# Patient Record
Sex: FEMALE | Race: WHITE | NOT HISPANIC OR LATINO | Employment: FULL TIME | URBAN - METROPOLITAN AREA
[De-identification: names, ages, dates, MRNs, and addresses within clinical notes are randomized per-mention and may not be internally consistent; named-entity substitution may affect disease eponyms.]

---

## 2018-06-14 ENCOUNTER — APPOINTMENT (OUTPATIENT)
Dept: URGENT CARE | Facility: CLINIC | Age: 33
End: 2018-06-14

## 2018-06-14 ENCOUNTER — APPOINTMENT (OUTPATIENT)
Dept: LAB | Facility: CLINIC | Age: 33
End: 2018-06-14

## 2018-06-14 ENCOUNTER — TRANSCRIBE ORDERS (OUTPATIENT)
Dept: URGENT CARE | Facility: CLINIC | Age: 33
End: 2018-06-14

## 2018-06-14 DIAGNOSIS — Z02.1 PRE-EMPLOYMENT HEALTH SCREENING EXAMINATION: Primary | ICD-10-CM

## 2018-06-14 DIAGNOSIS — Z02.1 PRE-EMPLOYMENT HEALTH SCREENING EXAMINATION: ICD-10-CM

## 2018-06-14 PROCEDURE — 86480 TB TEST CELL IMMUN MEASURE: CPT

## 2018-06-14 PROCEDURE — 36415 COLL VENOUS BLD VENIPUNCTURE: CPT

## 2018-06-16 LAB
ANNOTATION COMMENT IMP: NORMAL
GAMMA INTERFERON BACKGROUND BLD IA-ACNC: 0.05 IU/ML
M TB IFN-G BLD-IMP: NEGATIVE
M TB IFN-G CD4+ BCKGRND COR BLD-ACNC: <0.01 IU/ML
M TB IFN-G CD4+ T-CELLS BLD-ACNC: 0.04 IU/ML
MITOGEN IGNF BLD-ACNC: 7.52 IU/ML
QUANTIFERON-TB GOLD IN TUBE: NORMAL
SERVICE CMNT-IMP: NORMAL

## 2018-07-13 ENCOUNTER — HOSPITAL ENCOUNTER (EMERGENCY)
Facility: HOSPITAL | Age: 33
Discharge: HOME | End: 2018-07-13
Admitting: EMERGENCY MEDICINE
Payer: COMMERCIAL

## 2018-07-13 VITALS
HEART RATE: 51 BPM | TEMPERATURE: 97.2 F | SYSTOLIC BLOOD PRESSURE: 106 MMHG | DIASTOLIC BLOOD PRESSURE: 61 MMHG | RESPIRATION RATE: 18 BRPM | OXYGEN SATURATION: 100 %

## 2018-07-13 DIAGNOSIS — R60.0 EDEMA OF BOTH LEGS: Primary | ICD-10-CM

## 2018-07-13 LAB
ALBUMIN SERPL BCP-MCNC: 3.6 G/DL (ref 3.5–5)
ALP SERPL-CCNC: 57 U/L (ref 46–116)
ALT SERPL W P-5'-P-CCNC: 21 U/L (ref 12–78)
ANION GAP SERPL CALCULATED.3IONS-SCNC: 6 MMOL/L (ref 4–13)
AST SERPL W P-5'-P-CCNC: 13 U/L (ref 5–45)
BASOPHILS # BLD AUTO: 0.01 THOUSANDS/ΜL (ref 0–0.1)
BASOPHILS NFR BLD AUTO: 0 % (ref 0–1)
BILIRUB SERPL-MCNC: 0.3 MG/DL (ref 0.2–1)
BUN SERPL-MCNC: 11 MG/DL (ref 5–25)
CALCIUM SERPL-MCNC: 8.8 MG/DL (ref 8.3–10.1)
CHLORIDE SERPL-SCNC: 106 MMOL/L (ref 100–108)
CO2 SERPL-SCNC: 30 MMOL/L (ref 21–32)
CREAT SERPL-MCNC: 0.92 MG/DL (ref 0.6–1.3)
EOSINOPHIL # BLD AUTO: 0.25 THOUSAND/ΜL (ref 0–0.61)
EOSINOPHIL NFR BLD AUTO: 3 % (ref 0–6)
ERYTHROCYTE [DISTWIDTH] IN BLOOD BY AUTOMATED COUNT: 11.9 % (ref 11.6–15.1)
GFR SERPL CREATININE-BSD FRML MDRD: 83 ML/MIN/1.73SQ M
GLUCOSE SERPL-MCNC: 90 MG/DL (ref 65–140)
HCT VFR BLD AUTO: 37 % (ref 34.8–46.1)
HGB BLD-MCNC: 12.7 G/DL (ref 11.5–15.4)
IMM GRANULOCYTES # BLD AUTO: 0.02 THOUSAND/UL (ref 0–0.2)
IMM GRANULOCYTES NFR BLD AUTO: 0 % (ref 0–2)
LYMPHOCYTES # BLD AUTO: 3.04 THOUSANDS/ΜL (ref 0.6–4.47)
LYMPHOCYTES NFR BLD AUTO: 36 % (ref 14–44)
MCH RBC QN AUTO: 31.2 PG (ref 26.8–34.3)
MCHC RBC AUTO-ENTMCNC: 34.3 G/DL (ref 31.4–37.4)
MCV RBC AUTO: 91 FL (ref 82–98)
MONOCYTES # BLD AUTO: 0.56 THOUSAND/ΜL (ref 0.17–1.22)
MONOCYTES NFR BLD AUTO: 7 % (ref 4–12)
NEUTROPHILS # BLD AUTO: 4.69 THOUSANDS/ΜL (ref 1.85–7.62)
NEUTS SEG NFR BLD AUTO: 54 % (ref 43–75)
NRBC BLD AUTO-RTO: 0 /100 WBCS
PLATELET # BLD AUTO: 188 THOUSANDS/UL (ref 149–390)
PMV BLD AUTO: 10.7 FL (ref 8.9–12.7)
POTASSIUM SERPL-SCNC: 4 MMOL/L (ref 3.5–5.3)
PROT SERPL-MCNC: 6.8 G/DL (ref 6.4–8.2)
RBC # BLD AUTO: 4.07 MILLION/UL (ref 3.81–5.12)
SODIUM SERPL-SCNC: 142 MMOL/L (ref 136–145)
WBC # BLD AUTO: 8.57 THOUSAND/UL (ref 4.31–10.16)

## 2018-07-13 PROCEDURE — 36415 COLL VENOUS BLD VENIPUNCTURE: CPT | Performed by: PHYSICIAN ASSISTANT

## 2018-07-13 PROCEDURE — 99283 EMERGENCY DEPT VISIT LOW MDM: CPT

## 2018-07-13 PROCEDURE — 85025 COMPLETE CBC W/AUTO DIFF WBC: CPT | Performed by: PHYSICIAN ASSISTANT

## 2018-07-13 PROCEDURE — 80053 COMPREHEN METABOLIC PANEL: CPT | Performed by: PHYSICIAN ASSISTANT

## 2018-07-13 RX ORDER — VALACYCLOVIR HYDROCHLORIDE 500 MG/1
500 TABLET, FILM COATED ORAL DAILY
COMMUNITY
End: 2018-11-26 | Stop reason: SDUPTHER

## 2018-07-13 RX ORDER — OMEPRAZOLE 20 MG/1
20 CAPSULE, DELAYED RELEASE ORAL DAILY
COMMUNITY
End: 2018-11-05 | Stop reason: SDUPTHER

## 2018-07-13 RX ORDER — UREA 10 %
800 LOTION (ML) TOPICAL DAILY
COMMUNITY
End: 2021-05-21 | Stop reason: ALTCHOICE

## 2018-07-13 RX ORDER — ALBUTEROL SULFATE 90 UG/1
2 AEROSOL, METERED RESPIRATORY (INHALATION) EVERY 6 HOURS PRN
COMMUNITY
End: 2022-01-14

## 2018-07-13 RX ORDER — FLUTICASONE PROPIONATE 50 MCG
1 SPRAY, SUSPENSION (ML) NASAL AS NEEDED
COMMUNITY

## 2018-07-13 RX ORDER — LORATADINE 10 MG/1
10 CAPSULE, LIQUID FILLED ORAL DAILY
COMMUNITY

## 2018-07-13 NOTE — ED PROVIDER NOTES
History  Chief Complaint   Patient presents with    Leg Swelling     44-year-old female presents the ER with bilateral leg swelling that has worsened over this last week  Patient states that she just started a new job and has been in orientation where she spends more time sitting than usual   Patient states that she is usually very active and also walks in the evening but this week has not been able to  Patient denies any past history of blood clots and denies any redness, warmth, shortness of breath, difficulty breathing, wheezing, chest pain, chest tightness  Prior to Admission Medications   Prescriptions Last Dose Informant Patient Reported? Taking? Loratadine (CLARITIN) 10 MG CAPS   Yes Yes   Sig: Take 10 mg by mouth   albuterol (PROVENTIL HFA,VENTOLIN HFA) 90 mcg/act inhaler   Yes Yes   Sig: Inhale 2 puffs every 6 (six) hours as needed for wheezing   docusate sodium (COLACE) 50 mg capsule   Yes Yes   Sig: Take by mouth 2 (two) times a day   fluticasone (FLONASE) 50 mcg/act nasal spray   Yes Yes   Si spray into each nostril daily   folic acid (FOLVITE) 555 MCG tablet   Yes Yes   Sig: Take 400 mcg by mouth daily   omeprazole (PriLOSEC) 20 mg delayed release capsule   Yes Yes   Sig: Take 20 mg by mouth daily   valACYclovir (VALTREX) 500 mg tablet   Yes Yes   Sig: Take 500 mg by mouth daily      Facility-Administered Medications: None       No past medical history on file  No past surgical history on file  No family history on file  I have reviewed and agree with the history as documented  Social History   Substance Use Topics    Smoking status: Not on file    Smokeless tobacco: Not on file    Alcohol use Not on file        Review of Systems   HENT: Negative  Eyes: Negative  Respiratory: Negative  Cardiovascular: Positive for leg swelling  Negative for chest pain and palpitations  Gastrointestinal: Negative  Genitourinary: Negative  Musculoskeletal: Negative  Neurological: Negative  All other systems reviewed and are negative  Physical Exam  Physical Exam   Constitutional: She is oriented to person, place, and time  Vital signs are normal  She appears well-developed and well-nourished  No distress  HENT:   Head: Normocephalic and atraumatic  Eyes: Conjunctivae and EOM are normal  Pupils are equal, round, and reactive to light  Neck: Normal range of motion  Neck supple  Cardiovascular: Normal rate, regular rhythm, normal heart sounds and intact distal pulses  Pulmonary/Chest: Effort normal and breath sounds normal    Abdominal: Soft  Bowel sounds are normal    Musculoskeletal: Normal range of motion  She exhibits edema (Plus one edema bilaterally, no pitting)  Neurological: She is alert and oriented to person, place, and time  Skin: Skin is warm and dry  Capillary refill takes less than 2 seconds  She is not diaphoretic  No erythema  Psychiatric: She has a normal mood and affect  Her speech is normal and behavior is normal  Judgment and thought content normal    Nursing note and vitals reviewed        Vital Signs  ED Triage Vitals   Temperature Pulse Respirations Blood Pressure SpO2   07/13/18 1800 07/13/18 1759 07/13/18 1759 07/13/18 1759 07/13/18 1759   (!) 97 2 °F (36 2 °C) 64 18 118/64 99 %      Temp src Heart Rate Source Patient Position - Orthostatic VS BP Location FiO2 (%)   -- 07/13/18 1930 07/13/18 1759 07/13/18 1759 --    Monitor Lying Right arm       Pain Score       07/13/18 1935       3           Vitals:    07/13/18 1759 07/13/18 1800 07/13/18 1930   BP: 118/64  106/61   Pulse: 64 64 (!) 51   Patient Position - Orthostatic VS: Lying  Lying       Visual Acuity      ED Medications  Medications - No data to display    Diagnostic Studies  Results Reviewed     Procedure Component Value Units Date/Time    Comprehensive metabolic panel [[id number]] Collected:  07/13/18 1915    Lab Status:  Final result Specimen:  Blood from Arm, Right Updated:  07/13/18 1945     Sodium 142 mmol/L      Potassium 4 0 mmol/L      Chloride 106 mmol/L      CO2 30 mmol/L      Anion Gap 6 mmol/L      BUN 11 mg/dL      Creatinine 0 92 mg/dL      Glucose 90 mg/dL      Calcium 8 8 mg/dL      AST 13 U/L      ALT 21 U/L      Alkaline Phosphatase 57 U/L      Total Protein 6 8 g/dL      Albumin 3 6 g/dL      Total Bilirubin 0 30 mg/dL      eGFR 83 ml/min/1 73sq m     Narrative:         National Kidney Disease Education Program recommendations are as follows:  GFR calculation is accurate only with a steady state creatinine  Chronic Kidney disease less than 60 ml/min/1 73 sq  meters  Kidney failure less than 15 ml/min/1 73 sq  meters  CBC and differential [[id number]] Collected:  07/13/18 1915    Lab Status:  Final result Specimen:  Blood from Arm, Right Updated:  07/13/18 1923     WBC 8 57 Thousand/uL      RBC 4 07 Million/uL      Hemoglobin 12 7 g/dL      Hematocrit 37 0 %      MCV 91 fL      MCH 31 2 pg      MCHC 34 3 g/dL      RDW 11 9 %      MPV 10 7 fL      Platelets 283 Thousands/uL      nRBC 0 /100 WBCs      Neutrophils Relative 54 %      Immat GRANS % 0 %      Lymphocytes Relative 36 %      Monocytes Relative 7 %      Eosinophils Relative 3 %      Basophils Relative 0 %      Neutrophils Absolute 4 69 Thousands/µL      Immature Grans Absolute 0 02 Thousand/uL      Lymphocytes Absolute 3 04 Thousands/µL      Monocytes Absolute 0 56 Thousand/µL      Eosinophils Absolute 0 25 Thousand/µL      Basophils Absolute 0 01 Thousands/µL                  No orders to display              Procedures  Procedures       Phone Contacts  ED Phone Contact    ED Course                               MDM  Number of Diagnoses or Management Options  Edema of both legs: new and requires workup  Diagnosis management comments: Patient advised to try compression stocking and follow up with family doctor         Amount and/or Complexity of Data Reviewed  Clinical lab tests: ordered and reviewed    Patient Progress  Patient progress: stable    CritCare Time    Disposition  Final diagnoses:   Edema of both legs     Time reflects when diagnosis was documented in both MDM as applicable and the Disposition within this note     Time User Action Codes Description Comment    7/13/2018  8:49 PM Zahra Rutherford Add [R60 0] Edema of both legs       ED Disposition     ED Disposition Condition Comment    Discharge  [facility] discharge to home/self care  Condition at discharge: Stable        Follow-up Information     Follow up With Specialties Details Why Mario Cortes U  13 , DO Family Medicine Call For [address], If symptoms worsen [address] [facility]  [phone]            Discharge Medication List as of 7/13/2018  8:50 PM      CONTINUE these medications which have NOT CHANGED    Details   albuterol (PROVENTIL HFA,VENTOLIN HFA) 90 mcg/act inhaler Inhale 2 puffs every 6 (six) hours as needed for wheezing, Historical Med      docusate sodium (COLACE) 50 mg capsule Take by mouth 2 (two) times a day, Historical Med      fluticasone (FLONASE) 50 mcg/act nasal spray 1 spray into each nostril daily, Historical Med      folic acid (FOLVITE) 500 MCG tablet Take 400 mcg by mouth daily, Historical Med      Loratadine (CLARITIN) 10 MG CAPS Take 10 mg by mouth, Historical Med      omeprazole (PriLOSEC) 20 mg delayed release capsule Take 20 mg by mouth daily, Historical Med      valACYclovir (VALTREX) 500 mg tablet Take 500 mg by mouth daily, Historical Med           No discharge procedures on file      ED Provider  Electronically Signed by           Lexy Hwang PA-C  07/23/18 7323

## 2018-07-14 NOTE — DISCHARGE INSTRUCTIONS
Leg Edema   WHAT YOU NEED TO KNOW:   Leg edema is swelling caused by fluid buildup  Your legs may swell if you sit or stand for long periods of time, are pregnant, or are injured  Swelling may also occur if you have heart failure or circulation problems  This means that your heart does not pump blood through your body as it should  DISCHARGE INSTRUCTIONS:   Self-care:   · Elevate your legs:  Raise your legs above the level of your heart as often as you can  This will help decrease swelling and pain  Prop your legs on pillows or blankets to keep them elevated comfortably  · Wear pressure stockings: These tight stockings put pressure on your legs to promote blood flow and prevent blood clots  Wear the stockings during the day  Do not wear them while you sleep  · Apply heat:  Heat helps decrease pain and swelling  Apply heat on the area for 20 to 30 minutes every 2 hours for as many days as directed  · Stay active:  Do not stand or sit for long periods of time  Ask your healthcare provider about the best exercise plan for you  · Eat healthy foods:  Healthy foods include fruits, vegetables, whole-grain breads, low-fat dairy products, beans, lean meats, and fish  Ask if you need to be on a special diet  Limit salt  Salt will make your body hold even more fluid  Follow up with your healthcare provider as directed:  Write down your questions so you remember to ask them during your visits  Contact your healthcare provider if:   · You have a fever or feel more tired than usual     · The veins in your legs look larger than usual  They may look full or bulging  · Your legs itch or feel heavy  · You have red or white areas or sores on your legs  The skin may also appear dimpled or have indentations  · You are gaining weight  · You have trouble moving your ankles  · The swelling does not go away, or other parts of your body swell      · You have questions or concerns about your condition or care   Return to the emergency department if:   · You cannot walk  · You feel faint or confused  · Your skin turns blue or gray  · Your leg feels warm, tender, and painful  It may be swollen and red  · You have chest pain or trouble breathing that is worse when you lie down  · You suddenly feel lightheaded and have trouble breathing  · You have new and sudden chest pain  You may have more pain when you take deep breaths or cough  You may also cough up blood  © 2017 [address]  Information is for End User's use only and may not be sold, redistributed or otherwise used for commercial purposes  All illustrations and images included in CareNotes® are the copyrighted property of [employer] , Inc  or [employer]  The above information is an  only  It is not intended as medical advice for individual conditions or treatments  Talk to your doctor, nurse or pharmacist before following any medical regimen to see if it is safe and effective for you

## 2018-11-05 ENCOUNTER — OFFICE VISIT (OUTPATIENT)
Dept: FAMILY MEDICINE CLINIC | Facility: CLINIC | Age: 33
End: 2018-11-05
Payer: COMMERCIAL

## 2018-11-05 VITALS
WEIGHT: 240.8 LBS | BODY MASS INDEX: 33.71 KG/M2 | HEART RATE: 68 BPM | TEMPERATURE: 97.6 F | SYSTOLIC BLOOD PRESSURE: 110 MMHG | DIASTOLIC BLOOD PRESSURE: 78 MMHG | HEIGHT: 71 IN

## 2018-11-05 DIAGNOSIS — M54.42 CHRONIC LEFT-SIDED LOW BACK PAIN WITH LEFT-SIDED SCIATICA: ICD-10-CM

## 2018-11-05 DIAGNOSIS — M25.552 LEFT HIP PAIN: Primary | ICD-10-CM

## 2018-11-05 DIAGNOSIS — G89.29 CHRONIC LEFT-SIDED LOW BACK PAIN WITH LEFT-SIDED SCIATICA: ICD-10-CM

## 2018-11-05 DIAGNOSIS — K21.9 GASTROESOPHAGEAL REFLUX DISEASE WITHOUT ESOPHAGITIS: ICD-10-CM

## 2018-11-05 PROCEDURE — 99203 OFFICE O/P NEW LOW 30 MIN: CPT | Performed by: NURSE PRACTITIONER

## 2018-11-05 RX ORDER — OMEPRAZOLE 20 MG/1
20 CAPSULE, DELAYED RELEASE ORAL DAILY
Qty: 30 CAPSULE | Refills: 2 | Status: SHIPPED | OUTPATIENT
Start: 2018-11-05 | End: 2018-12-24 | Stop reason: SDUPTHER

## 2018-11-05 NOTE — PROGRESS NOTES
Assessment/Plan   Diagnoses and all orders for this visit:    Left hip pain  -     Ambulatory referral to Pain Management; Future  -     MRI lumbar spine wo contrast; Future    Chronic left-sided low back pain with left-sided sciatica  -     Ambulatory referral to Pain Management; Future  -     MRI lumbar spine wo contrast; Future    Gastroesophageal reflux disease without esophagitis  -     omeprazole (PriLOSEC) 20 mg delayed release capsule; Take 1 capsule (20 mg total) by mouth daily for 30 days        Chief Complaint   Patient presents with   [facility] patient check up, left hip pain and radiates down leg  last 3 digits and foot numb for 4 months  Had x-rays and PT no help  Subjective   Patient ID: Jayashree Kaiser is a 29 y o  female  Vitals:    11/05/18 0808   BP: 110/78   Pulse: 68   Temp: 97 6 °F (36 4 °C)     Krista Michael is here today to establish care with this practice, he states that since July she has been experiencing pain initially in her low back which radiated down her sciatic area of her left buttocks into her left hip pain radiates to knee, she has numbness of 3rd 4th and 5th toes  She has had films and feedings well [facility] for her hip and back which were without abnormality, has been on meloxicam Medrol Dosepaks and has had a course of physical therapy without any change in the pain  She has decreased range of motion of her left hip due to pain, she is limping  She is without decreased strength today, denies any weakness of her leg or giving out feeling  Hip Pain    The incident occurred more than 1 week ago  There was no injury mechanism  The pain is present in the left hip  The quality of the pain is described as stabbing and aching  The pain is at a severity of 7/10  The pain is moderate   The pain has been intermittent (pain increases in left hip with rotation, sciatic pain at times, none today, numbness of left 3rd, 4th, and 5th toes, pins and needleles feeling with long persiods of sitting) since onset  Associated symptoms include a loss of sensation, numbness and tingling  Pertinent negatives include no inability to bear weight, loss of motion or muscle weakness  She reports no foreign bodies present  The symptoms are aggravated by movement  She has tried NSAIDs and rest (physical therapy, has had 1 SI injectiion) for the symptoms  The treatment provided no relief  The following portions of the patient's history were reviewed and updated as appropriate: allergies, past family history, past medical history, past social history, past surgical history and problem list     Review of Systems   Constitutional: Negative  Negative for chills, fatigue and fever  HENT: Negative  Eyes: Negative  Respiratory: Negative  Negative for cough and shortness of breath  Cardiovascular: Negative  Gastrointestinal: Negative  Endocrine: Negative  Genitourinary: Negative  Musculoskeletal: Positive for arthralgias (left hip pain) and gait problem  Negative for joint swelling, myalgias and neck pain  Skin: Negative  Allergic/Immunologic: Negative  Neurological: Positive for tingling and numbness  Hematological: Negative  Psychiatric/Behavioral: Negative  Objective     Physical Exam   Constitutional: She is oriented to person, place, and time  She appears well-developed and well-nourished  No distress  HENT:   Head: Normocephalic and atraumatic  Eyes: Conjunctivae are normal  Right eye exhibits no discharge  Left eye exhibits no discharge  Neck: Normal range of motion  Neck supple  No thyromegaly present  Cardiovascular: Normal rate, regular rhythm, normal heart sounds and intact distal pulses  No murmur heard  Pulmonary/Chest: Effort normal and breath sounds normal  No respiratory distress  Musculoskeletal: She exhibits tenderness  She exhibits no edema  Left hip: She exhibits decreased range of motion and tenderness  She exhibits normal strength, no swelling and no crepitus  Legs:  Decreased ROM of left hip r/t pain   Lymphadenopathy:     She has no cervical adenopathy  Neurological: She is alert and oriented to person, place, and time  Skin: Skin is warm and dry  Capillary refill takes less than 2 seconds  She is not diaphoretic  Psychiatric: She has a normal mood and affect  Her behavior is normal  Judgment and thought content normal    Nursing note and vitals reviewed

## 2018-11-13 ENCOUNTER — OFFICE VISIT (OUTPATIENT)
Dept: PAIN MEDICINE | Facility: CLINIC | Age: 33
End: 2018-11-13
Payer: COMMERCIAL

## 2018-11-13 VITALS
BODY MASS INDEX: 33.6 KG/M2 | HEART RATE: 72 BPM | SYSTOLIC BLOOD PRESSURE: 110 MMHG | DIASTOLIC BLOOD PRESSURE: 78 MMHG | WEIGHT: 240 LBS | HEIGHT: 71 IN

## 2018-11-13 DIAGNOSIS — G89.29 CHRONIC LEFT-SIDED LOW BACK PAIN WITH LEFT-SIDED SCIATICA: ICD-10-CM

## 2018-11-13 DIAGNOSIS — M54.42 CHRONIC LEFT-SIDED LOW BACK PAIN WITH LEFT-SIDED SCIATICA: ICD-10-CM

## 2018-11-13 DIAGNOSIS — M54.16 LUMBAR RADICULOPATHY: Primary | ICD-10-CM

## 2018-11-13 DIAGNOSIS — M25.552 LEFT HIP PAIN: ICD-10-CM

## 2018-11-13 PROCEDURE — 99244 OFF/OP CNSLTJ NEW/EST MOD 40: CPT | Performed by: ANESTHESIOLOGY

## 2018-11-13 RX ORDER — PREDNISONE 10 MG/1
TABLET ORAL
Qty: 21 TABLET | Refills: 0 | Status: SHIPPED | OUTPATIENT
Start: 2018-11-13 | End: 2019-01-29

## 2018-11-13 RX ORDER — BIOTIN 1 MG
1000 TABLET ORAL 3 TIMES DAILY
COMMUNITY
End: 2021-05-21 | Stop reason: ALTCHOICE

## 2018-11-13 NOTE — PROGRESS NOTES
Assessment:  1  Lumbar radiculopathy - Left    2  Chronic left-sided low back pain with left-sided sciatica    3  Left hip pain        Plan: At this point the patient's pain persists despite time, relative rest, activity modification, and nonsteroidal anti-inflammatories  She has undergone a course of physical therapy  She has neurological deficits  Her pain is significantly interfering with her daily living activities  I believe is appropriate to order an MRI of the lumbar spine to rule out any significant etiology of her symptoms  I will start her on a titrating dose of oral prednisone to address any inflammatory component of the patient's pain  She understands she should not take nonsteroidal anti-inflammatories until she is finished with this steroid  If she has any problems or questions she will give us a call  Once we obtain MRI results we will proceed from there  My impressions and treatment recommendations were discussed in detail with the patient who verbalized understanding and had no further questions  Discharge instructions were provided  I personally saw and examined the patient and I agree with the above discussed plan of care  Orders Placed This Encounter   Procedures    MRI lumbar spine without contrast     Standing Status:   Future     Standing Expiration Date:   11/13/2022     Scheduling Instructions: There is no preparation for this test  Please leave your jewelry and valuables at home, wedding rings are the exception  Please bring your insurance cards, a form of photo ID and a list of your medications with you  Arrive 15 minutes prior to your appointment time in order to register  Please bring any prior CT or MRI studies of this area that were not performed at a [facility]  To schedule this appointment, please contact Central Scheduling at [phone]  Order Specific Question:   Is the patient pregnant?      Answer:   Unknown     Order Specific Question:   What is the patient's sedation requirement? Answer:   No Sedation     New Medications Ordered This Visit   Medications    Biotin 1000 MCG tablet     Sig: Take 1,000 mcg by mouth 3 (three) times a day    predniSONE 10 mg tablet     Sig: Take according to titration schedule     Dispense:  21 tablet     Refill:  0       Referred by Clara Mcpherson      History of Present Illness:    Jhon Hauser is a 29 y o  female with 4 month history of left-sided low back and left lower extremity pain and numbness  She is unaware of any clear precipitating event denies any trauma or injury  Her pain is moderate to severe she rates it between 6 to 8/10 on the visual analog scale significant interfering with daily living activities  Pain is nearly constant worse in the morning and night  She notes that physical therapy TENS heat nice provides no relief she did undergo 4 weeks of physical therapy  She describes the pain as sharp in her low back pins and needles down her leg with numbness into her toes she has subjective weakness of the left lower limb  Walking decreases symptoms while lying down, standing, bending, sitting, coughing and sneezing, all exacerbate her symptoms  I have personally reviewed and/or updated the patient's past medical history, past surgical history, family history, social history, current medications, allergies, and vital signs today  Review of Systems:    Review of Systems   Constitutional: Positive for unexpected weight change  Negative for fever  HENT: Negative for trouble swallowing  Eyes: Negative for visual disturbance  Respiratory: Negative for shortness of breath and wheezing  Cardiovascular: Negative for chest pain and palpitations  Gastrointestinal: Negative for constipation, diarrhea, nausea and vomiting  Endocrine: Negative for cold intolerance, heat intolerance and polydipsia  Genitourinary: Negative for difficulty urinating and frequency  Musculoskeletal: Positive for gait problem (Difficulty walking )  Negative for arthralgias, joint swelling and myalgias  Skin: Negative for rash  Neurological: Negative for dizziness, seizures, syncope, weakness and headaches  Hematological: Bruises/bleeds easily  Psychiatric/Behavioral: Negative for dysphoric mood  All other systems reviewed and are negative  Patient Active Problem List   Diagnosis    Lumbar radiculopathy - Left       Past Medical History:   Diagnosis Date    Asthma     GERD (gastroesophageal reflux disease)        Past Surgical History:   Procedure Laterality Date    CERVICAL BIOPSY  W/ LOOP ELECTRODE EXCISION      TONSILECTOMY AND ADNOIDECTOMY      WISDOM TOOTH EXTRACTION         No family history on file  Social History     Occupational History    Not on file  Social History Main Topics    Smoking status: Former Smoker    Smokeless tobacco: Never Used    Alcohol use Not on file    Drug use: Unknown    Sexual activity: Not on file       Current Outpatient Prescriptions on File Prior to Visit   Medication Sig    albuterol (PROVENTIL HFA,VENTOLIN HFA) 90 mcg/act inhaler Inhale 2 puffs every 6 (six) hours as needed for wheezing    docusate sodium (COLACE) 50 mg capsule Take by mouth 2 (two) times a day    fluticasone (FLONASE) 50 mcg/act nasal spray 1 spray into each nostril daily    folic acid (FOLVITE) 835 MCG tablet Take 400 mcg by mouth daily    Loratadine (CLARITIN) 10 MG CAPS Take 10 mg by mouth    omeprazole (PriLOSEC) 20 mg delayed release capsule Take 1 capsule (20 mg total) by mouth daily for 30 days    valACYclovir (VALTREX) 500 mg tablet Take 500 mg by mouth daily     No current facility-administered medications on file prior to visit          Allergies   Allergen Reactions    Doxycycline Edema    Sulfa Antibiotics      Family has allergy she has never taken       Physical Exam:    /78   Pulse 72   Ht 5' 11" (1 803 m)   Wt 109 kg (240 lb)   BMI 33 47 kg/m²     Constitutional: normal, well developed, well nourished, alert, in no distress and non-toxic and no overt pain behavior  and overweight  Eyes: anicteric  HEENT: grossly intact  Neck: supple, symmetric, trachea midline and no masses   Pulmonary:even and unlabored  Cardiovascular:No edema or pitting edema present  Skin:Normal without rashes or lesions and well hydrated  Psychiatric:Mood and affect appropriate  Neurologic:Cranial Nerves II-XII grossly intact  Musculoskeletal:normal and antalgic, difficulty going from sitting to standing to sitting position, no obvious skin lesions or erythema lumbar sacral spine, there is tenderness palpation on the left PSIS negative on the right no greater trochanteric tenderness bilateral no spinous process tenderness, deep tendon reflexes are absent left patella 2+ right patella diminished but symmetrical bilateral Achilles, decreased sensation left L5 distribution to pinwheel compared to the right, no focal motor deficit appreciated the lower limbs positive straight leg raising on the left negative the right negative bilateral Nikko's maneuver    Imaging  MRI LUMBAR SP W/O IV CONTRAST  07/02/15 @ [facility]      Indication: Lumbago  724 2  Backache  724 5  Disorders of sacrum  724 6  Chronic low back pain  Occasional right leg pain  Comparison:None     Findings: Multiple planar MR examination of the lumbar spine performed  The termination of the   conus medullaris at the level of T12  The conus is normal in appearance  L1-L2 interspace: The disc is well maintained  There is no disc bulging or herniation  The spinal   canal is of normal dimensions  The neural foramina are patent  L2-L3 interspace: The disc is well-maintained  There is no disc bulging or herniation  The spinal   canal and neural foramina are patent  L3-L4 interspace: The disc is well maintained  There is no disc bulging or herniation  The spinal   canal is of normal dimensions  The neural foramina are patent  L4-L5 interspace: The disc is well maintained  There is no disc bulging or herniation  The spinal   canal is of normal dimensions  The neural foramina are patent  L5-S1 interspace: There is degenerative disc disease  There is a small broad-based central disc   herniation causing minimal ventral impression on the thecal sac  There is Modic type I marrow   reactive signal at the L5 and S1 endplates  There is mild degenerative facet disease  Conclusion: Degenerative disc disease, small central disc herniation and Modic type I reactive   marrow signal at the L5-S1 level  I have personally reviewed pertinent films in PACS

## 2018-11-16 ENCOUNTER — HOSPITAL ENCOUNTER (OUTPATIENT)
Dept: MRI IMAGING | Facility: HOSPITAL | Age: 33
Discharge: HOME | End: 2018-11-16
Payer: COMMERCIAL

## 2018-11-16 ENCOUNTER — TELEPHONE (OUTPATIENT)
Dept: PAIN MEDICINE | Facility: CLINIC | Age: 33
End: 2018-11-16

## 2018-11-16 DIAGNOSIS — M25.552 LEFT HIP PAIN: ICD-10-CM

## 2018-11-16 DIAGNOSIS — G89.29 CHRONIC LEFT-SIDED LOW BACK PAIN WITH LEFT-SIDED SCIATICA: ICD-10-CM

## 2018-11-16 DIAGNOSIS — M54.42 CHRONIC LEFT-SIDED LOW BACK PAIN WITH LEFT-SIDED SCIATICA: ICD-10-CM

## 2018-11-16 DIAGNOSIS — M51.26 LUMBAR DISC HERNIATION: Primary | ICD-10-CM

## 2018-11-16 PROCEDURE — 72148 MRI LUMBAR SPINE W/O DYE: CPT

## 2018-11-16 NOTE — TELEPHONE ENCOUNTER
Order for Dr Alvarado Garcia in [facility]    Procedure for TFESI in [facility]

## 2018-11-16 NOTE — TELEPHONE ENCOUNTER
Left a detailed message on machine advising of referral to dr Misael Romero  Provided spelling and phone number  Advised pt of order for consuelo  Anticipate a cb from spa surg coordinator to schedule procedure  Please cb with questions or concerns  Provided cb number and office hours

## 2018-11-16 NOTE — TELEPHONE ENCOUNTER
----- Message from Pj Mejia DO sent at 11/16/2018 11:54 AM EST -----  Patient's MRI reveals large disc herniation at L5 S1    I recommend a surgical evaluation and would consider lumbar transforaminal epidural steroid injection, as her pain 1 side versus the other question

## 2018-11-16 NOTE — TELEPHONE ENCOUNTER
S/w pt, advised of above  Per pt,  Pain is worst in her L hip and down the back of her L leg with numbness into her L foot  Pt questioned tfesi, explained procedure as an injection of steroids into the affected area to treat pain along a specific pathway  Allow 3-5 days for the effects of the steroids, this office will fu in 1 week to determine the effectiveness  Advised pt, the procedure may provide significant relief, may need to be repeated to achieve acceptable relief or may fail to provide any relief  Pt verbalized understanding and agreement and requested a referral to a surgeon  Advised pt, will fu w/ SL re: procedure specifics and surgical referral and cb  Pt appreciative       Surg referral   Procedure order

## 2018-11-19 NOTE — TELEPHONE ENCOUNTER
S/w pt and she will be having her physical therapy from [facility] faxed to office  Pt is scheduled for 12/3 pending authorization, pt aware need for , npo 1 hr prior, wear pants without any metal, if become ill/anbtx call to r/s  Pt verbalized understanding

## 2018-11-21 ENCOUNTER — TELEPHONE (OUTPATIENT)
Dept: FAMILY MEDICINE CLINIC | Facility: CLINIC | Age: 33
End: 2018-11-21

## 2018-11-21 ENCOUNTER — OFFICE VISIT (OUTPATIENT)
Dept: OBGYN CLINIC | Facility: CLINIC | Age: 33
End: 2018-11-21
Payer: COMMERCIAL

## 2018-11-21 VITALS
HEART RATE: 70 BPM | BODY MASS INDEX: 33.6 KG/M2 | DIASTOLIC BLOOD PRESSURE: 75 MMHG | HEIGHT: 71 IN | WEIGHT: 240 LBS | SYSTOLIC BLOOD PRESSURE: 116 MMHG

## 2018-11-21 DIAGNOSIS — M51.26 LUMBAR DISC HERNIATION: Primary | ICD-10-CM

## 2018-11-21 DIAGNOSIS — M54.16 LUMBAR RADICULOPATHY: ICD-10-CM

## 2018-11-21 PROCEDURE — 99244 OFF/OP CNSLTJ NEW/EST MOD 40: CPT | Performed by: ORTHOPAEDIC SURGERY

## 2018-11-21 NOTE — PROGRESS NOTES
Patient Name:  Latia Timmons  MRN:  [id number]    Assessment     1  Lumbar disc herniation  Ambulatory referral to Orthopedic Surgery   2  Lumbar radiculopathy - Left         Plan     Large  Central disc herniation L5-S1 with  Associated lumbar radiculopathy  1  Patient does have an epidural steroid injection scheduled for 12/2/18  She would like to proceed with this  I feel that is reasonable  2  Recommend follow-up two weeks after the injection  If symptoms persist consider diskectomy at that time  3  Reviewed signs and symptoms of cauda equina syndrome with the patient and advise she reported immediately to the emergency department if she experiences any of these signs or symptoms  Chief Complaint     Left lower extremity pain      History of the Present Illness     Latia Timmons is a 29 y o  female who reports to the office today for evaluation of her left lower extremity  She has been experiencing low back pain and left lower extremity pain since July of 2018  She denies any injury or trauma  She states pain in the left-sided lumbar spine with radiation distally along the left lower extremity to the toes  She notes associated numbness and tingling in this distribution as well  She notes associated weakness  She does see Dr Troy Neal who ultimately obtained an MRI and scheduled the patient to undergo an epidural steroid injection on 12/3/18  She notes worsening pain with ambulation  She denies bowel or bladder dysfunction  No fevers or chills  Physical Exam     /75   Pulse 70   Ht 5' 11" (1 803 m)   Wt 109 kg (240 lb)   BMI 33 47 kg/m²     Lumbar spine:  No gross deformity  Skin intact  No erythema ecchymosis or swelling  No significant tenderness to palpation midline and paraspinal musculature  Motor intact L2-S1 bilaterally with 5/5 strength  Sensation intact in all distributions bilaterally with the exception of the left lateral foot    Positive straight leg raise on the left  Diminished DTRs on the left compared to the right  Skin warm and well perfused    Eyes:  Anicteric sclerae  Neck:  Supple  Lungs:  Unlabored breathing  Cardiovascular:  Capillary refill is less than 2 seconds  Skin:  Intact without erythema  Neurologic:  Sensation intact to light touch  Psychiatric:  Mood and affect are appropriate  Data Review     I have personally reviewed pertinent films in PACS, and my interpretation follows:    MRI of the lumbar spine reveals a large prominent central disc protrusion at L5-S1 which resulted in moderate to severe spinal stenosis most severe on the left        Past Medical History:   Diagnosis Date    Allergic     Asthma     GERD (gastroesophageal reflux disease)        Past Surgical History:   Procedure Laterality Date    CERVICAL BIOPSY  W/ LOOP ELECTRODE EXCISION      TONSILECTOMY AND ADNOIDECTOMY      WISDOM TOOTH EXTRACTION         Allergies   Allergen Reactions    Doxycycline Edema    Sulfa Antibiotics      Family has allergy she has never taken       Current Outpatient Prescriptions on File Prior to Visit   Medication Sig Dispense Refill    albuterol (PROVENTIL HFA,VENTOLIN HFA) 90 mcg/act inhaler Inhale 2 puffs every 6 (six) hours as needed for wheezing      Biotin 1000 MCG tablet Take 1,000 mcg by mouth 3 (three) times a day      docusate sodium (COLACE) 50 mg capsule Take by mouth 2 (two) times a day      fluticasone (FLONASE) 50 mcg/act nasal spray 1 spray into each nostril daily      folic acid (FOLVITE) 300 MCG tablet Take 400 mcg by mouth daily      Loratadine (CLARITIN) 10 MG CAPS Take 10 mg by mouth      omeprazole (PriLOSEC) 20 mg delayed release capsule Take 1 capsule (20 mg total) by mouth daily for 30 days 30 capsule 2    predniSONE 10 mg tablet Take according to titration schedule 21 tablet 0    valACYclovir (VALTREX) 500 mg tablet Take 500 mg by mouth daily       No current facility-administered medications on file prior to visit  Social History   Substance Use Topics    Smoking status: Former Smoker    Smokeless tobacco: Never Used    Alcohol use Not on file       Family History   Problem Relation Age of Onset    Diabetes Mother     Diabetes Father     Cancer Maternal Grandmother     Esophageal varices Maternal Grandmother     Abnormal EKG Maternal Grandmother     Alcohol abuse Maternal Grandmother     Cirrhosis Maternal Grandmother     Cancer Maternal Grandfather     Heart disease Maternal Grandfather     Stroke Maternal Grandfather     Hypertension Paternal Grandmother     Diabetes Paternal Grandmother     Diabetes Paternal Grandfather     Heart disease Maternal Uncle     Hypertension Paternal Aunt     Diabetes Paternal Aunt     Hypertension Paternal Uncle     Diabetes Paternal Uncle          Review of Systems     As stated in the HPI  All other systems were reviewed and are negative        Scribe Attestation    I,:   Ritesh Levy PA-C am acting as a scribe while in the presence of the attending physician :        I,:   Dinesh Hdez MD personally performed the services described in this documentation    as scribed in my presence :

## 2018-11-26 DIAGNOSIS — B00.9 HERPES: Primary | ICD-10-CM

## 2018-11-26 RX ORDER — VALACYCLOVIR HYDROCHLORIDE 500 MG/1
500 TABLET, FILM COATED ORAL DAILY
Qty: 30 TABLET | Refills: 2 | Status: SHIPPED | OUTPATIENT
Start: 2018-11-26 | End: 2018-12-24 | Stop reason: SDUPTHER

## 2018-12-03 ENCOUNTER — HOSPITAL ENCOUNTER (OUTPATIENT)
Dept: RADIOLOGY | Facility: CLINIC | Age: 33
Discharge: HOME | End: 2018-12-03
Admitting: ANESTHESIOLOGY
Payer: COMMERCIAL

## 2018-12-03 VITALS
DIASTOLIC BLOOD PRESSURE: 79 MMHG | HEART RATE: 76 BPM | RESPIRATION RATE: 20 BRPM | TEMPERATURE: 98.4 F | OXYGEN SATURATION: 98 % | SYSTOLIC BLOOD PRESSURE: 124 MMHG

## 2018-12-03 DIAGNOSIS — M51.26 LUMBAR DISC HERNIATION: ICD-10-CM

## 2018-12-03 PROCEDURE — 64483 NJX AA&/STRD TFRM EPI L/S 1: CPT | Performed by: ANESTHESIOLOGY

## 2018-12-03 PROCEDURE — 64484 NJX AA&/STRD TFRM EPI L/S EA: CPT | Performed by: ANESTHESIOLOGY

## 2018-12-03 RX ORDER — LIDOCAINE HYDROCHLORIDE 10 MG/ML
5 INJECTION, SOLUTION EPIDURAL; INFILTRATION; INTRACAUDAL; PERINEURAL ONCE
Status: COMPLETED | OUTPATIENT
Start: 2018-12-03 | End: 2018-12-03

## 2018-12-03 RX ORDER — METHYLPREDNISOLONE ACETATE 80 MG/ML
160 INJECTION, SUSPENSION INTRA-ARTICULAR; INTRALESIONAL; INTRAMUSCULAR; PARENTERAL; SOFT TISSUE ONCE
Status: COMPLETED | OUTPATIENT
Start: 2018-12-03 | End: 2018-12-03

## 2018-12-03 RX ADMIN — IOHEXOL 1 ML: 300 INJECTION, SOLUTION INTRAVENOUS at 14:04

## 2018-12-03 RX ADMIN — METHYLPREDNISOLONE ACETATE 160 MG: 80 INJECTION, SUSPENSION INTRA-ARTICULAR; INTRALESIONAL; INTRAMUSCULAR; SOFT TISSUE at 14:09

## 2018-12-03 RX ADMIN — LIDOCAINE HYDROCHLORIDE 3 ML: 10 INJECTION, SOLUTION EPIDURAL; INFILTRATION; INTRACAUDAL; PERINEURAL at 14:04

## 2018-12-03 RX ADMIN — LIDOCAINE HYDROCHLORIDE 2 ML: 20 INJECTION, SOLUTION EPIDURAL; INFILTRATION; INTRACAUDAL at 14:08

## 2018-12-03 NOTE — H&P
History of Present Illness: The patient is a 29 y o  female who presents with complaints of low back and leg pain  Patient Active Problem List   Diagnosis    Lumbar radiculopathy - Left    Lumbar disc herniation       Past Medical History:   Diagnosis Date    Allergic     Asthma     GERD (gastroesophageal reflux disease)        Past Surgical History:   Procedure Laterality Date    CERVICAL BIOPSY  W/ LOOP ELECTRODE EXCISION      TONSILECTOMY AND ADNOIDECTOMY      WISDOM TOOTH EXTRACTION           Current Outpatient Prescriptions:     albuterol (PROVENTIL HFA,VENTOLIN HFA) 90 mcg/act inhaler, Inhale 2 puffs every 6 (six) hours as needed for wheezing, Disp: , Rfl:     Biotin 1000 MCG tablet, Take 1,000 mcg by mouth 3 (three) times a day, Disp: , Rfl:     docusate sodium (COLACE) 50 mg capsule, Take by mouth 2 (two) times a day, Disp: , Rfl:     fluticasone (FLONASE) 50 mcg/act nasal spray, 1 spray into each nostril daily, Disp: , Rfl:     folic acid (FOLVITE) 855 MCG tablet, Take 400 mcg by mouth daily, Disp: , Rfl:     Loratadine (CLARITIN) 10 MG CAPS, Take 10 mg by mouth, Disp: , Rfl:     omeprazole (PriLOSEC) 20 mg delayed release capsule, Take 1 capsule (20 mg total) by mouth daily for 30 days, Disp: 30 capsule, Rfl: 2    predniSONE 10 mg tablet, Take according to titration schedule, Disp: 21 tablet, Rfl: 0    valACYclovir (VALTREX) 500 mg tablet, Take 1 tablet (500 mg total) by mouth daily for 90 days, Disp: 30 tablet, Rfl: 2    Allergies   Allergen Reactions    Doxycycline Edema    Sulfa Antibiotics      Family has allergy she has never taken       Physical Exam:   General: Awake, Alert, Oriented x 3, Mood and affect appropriate  Respiratory: Respirations even and unlabored  Cardiovascular: Peripheral pulses intact; no edema  Musculoskeletal Exam:  Decreased range of motion lumbar spine  ASA Score: II         Assessment:   1   Lumbar disc herniation        Plan: Left L5/S1 transforaminal epidural steroid injection number 1

## 2018-12-03 NOTE — DISCHARGE INSTRUCTIONS
Epidural Steroid Injection   WHAT YOU NEED TO KNOW:   An epidural steroid injection (TAMI) is a procedure to inject steroid medicine into the epidural space  The epidural space is between your spinal cord and vertebrae  Steroids reduce inflammation and fluid buildup in your spine that may be causing pain  You may be given pain medicine along with the steroids  ACTIVITY  · Do not drive or operate machinery today  · No strenuous activity today - bending, lifting, etc   · You may resume normal activites starting tomorrow - start slowly and as tolerated  · You may shower today, but no tub baths or hot tubs  · You may have numbness for several hours from the local anesthetic  Please use caution and common sense, especially with weight-bearing activities  CARE OF THE INJECTION SITE  · If you have soreness or pain, apply ice to the area today (20 minutes on/20 minutes off)  · Starting tomorrow, you may use warm, moist heat or ice if needed  · You may have an increase or change in your discomfort for 36-48 hours after your treatment  · Apply ice and continue with any pain medication you have been prescribed  · Notify the [facility] if you have any of the following: redness, drainage, swelling, headache, stiff neck or fever above 100°F     SPECIAL INSTRUCTIONS  · Our office will contact you in approximately 7 days for a progress report  MEDICATIONS  · Continue to take all routine medications  · Our office may have instructed you to hold some medications  If you have a problem specifically related to your procedure, please call our office at [phone]  Problems not related to your procedure should be directed to your primary care physician

## 2018-12-10 ENCOUNTER — TELEPHONE (OUTPATIENT)
Dept: PAIN MEDICINE | Facility: CLINIC | Age: 33
End: 2018-12-10

## 2018-12-19 ENCOUNTER — OFFICE VISIT (OUTPATIENT)
Dept: OBGYN CLINIC | Facility: CLINIC | Age: 33
End: 2018-12-19
Payer: COMMERCIAL

## 2018-12-19 VITALS
WEIGHT: 227 LBS | HEART RATE: 73 BPM | SYSTOLIC BLOOD PRESSURE: 122 MMHG | BODY MASS INDEX: 31.78 KG/M2 | DIASTOLIC BLOOD PRESSURE: 82 MMHG | HEIGHT: 71 IN

## 2018-12-19 DIAGNOSIS — M54.16 LUMBAR RADICULOPATHY: ICD-10-CM

## 2018-12-19 DIAGNOSIS — M51.26 LUMBAR DISC HERNIATION: Primary | ICD-10-CM

## 2018-12-19 PROCEDURE — 99213 OFFICE O/P EST LOW 20 MIN: CPT | Performed by: ORTHOPAEDIC SURGERY

## 2018-12-19 NOTE — PROGRESS NOTES
Assessment/Plan:    Lumbar disc herniation at L5-S1 with left sided radiculopathy and stenosis  · Patient will proceed with repeat epidural steroid injection with Dr Gema Coughlin  · Surgical intervention for lumbar microdiskectomy was again discussed  · Continue conservative management with injections  · Follow up in 4 weeks        Problem List Items Addressed This Visit     Lumbar radiculopathy - Left    Lumbar disc herniation - Primary     Patient reports 50% improvement following epidural steroid injection for large disc herniation L5-S1  She is very happy with the progress thus far and in fact is scheduled to have a repeat injection in the next 1 week  On physical exam she has positive straight leg raise left lower extremity  5/5 strength L2-S1 bilaterally  Sensation is grossly intact to light touch  Assessment and plan  Radicular symptoms to the left lower extremity 50% improved following 1st epidural steroid injection  No gross weakness  Follow-up in 3 weeks for re-evaluation after 2nd injection  Subjective:      Patient ID: Sherin Beauchamp is a 29 y o  female  HPI  Patient presents to the office for follow up of low back pain with left sided radicular symptoms ongoing since July 2018  She denies any injury or trauma  Since last visit she has received a lumbar epidural steroid injection with Dr Gema Coughlin  She reports 50% improvement of symptoms from the injection and has scheduled the next injection for January 2019  She still experiences some pain, numbness, tingling and weakness in her left leg  The following portions of the patient's history were reviewed and updated as appropriate: current medications, past family history, past medical history, past social history, past surgical history and problem list     Review of Systems   Constitutional: Negative for fever and unexpected weight change  HENT: Negative for hearing loss, nosebleeds and sore throat      Eyes: Negative for pain, redness and visual disturbance  Respiratory: Negative for cough, shortness of breath and wheezing  Cardiovascular: Negative for chest pain, palpitations and leg swelling  Gastrointestinal: Negative for abdominal pain, nausea and vomiting  Endocrine: Negative for polydipsia and polyuria  Genitourinary: Negative for dysuria and hematuria  Skin: Negative for rash and wound  Neurological: Negative for dizziness and headaches  Psychiatric/Behavioral: Negative for agitation and suicidal ideas  Objective:      /82   Pulse 73   Ht 5' 11" (1 803 m)   Wt 103 kg (227 lb)   BMI 31 66 kg/m²          Physical Exam   Constitutional: She is oriented to person, place, and time  She appears well-developed and well-nourished  HENT:   Head: Normocephalic and atraumatic  Eyes: Pupils are equal, round, and reactive to light  Neck: Neck supple  Cardiovascular: Intact distal pulses  Pulmonary/Chest: Effort normal and breath sounds normal    Neurological: She is alert and oriented to person, place, and time  Skin: Skin is warm and dry  Psychiatric: She has a normal mood and affect   Her behavior is normal        Patient ambulates without assistance  Modified straight leg raise positive on the left, negative on the right  Strength L2-S1 5/5 bilaterally  Sensation diminished left lateral foot

## 2018-12-19 NOTE — ASSESSMENT & PLAN NOTE
Patient reports 50% improvement following epidural steroid injection for large disc herniation L5-S1  She is very happy with the progress thus far and in fact is scheduled to have a repeat injection in the next 1 week  On physical exam she has positive straight leg raise left lower extremity  5/5 strength L2-S1 bilaterally  Sensation is grossly intact to light touch  Assessment and plan  Radicular symptoms to the left lower extremity 50% improved following 1st epidural steroid injection  No gross weakness  Follow-up in 3 weeks for re-evaluation after 2nd injection

## 2018-12-24 DIAGNOSIS — B00.9 HERPES: ICD-10-CM

## 2018-12-24 DIAGNOSIS — K21.9 GASTROESOPHAGEAL REFLUX DISEASE WITHOUT ESOPHAGITIS: ICD-10-CM

## 2018-12-24 RX ORDER — OMEPRAZOLE 20 MG/1
20 CAPSULE, DELAYED RELEASE ORAL DAILY
Qty: 30 CAPSULE | Refills: 2 | Status: SHIPPED | OUTPATIENT
Start: 2018-12-24 | End: 2019-04-04 | Stop reason: SDUPTHER

## 2018-12-24 RX ORDER — VALACYCLOVIR HYDROCHLORIDE 500 MG/1
500 TABLET, FILM COATED ORAL DAILY
Qty: 90 TABLET | Refills: 0 | Status: SHIPPED | OUTPATIENT
Start: 2018-12-24 | End: 2019-04-04 | Stop reason: SDUPTHER

## 2018-12-31 ENCOUNTER — HOSPITAL ENCOUNTER (OUTPATIENT)
Dept: RADIOLOGY | Facility: CLINIC | Age: 33
Discharge: HOME | End: 2018-12-31
Attending: ANESTHESIOLOGY | Admitting: ANESTHESIOLOGY
Payer: COMMERCIAL

## 2018-12-31 VITALS
SYSTOLIC BLOOD PRESSURE: 122 MMHG | RESPIRATION RATE: 20 BRPM | HEART RATE: 59 BPM | DIASTOLIC BLOOD PRESSURE: 81 MMHG | OXYGEN SATURATION: 99 % | TEMPERATURE: 98.4 F

## 2018-12-31 DIAGNOSIS — M51.26 DISPLACEMENT OF LUMBAR INTERVERTEBRAL DISC WITHOUT MYELOPATHY: ICD-10-CM

## 2018-12-31 PROCEDURE — 64484 NJX AA&/STRD TFRM EPI L/S EA: CPT | Performed by: ANESTHESIOLOGY

## 2018-12-31 PROCEDURE — 64483 NJX AA&/STRD TFRM EPI L/S 1: CPT | Performed by: ANESTHESIOLOGY

## 2018-12-31 RX ORDER — LIDOCAINE HYDROCHLORIDE 10 MG/ML
5 INJECTION, SOLUTION EPIDURAL; INFILTRATION; INTRACAUDAL; PERINEURAL ONCE
Status: COMPLETED | OUTPATIENT
Start: 2018-12-31 | End: 2018-12-31

## 2018-12-31 RX ORDER — METHYLPREDNISOLONE ACETATE 80 MG/ML
160 INJECTION, SUSPENSION INTRA-ARTICULAR; INTRALESIONAL; INTRAMUSCULAR; PARENTERAL; SOFT TISSUE ONCE
Status: COMPLETED | OUTPATIENT
Start: 2018-12-31 | End: 2018-12-31

## 2018-12-31 RX ADMIN — IOHEXOL 1 ML: 300 INJECTION, SOLUTION INTRAVENOUS at 10:36

## 2018-12-31 RX ADMIN — LIDOCAINE HYDROCHLORIDE 3 ML: 10 INJECTION, SOLUTION EPIDURAL; INFILTRATION; INTRACAUDAL; PERINEURAL at 10:36

## 2018-12-31 RX ADMIN — LIDOCAINE HYDROCHLORIDE 2 ML: 20 INJECTION, SOLUTION EPIDURAL; INFILTRATION; INTRACAUDAL at 10:36

## 2018-12-31 RX ADMIN — METHYLPREDNISOLONE ACETATE 160 MG: 80 INJECTION, SUSPENSION INTRA-ARTICULAR; INTRALESIONAL; INTRAMUSCULAR; SOFT TISSUE at 10:37

## 2018-12-31 NOTE — DISCHARGE INSTRUCTIONS
Epidural Steroid Injection   WHAT YOU NEED TO KNOW:   An epidural steroid injection (TAMI) is a procedure to inject steroid medicine into the epidural space  The epidural space is between your spinal cord and vertebrae  Steroids reduce inflammation and fluid buildup in your spine that may be causing pain  You may be given pain medicine along with the steroids  ACTIVITY  · Do not drive or operate machinery today  · No strenuous activity today - bending, lifting, etc   · You may resume normal activites starting tomorrow - start slowly and as tolerated  · You may shower today, but no tub baths or hot tubs  · You may have numbness for several hours from the local anesthetic  Please use caution and common sense, especially with weight-bearing activities  CARE OF THE INJECTION SITE  · If you have soreness or pain, apply ice to the area today (20 minutes on/20 minutes off)  · Starting tomorrow, you may use warm, moist heat or ice if needed  · You may have an increase or change in your discomfort for 36-48 hours after your treatment  · Apply ice and continue with any pain medication you have been prescribed  · Notify the [facility] if you have any of the following: redness, drainage, swelling, headache, stiff neck or fever above 100°F     SPECIAL INSTRUCTIONS  · Our office will contact you in approximately 7 days for a progress report  MEDICATIONS  · Continue to take all routine medications  · Our office may have instructed you to hold some medications  If you have a problem specifically related to your procedure, please call our office at [phone]  Problems not related to your procedure should be directed to your primary care physician

## 2018-12-31 NOTE — H&P
History of Present Illness: The patient is a 29 y o  female who presents with complaints of low back and leg pain      Patient Active Problem List   Diagnosis    Lumbar radiculopathy - Left    Lumbar disc herniation       Past Medical History:   Diagnosis Date    Allergic     Asthma     GERD (gastroesophageal reflux disease)        Past Surgical History:   Procedure Laterality Date    CERVICAL BIOPSY  W/ LOOP ELECTRODE EXCISION      TONSILECTOMY AND ADNOIDECTOMY      WISDOM TOOTH EXTRACTION           Current Outpatient Prescriptions:     albuterol (PROVENTIL HFA,VENTOLIN HFA) 90 mcg/act inhaler, Inhale 2 puffs every 6 (six) hours as needed for wheezing, Disp: , Rfl:     Biotin 1000 MCG tablet, Take 1,000 mcg by mouth 3 (three) times a day, Disp: , Rfl:     docusate sodium (COLACE) 50 mg capsule, Take by mouth 2 (two) times a day, Disp: , Rfl:     fluticasone (FLONASE) 50 mcg/act nasal spray, 1 spray into each nostril daily, Disp: , Rfl:     folic acid (FOLVITE) 652 MCG tablet, Take 400 mcg by mouth daily, Disp: , Rfl:     Loratadine (CLARITIN) 10 MG CAPS, Take 10 mg by mouth, Disp: , Rfl:     omeprazole (PriLOSEC) 20 mg delayed release capsule, Take 1 capsule (20 mg total) by mouth daily, Disp: 30 capsule, Rfl: 2    predniSONE 10 mg tablet, Take according to titration schedule, Disp: 21 tablet, Rfl: 0    valACYclovir (VALTREX) 500 mg tablet, Take 1 tablet (500 mg total) by mouth daily for 90 days, Disp: 90 tablet, Rfl: 0    Current Facility-Administered Medications:     iohexol (OMNIPAQUE) 300 mg/mL injection 50 mL, 50 mL, Epidural, Once, Gwyn Mckeon DO    lidocaine (PF) (XYLOCAINE-MPF) 1 % injection 5 mL, 5 mL, Other, Once, Gwyn Mckeon DO    lidocaine (PF) (XYLOCAINE-MPF) 2 % injection 5 mL, 5 mL, Epidural, Once, Gwyn Mckeon DO    methylPREDNISolone acetate (DEPO-MEDROL) injection 160 mg, 160 mg, Epidural, Once, Gwyn Mckeon DO    Allergies   Allergen Reactions    Doxycycline Edema    Sulfa Antibiotics      Family has allergy she has never taken       Physical Exam:   Vitals:    12/31/18 1015   BP: 108/71   Pulse: 64   Resp: 20   Temp: 98 4 °F (36 9 °C)   SpO2: 98%     General: Awake, Alert, Oriented x 3, Mood and affect appropriate  Respiratory: Respirations even and unlabored  Cardiovascular: Peripheral pulses intact; no edema  Musculoskeletal Exam:  Decreased range of motion lumbar spine    ASA Score: II    Patient/Chart Verification  Patient ID Verified: Verbal  ID Band Applied: No  Consents Confirmed: Procedural  H&P( within 30 days) Verified: To be obtained in the Pre-Procedure area  Interval H&P(within 24 hr) Complete (required for Outpatients and Surgery Admit only): To be obtained in the Pre-Procedure area  Allergies Reviewed: Yes  Anticoag/NSAID held?: No  Currently on antibiotics?: No  Pregnancy denied?: Yes  Pre-op Lab/Test Results Available: N/A  Pregnancy Lab Collected: N/A comment    Assessment:   1   Displacement of lumbar intervertebral disc without myelopathy        Plan: LT L5/S1 TFESI #2

## 2019-01-07 ENCOUNTER — TELEPHONE (OUTPATIENT)
Dept: PAIN MEDICINE | Facility: CLINIC | Age: 34
End: 2019-01-07

## 2019-01-08 NOTE — TELEPHONE ENCOUNTER
LMOM to cb on home / cell  Provided cb number and office hours       Note:  Large disc herniation at L5-SI  Surg eval and tfesi per sl  #1 - 50%  #2 - 0  Fu w/ DG 2/19  Microdiscectomy per Dr Venus Dietz  Conservative tx for now  Fu 1/16

## 2019-01-09 NOTE — TELEPHONE ENCOUNTER
S/w pt, stated that after her first injection, it took ~ 1 week - 10 days, but she did have + relief  Pt stated that the pain in her L leg began to come back ~ 5 days before her next injection  Pt stated that s/p injection #2, no relief, and the relief that she had after injection #1 is also gone  Pt denies any strenuous activity or cause for this pain  Pt stated that there is no new pain - only her original pain has returned  Per Pt, Dr Kim Gant had advised - if no additional relief after the 2nd injection - the microdescectomy would be advised  Pt stated that she is trying to avoid surgery s/t "down time"  Advised pt, will d/w Dr Cleve Monk and cb to advise  Pt verbalized understanding and appreciation

## 2019-01-16 ENCOUNTER — OFFICE VISIT (OUTPATIENT)
Dept: OBGYN CLINIC | Facility: CLINIC | Age: 34
End: 2019-01-16
Payer: COMMERCIAL

## 2019-01-16 VITALS
WEIGHT: 227 LBS | HEART RATE: 67 BPM | HEIGHT: 71 IN | BODY MASS INDEX: 31.78 KG/M2 | SYSTOLIC BLOOD PRESSURE: 113 MMHG | DIASTOLIC BLOOD PRESSURE: 75 MMHG

## 2019-01-16 DIAGNOSIS — M54.16 LUMBAR RADICULOPATHY: ICD-10-CM

## 2019-01-16 DIAGNOSIS — M51.26 LUMBAR DISC HERNIATION: Primary | ICD-10-CM

## 2019-01-16 PROCEDURE — 99214 OFFICE O/P EST MOD 30 MIN: CPT | Performed by: ORTHOPAEDIC SURGERY

## 2019-01-16 NOTE — PROGRESS NOTES
Assessment/Plan:      Patient seen examined with Dr Stevan Pickard  She presents for follow-up left lower extremity radicular symptoms secondary to large disc herniation at L5-S1  Recent injections with pain management have not provide significant relief  She is a candidate for left lumbar micro diskectomy at L5-S1  Risks and benefits of the procedure discussed with the patient  Patient would like to discuss surgery with family first prior to committing  She will follow-up on a PRN basis, she can call our office to schedule an appointment if she would like to proceed with surgical scheduling  Problem List Items Addressed This Visit     Lumbar radiculopathy - Left    Lumbar disc herniation - Primary            Subjective:      Patient ID: Mike Zelaya is a 29 y o  female  HPI     The patient is a 81-year-old female presents for follow-up appointment  She was last seen about four weeks ago for left lower extremity radicular symptoms, particularly left lateral thigh with radiation occasionally to the toes  This is associated with numbness and tingling in made worse when sitting for long periods of time  She underwent her second epidural steroid injection with pain management with minimal relief  Today, she states her symptoms are basically back to baseline  She denies any bowel or bladder incontinence, no saddle anesthesia  She denies any significant weakness of the left lower extremity  She would like to further discuss surgical intervention today  Overall, she has minimal low back pain and 95% of her symptoms are left lower extremity  The following portions of the patient's history were reviewed and updated as appropriate: allergies, current medications, past family history, past medical history, past social history, past surgical history and problem list     Review of Systems   Constitutional: Negative for chills, diaphoresis, fatigue and fever  Respiratory: Negative  Cardiovascular: Negative  Genitourinary: Negative for decreased urine volume and difficulty urinating  Musculoskeletal: Positive for back pain  Negative for gait problem  Skin: Negative  Neurological: Positive for numbness  Negative for weakness  All other systems reviewed and are negative  Objective:      /75   Pulse 67   Ht 5' 11" (1 803 m)   Wt 103 kg (227 lb)   BMI 31 66 kg/m²          Physical Exam   Constitutional: She is oriented to person, place, and time  She appears well-developed and well-nourished  No distress  HENT:   Head: Normocephalic and atraumatic  Eyes: Right eye exhibits no discharge  Cardiovascular: Intact distal pulses  Pulmonary/Chest: Effort normal  No stridor  No respiratory distress  Musculoskeletal: She exhibits tenderness  Neurological: She is alert and oriented to person, place, and time  Skin: Skin is warm and dry  She is not diaphoretic  Psychiatric: She has a normal mood and affect  Nursing note and vitals reviewed  No acute distress  Gait is normal   Inspection no open wounds or erythema  Lumbosacral region is minimally tender to palpation  Positive modified straight leg raise on the left, negative on the right  Strength is 5/5 L2-S1 bilaterally, sensation is equal and intact  Palpable posterior tibialis pulses bilaterally  There is no calf tenderness or pitting edema

## 2019-01-23 ENCOUNTER — OFFICE VISIT (OUTPATIENT)
Dept: OBGYN CLINIC | Facility: CLINIC | Age: 34
End: 2019-01-23
Payer: COMMERCIAL

## 2019-01-23 VITALS
WEIGHT: 227 LBS | SYSTOLIC BLOOD PRESSURE: 125 MMHG | HEART RATE: 64 BPM | HEIGHT: 71 IN | BODY MASS INDEX: 31.78 KG/M2 | DIASTOLIC BLOOD PRESSURE: 82 MMHG

## 2019-01-23 DIAGNOSIS — M51.26 LUMBAR DISC HERNIATION: Primary | ICD-10-CM

## 2019-01-23 DIAGNOSIS — M54.16 LUMBAR RADICULOPATHY: ICD-10-CM

## 2019-01-23 PROCEDURE — 99214 OFFICE O/P EST MOD 30 MIN: CPT | Performed by: ORTHOPAEDIC SURGERY

## 2019-01-23 RX ORDER — ACETAMINOPHEN 325 MG/1
975 TABLET ORAL ONCE
Status: CANCELLED | OUTPATIENT
Start: 2019-01-23 | End: 2019-01-23

## 2019-01-23 RX ORDER — GABAPENTIN 300 MG/1
300 CAPSULE ORAL ONCE
Status: CANCELLED | OUTPATIENT
Start: 2019-01-23 | End: 2019-01-23

## 2019-01-23 RX ORDER — CEFAZOLIN SODIUM 2 G/50ML
2000 SOLUTION INTRAVENOUS ONCE
Status: CANCELLED | OUTPATIENT
Start: 2019-01-23 | End: 2019-01-23

## 2019-01-23 NOTE — PROGRESS NOTES
Assessment/Plan:    Lumbar disc herniation  Patient is seen and examined  She presents today for follow-up and for surgical scheduling regarding left lumbar radiculopathy  She does have history of large extruded disc herniation on the left at L5-S1 causing lateral recess stenosis  She has tried and failed conservative management including epidural steroid injections which provided improvement in numbness but not significant improvement in pain  On physical exam she does have a positive straight leg raise left lower extremity  Good strength L2-S1 bilaterally  Sensation is grossly intact to light touch L2-S1 bilaterally  Reflexes are hypoactive at L4 and S1 symmetrically  Skin is intact lower extremities are warm well perfused  MRI demonstrates large extruded disc herniation on the left at L5-S1 causing lateral recess stenosis    Assessment and plan  Plan for hemilaminectomy lumbar decompression diskectomy left L5-S1  Risk and benefits including but not limited to bleeding, CSF leak, nerve damage, possible persistent symptoms, possible need for reoperation are discussed          · Left leg pain and numbness > low back pain  · S/p 2x Left L5-S1 TFESI with no long lasting benefit  · Microdiskectomy L5-S1 scheduled  · Risks, expectations, and precautions discussed with patient in detail   · Patient to stop NSAIDs one week prior to surgery    · Follow up after surgery     Problem List Items Addressed This Visit     Lumbar radiculopathy - Left    Relevant Orders    Case request operating room: Lumbar laminectomy decompression diskectomy left L5-S1  CPT code 31153  Case length:   1 hour (Completed)    UA w Reflex to Microscopic w Reflex to Culture    Comprehensive metabolic panel    CBC and differential    APTT    Protime-INR    HEMOGLOBIN A1C W/ EAG ESTIMATION    Type and screen    EKG 12 lead    XR chest pa & lateral    Lumbar disc herniation - Primary     Patient is seen and examined    She presents today for follow-up and for surgical scheduling regarding left lumbar radiculopathy  She does have history of large extruded disc herniation on the left at L5-S1 causing lateral recess stenosis  She has tried and failed conservative management including epidural steroid injections which provided improvement in numbness but not significant improvement in pain  On physical exam she does have a positive straight leg raise left lower extremity  Good strength L2-S1 bilaterally  Sensation is grossly intact to light touch L2-S1 bilaterally  Reflexes are hypoactive at L4 and S1 symmetrically  Skin is intact lower extremities are warm well perfused  MRI demonstrates large extruded disc herniation on the left at L5-S1 causing lateral recess stenosis    Assessment and plan  Plan for hemilaminectomy lumbar decompression diskectomy left L5-S1  Risk and benefits including but not limited to bleeding, CSF leak, nerve damage, possible persistent symptoms, possible need for reoperation are discussed           Relevant Orders    Case request operating room: Lumbar laminectomy decompression diskectomy left L5-S1  CPT code 51725  Case length:   1 hour (Completed)    UA w Reflex to Microscopic w Reflex to Culture    Comprehensive metabolic panel    CBC and differential    APTT    Protime-INR    HEMOGLOBIN A1C W/ EAG ESTIMATION    Type and screen    EKG 12 lead    XR chest pa & lateral            Subjective:      Patient ID: Jo-Ann Gomez is a 29 y o  female  HPI   The patient presents to discuss surgery for left leg pain  Today she complains of minimal low back pain with greater posterior left thigh and calf pain  The entire left leg is numb to the foot  Lying and turning in bed aggravates  She currently uses naproxen and ibuprofen with some benefit  She did have lumbar injections with no long lasting benefit  She denies past lumbar surgery        The following portions of the patient's history were reviewed and updated as appropriate: current medications, past family history, past medical history, past social history, past surgical history and problem list     Review of Systems   Constitutional: Negative for chills, fever and unexpected weight change  HENT: Negative for hearing loss, nosebleeds and sore throat  Eyes: Negative for pain, redness and visual disturbance  Respiratory: Negative for cough, shortness of breath and wheezing  Cardiovascular: Negative for chest pain, palpitations and leg swelling  Gastrointestinal: Negative for abdominal pain, nausea and vomiting  Genitourinary: Negative for dyspareunia, dysuria and frequency  Skin: Negative for rash and wound  Neurological: Negative for dizziness, numbness and headaches  Psychiatric/Behavioral: Negative for decreased concentration and suicidal ideas  The patient is not nervous/anxious  Objective:      /82   Pulse 64   Ht 5' 11" (1 803 m)   Wt 103 kg (227 lb)   BMI 31 66 kg/m²          Physical Exam   Constitutional: She is oriented to person, place, and time  She appears well-developed and well-nourished  HENT:   Head: Normocephalic  Eyes: Pupils are equal, round, and reactive to light  Conjunctivae and EOM are normal    Neck: Normal range of motion  Cardiovascular: Normal rate  Pulmonary/Chest: Effort normal    Neurological: She is alert and oriented to person, place, and time  Skin: Skin is warm and dry         Patient ambulates without assistance  Tender to palpation over lumbosacral region  Modified straight leg raise positive left, negative right  Strength L2-S1 5/5 bilaterally   Sensation L2-S1 intact bilaterally   Reflexes hypoactive at L4 and S1 symetrically      Imaging:  Lumbar MRI 11/16/18 Reviewed     Scribe Attestation    I,:   Ramón Terry am acting as a scribe while in the presence of the attending physician :        I,:   Kassandra Beyer MD personally performed the services described in this documentation as scribed in my presence :

## 2019-01-23 NOTE — ASSESSMENT & PLAN NOTE
Patient is seen and examined  She presents today for follow-up and for surgical scheduling regarding left lumbar radiculopathy  She does have history of large extruded disc herniation on the left at L5-S1 causing lateral recess stenosis  She has tried and failed conservative management including epidural steroid injections which provided improvement in numbness but not significant improvement in pain  On physical exam she does have a positive straight leg raise left lower extremity  Good strength L2-S1 bilaterally  Sensation is grossly intact to light touch L2-S1 bilaterally  Reflexes are hypoactive at L4 and S1 symmetrically  Skin is intact lower extremities are warm well perfused      MRI demonstrates large extruded disc herniation on the left at L5-S1 causing lateral recess stenosis    Assessment and plan  Plan for hemilaminectomy lumbar decompression diskectomy left L5-S1  Risk and benefits including but not limited to bleeding, CSF leak, nerve damage, possible persistent symptoms, possible need for reoperation are discussed

## 2019-01-25 ENCOUNTER — TRANSCRIBE ORDERS (OUTPATIENT)
Dept: ADMINISTRATIVE | Facility: HOSPITAL | Age: 34
End: 2019-01-25

## 2019-01-25 ENCOUNTER — LAB (OUTPATIENT)
Dept: LAB | Facility: HOSPITAL | Age: 34
End: 2019-01-25
Attending: ORTHOPAEDIC SURGERY
Payer: COMMERCIAL

## 2019-01-25 ENCOUNTER — HOSPITAL ENCOUNTER (OUTPATIENT)
Dept: RADIOLOGY | Facility: HOSPITAL | Age: 34
Discharge: HOME | End: 2019-01-25
Attending: ORTHOPAEDIC SURGERY

## 2019-01-25 ENCOUNTER — HOSPITAL ENCOUNTER (OUTPATIENT)
Dept: NON INVASIVE DIAGNOSTICS | Facility: HOSPITAL | Age: 34
Discharge: HOME | End: 2019-01-25
Attending: ORTHOPAEDIC SURGERY

## 2019-01-25 DIAGNOSIS — M51.26 LUMBAR DISC HERNIATION: ICD-10-CM

## 2019-01-25 DIAGNOSIS — M54.16 LUMBAR RADICULOPATHY: ICD-10-CM

## 2019-01-25 DIAGNOSIS — M51.26 DISPLACEMENT OF LUMBAR INTERVERTEBRAL DISC WITHOUT MYELOPATHY: ICD-10-CM

## 2019-01-25 DIAGNOSIS — M51.26 DISPLACEMENT OF LUMBAR INTERVERTEBRAL DISC WITHOUT MYELOPATHY: Primary | ICD-10-CM

## 2019-01-25 LAB
ALBUMIN SERPL BCP-MCNC: 3.6 G/DL (ref 3.5–5)
ALP SERPL-CCNC: 60 U/L (ref 46–116)
ALT SERPL W P-5'-P-CCNC: 15 U/L (ref 12–78)
ANION GAP SERPL CALCULATED.3IONS-SCNC: 6 MMOL/L (ref 4–13)
APTT PPP: 35 SECONDS (ref 26–38)
AST SERPL W P-5'-P-CCNC: 14 U/L (ref 5–45)
BASOPHILS # BLD AUTO: 0.02 THOUSANDS/ΜL (ref 0–0.1)
BASOPHILS NFR BLD AUTO: 0 % (ref 0–1)
BILIRUB SERPL-MCNC: 0.5 MG/DL (ref 0.2–1)
BILIRUB UR QL STRIP: NEGATIVE
BUN SERPL-MCNC: 12 MG/DL (ref 5–25)
CALCIUM SERPL-MCNC: 8.8 MG/DL (ref 8.3–10.1)
CHLORIDE SERPL-SCNC: 104 MMOL/L (ref 100–108)
CLARITY UR: CLEAR
CO2 SERPL-SCNC: 29 MMOL/L (ref 21–32)
COLOR UR: YELLOW
CREAT SERPL-MCNC: 0.7 MG/DL (ref 0.6–1.3)
EOSINOPHIL # BLD AUTO: 0.16 THOUSAND/ΜL (ref 0–0.61)
EOSINOPHIL NFR BLD AUTO: 2 % (ref 0–6)
ERYTHROCYTE [DISTWIDTH] IN BLOOD BY AUTOMATED COUNT: 12.7 % (ref 11.6–15.1)
EST. AVERAGE GLUCOSE BLD GHB EST-MCNC: 108 MG/DL
GFR SERPL CREATININE-BSD FRML MDRD: 115 ML/MIN/1.73SQ M
GLUCOSE SERPL-MCNC: 89 MG/DL (ref 65–140)
GLUCOSE UR STRIP-MCNC: NEGATIVE MG/DL
HBA1C MFR BLD: 5.4 % (ref 4.2–6.3)
HCT VFR BLD AUTO: 38.1 % (ref 34.8–46.1)
HGB BLD-MCNC: 12.5 G/DL (ref 11.5–15.4)
HGB UR QL STRIP.AUTO: NEGATIVE
IMM GRANULOCYTES # BLD AUTO: 0.03 THOUSAND/UL (ref 0–0.2)
IMM GRANULOCYTES NFR BLD AUTO: 0 % (ref 0–2)
INR PPP: 1.03 (ref 0.86–1.17)
KETONES UR STRIP-MCNC: NEGATIVE MG/DL
LEUKOCYTE ESTERASE UR QL STRIP: NEGATIVE
LYMPHOCYTES # BLD AUTO: 2.93 THOUSANDS/ΜL (ref 0.6–4.47)
LYMPHOCYTES NFR BLD AUTO: 37 % (ref 14–44)
MCH RBC QN AUTO: 30.6 PG (ref 26.8–34.3)
MCHC RBC AUTO-ENTMCNC: 32.8 G/DL (ref 31.4–37.4)
MCV RBC AUTO: 93 FL (ref 82–98)
MONOCYTES # BLD AUTO: 0.76 THOUSAND/ΜL (ref 0.17–1.22)
MONOCYTES NFR BLD AUTO: 10 % (ref 4–12)
NEUTROPHILS # BLD AUTO: 4.06 THOUSANDS/ΜL (ref 1.85–7.62)
NEUTS SEG NFR BLD AUTO: 51 % (ref 43–75)
NITRITE UR QL STRIP: NEGATIVE
NRBC BLD AUTO-RTO: 0 /100 WBCS
PH UR STRIP.AUTO: 6 [PH] (ref 4.5–8)
PLATELET # BLD AUTO: 196 THOUSANDS/UL (ref 149–390)
PMV BLD AUTO: 10.4 FL (ref 8.9–12.7)
POTASSIUM SERPL-SCNC: 3.4 MMOL/L (ref 3.5–5.3)
PROT SERPL-MCNC: 7.2 G/DL (ref 6.4–8.2)
PROT UR STRIP-MCNC: NEGATIVE MG/DL
PROTHROMBIN TIME: 12.9 SECONDS (ref 11.8–14.2)
RBC # BLD AUTO: 4.09 MILLION/UL (ref 3.81–5.12)
SODIUM SERPL-SCNC: 139 MMOL/L (ref 136–145)
SP GR UR STRIP.AUTO: 1.01 (ref 1–1.03)
UROBILINOGEN UR QL STRIP.AUTO: 0.2 E.U./DL
WBC # BLD AUTO: 7.96 THOUSAND/UL (ref 4.31–10.16)

## 2019-01-25 PROCEDURE — 71046 X-RAY EXAM CHEST 2 VIEWS: CPT

## 2019-01-25 PROCEDURE — 93005 ELECTROCARDIOGRAM TRACING: CPT

## 2019-01-25 PROCEDURE — 85730 THROMBOPLASTIN TIME PARTIAL: CPT

## 2019-01-25 PROCEDURE — 80053 COMPREHEN METABOLIC PANEL: CPT

## 2019-01-25 PROCEDURE — 85610 PROTHROMBIN TIME: CPT

## 2019-01-25 PROCEDURE — 81003 URINALYSIS AUTO W/O SCOPE: CPT | Performed by: ORTHOPAEDIC SURGERY

## 2019-01-25 PROCEDURE — 36415 COLL VENOUS BLD VENIPUNCTURE: CPT

## 2019-01-25 PROCEDURE — 83036 HEMOGLOBIN GLYCOSYLATED A1C: CPT

## 2019-01-25 PROCEDURE — 85025 COMPLETE CBC W/AUTO DIFF WBC: CPT

## 2019-01-28 ENCOUNTER — ANESTHESIA EVENT (OUTPATIENT)
Dept: PERIOP | Facility: HOSPITAL | Age: 34
End: 2019-01-28
Payer: COMMERCIAL

## 2019-01-28 NOTE — ANESTHESIA PREPROCEDURE EVALUATION
Review of Systems/Medical History  Patient summary reviewed  Chart reviewed  No history of anesthetic complications     Cardiovascular  Negative cardio ROS    Pulmonary  Asthma ,        GI/Hepatic    GERD ,        Negative  ROS        Endo/Other  Negative endo/other ROS      GYN  Negative gynecology ROS          Hematology  Negative hematology ROS      Musculoskeletal  Back pain , lumbar pain, Sciatica,        Neurology  Negative neurology ROS      Psychology   Negative psychology ROS              Physical Exam    Airway    Mallampati score: II  TM Distance: >3 FB  Neck ROM: full     Dental   No notable dental hx     Cardiovascular  Comment: Negative ROS, Rhythm: regular, Rate: normal, Cardiovascular exam normal    Pulmonary  Pulmonary exam normal Breath sounds clear to auscultation,     Other Findings        Anesthesia Plan  ASA Score- 3     Anesthesia Type- general with ASA Monitors  Additional Monitors:   Airway Plan: ETT  Comment:     Plan Factors-    Induction- intravenous  Postoperative Plan- Plan for postoperative opioid use  Informed Consent- Anesthetic plan and risks discussed with patient  I personally reviewed this patient with the CRNA  Discussed and agreed on the Anesthesia Plan with the CRNA  Primitivo Gross Recent labs personally reviewed:  Lab Results   Component Value Date    WBC 7 96 01/25/2019    HGB 12 5 01/25/2019     01/25/2019     Lab Results   Component Value Date    K 3 4 (L) 01/25/2019    BUN 12 01/25/2019    CREATININE 0 70 01/25/2019     Lab Results   Component Value Date    PTT 35 01/25/2019      Lab Results   Component Value Date    INR 1 03 01/25/2019       Blood type O    Lab Results   Component Value Date    HGBA1C 5 4 01/25/2019       IDerek MD, have personally seen and evaluated the patient prior to anesthetic care  I have reviewed the pre-anesthetic record, and other medical records if appropriate to the anesthetic care    If a CRNA is involved in the case, I have reviewed the CRNA assessment, if present, and agree  Risks/benefits and alternatives discussed with patient including possible PONV, sore throat, and possibility of rare anesthetic and surgical emergencies

## 2019-01-29 LAB
ATRIAL RATE: 68 BPM
P AXIS: 15 DEGREES
PR INTERVAL: 174 MS
QRS AXIS: 31 DEGREES
QRSD INTERVAL: 98 MS
QT INTERVAL: 416 MS
QTC INTERVAL: 442 MS
T WAVE AXIS: 22 DEGREES
VENTRICULAR RATE: 68 BPM

## 2019-01-29 PROCEDURE — 93010 ELECTROCARDIOGRAM REPORT: CPT | Performed by: INTERNAL MEDICINE

## 2019-01-29 NOTE — PRE-PROCEDURE INSTRUCTIONS
Pre-Surgery Instructions:   Medication Instructions    albuterol (PROVENTIL HFA,VENTOLIN HFA) 90 mcg/act inhaler Instructed patient per Anesthesia Guidelines   Biotin 1000 MCG tablet Instructed patient per Anesthesia Guidelines   docusate sodium (COLACE) 50 mg capsule Instructed patient per Anesthesia Guidelines   fluticasone (FLONASE) 50 mcg/act nasal spray Instructed patient per Anesthesia Guidelines   folic acid (FOLVITE) 868 MCG tablet Instructed patient per Anesthesia Guidelines   Loratadine (CLARITIN) 10 MG CAPS Instructed patient per Anesthesia Guidelines   omeprazole (PriLOSEC) 20 mg delayed release capsule Instructed patient per Anesthesia Guidelines   valACYclovir (VALTREX) 500 mg tablet Instructed patient per Anesthesia Guidelines  Spoke to pt  Medication list reviewed & instructed  As of 1 29 19 pt to stop biotin & folic  Instructed on tylenol only  Pt reports no use of PRN albuterol in past 2 weeks  Am DOS pt to take valtrex & omeprazole with 1-2 sips of water  Showering instructions given at time of call  All instructions verbally understood by patient  No further questions  Acyclovir Med Class     Continue to take this medication on your normal schedule  If this is an oral medication and you take it in the morning, then you may take this medicine with a sip of water  Inhalational Med Class     Continue to take these inhaler medications on your normal schedule up to and including the day of surgery  Stool Softener Med Class     Continue to take this medication on your normal schedule  If this is an oral medication and you take it in the morning, then you may take this medicine with a sip of water

## 2019-02-01 ENCOUNTER — APPOINTMENT (OUTPATIENT)
Dept: LAB | Facility: HOSPITAL | Age: 34
End: 2019-02-01
Attending: ORTHOPAEDIC SURGERY
Payer: COMMERCIAL

## 2019-02-01 DIAGNOSIS — M51.26 LUMBAR DISC HERNIATION: ICD-10-CM

## 2019-02-01 DIAGNOSIS — M54.16 LUMBAR RADICULOPATHY: ICD-10-CM

## 2019-02-01 DIAGNOSIS — M51.26 DISPLACEMENT OF LUMBAR INTERVERTEBRAL DISC WITHOUT MYELOPATHY: ICD-10-CM

## 2019-02-01 LAB
ABO GROUP BLD: NORMAL
BLD GP AB SCN SERPL QL: NEGATIVE
RH BLD: POSITIVE
SPECIMEN EXPIRATION DATE: NORMAL

## 2019-02-01 PROCEDURE — 36415 COLL VENOUS BLD VENIPUNCTURE: CPT

## 2019-02-01 PROCEDURE — 86850 RBC ANTIBODY SCREEN: CPT

## 2019-02-01 PROCEDURE — 86901 BLOOD TYPING SEROLOGIC RH(D): CPT

## 2019-02-01 PROCEDURE — 86900 BLOOD TYPING SEROLOGIC ABO: CPT

## 2019-02-05 ENCOUNTER — HOSPITAL ENCOUNTER (OUTPATIENT)
Facility: HOSPITAL | Age: 34
Setting detail: OUTPATIENT SURGERY
Discharge: HOME | End: 2019-02-05
Attending: ORTHOPAEDIC SURGERY | Admitting: ORTHOPAEDIC SURGERY
Payer: COMMERCIAL

## 2019-02-05 ENCOUNTER — HOSPITAL ENCOUNTER (OUTPATIENT)
Dept: RADIOLOGY | Facility: HOSPITAL | Age: 34
Discharge: HOME | End: 2019-02-05
Payer: COMMERCIAL

## 2019-02-05 ENCOUNTER — ANESTHESIA (OUTPATIENT)
Dept: PERIOP | Facility: HOSPITAL | Age: 34
End: 2019-02-05
Payer: COMMERCIAL

## 2019-02-05 VITALS
DIASTOLIC BLOOD PRESSURE: 76 MMHG | RESPIRATION RATE: 18 BRPM | HEART RATE: 74 BPM | WEIGHT: 230 LBS | OXYGEN SATURATION: 99 % | BODY MASS INDEX: 32.2 KG/M2 | TEMPERATURE: 99.3 F | SYSTOLIC BLOOD PRESSURE: 109 MMHG | HEIGHT: 71 IN

## 2019-02-05 DIAGNOSIS — M51.26 LUMBAR DISC HERNIATION: Primary | ICD-10-CM

## 2019-02-05 DIAGNOSIS — M51.26 LUMBAR DISC HERNIATION: ICD-10-CM

## 2019-02-05 DIAGNOSIS — M54.16 LUMBAR RADICULOPATHY: ICD-10-CM

## 2019-02-05 LAB
EXT PREGNANCY TEST URINE: NEGATIVE
GLUCOSE SERPL-MCNC: 93 MG/DL (ref 65–140)

## 2019-02-05 PROCEDURE — 82948 REAGENT STRIP/BLOOD GLUCOSE: CPT

## 2019-02-05 PROCEDURE — 63030 LAMOT DCMPRN NRV RT 1 LMBR: CPT | Performed by: ORTHOPAEDIC SURGERY

## 2019-02-05 PROCEDURE — 72020 X-RAY EXAM OF SPINE 1 VIEW: CPT

## 2019-02-05 PROCEDURE — 81025 URINE PREGNANCY TEST: CPT | Performed by: ORTHOPAEDIC SURGERY

## 2019-02-05 PROCEDURE — 63030 LAMOT DCMPRN NRV RT 1 LMBR: CPT | Performed by: PHYSICIAN ASSISTANT

## 2019-02-05 RX ORDER — FENTANYL CITRATE 50 UG/ML
INJECTION, SOLUTION INTRAMUSCULAR; INTRAVENOUS AS NEEDED
Status: DISCONTINUED | OUTPATIENT
Start: 2019-02-05 | End: 2019-02-05 | Stop reason: SURG

## 2019-02-05 RX ORDER — ACETAMINOPHEN 325 MG/1
975 TABLET ORAL ONCE
Status: COMPLETED | OUTPATIENT
Start: 2019-02-05 | End: 2019-02-05

## 2019-02-05 RX ORDER — PROPOFOL 10 MG/ML
INJECTION, EMULSION INTRAVENOUS AS NEEDED
Status: DISCONTINUED | OUTPATIENT
Start: 2019-02-05 | End: 2019-02-05 | Stop reason: SURG

## 2019-02-05 RX ORDER — SODIUM CHLORIDE, SODIUM LACTATE, POTASSIUM CHLORIDE, CALCIUM CHLORIDE 600; 310; 30; 20 MG/100ML; MG/100ML; MG/100ML; MG/100ML
20 INJECTION, SOLUTION INTRAVENOUS CONTINUOUS
Status: DISCONTINUED | OUTPATIENT
Start: 2019-02-05 | End: 2019-02-05 | Stop reason: HOSPADM

## 2019-02-05 RX ORDER — KETOROLAC TROMETHAMINE 30 MG/ML
INJECTION, SOLUTION INTRAMUSCULAR; INTRAVENOUS AS NEEDED
Status: DISCONTINUED | OUTPATIENT
Start: 2019-02-05 | End: 2019-02-05 | Stop reason: SURG

## 2019-02-05 RX ORDER — OXYCODONE HYDROCHLORIDE 5 MG/1
10 TABLET ORAL EVERY 4 HOURS PRN
Status: DISCONTINUED | OUTPATIENT
Start: 2019-02-05 | End: 2019-02-05 | Stop reason: HOSPADM

## 2019-02-05 RX ORDER — METHOCARBAMOL 500 MG/1
500 TABLET, FILM COATED ORAL 4 TIMES DAILY PRN
Qty: 30 TABLET | Refills: 0 | Status: SHIPPED | OUTPATIENT
Start: 2019-02-05 | End: 2019-05-07 | Stop reason: ALTCHOICE

## 2019-02-05 RX ORDER — GLYCOPYRROLATE 0.2 MG/ML
INJECTION INTRAMUSCULAR; INTRAVENOUS AS NEEDED
Status: DISCONTINUED | OUTPATIENT
Start: 2019-02-05 | End: 2019-02-05 | Stop reason: SURG

## 2019-02-05 RX ORDER — CEPHALEXIN 500 MG/1
500 CAPSULE ORAL EVERY 6 HOURS SCHEDULED
Qty: 20 CAPSULE | Refills: 0 | Status: SHIPPED | OUTPATIENT
Start: 2019-02-05 | End: 2019-02-10

## 2019-02-05 RX ORDER — VANCOMYCIN HYDROCHLORIDE 1 G/20ML
INJECTION, POWDER, LYOPHILIZED, FOR SOLUTION INTRAVENOUS AS NEEDED
Status: DISCONTINUED | OUTPATIENT
Start: 2019-02-05 | End: 2019-02-05 | Stop reason: HOSPADM

## 2019-02-05 RX ORDER — MIDAZOLAM HYDROCHLORIDE 1 MG/ML
INJECTION INTRAMUSCULAR; INTRAVENOUS AS NEEDED
Status: DISCONTINUED | OUTPATIENT
Start: 2019-02-05 | End: 2019-02-05 | Stop reason: SURG

## 2019-02-05 RX ORDER — LIDOCAINE HYDROCHLORIDE 10 MG/ML
INJECTION, SOLUTION INFILTRATION; PERINEURAL AS NEEDED
Status: DISCONTINUED | OUTPATIENT
Start: 2019-02-05 | End: 2019-02-05 | Stop reason: SURG

## 2019-02-05 RX ORDER — NEOSTIGMINE METHYLSULFATE 1 MG/ML
INJECTION INTRAVENOUS AS NEEDED
Status: DISCONTINUED | OUTPATIENT
Start: 2019-02-05 | End: 2019-02-05 | Stop reason: SURG

## 2019-02-05 RX ORDER — GABAPENTIN 300 MG/1
300 CAPSULE ORAL ONCE
Status: COMPLETED | OUTPATIENT
Start: 2019-02-05 | End: 2019-02-05

## 2019-02-05 RX ORDER — MAGNESIUM HYDROXIDE 1200 MG/15ML
LIQUID ORAL AS NEEDED
Status: DISCONTINUED | OUTPATIENT
Start: 2019-02-05 | End: 2019-02-05 | Stop reason: HOSPADM

## 2019-02-05 RX ORDER — OXYCODONE HYDROCHLORIDE 5 MG/1
5 TABLET ORAL EVERY 6 HOURS PRN
Qty: 30 TABLET | Refills: 0 | Status: SHIPPED | OUTPATIENT
Start: 2019-02-05 | End: 2019-02-15

## 2019-02-05 RX ORDER — NAPROXEN 250 MG/1
500 TABLET ORAL 2 TIMES DAILY WITH MEALS
COMMUNITY
End: 2019-02-05 | Stop reason: HOSPADM

## 2019-02-05 RX ORDER — METHOCARBAMOL 500 MG/1
500 TABLET, FILM COATED ORAL EVERY 6 HOURS PRN
Status: DISCONTINUED | OUTPATIENT
Start: 2019-02-05 | End: 2019-02-05 | Stop reason: HOSPADM

## 2019-02-05 RX ORDER — TRAMADOL HYDROCHLORIDE 50 MG/1
50 TABLET ORAL EVERY 6 HOURS PRN
Status: DISCONTINUED | OUTPATIENT
Start: 2019-02-05 | End: 2019-02-05 | Stop reason: HOSPADM

## 2019-02-05 RX ORDER — BUPIVACAINE HYDROCHLORIDE 2.5 MG/ML
INJECTION, SOLUTION INFILTRATION; PERINEURAL AS NEEDED
Status: DISCONTINUED | OUTPATIENT
Start: 2019-02-05 | End: 2019-02-05 | Stop reason: HOSPADM

## 2019-02-05 RX ORDER — ONDANSETRON 2 MG/ML
INJECTION INTRAMUSCULAR; INTRAVENOUS AS NEEDED
Status: DISCONTINUED | OUTPATIENT
Start: 2019-02-05 | End: 2019-02-05 | Stop reason: SURG

## 2019-02-05 RX ORDER — HYDROMORPHONE HCL/PF 1 MG/ML
0.5 SYRINGE (ML) INJECTION
Status: DISCONTINUED | OUTPATIENT
Start: 2019-02-05 | End: 2019-02-05 | Stop reason: HOSPADM

## 2019-02-05 RX ORDER — CHLORHEXIDINE GLUCONATE 0.12 MG/ML
15 RINSE ORAL ONCE
Status: COMPLETED | OUTPATIENT
Start: 2019-02-05 | End: 2019-02-05

## 2019-02-05 RX ORDER — ROCURONIUM BROMIDE 10 MG/ML
INJECTION, SOLUTION INTRAVENOUS AS NEEDED
Status: DISCONTINUED | OUTPATIENT
Start: 2019-02-05 | End: 2019-02-05 | Stop reason: SURG

## 2019-02-05 RX ORDER — ONDANSETRON 2 MG/ML
4 INJECTION INTRAMUSCULAR; INTRAVENOUS ONCE AS NEEDED
Status: DISCONTINUED | OUTPATIENT
Start: 2019-02-05 | End: 2019-02-05 | Stop reason: HOSPADM

## 2019-02-05 RX ORDER — ACETAMINOPHEN 325 MG/1
650 TABLET ORAL EVERY 6 HOURS PRN
Status: DISCONTINUED | OUTPATIENT
Start: 2019-02-05 | End: 2019-02-05 | Stop reason: HOSPADM

## 2019-02-05 RX ORDER — HYDROMORPHONE HYDROCHLORIDE 2 MG/ML
INJECTION, SOLUTION INTRAMUSCULAR; INTRAVENOUS; SUBCUTANEOUS AS NEEDED
Status: DISCONTINUED | OUTPATIENT
Start: 2019-02-05 | End: 2019-02-05 | Stop reason: SURG

## 2019-02-05 RX ORDER — SODIUM CHLORIDE, SODIUM LACTATE, POTASSIUM CHLORIDE, CALCIUM CHLORIDE 600; 310; 30; 20 MG/100ML; MG/100ML; MG/100ML; MG/100ML
100 INJECTION, SOLUTION INTRAVENOUS CONTINUOUS
Status: DISCONTINUED | OUTPATIENT
Start: 2019-02-05 | End: 2019-02-05 | Stop reason: HOSPADM

## 2019-02-05 RX ORDER — SODIUM CHLORIDE, SODIUM LACTATE, POTASSIUM CHLORIDE, CALCIUM CHLORIDE 600; 310; 30; 20 MG/100ML; MG/100ML; MG/100ML; MG/100ML
125 INJECTION, SOLUTION INTRAVENOUS CONTINUOUS
Status: DISCONTINUED | OUTPATIENT
Start: 2019-02-05 | End: 2019-02-05 | Stop reason: HOSPADM

## 2019-02-05 RX ORDER — FENTANYL CITRATE/PF 50 MCG/ML
25 SYRINGE (ML) INJECTION
Status: COMPLETED | OUTPATIENT
Start: 2019-02-05 | End: 2019-02-05

## 2019-02-05 RX ADMIN — CHLORHEXIDINE GLUCONATE 15 ML: 1.2 RINSE ORAL at 08:42

## 2019-02-05 RX ADMIN — HYDROMORPHONE HYDROCHLORIDE 0.5 MG: 2 INJECTION, SOLUTION INTRAMUSCULAR; INTRAVENOUS; SUBCUTANEOUS at 12:48

## 2019-02-05 RX ADMIN — FENTANYL CITRATE 25 MCG: 50 INJECTION, SOLUTION INTRAMUSCULAR; INTRAVENOUS at 13:33

## 2019-02-05 RX ADMIN — FENTANYL CITRATE 25 MCG: 50 INJECTION, SOLUTION INTRAMUSCULAR; INTRAVENOUS at 13:38

## 2019-02-05 RX ADMIN — FENTANYL CITRATE 50 MCG: 50 INJECTION, SOLUTION INTRAMUSCULAR; INTRAVENOUS at 11:06

## 2019-02-05 RX ADMIN — PROPOFOL 200 MG: 10 INJECTION, EMULSION INTRAVENOUS at 11:10

## 2019-02-05 RX ADMIN — ROCURONIUM BROMIDE 30 MG: 10 INJECTION INTRAVENOUS at 11:41

## 2019-02-05 RX ADMIN — SODIUM CHLORIDE, SODIUM LACTATE, POTASSIUM CHLORIDE, AND CALCIUM CHLORIDE 125 ML/HR: .6; .31; .03; .02 INJECTION, SOLUTION INTRAVENOUS at 08:54

## 2019-02-05 RX ADMIN — GABAPENTIN 300 MG: 300 CAPSULE ORAL at 08:39

## 2019-02-05 RX ADMIN — MIDAZOLAM 2 MG: 1 INJECTION INTRAMUSCULAR; INTRAVENOUS at 11:03

## 2019-02-05 RX ADMIN — DEXAMETHASONE SODIUM PHOSPHATE 10 MG: 10 INJECTION INTRAMUSCULAR; INTRAVENOUS at 11:25

## 2019-02-05 RX ADMIN — LIDOCAINE HYDROCHLORIDE 50 MG: 10 INJECTION, SOLUTION INFILTRATION; PERINEURAL at 11:10

## 2019-02-05 RX ADMIN — ROCURONIUM BROMIDE 50 MG: 10 INJECTION INTRAVENOUS at 11:10

## 2019-02-05 RX ADMIN — GLYCOPYRROLATE 0.4 MG: 0.2 INJECTION, SOLUTION INTRAMUSCULAR; INTRAVENOUS at 13:05

## 2019-02-05 RX ADMIN — FENTANYL CITRATE 25 MCG: 50 INJECTION, SOLUTION INTRAMUSCULAR; INTRAVENOUS at 14:09

## 2019-02-05 RX ADMIN — KETOROLAC TROMETHAMINE 30 MG: 30 INJECTION, SOLUTION INTRAMUSCULAR at 12:53

## 2019-02-05 RX ADMIN — ONDANSETRON 4 MG: 2 INJECTION INTRAMUSCULAR; INTRAVENOUS at 12:46

## 2019-02-05 RX ADMIN — Medication 2000 MG: at 11:16

## 2019-02-05 RX ADMIN — NEOSTIGMINE METHYLSULFATE 2 MG: 1 INJECTION INTRAVENOUS at 13:05

## 2019-02-05 RX ADMIN — ACETAMINOPHEN 975 MG: 325 TABLET, FILM COATED ORAL at 08:39

## 2019-02-05 RX ADMIN — OXYCODONE HYDROCHLORIDE 10 MG: 5 TABLET ORAL at 16:56

## 2019-02-05 RX ADMIN — FENTANYL CITRATE 50 MCG: 50 INJECTION, SOLUTION INTRAMUSCULAR; INTRAVENOUS at 11:04

## 2019-02-05 RX ADMIN — FENTANYL CITRATE 25 MCG: 50 INJECTION, SOLUTION INTRAMUSCULAR; INTRAVENOUS at 14:18

## 2019-02-05 NOTE — H&P (VIEW-ONLY)
Assessment/Plan:    Lumbar disc herniation  Patient is seen and examined  She presents today for follow-up and for surgical scheduling regarding left lumbar radiculopathy  She does have history of large extruded disc herniation on the left at L5-S1 causing lateral recess stenosis  She has tried and failed conservative management including epidural steroid injections which provided improvement in numbness but not significant improvement in pain  On physical exam she does have a positive straight leg raise left lower extremity  Good strength L2-S1 bilaterally  Sensation is grossly intact to light touch L2-S1 bilaterally  Reflexes are hypoactive at L4 and S1 symmetrically  Skin is intact lower extremities are warm well perfused  MRI demonstrates large extruded disc herniation on the left at L5-S1 causing lateral recess stenosis    Assessment and plan  Plan for hemilaminectomy lumbar decompression diskectomy left L5-S1  Risk and benefits including but not limited to bleeding, CSF leak, nerve damage, possible persistent symptoms, possible need for reoperation are discussed          · Left leg pain and numbness > low back pain  · S/p 2x Left L5-S1 TFESI with no long lasting benefit  · Microdiskectomy L5-S1 scheduled  · Risks, expectations, and precautions discussed with patient in detail   · Patient to stop NSAIDs one week prior to surgery    · Follow up after surgery     Problem List Items Addressed This Visit     Lumbar radiculopathy - Left    Relevant Orders    Case request operating room: Lumbar laminectomy decompression diskectomy left L5-S1  CPT code 56235  Case length:   1 hour (Completed)    UA w Reflex to Microscopic w Reflex to Culture    Comprehensive metabolic panel    CBC and differential    APTT    Protime-INR    HEMOGLOBIN A1C W/ EAG ESTIMATION    Type and screen    EKG 12 lead    XR chest pa & lateral    Lumbar disc herniation - Primary     Patient is seen and examined    She presents today for follow-up and for surgical scheduling regarding left lumbar radiculopathy  She does have history of large extruded disc herniation on the left at L5-S1 causing lateral recess stenosis  She has tried and failed conservative management including epidural steroid injections which provided improvement in numbness but not significant improvement in pain  On physical exam she does have a positive straight leg raise left lower extremity  Good strength L2-S1 bilaterally  Sensation is grossly intact to light touch L2-S1 bilaterally  Reflexes are hypoactive at L4 and S1 symmetrically  Skin is intact lower extremities are warm well perfused  MRI demonstrates large extruded disc herniation on the left at L5-S1 causing lateral recess stenosis    Assessment and plan  Plan for hemilaminectomy lumbar decompression diskectomy left L5-S1  Risk and benefits including but not limited to bleeding, CSF leak, nerve damage, possible persistent symptoms, possible need for reoperation are discussed           Relevant Orders    Case request operating room: Lumbar laminectomy decompression diskectomy left L5-S1  CPT code 00652  Case length:   1 hour (Completed)    UA w Reflex to Microscopic w Reflex to Culture    Comprehensive metabolic panel    CBC and differential    APTT    Protime-INR    HEMOGLOBIN A1C W/ EAG ESTIMATION    Type and screen    EKG 12 lead    XR chest pa & lateral            Subjective:      Patient ID: Miki Huang is a 29 y o  female  HPI   The patient presents to discuss surgery for left leg pain  Today she complains of minimal low back pain with greater posterior left thigh and calf pain  The entire left leg is numb to the foot  Lying and turning in bed aggravates  She currently uses naproxen and ibuprofen with some benefit  She did have lumbar injections with no long lasting benefit  She denies past lumbar surgery        The following portions of the patient's history were reviewed and updated as appropriate: current medications, past family history, past medical history, past social history, past surgical history and problem list     Review of Systems   Constitutional: Negative for chills, fever and unexpected weight change  HENT: Negative for hearing loss, nosebleeds and sore throat  Eyes: Negative for pain, redness and visual disturbance  Respiratory: Negative for cough, shortness of breath and wheezing  Cardiovascular: Negative for chest pain, palpitations and leg swelling  Gastrointestinal: Negative for abdominal pain, nausea and vomiting  Genitourinary: Negative for dyspareunia, dysuria and frequency  Skin: Negative for rash and wound  Neurological: Negative for dizziness, numbness and headaches  Psychiatric/Behavioral: Negative for decreased concentration and suicidal ideas  The patient is not nervous/anxious  Objective:      /82   Pulse 64   Ht 5' 11" (1 803 m)   Wt 103 kg (227 lb)   BMI 31 66 kg/m²          Physical Exam   Constitutional: She is oriented to person, place, and time  She appears well-developed and well-nourished  HENT:   Head: Normocephalic  Eyes: Pupils are equal, round, and reactive to light  Conjunctivae and EOM are normal    Neck: Normal range of motion  Cardiovascular: Normal rate  Pulmonary/Chest: Effort normal    Neurological: She is alert and oriented to person, place, and time  Skin: Skin is warm and dry         Patient ambulates without assistance  Tender to palpation over lumbosacral region  Modified straight leg raise positive left, negative right  Strength L2-S1 5/5 bilaterally   Sensation L2-S1 intact bilaterally   Reflexes hypoactive at L4 and S1 symetrically      Imaging:  Lumbar MRI 11/16/18 Reviewed     Scribe Attestation    I,:   Yesika Newton am acting as a scribe while in the presence of the attending physician :        I,:   Jaleel Vigil MD personally performed the services described in this documentation as scribed in my presence :

## 2019-02-05 NOTE — ANESTHESIA POSTPROCEDURE EVALUATION
Post-Op Assessment Note      CV Status:  Stable    Mental Status:  Alert and awake    Hydration Status:  Euvolemic    PONV Controlled:  Controlled    Airway Patency:  Patent    Post Op Vitals Reviewed: Yes          Staff: CRNA           /71 (02/05/19 1315)    Temp 97 9 °F (36 6 °C) (02/05/19 1315)    Pulse 68 (02/05/19 1315)   Resp 12 (02/05/19 1315)    SpO2 100 % (02/05/19 1315)

## 2019-02-05 NOTE — PERIOPERATIVE NURSING NOTE
VSS, pt denies nausea, reports improvement in pain, assessment unchanged, report called, no questions, pt transferred to [facility]

## 2019-02-05 NOTE — DISCHARGE INSTRUCTIONS
Discharge Instructions - Orthopedics  Tigist Delgado 29 y o  female MRN: [id number]  Unit/Bed#: [facility]U 16    Weight Bearing Status:                                           Weight bearing as tolerated to both legs  DVT prophylaxis:  None    Pain:  Continue analgesics as directed    Dressing Instructions:   Keep dressing clean, dry and intact until follow up appointment  May change dressing after 5 days  No scrubbing of wound  Do not shower until 2-3 days after the procedure  PT/OT:  No PT until further instructions from your surgeon  Appt Instructions: If you do not have your appointment, please call the clinic at [phone] to f/u with Dr Funmi Lerner in the office in 2 weeks  Contact the office sooner if you experience any increased numbness/tingling in the extremities  Miscellaneous:  No lifting greater than 5 lbs, no twisting or bending

## 2019-02-05 NOTE — OP NOTE
OPERATIVE REPORT  PATIENT NAME: Jhon Hauser    :  1985  MRN: [id number]  Pt Location: [facility] OR ROOM 18    SURGERY DATE: 2019    Surgeon(s) and Role:     * Nellie Langford MD - Primary     * Eloise Garcia PA-C (no qualified resident available, a PA was utilized for the purpose of patient positioning, retraction and blood suction)    Preop Diagnosis:  Lumbar disc herniation L5-S1 [M51 26]  Lumbar radiculopathy [M54 16]    Post-Op Diagnosis Codes:     * Lumbar disc herniation L5-S1 [M51 26]     * Lumbar radiculopathy [M54 16]    Procedures:  Lumbar hemilaminectomy decompression diskectomy left L5-S1    Specimen(s):  * No specimens in log *    Estimated Blood Loss:   Minimal    Drains:       Anesthesia Type:   General    Operative Indications:  Lumbar disc herniation [M51 26]  Lumbar radiculopathy [M54 16]      Operative Findings:  Large disc herniation L5-S1  Significant spinal stenosis L5-S1  Significant soft tissue leading to the surgical wound bed    Complications:   None    Procedure and Technique:  Left hemilaminectomy decompression, diskectomy left L5-S1  Patient was correctly identified by both the anesthesia department and myself  Her back was marked  She was taken to the operating room with general anesthesia was induced in the supine position  SCDs were attached to her legs  IV antibiotics were administered preoperatively  She was positioned prone onto radiolucent Lexis Meiers table ensuring that all bony prominences and neurovascular structures were adequately padded and protected  AP and lateral imaging demonstrated with the incision would be made overlying L5-S1  The back was then prepped and draped in the usual sterile fashion  A time-out was performed  A 2 5 cm longitudinal incision was made in midline overlying L5-S1  Dissection was carried to the level of the fascia    The adipose soft tissue depth measured over 8 cm  Subperiosteal dissection was performed exposing the spinous processes at L5-S1 out to the lamina and to the facet joint  Self-retaining retractors placed lateral marker confirmed the appropriate level of L5-S1 with Radiology  The ligamentum flavum was teased off with the use of pituitary rongeur was  Partial medial facetectomy was performed with the use of a high-speed bur  The lateral most aspect of the thecal sac was I had identified  The traversing L5 nerve was found to be severely tented over a large disc herniation  With very gentle mobilization annulotomy was created and several pieces of large disc material were removed from the lateral recess and also from the central region  Following diskectomy the lateral recess was found to be nicely decompressed  The traversing L5 nerve was found to passed freely without any significant tension  Alice Jane was passed to ensure no residual extruded fragments  Hemostasis was achieved with the use of FloSeal and bipolar  Copious irrigation was performed to the entire wound with normal saline solution followed by neomycin mixture  The exposed thecal sac on the left side was found to be intact without deficits  The fascial layer was then closed in a watertight fashion using 0 PDS suture  The deep subcutaneous layer was closed with 0 Vicryl suture followed by 2 Vicryl suture after vancomycin powder had been sprinkled is soft tissues  The skin layer was closed in interrupted fashion using 2 0 nylon suture  Marcaine was injected into the wound  Soft sterile dressing was then applied  Patient was flipped into the supine position extubated and taken to recovery room in stable condition       I was present for the entire procedure    Patient Disposition:  PACU     SIGNATURE: Colin Nam MD  DATE: February 5, 2019  TIME: 1:03 PM

## 2019-02-05 NOTE — PROGRESS NOTES
Pt's mother Jeffrey Camejo and pt's  Salo Hurtado at bedside  Pt's prescriptions x 3 tubed to [facility] tube # 11  Pt's family to  when ready

## 2019-02-07 ENCOUNTER — TELEPHONE (OUTPATIENT)
Dept: OBGYN CLINIC | Facility: HOSPITAL | Age: 34
End: 2019-02-07

## 2019-02-07 NOTE — TELEPHONE ENCOUNTER
Dr Kaylyn Roberts patient - Lumbar Lami/Disc    Patient calling this morning as she started with  temp 100 3-100 9  Denies drainage, swelling to incisional area  Advised to monitor symptoms above stay well hydrated, be up expanding lungs, cough-deep breathing, add Tylenol 1000mg TID  Call if fever goes above 101 0 with above remedies  Patient is currently on Keflex 500mg  QID for 5 days post surgery  Patient verbalized understanding

## 2019-02-12 NOTE — TELEPHONE ENCOUNTER
Spoke to pt  Who stated  she is having irritation from the (paper) tape over her dressing  Pt  Is changing dressing daily and stated because of her irritation she has applied hydrocortisone cream to areas of her back , pt  Stated she did not put cream on incision  I advised pt  To not apply any lotions or cream ner incision site and possibl change the type of tape she is using to cover incision  Pt  Will also take Benadryl for itching  Pt  Wants to know if incision must be covered   Please advise         Thanks

## 2019-02-12 NOTE — TELEPHONE ENCOUNTER
Patient called requesting a call back from a nurse patient got surgery 02/05 and have questions about her incision                   Call back # [phone]  Dr Samaniego

## 2019-02-12 NOTE — TELEPHONE ENCOUNTER
Spoke with patient  She is experiencing josh-incisional itching, and she was instructed to take oral Benadryl PRN  Advised to avoid cream/ointments near the incision  Otherwise, her incision is c/d/i, no drainage or erythema  She is aware of the above  Thank you

## 2019-02-20 ENCOUNTER — OFFICE VISIT (OUTPATIENT)
Dept: OBGYN CLINIC | Facility: CLINIC | Age: 34
End: 2019-02-20

## 2019-02-20 VITALS
BODY MASS INDEX: 33.32 KG/M2 | SYSTOLIC BLOOD PRESSURE: 131 MMHG | WEIGHT: 238 LBS | HEIGHT: 71 IN | HEART RATE: 78 BPM | DIASTOLIC BLOOD PRESSURE: 83 MMHG

## 2019-02-20 DIAGNOSIS — M51.26 LUMBAR DISC HERNIATION: ICD-10-CM

## 2019-02-20 DIAGNOSIS — M54.16 LUMBAR RADICULOPATHY: ICD-10-CM

## 2019-02-20 DIAGNOSIS — M51.36 DDD (DEGENERATIVE DISC DISEASE), LUMBAR: Primary | ICD-10-CM

## 2019-02-20 PROCEDURE — 99024 POSTOP FOLLOW-UP VISIT: CPT | Performed by: ORTHOPAEDIC SURGERY

## 2019-02-20 NOTE — PROGRESS NOTES
Subjective:  15-year-old female presenting for 2 week postoperative visit s/p Lumbar hemilaminectomy decompression diskectomy left L5-S1  Patient states had her left leg pain has completely resolved  She denies any numbness or tingling in her left leg  She describes intermittent lumbar back pain in the area of her surgical incision  This pain is controlled with the administration of Tylenol  Overall patient is very pleased with the results of her surgery  Physical exam:  Sutures intact, no active drainage from surgical incision lumbar spine, scabbing at midpoint of surgical incision  No josh-incisional erythema or ecchymosis  Appropriate tenderness to palpation josh incisionally, sensation intact to light touch equal bilateral lower extremities L2-S1, 5/5 motor L2-S1 equal bilateral lower extremities, distal extremity warm well perfused    Assessment plan:  15-year-old female 2 weeks status post L5-S1 lumbar hemilaminectomy decompression diskectomy  Doing well  Weightbearing as tolerated  Over-the-counter analgesics as needed for pain relief  Patient's directed to continue to monitor her surgical incision for signs of infection including erythema, pain, drainage  Follow up in office in 2 weeks on March 4th for wound check  Patient planning on returning to work on March 4th  She will be re-evaluated at morning  She was provided with a note to tentatively return to work on March 4  at today's visit

## 2019-02-20 NOTE — LETTER
February 20, 2019     Patient: Oswald Moore   YOB: 1985   Date of Visit: 2/20/2019       To Whom it May Concern:    Oswald Moore is under my professional care  She was seen in my office on 2/20/2019  She may return to work on 3/4 tentatively  If you have any questions or concerns, please don't hesitate to call           Sincerely,          Delilah Darling MD        CC: No Recipients

## 2019-03-04 ENCOUNTER — OFFICE VISIT (OUTPATIENT)
Dept: OBGYN CLINIC | Facility: HOSPITAL | Age: 34
End: 2019-03-04

## 2019-03-04 VITALS
HEART RATE: 61 BPM | BODY MASS INDEX: 33.18 KG/M2 | WEIGHT: 237 LBS | HEIGHT: 71 IN | SYSTOLIC BLOOD PRESSURE: 111 MMHG | DIASTOLIC BLOOD PRESSURE: 77 MMHG

## 2019-03-04 DIAGNOSIS — M54.16 LUMBAR RADICULOPATHY: ICD-10-CM

## 2019-03-04 DIAGNOSIS — M51.26 LUMBAR DISC HERNIATION: Primary | ICD-10-CM

## 2019-03-04 PROCEDURE — 99024 POSTOP FOLLOW-UP VISIT: CPT | Performed by: PHYSICIAN ASSISTANT

## 2019-03-04 NOTE — PROGRESS NOTES
Assessment/Plan:    Patient seen and examined  She is roughly one month status post lumbar hemilaminectomy decompression diskectomy on the left at L5-S1 done on 2/5/2019  Overall she is doing very well and reports her left lower extremity radicular symptoms are completely resolved  Her lumbar incision is well healed  From our standpoint, she can return to work without restrictions  She can follow up on a p r n  Basis  She was instructed to let us know she has any questions or concerns  Problem List Items Addressed This Visit        Nervous and Auditory    Lumbar radiculopathy - Left       Musculoskeletal and Integument    Lumbar disc herniation - Primary            Subjective:      Patient ID: Ata Evangelista is a 29 y o  female  HPI     Patient is a 44-year-old female presents for postop visit  She is roughly one month status post lumbar hemilaminectomy decompression diskectomy on the left at L5-S1 done on 2/5/2019  Today she states she is feeling very well and her left lower extremity radicular symptoms are completely resolved  She denies any new issues since her last visit  Her lumbar incision is healed she denies any fever chills or drainage  She is happy with the results of the surgery  The following portions of the patient's history were reviewed and updated as appropriate: allergies, current medications, past family history, past medical history, past social history, past surgical history and problem list     Review of Systems      Objective:      /77   Pulse 61   Ht 5' 11" (1 803 m)   Wt 108 kg (237 lb)   BMI 33 05 kg/m²          Physical Exam      No acute distress  Gait is normal without assistance  Inspection reveals lumbar incision is well healed, no signs of erythema drainage or dehiscence  Negative modified straight leg raise bilaterally  Strength is 5/5 L2-S1 bilaterally, sensation is equal and intact  There is no calf tenderness or pitting edema

## 2019-03-04 NOTE — LETTER
March 4, 2019     Patient: Sonya Nolasco   YOB: 1985   Date of Visit: 3/4/2019       To Whom it May Concern:    Sonya Nolasco is under my professional care  She was seen in my office on 3/4/2019  She may return to work on 3/4/2019, full duty with no restrictions  If you have any questions or concerns, please don't hesitate to call           Sincerely,          Ridge Angel PA-C        CC: No Recipients

## 2019-04-04 DIAGNOSIS — K21.9 GASTROESOPHAGEAL REFLUX DISEASE WITHOUT ESOPHAGITIS: ICD-10-CM

## 2019-04-04 DIAGNOSIS — B00.9 HERPES: ICD-10-CM

## 2019-04-04 RX ORDER — OMEPRAZOLE 20 MG/1
20 CAPSULE, DELAYED RELEASE ORAL DAILY
Qty: 90 CAPSULE | Refills: 1 | Status: SHIPPED | OUTPATIENT
Start: 2019-04-04 | End: 2020-02-23 | Stop reason: SDUPTHER

## 2019-04-04 RX ORDER — VALACYCLOVIR HYDROCHLORIDE 500 MG/1
500 TABLET, FILM COATED ORAL DAILY
Qty: 90 TABLET | Refills: 1 | Status: SHIPPED | OUTPATIENT
Start: 2019-04-04 | End: 2019-12-23 | Stop reason: SDUPTHER

## 2019-04-29 DIAGNOSIS — R94.31 ABNORMAL EKG: Primary | ICD-10-CM

## 2019-04-30 ENCOUNTER — HOSPITAL ENCOUNTER (OUTPATIENT)
Dept: NON INVASIVE DIAGNOSTICS | Facility: HOSPITAL | Age: 34
Discharge: HOME | End: 2019-04-30
Payer: COMMERCIAL

## 2019-04-30 DIAGNOSIS — R94.31 ABNORMAL EKG: ICD-10-CM

## 2019-04-30 LAB
ATRIAL RATE: 52 BPM
P AXIS: 24 DEGREES
PR INTERVAL: 156 MS
QRS AXIS: 30 DEGREES
QRSD INTERVAL: 98 MS
QT INTERVAL: 440 MS
QTC INTERVAL: 409 MS
T WAVE AXIS: 38 DEGREES
VENTRICULAR RATE: 52 BPM

## 2019-04-30 PROCEDURE — 93010 ELECTROCARDIOGRAM REPORT: CPT | Performed by: INTERNAL MEDICINE

## 2019-04-30 PROCEDURE — 93005 ELECTROCARDIOGRAM TRACING: CPT

## 2019-05-07 ENCOUNTER — APPOINTMENT (OUTPATIENT)
Dept: RADIOLOGY | Facility: CLINIC | Age: 34
End: 2019-05-07
Payer: COMMERCIAL

## 2019-05-07 ENCOUNTER — APPOINTMENT (OUTPATIENT)
Dept: LAB | Facility: CLINIC | Age: 34
End: 2019-05-07
Payer: COMMERCIAL

## 2019-05-07 ENCOUNTER — TELEPHONE (OUTPATIENT)
Dept: FAMILY MEDICINE CLINIC | Facility: CLINIC | Age: 34
End: 2019-05-07

## 2019-05-07 ENCOUNTER — OFFICE VISIT (OUTPATIENT)
Dept: FAMILY MEDICINE CLINIC | Facility: CLINIC | Age: 34
End: 2019-05-07
Payer: COMMERCIAL

## 2019-05-07 VITALS
RESPIRATION RATE: 16 BRPM | SYSTOLIC BLOOD PRESSURE: 110 MMHG | WEIGHT: 240.4 LBS | BODY MASS INDEX: 33.65 KG/M2 | TEMPERATURE: 96.8 F | HEIGHT: 71 IN | OXYGEN SATURATION: 98 % | DIASTOLIC BLOOD PRESSURE: 76 MMHG | HEART RATE: 62 BPM

## 2019-05-07 DIAGNOSIS — H00.012 HORDEOLUM EXTERNUM OF RIGHT LOWER EYELID: ICD-10-CM

## 2019-05-07 DIAGNOSIS — R06.02 SOB (SHORTNESS OF BREATH): ICD-10-CM

## 2019-05-07 DIAGNOSIS — R00.2 PALPITATIONS: ICD-10-CM

## 2019-05-07 LAB
ALBUMIN SERPL BCP-MCNC: 3.9 G/DL (ref 3.5–5)
ALP SERPL-CCNC: 66 U/L (ref 46–116)
ALT SERPL W P-5'-P-CCNC: 24 U/L (ref 12–78)
ANION GAP SERPL CALCULATED.3IONS-SCNC: 5 MMOL/L (ref 4–13)
AST SERPL W P-5'-P-CCNC: 17 U/L (ref 5–45)
BASOPHILS # BLD AUTO: 0.02 THOUSANDS/ΜL (ref 0–0.1)
BASOPHILS NFR BLD AUTO: 0 % (ref 0–1)
BILIRUB SERPL-MCNC: 0.26 MG/DL (ref 0.2–1)
BUN SERPL-MCNC: 13 MG/DL (ref 5–25)
CALCIUM SERPL-MCNC: 8.9 MG/DL (ref 8.3–10.1)
CHLORIDE SERPL-SCNC: 104 MMOL/L (ref 100–108)
CO2 SERPL-SCNC: 29 MMOL/L (ref 21–32)
CREAT SERPL-MCNC: 0.8 MG/DL (ref 0.6–1.3)
EOSINOPHIL # BLD AUTO: 0.19 THOUSAND/ΜL (ref 0–0.61)
EOSINOPHIL NFR BLD AUTO: 3 % (ref 0–6)
ERYTHROCYTE [DISTWIDTH] IN BLOOD BY AUTOMATED COUNT: 11.9 % (ref 11.6–15.1)
ERYTHROCYTE [SEDIMENTATION RATE] IN BLOOD: 11 MM/HOUR (ref 0–20)
GFR SERPL CREATININE-BSD FRML MDRD: 98 ML/MIN/1.73SQ M
GLUCOSE P FAST SERPL-MCNC: 85 MG/DL (ref 65–99)
HCT VFR BLD AUTO: 40.3 % (ref 34.8–46.1)
HGB BLD-MCNC: 13 G/DL (ref 11.5–15.4)
IMM GRANULOCYTES # BLD AUTO: 0.01 THOUSAND/UL (ref 0–0.2)
IMM GRANULOCYTES NFR BLD AUTO: 0 % (ref 0–2)
LYMPHOCYTES # BLD AUTO: 2.43 THOUSANDS/ΜL (ref 0.6–4.47)
LYMPHOCYTES NFR BLD AUTO: 33 % (ref 14–44)
MAGNESIUM SERPL-MCNC: 2 MG/DL (ref 1.6–2.6)
MCH RBC QN AUTO: 29.7 PG (ref 26.8–34.3)
MCHC RBC AUTO-ENTMCNC: 32.3 G/DL (ref 31.4–37.4)
MCV RBC AUTO: 92 FL (ref 82–98)
MONOCYTES # BLD AUTO: 0.49 THOUSAND/ΜL (ref 0.17–1.22)
MONOCYTES NFR BLD AUTO: 7 % (ref 4–12)
NEUTROPHILS # BLD AUTO: 4.17 THOUSANDS/ΜL (ref 1.85–7.62)
NEUTS SEG NFR BLD AUTO: 57 % (ref 43–75)
NRBC BLD AUTO-RTO: 0 /100 WBCS
PLATELET # BLD AUTO: 224 THOUSANDS/UL (ref 149–390)
PMV BLD AUTO: 11 FL (ref 8.9–12.7)
POTASSIUM SERPL-SCNC: 3.8 MMOL/L (ref 3.5–5.3)
PROT SERPL-MCNC: 7.2 G/DL (ref 6.4–8.2)
RBC # BLD AUTO: 4.37 MILLION/UL (ref 3.81–5.12)
SODIUM SERPL-SCNC: 138 MMOL/L (ref 136–145)
T3FREE SERPL-MCNC: 3 PG/ML (ref 2.3–4.2)
T4 FREE SERPL-MCNC: 0.91 NG/DL (ref 0.76–1.46)
TSH SERPL DL<=0.05 MIU/L-ACNC: 2.72 UIU/ML (ref 0.36–3.74)
WBC # BLD AUTO: 7.31 THOUSAND/UL (ref 4.31–10.16)

## 2019-05-07 PROCEDURE — 83735 ASSAY OF MAGNESIUM: CPT

## 2019-05-07 PROCEDURE — 84481 FREE ASSAY (FT-3): CPT

## 2019-05-07 PROCEDURE — 99214 OFFICE O/P EST MOD 30 MIN: CPT | Performed by: FAMILY MEDICINE

## 2019-05-07 PROCEDURE — 71046 X-RAY EXAM CHEST 2 VIEWS: CPT

## 2019-05-07 PROCEDURE — 84439 ASSAY OF FREE THYROXINE: CPT

## 2019-05-07 PROCEDURE — 85025 COMPLETE CBC W/AUTO DIFF WBC: CPT

## 2019-05-07 PROCEDURE — 80053 COMPREHEN METABOLIC PANEL: CPT

## 2019-05-07 PROCEDURE — 85652 RBC SED RATE AUTOMATED: CPT

## 2019-05-07 PROCEDURE — 86376 MICROSOMAL ANTIBODY EACH: CPT

## 2019-05-07 PROCEDURE — 86800 THYROGLOBULIN ANTIBODY: CPT

## 2019-05-07 PROCEDURE — 86618 LYME DISEASE ANTIBODY: CPT | Performed by: NURSE PRACTITIONER

## 2019-05-07 PROCEDURE — 3008F BODY MASS INDEX DOCD: CPT | Performed by: FAMILY MEDICINE

## 2019-05-07 PROCEDURE — 84443 ASSAY THYROID STIM HORMONE: CPT

## 2019-05-07 PROCEDURE — 36415 COLL VENOUS BLD VENIPUNCTURE: CPT | Performed by: NURSE PRACTITIONER

## 2019-05-07 RX ORDER — POLYMYXIN B SULFATE AND TRIMETHOPRIM 1; 10000 MG/ML; [USP'U]/ML
1 SOLUTION OPHTHALMIC EVERY 6 HOURS
Qty: 10 ML | Refills: 0 | Status: SHIPPED | OUTPATIENT
Start: 2019-05-07 | End: 2019-05-07

## 2019-05-07 RX ORDER — TOBRAMYCIN 3 MG/ML
1 SOLUTION/ DROPS OPHTHALMIC
Qty: 5 ML | Refills: 0 | Status: SHIPPED | OUTPATIENT
Start: 2019-05-07 | End: 2019-05-29 | Stop reason: ALTCHOICE

## 2019-05-08 LAB
B BURGDOR IGG SER IA-ACNC: 0.11
B BURGDOR IGM SER IA-ACNC: 0.3

## 2019-05-09 ENCOUNTER — TELEPHONE (OUTPATIENT)
Dept: FAMILY MEDICINE CLINIC | Facility: CLINIC | Age: 34
End: 2019-05-09

## 2019-05-09 LAB
THYROGLOB AB SERPL-ACNC: <1 IU/ML (ref 0–0.9)
THYROPEROXIDASE AB SERPL-ACNC: 12 IU/ML (ref 0–34)

## 2019-05-14 ENCOUNTER — HOSPITAL ENCOUNTER (OUTPATIENT)
Dept: NON INVASIVE DIAGNOSTICS | Facility: HOSPITAL | Age: 34
Discharge: HOME | End: 2019-05-14
Payer: COMMERCIAL

## 2019-05-14 DIAGNOSIS — R00.2 PALPITATIONS: ICD-10-CM

## 2019-05-14 PROCEDURE — 93225 XTRNL ECG REC<48 HRS REC: CPT

## 2019-05-14 PROCEDURE — 93226 XTRNL ECG REC<48 HR SCAN A/R: CPT

## 2019-05-23 PROCEDURE — 93227 XTRNL ECG REC<48 HR R&I: CPT | Performed by: INTERNAL MEDICINE

## 2019-05-29 ENCOUNTER — CONSULT (OUTPATIENT)
Dept: CARDIOLOGY CLINIC | Facility: CLINIC | Age: 34
End: 2019-05-29
Payer: COMMERCIAL

## 2019-05-29 VITALS
SYSTOLIC BLOOD PRESSURE: 104 MMHG | WEIGHT: 240 LBS | DIASTOLIC BLOOD PRESSURE: 70 MMHG | HEIGHT: 71 IN | BODY MASS INDEX: 33.6 KG/M2 | HEART RATE: 78 BPM

## 2019-05-29 DIAGNOSIS — R00.2 PALPITATIONS: Primary | ICD-10-CM

## 2019-05-29 PROCEDURE — 99243 OFF/OP CNSLTJ NEW/EST LOW 30: CPT | Performed by: INTERNAL MEDICINE

## 2019-06-26 ENCOUNTER — HOSPITAL ENCOUNTER (OUTPATIENT)
Dept: NON INVASIVE DIAGNOSTICS | Facility: CLINIC | Age: 34
Discharge: HOME | End: 2019-06-26
Payer: COMMERCIAL

## 2019-06-26 DIAGNOSIS — R00.2 PALPITATIONS: ICD-10-CM

## 2019-06-26 PROCEDURE — 93306 TTE W/DOPPLER COMPLETE: CPT | Performed by: INTERNAL MEDICINE

## 2019-06-26 PROCEDURE — 93306 TTE W/DOPPLER COMPLETE: CPT

## 2019-06-26 PROCEDURE — 93018 CV STRESS TEST I&R ONLY: CPT | Performed by: INTERNAL MEDICINE

## 2019-06-26 PROCEDURE — 93016 CV STRESS TEST SUPVJ ONLY: CPT | Performed by: INTERNAL MEDICINE

## 2019-06-26 PROCEDURE — 93017 CV STRESS TEST TRACING ONLY: CPT

## 2019-06-28 LAB
CHEST PAIN STATEMENT: NORMAL
MAX DIASTOLIC BP: 80 MMHG
MAX HEART RATE: 184 BPM
MAX PREDICTED HEART RATE: 187 BPM
MAX. SYSTOLIC BP: 135 MMHG
PROTOCOL NAME: NORMAL
REASON FOR TERMINATION: NORMAL
TARGET HR FORMULA: NORMAL
TEST INDICATION: NORMAL
TIME IN EXERCISE PHASE: NORMAL

## 2019-11-14 ENCOUNTER — OFFICE VISIT (OUTPATIENT)
Dept: FAMILY MEDICINE CLINIC | Facility: CLINIC | Age: 34
End: 2019-11-14
Payer: COMMERCIAL

## 2019-11-14 VITALS
WEIGHT: 236 LBS | HEIGHT: 71 IN | BODY MASS INDEX: 33.04 KG/M2 | HEART RATE: 60 BPM | DIASTOLIC BLOOD PRESSURE: 76 MMHG | SYSTOLIC BLOOD PRESSURE: 112 MMHG | RESPIRATION RATE: 18 BRPM | TEMPERATURE: 98.1 F

## 2019-11-14 DIAGNOSIS — Z11.4 SCREENING FOR HIV (HUMAN IMMUNODEFICIENCY VIRUS): Primary | ICD-10-CM

## 2019-11-14 DIAGNOSIS — Z00.00 ANNUAL PHYSICAL EXAM: ICD-10-CM

## 2019-11-14 PROCEDURE — 99395 PREV VISIT EST AGE 18-39: CPT | Performed by: NURSE PRACTITIONER

## 2019-11-14 NOTE — PATIENT INSTRUCTIONS

## 2019-11-14 NOTE — PROGRESS NOTES
[facility]"" [facility]    NAME: Belinda Rick  AGE: 36 y o  SEX: female  : 1985     DATE: 2019     Assessment and Plan:     Problem List Items Addressed This Visit     None      Visit Diagnoses     Screening for HIV (human immunodeficiency virus)    -  Primary    Annual physical exam        BMI 32 0-32 9,adult              Immunizations and preventive care screenings were discussed with patient today  Appropriate education was printed on patient's after visit summary  Counseling:  Alcohol/drug use: discussed moderation in alcohol intake, the recommendations for healthy alcohol use, and avoidance of illicit drug use  Dental Health: discussed importance of regular tooth brushing, flossing, and dental visits  Injury prevention: discussed safety/seat belts, safety helmets, smoke detectors, carbon dioxide detectors, and smoking near bedding or upholstery  Sexual health: discussed sexually transmitted diseases, partner selection, use of condoms, avoidance of unintended pregnancy, and contraceptive alternatives  · Exercise: the importance of regular exercise/physical activity was discussed  Recommend exercise 3-5 times per week for at least 30 minutes  BMI Counseling: Body mass index is 32 92 kg/m²  The BMI is above normal  Nutrition recommendations include decreasing portion sizes, encouraging healthy choices of fruits and vegetables, decreasing fast food intake, consuming healthier snacks, increasing intake of lean protein, reducing intake of saturated and trans fat and reducing intake of cholesterol  Exercise recommendations include moderate physical activity 150 minutes/week and strength training exercises  No pharmacotherapy was ordered  Depression Screening and Follow-up Plan: Patient's depression screening was positive with a PHQ-2 score of 0  Clincally patient does not have depression   No treatment is required  Return in 1 year (on 11/14/2020)  Chief Complaint:     Chief Complaint   Patient presents with    Annual Exam     needs GYN order       History of Present Illness:     Adult Annual Physical   Patient here for a comprehensive physical exam  The patient reports no problems  Diet and Physical Activity  · Diet/Nutrition: well balanced diet  · Exercise: walking  Depression Screening  PHQ-9 Depression Screening    PHQ-9:    Frequency of the following problems over the past two weeks:       Little interest or pleasure in doing things:  0 - not at all  Feeling down, depressed, or hopeless:  0 - not at all  PHQ-2 Score:  0       General Health  · Sleep: sleeps well  · Hearing: normal - bilateral   · Vision: goes for regular eye exams and wears glasses  · Dental: regular dental visits, brushes teeth twice daily and flosses teeth occasionally  /GYN Health  · Last menstrual period: 10/24/2018  · Contraceptive method: none   · History of STDs?: no      Review of Systems:     Review of Systems   Constitutional: Negative  Negative for activity change, appetite change, fatigue and fever  HENT: Negative  Negative for congestion, mouth sores and sinus pain  Eyes: Negative  Negative for redness and visual disturbance  Respiratory: Negative  Negative for choking and shortness of breath  Cardiovascular: Negative  Negative for chest pain, palpitations and leg swelling  Gastrointestinal: Negative  Negative for abdominal distention, abdominal pain, anal bleeding, diarrhea and nausea  Endocrine: Negative  Negative for cold intolerance, polyphagia and polyuria  Genitourinary: Negative  Negative for difficulty urinating, dysuria, vaginal discharge and vaginal pain  Musculoskeletal: Negative  Negative for arthralgias and myalgias  Skin: Negative  Negative for wound  Allergic/Immunologic: Negative  Neurological: Negative    Negative for dizziness, speech difficulty and light-headedness  Hematological: Negative  Psychiatric/Behavioral: Negative  Negative for decreased concentration, sleep disturbance and suicidal ideas  The patient is not nervous/anxious         Past Medical History:     Past Medical History:   Diagnosis Date    Allergic     Asthma     GERD (gastroesophageal reflux disease)       Past Surgical History:     Past Surgical History:   Procedure Laterality Date    CERVICAL BIOPSY  W/ LOOP ELECTRODE EXCISION  2012    CT EPIDURAL STEROID INJECTION (TAMI LUMBAR) N/A 2018    L5-S1, x2    POSTERIOR LAMINECTOMY THORACIC AND LUMBAR SPINE N/A 2/5/2019    Procedure: Lumbar laminectomy, decompression, discectomy left L5-S1 ;  Surgeon: Dennie Gunner, MD;  Location: [facility];  Service: Orthopedics    TONSILECTOMY AND ADNOIDECTOMY  1990    WISDOM TOOTH EXTRACTION  2007      Social History:     Social History     Socioeconomic History    Marital status: /Civil Union     Spouse name: None    Number of children: None    Years of education: None    Highest education level: None   Occupational History    None   Social Needs    Financial resource strain: Not hard at all   [employer] insecurity:     Worry: Sometimes true     Inability: Sometimes true   [employer] needs:     Medical: No     Non-medical: No   Tobacco Use    Smoking status: Former Smoker    Smokeless tobacco: Never Used    Tobacco comment: quit > 4 years ago    Substance and Sexual Activity    Alcohol use: No    Drug use: No    Sexual activity: Yes     Partners: Male     Birth control/protection: None   Lifestyle    Physical activity:     Days per week: 0 days     Minutes per session: 0 min    Stress: Not at all   Relationships    Social connections:     Talks on phone: More than three times a week     Gets together: More than three times a week     Attends Zoroastrian service: Never     Active member of club or organization: Yes     Attends meetings of clubs or organizations: Never Relationship status:     Intimate partner violence:     Fear of current or ex partner: No     Emotionally abused: No     Physically abused: No     Forced sexual activity: No   Other Topics Concern    None   Social History Narrative    None      Family History:     Family History   Problem Relation Age of Onset    Diabetes Mother     Arthritis Mother     Diabetes Father     Arthritis Father         psoriatic    Hyperlipidemia Father     Cancer Maternal Grandmother     Esophageal varices Maternal Grandmother     Abnormal EKG Maternal Grandmother     Alcohol abuse Maternal Grandmother     Cirrhosis Maternal Grandmother     Cancer Maternal Grandfather     Heart disease Maternal Grandfather     Stroke Maternal Grandfather     Hypertension Paternal Grandmother     Diabetes Paternal Grandmother     Diabetes Paternal Grandfather     Heart disease Maternal Uncle     Hypertension Paternal Aunt     Diabetes Paternal Aunt     Hypertension Paternal Uncle     Diabetes Paternal Uncle       Current Medications:     Current Outpatient Medications   Medication Sig Dispense Refill    albuterol (PROVENTIL HFA,VENTOLIN HFA) 90 mcg/act inhaler Inhale 2 puffs every 6 (six) hours as needed for wheezing      Biotin 1000 MCG tablet Take 1,000 mcg by mouth 3 (three) times a day      docusate sodium (COLACE) 50 mg capsule Take by mouth as needed       fluticasone (FLONASE) 50 mcg/act nasal spray 1 spray into each nostril as needed        folic acid (FOLVITE) 995 MCG tablet Take 800 mcg by mouth daily       Loratadine (CLARITIN) 10 MG CAPS Take 10 mg by mouth daily       omeprazole (PriLOSEC) 20 mg delayed release capsule Take 1 capsule (20 mg total) by mouth daily 90 capsule 1    valACYclovir (VALTREX) 500 mg tablet Take 1 tablet (500 mg total) by mouth daily for 90 days 90 tablet 1     No current facility-administered medications for this visit  Allergies:      Allergies   Allergen Reactions    Sulfa Antibiotics      Family has allergy she has never taken    Doxycycline Rash and Edema     Whole body rash,bruises behind both thighs      Physical Exam:     /76 (BP Location: Left arm, Patient Position: Sitting, Cuff Size: Standard)   Pulse 60   Temp 98 1 °F (36 7 °C) (Tympanic)   Resp 18   Ht 5' 11" (1 803 m)   Wt 107 kg (236 lb)   BMI 32 92 kg/m²     Physical Exam   Constitutional: She is oriented to person, place, and time  She appears well-developed and well-nourished  No distress  HENT:   Head: Normocephalic and atraumatic  Right Ear: External ear normal    Left Ear: External ear normal    Nose: Nose normal    Mouth/Throat: Oropharynx is clear and moist  No oropharyngeal exudate  Eyes: Pupils are equal, round, and reactive to light  Right eye exhibits no discharge  Left eye exhibits no discharge  Neck: Normal range of motion  Neck supple  No thyromegaly present  Cardiovascular: Normal rate, regular rhythm, normal heart sounds and intact distal pulses  Exam reveals no gallop  No murmur heard  Pulmonary/Chest: Effort normal and breath sounds normal  No respiratory distress  She has no wheezes  Abdominal: Soft  Bowel sounds are normal  She exhibits no distension and no mass  There is no tenderness  There is no rebound and no guarding  Musculoskeletal: Normal range of motion  She exhibits no edema or deformity  Lymphadenopathy:     She has no cervical adenopathy  Neurological: She is alert and oriented to person, place, and time  No cranial nerve deficit  Skin: Skin is warm and dry  Capillary refill takes less than 2 seconds  No rash noted  She is not diaphoretic  No erythema  No pallor  Psychiatric: She has a normal mood and affect  Her behavior is normal  Judgment and thought content normal    Nursing note and vitals reviewed        Vinay Gastelum, [address] [place] [address]

## 2019-12-23 DIAGNOSIS — B00.9 HERPES: ICD-10-CM

## 2019-12-23 RX ORDER — VALACYCLOVIR HYDROCHLORIDE 500 MG/1
500 TABLET, FILM COATED ORAL DAILY
Qty: 90 TABLET | Refills: 1 | Status: SHIPPED | OUTPATIENT
Start: 2019-12-23 | End: 2020-02-23 | Stop reason: SDUPTHER

## 2020-02-10 DIAGNOSIS — K21.9 GASTROESOPHAGEAL REFLUX DISEASE WITHOUT ESOPHAGITIS: ICD-10-CM

## 2020-02-10 RX ORDER — OMEPRAZOLE 20 MG/1
CAPSULE, DELAYED RELEASE ORAL
Qty: 90 CAPSULE | Refills: 0 | OUTPATIENT
Start: 2020-02-10

## 2020-02-23 DIAGNOSIS — B00.9 HERPES: ICD-10-CM

## 2020-02-23 DIAGNOSIS — K21.9 GASTROESOPHAGEAL REFLUX DISEASE WITHOUT ESOPHAGITIS: ICD-10-CM

## 2020-02-24 RX ORDER — VALACYCLOVIR HYDROCHLORIDE 500 MG/1
500 TABLET, FILM COATED ORAL DAILY
Qty: 90 TABLET | Refills: 0 | Status: SHIPPED | OUTPATIENT
Start: 2020-02-24 | End: 2020-11-03 | Stop reason: SDUPTHER

## 2020-02-24 RX ORDER — OMEPRAZOLE 20 MG/1
20 CAPSULE, DELAYED RELEASE ORAL DAILY
Qty: 90 CAPSULE | Refills: 0 | Status: SHIPPED | OUTPATIENT
Start: 2020-02-24 | End: 2021-03-04 | Stop reason: SDUPTHER

## 2020-09-16 ENCOUNTER — APPOINTMENT (OUTPATIENT)
Dept: LAB | Facility: HOSPITAL | Age: 35
End: 2020-09-16
Attending: OBSTETRICS & GYNECOLOGY
Payer: COMMERCIAL

## 2020-09-16 ENCOUNTER — OFFICE VISIT (OUTPATIENT)
Dept: OBGYN CLINIC | Facility: CLINIC | Age: 35
End: 2020-09-16
Payer: COMMERCIAL

## 2020-09-16 VITALS
BODY MASS INDEX: 34.02 KG/M2 | HEIGHT: 71 IN | TEMPERATURE: 97.4 F | SYSTOLIC BLOOD PRESSURE: 124 MMHG | WEIGHT: 243 LBS | DIASTOLIC BLOOD PRESSURE: 72 MMHG

## 2020-09-16 DIAGNOSIS — Z12.4 CERVICAL CANCER SCREENING: Primary | ICD-10-CM

## 2020-09-16 DIAGNOSIS — N64.3 GALACTORRHEA: ICD-10-CM

## 2020-09-16 DIAGNOSIS — Z01.419 WOMEN'S ANNUAL ROUTINE GYNECOLOGICAL EXAMINATION: ICD-10-CM

## 2020-09-16 DIAGNOSIS — Z11.51 SCREENING FOR HPV (HUMAN PAPILLOMAVIRUS): ICD-10-CM

## 2020-09-16 LAB
PROLACTIN SERPL-MCNC: 15.8 NG/ML
TSH SERPL DL<=0.05 MIU/L-ACNC: 2.03 UIU/ML (ref 0.36–3.74)

## 2020-09-16 PROCEDURE — 36415 COLL VENOUS BLD VENIPUNCTURE: CPT

## 2020-09-16 PROCEDURE — 84146 ASSAY OF PROLACTIN: CPT

## 2020-09-16 PROCEDURE — 84443 ASSAY THYROID STIM HORMONE: CPT

## 2020-09-16 PROCEDURE — G0145 SCR C/V CYTO,THINLAYER,RESCR: HCPCS | Performed by: OBSTETRICS & GYNECOLOGY

## 2020-09-16 PROCEDURE — 87624 HPV HI-RISK TYP POOLED RSLT: CPT | Performed by: OBSTETRICS & GYNECOLOGY

## 2020-09-16 PROCEDURE — 99385 PREV VISIT NEW AGE 18-39: CPT | Performed by: OBSTETRICS & GYNECOLOGY

## 2020-09-16 NOTE — PATIENT INSTRUCTIONS
Galactorrhea   WHAT YOU NEED TO KNOW:   What is galactorrhea? Galactorrhea is a milky discharge from your nipples  When you are not pregnant or breastfeeding, galactorrhea may be a sign of other conditions  The discharge may leak on its own, or it might happen when your breasts are touched  Galactorrhea is more common in women, but it can happen in men and infants also  What causes galactorrhea? The cause of your galactorrhea may not be known  Common causes include the following:  · Medicines     · Breast stimulation     · Pituitary conditions such as tumors or Cushing disease    · Increase of the hormone prolactin    · A drop in the mother's hormone levels in an infant after birth  What symptoms are common with galactorrhea? Symptoms will depend on the cause of your galactorrhea  You may have any of the following:  · Irregular menstrual cycles    · Headaches    · Problems with your vision, such as partial blindness    · Fatigue and weakness that gets worse    · Trouble sleeping  How is galactorrhea diagnosed? Your healthcare provider will ask what medicines you are taking  He will do a breast exam  In females, blood tests may be done to check for pregnancy  The blood tests may show changes in hormone levels or problems with your thyroid, liver, or kidney function  Your healthcare provider may give you a vision test  You may need an MRI of your brain to check for a tumor  How is galactorrhea treated? The treatment of galactorrhea depends on the cause  Your healthcare provider may have you stop taking the medicines that are causing your galactorrhea  You may need to see a specialist  Barbara Santoyo may not need any treatment  Galactorrhea can go away on its own  What can I do to manage galactorrhea? Avoid breast stimulation  Wear loose clothing, and do not squeeze or rub your breasts  Stimulation signals hormones to produce the milky discharge  When should I contact my healthcare provider?    · Your breasts become swollen and tender  · You become irritable and you do not feel well  · You have severe thirst and urinate large amounts often  · You develop dizziness, weakness, and fatigue  · You have questions or concerns about your condition or care  CARE AGREEMENT:   You have the right to help plan your care  Learn about your health condition and how it may be treated  Discuss treatment options with your caregivers to decide what care you want to receive  You always have the right to refuse treatment  The above information is an  only  It is not intended as medical advice for individual conditions or treatments  Talk to your doctor, nurse or pharmacist before following any medical regimen to see if it is safe and effective for you  © 2017 [employer] Information is for End User's use only and may not be sold, redistributed or otherwise used for commercial purposes  All illustrations and images included in CareNotes® are the copyrighted property of [employer]  or Jignesh Guerrero

## 2020-09-16 NOTE — PROGRESS NOTES
Assessment/Plan   Diagnoses and all orders for this visit:    Women's annual routine gynecological examination    Galactorrhea  -     TSH, 3rd generation; Future  -     Prolactin; Future    Patient was seen today for new patient evaluation  1  Yearly exam-Pap smear done with HPV testing, self-breast awareness reviewed  2  History of LEEP-done in 2012 for high-grade dysplasia  Patient had negative follow-up until around 2018  Pap with HPV was done today  with disposition as per findings  3  Bilateral galactorrhea-noted with clear discharge about weekly over the past 3-4 years time  Her last delivery was 8 years ago, stillbirth  She denies any darkened discharge or any focal findings  Breast exam is entirely benign  Would suggest laboratory evaluation  Not sexually active for 8 month so HCG was deferred  TSH and prolactin were ordered  Disposition as per findings  She does not have any direct breast stimulation  The discharge is a scant amount on her bra bilateral every week to few weeks, apparently associated with menses  4  History of stillbirth-2 vessel cord was noted during that pregnancy  Chorioamnionitis was apparently found at the time of delivery  No other evaluation was done, no autopsy  5  Contraception-not using any at this time  Had Mirena IUD in the past   First 1 fell out after 11 months  Second 1 was good for 5 years time, removed a couple years ago  She would welcome a pregnancy if it occurs  She does continue with folic acid  6  History of oral/vaginal herpes-continues on Valtrex suppression as per family doctor  Takes 500 mg daily, twice daily for 3 days with any outbreaks  Has had no outbreaks for about 8 months time or greater  Follow-up 1 year for yearly exam or as needed      Subjective   Patient ID: Tigist Delgado is a 30 y o  female      Vitals:    09/16/20 0851   BP: 124/72   Temp: (!) 97 4 °F (36 3 °C)     Patient seen today for yearly exam, new patient        The following portions of the patient's history were reviewed and updated as appropriate: allergies, current medications, past family history, past medical history, past social history, past surgical history and problem list   Past Medical History:   Diagnosis Date    Allergic     Asthma     GERD (gastroesophageal reflux disease)     HPV (human papilloma virus) infection     HSV-1 infection     HSV-2 infection      Past Surgical History:   Procedure Laterality Date    CERVICAL BIOPSY  W/ LOOP ELECTRODE EXCISION      CT EPIDURAL STEROID INJECTION (TAMI LUMBAR) N/A     L5-S1, x2    POSTERIOR LAMINECTOMY THORACIC AND LUMBAR SPINE N/A 2019    Procedure: Lumbar laminectomy, decompression, discectomy left L5-S1 ;  Surgeon: Yumiko Mak MD;  Location: [facility];  Service: Orthopedics    TONSILECTOMY AND ADNOIDECTOMY      WISDOM TOOTH EXTRACTION       OB History    Para Term  AB Living   3 2     1     SAB TAB Ectopic Multiple Live Births   1              # Outcome Date GA Lbr Omar/2nd Weight Sex Delivery Anes PTL Lv   3 SAB            2 Para            1 Para                Current Outpatient Medications:     albuterol (PROVENTIL HFA,VENTOLIN HFA) 90 mcg/act inhaler, Inhale 2 puffs every 6 (six) hours as needed for wheezing, Disp: , Rfl:     Biotin 1000 MCG tablet, Take 1,000 mcg by mouth 3 (three) times a day, Disp: , Rfl:     docusate sodium (COLACE) 50 mg capsule, Take by mouth as needed , Disp: , Rfl:     fluticasone (FLONASE) 50 mcg/act nasal spray, 1 spray into each nostril as needed  , Disp: , Rfl:     folic acid (FOLVITE) 541 MCG tablet, Take 800 mcg by mouth daily , Disp: , Rfl:     Loratadine (CLARITIN) 10 MG CAPS, Take 10 mg by mouth daily , Disp: , Rfl:     omeprazole (PriLOSEC) 20 mg delayed release capsule, Take 1 capsule (20 mg total) by mouth daily, Disp: 90 capsule, Rfl: 0    valACYclovir (VALTREX) 500 mg tablet, Take 1 tablet (500 mg total) by mouth daily, Disp: 90 tablet, Rfl: 0  Allergies   Allergen Reactions    Sulfa Antibiotics      Family has allergy she has never taken    Doxycycline Rash and Edema     Whole body rash,bruises behind both thighs     Social History     Socioeconomic History    Marital status: /Civil Union     Spouse name: None    Number of children: None    Years of education: None    Highest education level: None   Occupational History    None   Social Needs    Financial resource strain: Not hard at all   Happy [place] insecurity     Worry: Sometimes true     Inability: Sometimes true   [place] Transportation needs     Medical: No     Non-medical: No   Tobacco Use    Smoking status: Former Smoker    Smokeless tobacco: Never Used    Tobacco comment: quit > 4 years ago    Substance and Sexual Activity    Alcohol use: Never     Frequency: Never     Binge frequency: Never    Drug use: No    Sexual activity: Yes     Partners: Male     Birth control/protection: None   Lifestyle    Physical activity     Days per week: 0 days     Minutes per session: 0 min    Stress: Not at all   Relationships    Social connections     Talks on phone: More than three times a week     Gets together: More than three times a week     Attends Anabaptism service: Never     Active member of club or organization: Yes     Attends meetings of clubs or organizations: Never     Relationship status:     Intimate partner violence     Fear of current or ex partner: No     Emotionally abused: No     Physically abused: No     Forced sexual activity: No   Other Topics Concern    None   Social History Narrative    None     Family History   Problem Relation Age of Onset    Diabetes Mother     Arthritis Mother     Diabetes Father     Arthritis Father         psoriatic    Hyperlipidemia Father     Cancer Maternal Grandmother     Esophageal varices Maternal Grandmother     Abnormal EKG Maternal Grandmother     Alcohol abuse Maternal Grandmother     Cirrhosis Maternal Grandmother     Cancer Maternal Grandfather     Heart disease Maternal Grandfather     Stroke Maternal Grandfather     Hypertension Paternal Grandmother     Diabetes Paternal Grandmother     Diabetes Paternal Grandfather     Heart disease Maternal Uncle     Hypertension Paternal Aunt     Diabetes Paternal Aunt     Hypertension Paternal Uncle     Diabetes Paternal Uncle        Review of Systems   Constitutional: Negative for chills, diaphoresis, fatigue and fever  Respiratory: Negative for apnea, cough, chest tightness, shortness of breath and wheezing  Cardiovascular: Negative for chest pain, palpitations and leg swelling  Gastrointestinal: Negative for abdominal distention, abdominal pain, anal bleeding, constipation, diarrhea, nausea, rectal pain and vomiting  Genitourinary: Negative for difficulty urinating, dyspareunia, dysuria, frequency, hematuria, menstrual problem, pelvic pain, urgency, vaginal bleeding, vaginal discharge and vaginal pain  Musculoskeletal: Negative for arthralgias, back pain and myalgias  Skin: Negative for color change and rash  Neurological: Negative for dizziness, syncope, light-headedness, numbness and headaches  Hematological: Negative for adenopathy  Does not bruise/bleed easily  Psychiatric/Behavioral: Negative for dysphoric mood and sleep disturbance  The patient is not nervous/anxious  galactorrhea    Objective   Physical Exam    Objective      /72   Temp (!) 97 4 °F (36 3 °C)   Ht 5' 11" (1 803 m)   Wt 110 kg (243 lb)   LMP 08/20/2020 (Exact Date)   BMI 33 89 kg/m²     General:   alert and oriented, in no acute distress   Neck: normal to inspection and palpation   Breast: normal appearance, no masses or tenderness   Heart:    Lungs:    Abdomen: soft, non-tender, without masses or organomegaly   Vulva: normal   Vagina: Without erythema or lesions or discharge  Normal   Cervix:  Without lesions or discharge or cervicitis  No Cervical motion tenderness    Minimal bleeding with Pap test   Uterus: top normal size, anteverted, non-tender   Adnexa: no mass, fullness, tenderness   Rectum: negative    Psych:  Normal mood and affect   Skin:  Without obvious lesions   Eyes: symmetric, with normal movements and reactivity   Musculoskeletal:  Normal muscle tone and movements appreciated

## 2020-09-19 LAB
HPV HR 12 DNA CVX QL NAA+PROBE: NEGATIVE
HPV16 DNA CVX QL NAA+PROBE: NEGATIVE
HPV18 DNA CVX QL NAA+PROBE: NEGATIVE

## 2020-09-23 LAB
LAB AP GYN PRIMARY INTERPRETATION: NORMAL
LAB AP LMP: NORMAL
Lab: NORMAL

## 2020-10-06 ENCOUNTER — OFFICE VISIT (OUTPATIENT)
Dept: FAMILY MEDICINE CLINIC | Facility: CLINIC | Age: 35
End: 2020-10-06
Payer: COMMERCIAL

## 2020-10-06 ENCOUNTER — APPOINTMENT (OUTPATIENT)
Dept: LAB | Facility: CLINIC | Age: 35
End: 2020-10-06
Payer: COMMERCIAL

## 2020-10-06 VITALS
BODY MASS INDEX: 34.47 KG/M2 | OXYGEN SATURATION: 100 % | HEART RATE: 66 BPM | TEMPERATURE: 97.5 F | WEIGHT: 246.2 LBS | RESPIRATION RATE: 18 BRPM | HEIGHT: 71 IN | DIASTOLIC BLOOD PRESSURE: 84 MMHG | SYSTOLIC BLOOD PRESSURE: 126 MMHG

## 2020-10-06 DIAGNOSIS — R11.0 NAUSEA: ICD-10-CM

## 2020-10-06 DIAGNOSIS — R51.9 ACUTE NONINTRACTABLE HEADACHE, UNSPECIFIED HEADACHE TYPE: ICD-10-CM

## 2020-10-06 DIAGNOSIS — Z23 IMMUNIZATION DUE: ICD-10-CM

## 2020-10-06 DIAGNOSIS — G44.009 CLUSTER HEADACHE, NOT INTRACTABLE, UNSPECIFIED CHRONICITY PATTERN: ICD-10-CM

## 2020-10-06 DIAGNOSIS — R51.9 ACUTE NONINTRACTABLE HEADACHE, UNSPECIFIED HEADACHE TYPE: Primary | ICD-10-CM

## 2020-10-06 LAB
ALBUMIN SERPL BCP-MCNC: 3.8 G/DL (ref 3.5–5)
ALP SERPL-CCNC: 63 U/L (ref 46–116)
ALT SERPL W P-5'-P-CCNC: 18 U/L (ref 12–78)
ANION GAP SERPL CALCULATED.3IONS-SCNC: 2 MMOL/L (ref 4–13)
AST SERPL W P-5'-P-CCNC: 9 U/L (ref 5–45)
BASOPHILS # BLD AUTO: 0.03 THOUSANDS/ΜL (ref 0–0.1)
BASOPHILS NFR BLD AUTO: 1 % (ref 0–1)
BILIRUB SERPL-MCNC: 0.37 MG/DL (ref 0.2–1)
BILIRUB UR QL STRIP: NEGATIVE
BUN SERPL-MCNC: 11 MG/DL (ref 5–25)
CALCIUM SERPL-MCNC: 9.3 MG/DL (ref 8.3–10.1)
CHLORIDE SERPL-SCNC: 108 MMOL/L (ref 100–108)
CLARITY UR: CLEAR
CO2 SERPL-SCNC: 29 MMOL/L (ref 21–32)
COLOR UR: YELLOW
CREAT SERPL-MCNC: 0.88 MG/DL (ref 0.6–1.3)
EOSINOPHIL # BLD AUTO: 0.17 THOUSAND/ΜL (ref 0–0.61)
EOSINOPHIL NFR BLD AUTO: 3 % (ref 0–6)
ERYTHROCYTE [DISTWIDTH] IN BLOOD BY AUTOMATED COUNT: 12.5 % (ref 11.6–15.1)
FERRITIN SERPL-MCNC: 28 NG/ML (ref 8–388)
GFR SERPL CREATININE-BSD FRML MDRD: 86 ML/MIN/1.73SQ M
GLUCOSE P FAST SERPL-MCNC: 93 MG/DL (ref 65–99)
GLUCOSE UR STRIP-MCNC: NEGATIVE MG/DL
HCT VFR BLD AUTO: 40.4 % (ref 34.8–46.1)
HGB BLD-MCNC: 13.4 G/DL (ref 11.5–15.4)
HGB UR QL STRIP.AUTO: NEGATIVE
IMM GRANULOCYTES # BLD AUTO: 0.01 THOUSAND/UL (ref 0–0.2)
IMM GRANULOCYTES NFR BLD AUTO: 0 % (ref 0–2)
IRON SATN MFR SERPL: 22 %
IRON SERPL-MCNC: 66 UG/DL (ref 50–170)
KETONES UR STRIP-MCNC: NEGATIVE MG/DL
LEUKOCYTE ESTERASE UR QL STRIP: NEGATIVE
LYMPHOCYTES # BLD AUTO: 2.28 THOUSANDS/ΜL (ref 0.6–4.47)
LYMPHOCYTES NFR BLD AUTO: 36 % (ref 14–44)
MCH RBC QN AUTO: 30.1 PG (ref 26.8–34.3)
MCHC RBC AUTO-ENTMCNC: 33.2 G/DL (ref 31.4–37.4)
MCV RBC AUTO: 91 FL (ref 82–98)
MONOCYTES # BLD AUTO: 0.44 THOUSAND/ΜL (ref 0.17–1.22)
MONOCYTES NFR BLD AUTO: 7 % (ref 4–12)
NEUTROPHILS # BLD AUTO: 3.44 THOUSANDS/ΜL (ref 1.85–7.62)
NEUTS SEG NFR BLD AUTO: 53 % (ref 43–75)
NITRITE UR QL STRIP: NEGATIVE
NRBC BLD AUTO-RTO: 0 /100 WBCS
PH UR STRIP.AUTO: 6.5 [PH]
PLATELET # BLD AUTO: 226 THOUSANDS/UL (ref 149–390)
PMV BLD AUTO: 11 FL (ref 8.9–12.7)
POTASSIUM SERPL-SCNC: 4.1 MMOL/L (ref 3.5–5.3)
PROT SERPL-MCNC: 7.1 G/DL (ref 6.4–8.2)
PROT UR STRIP-MCNC: NEGATIVE MG/DL
RBC # BLD AUTO: 4.45 MILLION/UL (ref 3.81–5.12)
SODIUM SERPL-SCNC: 139 MMOL/L (ref 136–145)
SP GR UR STRIP.AUTO: 1.02 (ref 1–1.03)
TIBC SERPL-MCNC: 304 UG/DL (ref 250–450)
UROBILINOGEN UR QL STRIP.AUTO: 0.2 E.U./DL
WBC # BLD AUTO: 6.37 THOUSAND/UL (ref 4.31–10.16)

## 2020-10-06 PROCEDURE — 86618 LYME DISEASE ANTIBODY: CPT | Performed by: NURSE PRACTITIONER

## 2020-10-06 PROCEDURE — 80053 COMPREHEN METABOLIC PANEL: CPT

## 2020-10-06 PROCEDURE — 85025 COMPLETE CBC W/AUTO DIFF WBC: CPT

## 2020-10-06 PROCEDURE — 90471 IMMUNIZATION ADMIN: CPT | Performed by: FAMILY MEDICINE

## 2020-10-06 PROCEDURE — 36415 COLL VENOUS BLD VENIPUNCTURE: CPT | Performed by: NURSE PRACTITIONER

## 2020-10-06 PROCEDURE — 83540 ASSAY OF IRON: CPT

## 2020-10-06 PROCEDURE — 99214 OFFICE O/P EST MOD 30 MIN: CPT | Performed by: NURSE PRACTITIONER

## 2020-10-06 PROCEDURE — 82728 ASSAY OF FERRITIN: CPT

## 2020-10-06 PROCEDURE — 90682 RIV4 VACC RECOMBINANT DNA IM: CPT | Performed by: FAMILY MEDICINE

## 2020-10-06 PROCEDURE — 81003 URINALYSIS AUTO W/O SCOPE: CPT

## 2020-10-06 PROCEDURE — 83550 IRON BINDING TEST: CPT

## 2020-10-06 RX ORDER — NAPROXEN 500 MG/1
500 TABLET ORAL 2 TIMES DAILY WITH MEALS
Qty: 20 TABLET | Refills: 0 | Status: SHIPPED | OUTPATIENT
Start: 2020-10-06 | End: 2020-11-19 | Stop reason: ALTCHOICE

## 2020-10-06 RX ORDER — ONDANSETRON 4 MG/1
4 TABLET, FILM COATED ORAL EVERY 8 HOURS PRN
Qty: 12 TABLET | Refills: 0 | Status: SHIPPED | OUTPATIENT
Start: 2020-10-06 | End: 2020-11-19 | Stop reason: ALTCHOICE

## 2020-10-08 LAB — B BURGDOR IGG+IGM SER-ACNC: <0.91 ISR (ref 0–0.9)

## 2020-11-02 DIAGNOSIS — Z11.59 SPECIAL SCREENING EXAMINATION FOR UNSPECIFIED VIRAL DISEASE: Primary | ICD-10-CM

## 2020-11-02 PROCEDURE — U0003 INFECTIOUS AGENT DETECTION BY NUCLEIC ACID (DNA OR RNA); SEVERE ACUTE RESPIRATORY SYNDROME CORONAVIRUS 2 (SARS-COV-2) (CORONAVIRUS DISEASE [COVID-19]), AMPLIFIED PROBE TECHNIQUE, MAKING USE OF HIGH THROUGHPUT TECHNOLOGIES AS DESCRIBED BY CMS-2020-01-R: HCPCS | Performed by: PHYSICIAN ASSISTANT

## 2020-11-03 ENCOUNTER — TELEMEDICINE (OUTPATIENT)
Dept: FAMILY MEDICINE CLINIC | Facility: CLINIC | Age: 35
End: 2020-11-03
Payer: COMMERCIAL

## 2020-11-03 VITALS — HEART RATE: 66 BPM

## 2020-11-03 DIAGNOSIS — J06.9 UPPER RESPIRATORY TRACT INFECTION, UNSPECIFIED TYPE: Primary | ICD-10-CM

## 2020-11-03 DIAGNOSIS — J45.41 MODERATE PERSISTENT ASTHMATIC BRONCHITIS WITH ACUTE EXACERBATION: ICD-10-CM

## 2020-11-03 DIAGNOSIS — B00.9 HERPES: ICD-10-CM

## 2020-11-03 LAB — SARS-COV-2 RNA SPEC QL NAA+PROBE: NOT DETECTED

## 2020-11-03 PROCEDURE — 99213 OFFICE O/P EST LOW 20 MIN: CPT | Performed by: NURSE PRACTITIONER

## 2020-11-03 RX ORDER — VALACYCLOVIR HYDROCHLORIDE 500 MG/1
500 TABLET, FILM COATED ORAL DAILY
Qty: 90 TABLET | Refills: 0 | Status: SHIPPED | OUTPATIENT
Start: 2020-11-03 | End: 2021-03-04 | Stop reason: SDUPTHER

## 2020-11-03 RX ORDER — AZITHROMYCIN 250 MG/1
250 TABLET, FILM COATED ORAL DAILY
Qty: 6 TABLET | Refills: 0 | Status: SHIPPED | OUTPATIENT
Start: 2020-11-03 | End: 2020-11-08

## 2020-11-19 ENCOUNTER — TELEPHONE (OUTPATIENT)
Dept: OBGYN CLINIC | Facility: CLINIC | Age: 35
End: 2020-11-19

## 2020-11-19 ENCOUNTER — OFFICE VISIT (OUTPATIENT)
Dept: FAMILY MEDICINE CLINIC | Facility: CLINIC | Age: 35
End: 2020-11-19
Payer: COMMERCIAL

## 2020-11-19 VITALS
DIASTOLIC BLOOD PRESSURE: 76 MMHG | BODY MASS INDEX: 34.3 KG/M2 | HEART RATE: 78 BPM | WEIGHT: 245 LBS | SYSTOLIC BLOOD PRESSURE: 126 MMHG | TEMPERATURE: 98.4 F | HEIGHT: 71 IN

## 2020-11-19 DIAGNOSIS — Z00.00 ANNUAL PHYSICAL EXAM: Primary | ICD-10-CM

## 2020-11-19 PROCEDURE — 99395 PREV VISIT EST AGE 18-39: CPT | Performed by: NURSE PRACTITIONER

## 2020-12-15 ENCOUNTER — TELEMEDICINE (OUTPATIENT)
Dept: FAMILY MEDICINE CLINIC | Facility: CLINIC | Age: 35
End: 2020-12-15
Payer: COMMERCIAL

## 2020-12-15 DIAGNOSIS — G89.29 CHRONIC NONINTRACTABLE HEADACHE, UNSPECIFIED HEADACHE TYPE: Primary | ICD-10-CM

## 2020-12-15 DIAGNOSIS — R51.9 CHRONIC NONINTRACTABLE HEADACHE, UNSPECIFIED HEADACHE TYPE: Primary | ICD-10-CM

## 2020-12-15 PROCEDURE — 99213 OFFICE O/P EST LOW 20 MIN: CPT | Performed by: NURSE PRACTITIONER

## 2020-12-15 RX ORDER — SUMATRIPTAN 50 MG/1
50 TABLET, FILM COATED ORAL ONCE AS NEEDED
Qty: 9 TABLET | Refills: 0 | Status: SHIPPED | OUTPATIENT
Start: 2020-12-15 | End: 2021-05-21

## 2020-12-15 RX ORDER — METOCLOPRAMIDE 5 MG/1
10 TABLET ORAL DAILY PRN
Qty: 10 TABLET | Refills: 0 | Status: SHIPPED | OUTPATIENT
Start: 2020-12-15 | End: 2022-01-14

## 2020-12-20 ENCOUNTER — IMMUNIZATIONS (OUTPATIENT)
Dept: FAMILY MEDICINE CLINIC | Facility: HOSPITAL | Age: 35
End: 2020-12-20
Payer: COMMERCIAL

## 2020-12-20 DIAGNOSIS — Z23 ENCOUNTER FOR IMMUNIZATION: ICD-10-CM

## 2020-12-20 PROCEDURE — 91300 SARS-COV-2 / COVID-19 MRNA VACCINE (PFIZER-BIONTECH) 30 MCG: CPT

## 2020-12-20 PROCEDURE — 0001A SARS-COV-2 / COVID-19 MRNA VACCINE (PFIZER-BIONTECH) 30 MCG: CPT

## 2021-01-04 DIAGNOSIS — G89.29 CHRONIC NONINTRACTABLE HEADACHE, UNSPECIFIED HEADACHE TYPE: ICD-10-CM

## 2021-01-04 DIAGNOSIS — R51.9 CHRONIC NONINTRACTABLE HEADACHE, UNSPECIFIED HEADACHE TYPE: ICD-10-CM

## 2021-01-04 RX ORDER — SUMATRIPTAN 50 MG/1
TABLET, FILM COATED ORAL
Qty: 9 TABLET | Refills: 0 | OUTPATIENT
Start: 2021-01-04

## 2021-01-08 ENCOUNTER — IMMUNIZATIONS (OUTPATIENT)
Dept: FAMILY MEDICINE CLINIC | Facility: HOSPITAL | Age: 36
End: 2021-01-08

## 2021-01-08 DIAGNOSIS — Z23 ENCOUNTER FOR IMMUNIZATION: ICD-10-CM

## 2021-01-08 PROCEDURE — 0002A SARS-COV-2 / COVID-19 MRNA VACCINE (PFIZER-BIONTECH) 30 MCG: CPT

## 2021-01-08 PROCEDURE — 91300 SARS-COV-2 / COVID-19 MRNA VACCINE (PFIZER-BIONTECH) 30 MCG: CPT

## 2021-01-11 ENCOUNTER — AMB VIDEO VISIT (OUTPATIENT)
Dept: OTHER | Facility: HOSPITAL | Age: 36
End: 2021-01-11
Payer: COMMERCIAL

## 2021-01-11 PROCEDURE — ECARE PR SL URGENT CARE VIRTUAL VISIT: Performed by: FAMILY MEDICINE

## 2021-01-11 NOTE — CARE ANYWHERE EVISITS
Visit Summary for SHANTELLE CASTANEDA [facility]   Rupa Berger - Gender: Female - Date of Birth: [id number]  Date: [id number] - Duration: 3 minutes  Patient: FACUNDO Berger  Provider: Tevin Horne    Patient Contact Information  Address  [address]; [address]  [phone]    Visit Topics  body aches, low grade temp, recent COVID vaccine booster [Added By: Self - 2021-01-11]    Triage Questions   What is your current physical address in the event of a medical emergency? Answer []  Are you allergic to any medications? Answer []  Are you now or could you be pregnant? Answer []  Do you have any immune system compromise or chronic lung   disease? Answer []  Do you have any vulnerable family members in the home (infant, pregnant, cancer, elderly)? Answer []     Conversation Transcripts  [0A][0A] [Notification] You are connected with Tevin Horne, Family Physician [0A][Notification] Wally Molina is located in [place]  [0A][Notification] Wally Molina has shared health history  Bradley Perez  [0A][Notification] Tevin Horne has   added a diagnosis/procedure code  [0A]    Diagnosis  Oth complications following immunization, NEC    Procedures    Medications Prescribed    No prescriptions ordered    Provider Notes  [0A][0A] [0A]We strongly encourage you to share the following record of today's visit with your primary care physician  [0A][0A][0A][0A]Contact phone number: [0A][0A][0A][0A]Mode of Communication:  Video[0A][0A][0A][0A]HPI: 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 and had nausea and fever up to 100 9 and body aches  98 9 temperature this am  NO cough symptoms or other covid symptoms  [0A][0A][0A][0A][0A][0A]PMH: Asthma[0A][0A]PSH: None[0A][0A]Meds: Albuterol   inhaler[0A][0A]Allergies: Doxycycline[0A][0A][0A][0A]Exam: [0A][0A]Gen: Alert, normal mental status and interaction, no visible distress, non- toxic appearance   [0A][0A][0A][0A]Assessment: Likely side effects from booster resoleved may return to   work  [0A][0A][0A][0A]Plan:  [0A][0A]1  As symptoms and temerature are normal may return to work  [0A][0A]2  Discussed precautions  [0A][0A][0A][0A]Follow up:[0A][0A]1  If there are any questions or problems with the prescription, call [phone]   anytime for assistance  [0A][0A]2  Please re-connect for another online visit or see an in-person provider should your symptoms worsen or persist   [0A][0A]3  Taking a probiotic (either in pill form or by eating yogurt that contains probiotics) while   using antibiotics can help prevent some of the troublesome side effects that antibiotics can sometimes cause [0A][0A]4  Please print a copy of this note and send it to your regular doctor, or take it to your next visit so it may be included in your   medical record  [0A][0A][0A][0A]Patient voiced understanding and agrees to plan [0A][0A][0A][0A]Please see your PCP on an annual basis  [0A]    Electronically signed by: Valentino Sick Ronald(NPI [id number])

## 2021-02-08 ENCOUNTER — TELEPHONE (OUTPATIENT)
Dept: NEUROLOGY | Facility: CLINIC | Age: 36
End: 2021-02-08

## 2021-02-08 NOTE — TELEPHONE ENCOUNTER
Did not contact patient during 2 week reminder calls, as patient confirmed appt via [facility] last week  Will contact patient to complete COVID screening during 2 day confirmation calls

## 2021-02-18 ENCOUNTER — TELEPHONE (OUTPATIENT)
Dept: NEUROLOGY | Facility: CLINIC | Age: 36
End: 2021-02-18

## 2021-02-18 NOTE — TELEPHONE ENCOUNTER
Called and left voicemail for patient offering to change apt to virtual visit  Please call us back to confirm

## 2021-02-19 ENCOUNTER — TELEPHONE (OUTPATIENT)
Dept: NEUROLOGY | Facility: CLINIC | Age: 36
End: 2021-02-19

## 2021-02-19 ENCOUNTER — APPOINTMENT (OUTPATIENT)
Dept: LAB | Facility: CLINIC | Age: 36
End: 2021-02-19
Payer: COMMERCIAL

## 2021-02-19 ENCOUNTER — CONSULT (OUTPATIENT)
Dept: NEUROLOGY | Facility: CLINIC | Age: 36
End: 2021-02-19
Payer: COMMERCIAL

## 2021-02-19 VITALS
HEART RATE: 66 BPM | DIASTOLIC BLOOD PRESSURE: 60 MMHG | WEIGHT: 253.4 LBS | BODY MASS INDEX: 35.34 KG/M2 | SYSTOLIC BLOOD PRESSURE: 120 MMHG

## 2021-02-19 DIAGNOSIS — G43.001 MIGRAINE WITHOUT AURA AND WITH STATUS MIGRAINOSUS, NOT INTRACTABLE: ICD-10-CM

## 2021-02-19 DIAGNOSIS — E55.9 VITAMIN D DEFICIENCY: ICD-10-CM

## 2021-02-19 DIAGNOSIS — E55.9 VITAMIN D DEFICIENCY: Primary | ICD-10-CM

## 2021-02-19 LAB
25(OH)D3 SERPL-MCNC: 17.6 NG/ML (ref 30–100)
VIT B12 SERPL-MCNC: 376 PG/ML (ref 100–900)

## 2021-02-19 PROCEDURE — 82306 VITAMIN D 25 HYDROXY: CPT

## 2021-02-19 PROCEDURE — 36415 COLL VENOUS BLD VENIPUNCTURE: CPT

## 2021-02-19 PROCEDURE — 82607 VITAMIN B-12: CPT

## 2021-02-19 PROCEDURE — 99245 OFF/OP CONSLTJ NEW/EST HI 55: CPT | Performed by: PSYCHIATRY & NEUROLOGY

## 2021-02-19 RX ORDER — ERGOCALCIFEROL 1.25 MG/1
CAPSULE ORAL
Qty: 12 CAPSULE | Refills: 3 | Status: SHIPPED | OUTPATIENT
Start: 2021-02-19 | End: 2021-12-13

## 2021-02-19 RX ORDER — DEXAMETHASONE 2 MG/1
2 TABLET ORAL 2 TIMES DAILY WITH MEALS
Qty: 5 TABLET | Refills: 0 | Status: SHIPPED | OUTPATIENT
Start: 2021-02-19 | End: 2022-01-14

## 2021-02-19 RX ORDER — RIZATRIPTAN BENZOATE 10 MG/1
10 TABLET, ORALLY DISINTEGRATING ORAL ONCE AS NEEDED
Qty: 12 TABLET | Refills: 3 | Status: SHIPPED | OUTPATIENT
Start: 2021-02-19 | End: 2021-05-21 | Stop reason: SDUPTHER

## 2021-02-19 RX ORDER — KETOROLAC TROMETHAMINE 10 MG/1
TABLET, FILM COATED ORAL
Qty: 10 TABLET | Refills: 3 | Status: SHIPPED | OUTPATIENT
Start: 2021-02-19 | End: 2021-05-21 | Stop reason: SDUPTHER

## 2021-02-19 NOTE — PROGRESS NOTES
Review of Systems   Constitutional: Positive for fatigue  Negative for appetite change and fever  HENT: Negative  Negative for hearing loss, tinnitus, trouble swallowing and voice change  Eyes: Positive for pain  Negative for photophobia  Respiratory: Negative  Negative for shortness of breath  Cardiovascular: Negative  Negative for palpitations  Gastrointestinal: Positive for nausea  Negative for vomiting  Endocrine: Negative  Negative for cold intolerance  Genitourinary: Negative  Negative for dysuria, frequency and urgency  Musculoskeletal: Positive for neck pain  Negative for myalgias  Tension shoulder pain   Skin: Negative  Negative for rash  Neurological: Positive for dizziness, light-headedness and headaches  Negative for tremors, seizures, syncope, facial asymmetry, speech difficulty, weakness and numbness  Hematological: Negative  Does not bruise/bleed easily  Psychiatric/Behavioral: Negative  Negative for confusion, hallucinations and sleep disturbance

## 2021-02-19 NOTE — TELEPHONE ENCOUNTER
Patient called back  Reviewed labs with her  Advised to take 500mcg Vit B12 and 50,000iu Vit D Patient verbalized understanding  She will  her medications at the pharmacy

## 2021-02-19 NOTE — TELEPHONE ENCOUNTER
----- Message from Andressa Miller MD sent at 2/19/2021  2:41 PM EST -----  Please call the patient regarding her abnormal result     B12 -would like for patient to take B12 500 micro g daily   Vitamin-D is low thus take 50,000 international units weekly until told to stop

## 2021-02-19 NOTE — PROGRESS NOTES
[facility]  PATIENT:  Marlyn Bee  MRN:  [id number]  :  1985  DATE OF SERVICE:  2021      Assessment/Plan:        Problem List Items Addressed This Visit        Cardiovascular and Mediastinum    Migraine without aura and with status migrainosus, not intractable    Relevant Medications    rizatriptan (MAXALT-MLT) 10 MG disintegrating tablet    ketorolac (TORADOL) 10 mg tablet    dexamethasone (DECADRON) 2 mg tablet    magnesium oxide (MAG-OX) 400 mg    Riboflavin (Vitamin B-2) 100 MG TABS    Other Relevant Orders    Vitamin B12      Other Visit Diagnoses     Vitamin D deficiency    -  Primary    Relevant Orders    Vitamin D 25 hydroxy              Cervicalgia:   Basic neck exercises for daily use:    - Neck pathology and poor posture, with straightening of the normal cervical lordosis, can cause headaches  Tightening of the neck muscles can irritate the nerves in the occipital region of the head and cause or worsen head pain  Thus neck strengthening and relaxation exercises, can help improve this particular pain  It is importance to have good posture for improving shoulder, neck, and head pain  - Here are some exercises which should take 5 minutes:     1  Standing, drop your head to one side while continuing to look ahead  Hold for 10 seconds then swap sides  Repeat twice more each side  To increase the stretch, drop the opposite shoulder  2  Standing again, lower your chin to your chest, hold for 10 and then look up to the ceiling and hold for 10  Repeat twice more  3  Next, standing straight again, look over your right shoulder and hold firm for 10 seconds, then over your left shoulder for 10  Repeat this 3 times  4  Finally, while sitting upright, bring your head forward and hold for 10, then all the way back and hold for 10  If this simple exercise does not help improve the posture, we will consider formal physical therapy in the future       Migraine headache without aura  Preventive therapy:   Reproductive age women: Should take folic acid daily when taking anti-seizure drugs especially Depakote   -Over-the-counter supplements: to decrease intensity and frequency of migraines  - Magnesium Oxide 400mg twice a day  If any diarrhea or upset stomach, decrease dose  as tolerated  (oral magnesium oxide may be an effective preventive strategy for people with migraine  Some theories about how it works include the idea that magnesium can help to prevent waves of cortical spreading depression and aura  Magnesium, in theory, also reduces the release of inflammatory or activating chemicals that can cause migraine)  - Vitamin B2 200 mg twice a day  May cause the urine to turn yellow which is normal for B 2 to do and is not a sign that you are dehydrated  (may be an effective preventive medication in some people with migraine)  Abortive therapy:  - at onset of her headaches she is to take Maxalt sublingual 10 mg and Toradol 10 mg in two hours if headaches still persist then she is to take decadron 2 mg with food  -     Work up:   - lab:  B12, vitamin-D,    Headache management instructions  - When patient has a moderate to severe headache, they should seek rest, initiate relaxation and apply cold compresses to the head  - Maintain regular sleep schedule  Adults need at least 7-8 hours of uninterrupted a night  - Limit over the counter medications such as Tylenol, Ibuprofen, Aleve, Excedrin  (No more than 2- 3 times a week or max 10 a month)  - Maintain headache diary  Free LISETTE for a smart phone, which can be used is "Migraine kylah"  - Limit caffeine to 1-2 cups 8 to 16 oz a day or less  - Avoid dietary trigger  (aged cheese, peanuts, MSG, aspartame and nitrates)  - Patient is to have regular frequent meals to prevent headache onset  - Please drink at least 64 ounces of water a day to help remain hydrated      Exercising for migraineurs:  Regular exercise can reduce the frequency and intensity of headaches and migraines  When one exercises, the body releases endorphins, which are the bodys natural painkillers  Exercise reduces stress and helps individuals to sleep at night  Exercising at least 30 to 40 minutes 3 times a week is sufficient for most patients  When exercising, follow this plan to prevent headaches:  - First, stay hydrated before, during, and after exercise  - Second part of the exercise plan is to eat sufficient food about an hour and a half before you exercise  Exercise causes ones blood sugar level to decrease, and it is important to have a source of energy    - Final part of the exercise plan is to warm-up  Do not jump into sudden, vigorous exercise if that triggers a headache or migraine  To read more go to [email or web address]  org/resource-library/effects-of-exercise-on-headaches-and-migraines/         Please call with any questions or concerns  Office number is [id number] Monitoring Program report was reviewed and was appropriate       History of Present Illness: We had the pleasure of evaluating Marlyn Bee in neurological consultation today for headaches  As you know,  she is a 30 y o  right handed  female  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 Seeing at therapist/ how often? Headaches:   Any family history of migraines? brother  Any family history of aneurysms? no    Have you seen someone else for headaches or pain? none    Headaches started at what age? Middle school and they got worse when she was 36    What is your current pain level?  0/10    How often do the headaches occur? Mild headaches: none  Moderate to severe headaches: 3 a month    Are you ever headache free? Yes there are many days with no headaches     Aura/Warning and how long does it last? none    What time of the day do the headaches start? Mild headaches: none  Moderate to severe headaches: typically wakes up with them, otherwise anytime of the day    How long do the headaches last?   Mild headaches: Moderate to severe headaches: 24 hours to 4 days    Where is your headache located? Mild headaches: in the past in the occipital and shoulder  Moderate to severe headaches: bilateral supraorbital region    Describe your usual headache? Mild headaches: pressure, throbbing, tightness  Moderate to severe headaches: throbbing and pressure    What is the intensity of pain? Mild headaches: 5 to 10/10  Moderate to severe headaches: 5 to 10/10    Associated symptoms:   - Decreased appetite   Nausea       - Photophobia     Phonophobia      Osmophobia - yes sometimes  - Flushing of face    - Stiff or sore neck   - Dizziness   light headed- sometimes  - Problems with concentration  - Prefer to be in a cool, quiet, dark room    Number of days missed per month because of headaches:  Work (or school) days: works through  Social or Family activities: does not affect her at home    Headache are worse if the patient: cough, sneeze, bending over, exertion  Headache triggers:  Not sure  What time of the year do headaches occur more frequently? none    Have you had trigger point injection performed and how often? No  Have you had Botox injection performed and how often? No   Have you had epidural injections or transforaminal injections performed? No    Alternative therapies used in the past for headaches? none  Have you used CBD or THC for your headaches and how often? No  How many caffeine products to drink a day?  One cup on the day she works  How much water to drink a day? 16 oz 5-6 a day    Are you current pregnant or planning on getting pregnant? What medications do you take or have you taken for your headaches/pain/mood? Preventive therapy:   -   -   -  Gabapentin 300 mg x1,  -  Robaxin 500 mg 4 times a day,  Abortive Therapy:   -  Fentanyl, Dilaudid, tramadol 50 mg,  -  Tylenol 650 /975 mg,  -  Decadron injection, methylprednisolone 160 mg x1, prednisone  Titration pack,  -  Toradol injection, naproxen 500 mg,  -  Zofran  4 mg injection, Reglan 5 mg,  -  Sumatriptan 50 mg,         Have you ever had any Brain imaging? No  - Reviewed old notes from physician seen in the past   Recent laboratory data was reviewed  Medications and allergies were reviewed        Past Medical History:   Diagnosis Date    Allergic     Asthma     GERD (gastroesophageal reflux disease)     HPV (human papilloma virus) infection     HSV-1 infection     HSV-2 infection        Patient Active Problem List   Diagnosis    Lumbar radiculopathy - Left    Lumbar disc herniation    Palpitations    Galactorrhea    Migraine without aura and with status migrainosus, not intractable       Medications:      Current Outpatient Medications   Medication Sig Dispense Refill    albuterol (PROVENTIL HFA,VENTOLIN HFA) 90 mcg/act inhaler Inhale 2 puffs every 6 (six) hours as needed for wheezing      docusate sodium (COLACE) 50 mg capsule Take by mouth as needed       fluticasone (FLONASE) 50 mcg/act nasal spray 1 spray into each nostril as needed        Loratadine (CLARITIN) 10 MG CAPS Take 10 mg by mouth daily       metoclopramide (REGLAN) 5 mg tablet Take 2 tablets (10 mg total) by mouth daily as needed (headache) Take 2 tabs with onset of headache 10 tablet 0    omeprazole (PriLOSEC) 20 mg delayed release capsule Take 1 capsule (20 mg total) by mouth daily (Patient taking differently: Take 20 mg by mouth 2 (two) times a day ) 90 capsule 0    SUMAtriptan (Imitrex) 50 mg tablet Take 1 tablet (50 mg total) by mouth once as needed for migraine for up to 1 dose May repeat dose in 2 hours 9 tablet 0    valACYclovir (VALTREX) 500 mg tablet Take 1 tablet (500 mg total) by mouth daily 90 tablet 0    Biotin 1000 MCG tablet Take 1,000 mcg by mouth 3 (three) times a day      dexamethasone (DECADRON) 2 mg tablet Take 1 tablet (2 mg total) by mouth 2 (two) times a day with meals 5 tablet 0    folic acid (FOLVITE) 083 MCG tablet Take 800 mcg by mouth daily       ketorolac (TORADOL) 10 mg tablet 1-tabs at onset of migraine, can repeat X1 in 6 hours, can combine with triptan  Take with food/milk/antacid  10 tablet 3    magnesium oxide (MAG-OX) 400 mg Take 1 tablet (400 mg total) by mouth 2 (two) times a day 180 tablet 0    Riboflavin (Vitamin B-2) 100 MG TABS Two in am and two in pm 360 tablet 0    rizatriptan (MAXALT-MLT) 10 MG disintegrating tablet Take 1 tablet (10 mg total) by mouth once as needed for migraine May repeat every 2 hours if needed, max 20mg/24 hours 12 tablet 3     No current facility-administered medications for this visit  Allergies:       Allergies   Allergen Reactions    Sulfa Antibiotics      Family has allergy she has never taken    Doxycycline Rash and Edema     Whole body rash,bruises behind both thighs       Family History:     Family History   Problem Relation Age of Onset    Diabetes Mother     Arthritis Mother     Diabetes Father     Arthritis Father         psoriatic    Hyperlipidemia Father     Cancer Father     Cancer Maternal Grandmother     Esophageal varices Maternal Grandmother     Abnormal EKG Maternal Grandmother     Alcohol abuse Maternal Grandmother     Cirrhosis Maternal Grandmother     Cancer Maternal Grandfather     Heart disease Maternal Grandfather     Stroke Maternal Grandfather     Hypertension Paternal Grandmother     Diabetes Paternal Grandmother     Diabetes Paternal Grandfather     Heart disease Maternal Uncle     Hypertension Paternal Aunt     Diabetes Paternal Aunt     Hypertension Paternal Uncle     Diabetes Paternal Uncle        Social History:     Social History     Socioeconomic History    Marital status: /Civil Union     Spouse name: Not on file    Number of children: Not on file    Years of education: Not on file    Highest education level: Not on file   Occupational History    Not on file   Social Needs    Financial resource strain: Not hard at all   Naomi-Maryjo insecurity     Worry: Sometimes true     Inability: Sometimes true   [employer] needs     Medical: No     Non-medical: No   Tobacco Use    Smoking status: Former Smoker    Smokeless tobacco: Never Used    Tobacco comment: quit > 4 years ago    Substance and Sexual Activity    Alcohol use: Never     Binge frequency: Less than monthly     Comment: rare    Drug use: No    Sexual activity: Yes     Partners: Male     Birth control/protection: None   Lifestyle    Physical activity     Days per week: 0 days     Minutes per session: 0 min    Stress: Not at all   Relationships    Social connections     Talks on phone: More than three times a week     Gets together: More than three times a week     Attends [employer] service: Never     Active member of club or organization: Yes     Attends meetings of clubs or organizations: Never     Relationship status:     Intimate partner violence     Fear of current or ex partner: No     Emotionally abused: No     Physically abused: No     Forced sexual activity: No   Other Topics Concern    Not on file   Social History Narrative    Not on file         Objective:   Physical Exam:                                                                   Vitals:               /60 (BP Location: Right arm, Patient Position: Sitting, Cuff Size: Large)   Pulse 66   Wt 115 kg (253 lb 6 4 oz)   BMI 35 34 kg/m²   BP Readings from Last 3 Encounters:   02/19/21 120/60   11/19/20 126/76   10/06/20 126/84     Pulse Readings from Last 3 Encounters:   02/19/21 66   11/19/20 78 11/03/20 66              CONSTITUTIONAL: Well developed, well nourished, well groomed  No dysmorphic features  Eyes:  PERRLA, EOM normal      Neck:  Normal ROM, neck supple  HEENT:  Normocephalic atraumatic  No meningismus  Oropharynx is clear and moist  No oral mucosal lesions  Chest:  Respirations regular and unlabored  Cardiovascular:  Distal extremities warm without palpable edema or tenderness, no observed significant swelling  Musculoskeletal:  Full range of motion  Skin:  warm and dry   Psychiatric:  Normal behavior and appropriate affect      Neurological Examination:   Mental status/cognitive function: Orientated to time, place and person  Cranial Nerves: 2 to 12 intact    Motor Exam:  Moving all extremities     Sensory:   Intact to light touch    Reflexes:     3+ out of 4 in the right upper and right lower extremity  2/4 in the left upper and left lower extremity    Coordination: no tremor noted    Gait: - no difficulty with gait, negative Romberg's, no difficulty with tandem gait        Review of Systems:   Review of Systems  Review of Systems   Constitutional: Positive for fatigue  Negative for appetite change and fever  HENT: Negative  Negative for hearing loss, tinnitus, trouble swallowing and voice change  Eyes: Positive for pain  Negative for photophobia  Respiratory: Negative  Negative for shortness of breath  Cardiovascular: Negative  Negative for palpitations  Gastrointestinal: Positive for nausea  Negative for vomiting  Endocrine: Negative  Negative for cold intolerance  Genitourinary: Negative  Negative for dysuria, frequency and urgency  Musculoskeletal: Positive for neck pain  Negative for myalgias  Tension shoulder pain   Skin: Negative  Negative for rash  Neurological: Positive for dizziness, light-headedness and headaches  Negative for tremors, seizures, syncope, facial asymmetry, speech difficulty, weakness and numbness  Hematological: Negative  Does not bruise/bleed easily  Psychiatric/Behavioral: Negative  Negative for confusion, hallucinations and sleep disturbance  I have spent 60 minutes with Patient  today in which greater than 50% of this time was spent in counseling/coordination of care regarding Diagnostic results, Prognosis, Risks and benefits of tx options, Intructions for management, Patient and family education, Importance of tx compliance, Risk factor reductions, Impressions and Plan of care as above        Author:  Graciela Goyal MD 2/19/2021 10:33 AM

## 2021-02-19 NOTE — PATIENT INSTRUCTIONS
Cervicalgia:   Basic neck exercises for daily use:    - Neck pathology and poor posture, with straightening of the normal cervical lordosis, can cause headaches  Tightening of the neck muscles can irritate the nerves in the occipital region of the head and cause or worsen head pain  Thus neck strengthening and relaxation exercises, can help improve this particular pain  It is importance to have good posture for improving shoulder, neck, and head pain  - Here are some exercises which should take 5 minutes:     1  Standing, drop your head to one side while continuing to look ahead  Hold for 10 seconds then swap sides  Repeat twice more each side  To increase the stretch, drop the opposite shoulder  2  Standing again, lower your chin to your chest, hold for 10 and then look up to the ceiling and hold for 10  Repeat twice more  3  Next, standing straight again, look over your right shoulder and hold firm for 10 seconds, then over your left shoulder for 10  Repeat this 3 times  4  Finally, while sitting upright, bring your head forward and hold for 10, then all the way back and hold for 10  If this simple exercise does not help improve the posture, we will consider formal physical therapy in the future  Migraine headache without aura  Preventive therapy:   Reproductive age women: Should take folic acid daily when taking anti-seizure drugs especially Depakote   -Over-the-counter supplements: to decrease intensity and frequency of migraines  - Magnesium Oxide 400mg twice a day  If any diarrhea or upset stomach, decrease dose  as tolerated  (oral magnesium oxide may be an effective preventive strategy for people with migraine  Some theories about how it works include the idea that magnesium can help to prevent waves of cortical spreading depression and aura   Magnesium, in theory, also reduces the release of inflammatory or activating chemicals that can cause migraine)  - Vitamin B2 200 mg twice a day  May cause the urine to turn yellow which is normal for B 2 to do and is not a sign that you are dehydrated  (may be an effective preventive medication in some people with migraine)  Abortive therapy:  - at onset of her headaches she is to take Maxalt sublingual 10 mg and Toradol 10 mg in two hours if headaches still persist then she is to take decadron 2 mg with food  -     Work up:   - lab:  B12, vitamin-D,    Headache management instructions  - When patient has a moderate to severe headache, they should seek rest, initiate relaxation and apply cold compresses to the head  - Maintain regular sleep schedule  Adults need at least 7-8 hours of uninterrupted a night  - Limit over the counter medications such as Tylenol, Ibuprofen, Aleve, Excedrin  (No more than 2- 3 times a week or max 10 a month)  - Maintain headache diary  Free LISETTE for a smart phone, which can be used is "Migraine kylah"  - Limit caffeine to 1-2 cups 8 to 16 oz a day or less  - Avoid dietary trigger  (aged cheese, peanuts, MSG, aspartame and nitrates)  - Patient is to have regular frequent meals to prevent headache onset  - Please drink at least 64 ounces of water a day to help remain hydrated  Exercising for migraineurs:  Regular exercise can reduce the frequency and intensity of headaches and migraines  When one exercises, the body releases endorphins, which are the bodys natural painkillers  Exercise reduces stress and helps individuals to sleep at night  Exercising at least 30 to 40 minutes 3 times a week is sufficient for most patients  When exercising, follow this plan to prevent headaches:  - First, stay hydrated before, during, and after exercise  - Second part of the exercise plan is to eat sufficient food about an hour and a half before you exercise  Exercise causes ones blood sugar level to decrease, and it is important to have a source of energy    - Final part of the exercise plan is to warm-up   Do not jump into sudden, vigorous exercise if that triggers a headache or migraine  To read more go to [email or web address]  org/resource-library/effects-of-exercise-on-headaches-and-migraines/         Please call with any questions or concerns   Office number is [phone]

## 2021-02-26 DIAGNOSIS — K21.9 GASTROESOPHAGEAL REFLUX DISEASE WITHOUT ESOPHAGITIS: ICD-10-CM

## 2021-02-26 DIAGNOSIS — B00.9 HERPES: ICD-10-CM

## 2021-02-26 RX ORDER — VALACYCLOVIR HYDROCHLORIDE 500 MG/1
TABLET, FILM COATED ORAL
Qty: 90 TABLET | Refills: 0 | OUTPATIENT
Start: 2021-02-26

## 2021-02-26 RX ORDER — OMEPRAZOLE 20 MG/1
CAPSULE, DELAYED RELEASE ORAL
Qty: 90 CAPSULE | Refills: 0 | OUTPATIENT
Start: 2021-02-26

## 2021-03-04 DIAGNOSIS — B00.9 HERPES: ICD-10-CM

## 2021-03-04 DIAGNOSIS — K21.9 GASTROESOPHAGEAL REFLUX DISEASE WITHOUT ESOPHAGITIS: ICD-10-CM

## 2021-03-04 RX ORDER — OMEPRAZOLE 20 MG/1
20 CAPSULE, DELAYED RELEASE ORAL DAILY
Qty: 90 CAPSULE | Refills: 0 | Status: SHIPPED | OUTPATIENT
Start: 2021-03-04 | End: 2021-10-28 | Stop reason: SDUPTHER

## 2021-03-04 RX ORDER — VALACYCLOVIR HYDROCHLORIDE 500 MG/1
500 TABLET, FILM COATED ORAL DAILY
Qty: 90 TABLET | Refills: 0 | Status: SHIPPED | OUTPATIENT
Start: 2021-03-04 | End: 2021-06-07 | Stop reason: SDUPTHER

## 2021-05-21 ENCOUNTER — OFFICE VISIT (OUTPATIENT)
Dept: NEUROLOGY | Facility: CLINIC | Age: 36
End: 2021-05-21
Payer: COMMERCIAL

## 2021-05-21 VITALS
BODY MASS INDEX: 35.57 KG/M2 | HEIGHT: 71 IN | SYSTOLIC BLOOD PRESSURE: 131 MMHG | WEIGHT: 254.1 LBS | HEART RATE: 60 BPM | TEMPERATURE: 97.7 F | DIASTOLIC BLOOD PRESSURE: 76 MMHG

## 2021-05-21 DIAGNOSIS — G43.001 MIGRAINE WITHOUT AURA AND WITH STATUS MIGRAINOSUS, NOT INTRACTABLE: ICD-10-CM

## 2021-05-21 PROCEDURE — 99215 OFFICE O/P EST HI 40 MIN: CPT | Performed by: PHYSICIAN ASSISTANT

## 2021-05-21 RX ORDER — ONDANSETRON 4 MG/1
4 TABLET, FILM COATED ORAL EVERY 8 HOURS PRN
COMMUNITY
End: 2021-10-04 | Stop reason: SDUPTHER

## 2021-05-21 RX ORDER — RIZATRIPTAN BENZOATE 10 MG/1
10 TABLET, ORALLY DISINTEGRATING ORAL ONCE AS NEEDED
Qty: 12 TABLET | Refills: 3 | Status: SHIPPED | OUTPATIENT
Start: 2021-05-21 | End: 2021-10-04 | Stop reason: SDUPTHER

## 2021-05-21 RX ORDER — KETOROLAC TROMETHAMINE 10 MG/1
TABLET, FILM COATED ORAL
Qty: 10 TABLET | Refills: 3 | Status: SHIPPED | OUTPATIENT
Start: 2021-05-21 | End: 2021-10-04 | Stop reason: SDUPTHER

## 2021-05-21 RX ORDER — CYPROHEPTADINE HYDROCHLORIDE 4 MG/1
4 TABLET ORAL
Qty: 30 TABLET | Refills: 0 | Status: SHIPPED | OUTPATIENT
Start: 2021-05-21

## 2021-05-21 NOTE — PATIENT INSTRUCTIONS
Migraine headache without aura  Preventive therapy:   Reproductive age women: Should take folic acid daily when taking anti-seizure drugs especially Depakote   -Over-the-counter supplements: to decrease intensity and frequency of migraines  - Magnesium Oxide 400mg twice a day  If any diarrhea or upset stomach, decrease dose  as tolerated  (oral magnesium oxide may be an effective preventive strategy for people with migraine  Some theories about how it works include the idea that magnesium can help to prevent waves of cortical spreading depression and aura  Magnesium, in theory, also reduces the release of inflammatory or activating chemicals that can cause migraine)  - Vitamin B2 200 mg twice a day  May cause the urine to turn yellow which is normal for B 2 to do and is not a sign that you are dehydrated  (may be an effective preventive medication in some people with migraine)  - Vitamin D3 9476-3113 units daily  Abortive therapy:  - at onset of her headaches she is to take Maxalt sublingual 10 mg and Toradol 10 mg in two hours  May repeat Maxalt in 2 hours if needed, limit of 3 a week or 9 a month  - if headaches still persist then she is to take decadron 2 mg with food  - Cyproheptadine 4 mg at bedtime as needed for weather or sinus headaches    Headache management instructions  - When patient has a moderate to severe headache, they should seek rest, initiate relaxation and apply cold compresses to the head  - Maintain regular sleep schedule  Adults need at least 7-8 hours of uninterrupted a night  - Limit over the counter medications such as Tylenol, Ibuprofen, Aleve, Excedrin  (No more than 2- 3 times a week or max 10 a month)  - Maintain headache diary  Free LISETTE for a smart phone, which can be used is "Migraine buddy"  - Limit caffeine to 1-2 cups 8 to 16 oz a day or less  - Avoid dietary trigger  (aged cheese, peanuts, MSG, aspartame and nitrates)    - Patient is to have regular frequent meals to prevent headache onset  - Please drink at least 64 ounces of water a day to help remain hydrated  Exercising for migraineurs:  Regular exercise can reduce the frequency and intensity of headaches and migraines  When one exercises, the body releases endorphins, which are the bodys natural painkillers  Exercise reduces stress and helps individuals to sleep at night  Exercising at least 30 to 40 minutes 3 times a week is sufficient for most patients  When exercising, follow this plan to prevent headaches:  - First, stay hydrated before, during, and after exercise  - Second part of the exercise plan is to eat sufficient food about an hour and a half before you exercise  Exercise causes ones blood sugar level to decrease, and it is important to have a source of energy    - Final part of the exercise plan is to warm-up  Do not jump into sudden, vigorous exercise if that triggers a headache or migraine  To read more go to [email or web address]  org/resource-library/effects-of-exercise-on-headaches-and-migraines/         Please call with any questions or concerns   Office number is [phone]

## 2021-05-21 NOTE — PROGRESS NOTES
Review of Systems   Constitutional: Negative  HENT: Negative  Eyes: Positive for visual disturbance (Right Eye )  Respiratory: Negative  Cardiovascular: Negative  Gastrointestinal: Positive for nausea  Endocrine: Negative  Genitourinary: Negative  Musculoskeletal: Negative  Skin: Negative  Allergic/Immunologic: Negative  Neurological: Positive for headaches  Hematological: Negative  Psychiatric/Behavioral: Negative

## 2021-05-21 NOTE — ASSESSMENT & PLAN NOTE
Preventive therapy:   Reproductive age women: Should take folic acid daily when taking anti-seizure drugs especially Depakote   -Over-the-counter supplements: to decrease intensity and frequency of migraines  - Magnesium Oxide 400mg twice a day  If any diarrhea or upset stomach, decrease dose  as tolerated  (oral magnesium oxide may be an effective preventive strategy for people with migraine  Some theories about how it works include the idea that magnesium can help to prevent waves of cortical spreading depression and aura  Magnesium, in theory, also reduces the release of inflammatory or activating chemicals that can cause migraine)  - Vitamin B2 200 mg twice a day  May cause the urine to turn yellow which is normal for B 2 to do and is not a sign that you are dehydrated  (may be an effective preventive medication in some people with migraine)  - Vitamin D3 2728-5367 units daily  Abortive therapy:  - at onset of her headaches she is to take Maxalt sublingual 10 mg and Toradol 10 mg in two hours  May repeat Maxalt in 2 hours if needed, limit of 3 a week or 9 a month  - if headaches still persist then she is to take decadron 2 mg with food       - Cyproheptadine 4 mg at bedtime as needed for weather or sinus headaches

## 2021-05-21 NOTE — PROGRESS NOTES
[facility]  PATIENT:  Ingrid Lindquist  MRN:  [id number]  :  1985  DATE OF SERVICE:  2021      Assessment/Plan:     Migraine without aura and with status migrainosus, not intractable  Preventive therapy:   Reproductive age women: Should take folic acid daily when taking anti-seizure drugs especially Depakote   -Over-the-counter supplements: to decrease intensity and frequency of migraines  - Magnesium Oxide 400mg twice a day  If any diarrhea or upset stomach, decrease dose  as tolerated  (oral magnesium oxide may be an effective preventive strategy for people with migraine  Some theories about how it works include the idea that magnesium can help to prevent waves of cortical spreading depression and aura  Magnesium, in theory, also reduces the release of inflammatory or activating chemicals that can cause migraine)  - Vitamin B2 200 mg twice a day  May cause the urine to turn yellow which is normal for B 2 to do and is not a sign that you are dehydrated  (may be an effective preventive medication in some people with migraine)  - Vitamin D3 4358-2379 units daily  Abortive therapy:  - at onset of her headaches she is to take Maxalt sublingual 10 mg and Toradol 10 mg in two hours  May repeat Maxalt in 2 hours if needed, limit of 3 a week or 9 a month  - if headaches still persist then she is to take decadron 2 mg with food  - Cyproheptadine 4 mg at bedtime as needed for weather or sinus headaches         Problem List Items Addressed This Visit        Cardiovascular and Mediastinum    Migraine without aura and with status migrainosus, not intractable     Preventive therapy:   Reproductive age women: Should take folic acid daily when taking anti-seizure drugs especially Depakote   -Over-the-counter supplements: to decrease intensity and frequency of migraines  - Magnesium Oxide 400mg twice a day    If any diarrhea or upset stomach, decrease dose  as tolerated  (oral magnesium oxide may be an effective preventive strategy for people with migraine  Some theories about how it works include the idea that magnesium can help to prevent waves of cortical spreading depression and aura  Magnesium, in theory, also reduces the release of inflammatory or activating chemicals that can cause migraine)  - Vitamin B2 200 mg twice a day  May cause the urine to turn yellow which is normal for B 2 to do and is not a sign that you are dehydrated  (may be an effective preventive medication in some people with migraine)  - Vitamin D3 0451-0545 units daily  Abortive therapy:  - at onset of her headaches she is to take Maxalt sublingual 10 mg and Toradol 10 mg in two hours  May repeat Maxalt in 2 hours if needed, limit of 3 a week or 9 a month  - if headaches still persist then she is to take decadron 2 mg with food  - Cyproheptadine 4 mg at bedtime as needed for weather or sinus headaches         Relevant Medications    ketorolac (TORADOL) 10 mg tablet    magnesium oxide (MAG-OX) 400 mg    rizatriptan (MAXALT-MLT) 10 MG disintegrating tablet    Riboflavin (Vitamin B-2) 100 MG TABS    cyproheptadine (PERIACTIN) 4 mg tablet              History of Present Illness: We had the pleasure of evaluating Krystyna Chaidez in neurological follow up  today for headaches  As you know,  she is a 30 y o   right handed female  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 Seeing at therapist/ how often?     Headaches:   Any family history of migraines? brother  Any family history of aneurysms? no     Have you seen someone else for headaches or pain? none     Headaches started at what age?  Middle school and they got worse when she was 36     What medications do you take or have you taken for your headaches/pain/mood? Preventive therapy:   -  Gabapentin 300 mg x1,  -  Robaxin 500 mg 4 times a day,  Abortive Therapy:   -  Fentanyl, Dilaudid, tramadol 50 mg,  -  Tylenol 650 /975 mg,  -  Decadron injection, methylprednisolone 160 mg x1, prednisone  Titration pack,  -  Toradol injection, naproxen 500 mg,  -  Zofran  4 mg injection, Reglan 5 mg,  -  Sumatriptan 50 mg,           What is your current pain level? 1-2/10     How often do the headaches occur? Mild headaches: a few days a week for past 3 months  Moderate to severe headaches: 2-3 a month     Are you ever headache free? Yes there are many days with no headaches      Aura/Warning and how long does it last? none     What time of the day do the headaches start? Mild headaches: wakes up with them  Moderate to severe headaches: wakes up with them, or anytime of the day     How long do the headaches last?   Mild headaches: 1 hour to entire day  Moderate to severe headaches: 2 hours to 10 hours; prior was 4 days     Where is your headache located? Mild headaches: in the past in the occipital and shoulder  Moderate to severe headaches: bilateral supraorbital region, occipitalis and neck     Describe your usual headache? Mild headaches: achy, dull, pressure  Moderate to severe headaches: throbbing and pressure     What is the intensity of pain?    Mild headaches: 1-2/10  Moderate to severe headaches: 5 to 7/10; average 4-5/10     Associated symptoms:   - Decreased appetite, Nausea       - Photophobia, Phonophobia, Osmophobia - yes sometimes  - right eye film/hazy  - Flushing of face    - Stiff or sore neck   - Dizziness, light headed- sometimes  - Problems with concentration  - Prefer to be in a cool, quiet, dark room     Number of days missed per month because of headaches:  Work (or school) days: works through  Social or Family activities: does not affect her at home     Headache are worse if the patient: cough, sneeze, bending over, exertion  Headache triggers:  Not sure  What time of the year do headaches occur more frequently? none     Have you had trigger point injection performed and how often? No  Have you had Botox injection performed and how often? No   Have you had epidural injections or transforaminal injections performed? No     Alternative therapies used in the past for headaches? none  Have you used CBD or THC for your headaches and how often? No  How many caffeine products to drink a day? One cup on the day she works  How much water to drink a day? 16 oz 5-6 a day     Are you current pregnant or planning on getting pregnant? no      Have you ever had any Brain imaging? No  - Reviewed old notes from physician seen in the past   Recent laboratory data was reviewed  Medications and allergies were reviewed      Reviewed old notes from physician seen in the past- see above HPI for summary of previous encounters  Past Medical History:   Diagnosis Date    Allergic     Asthma     GERD (gastroesophageal reflux disease)     HPV (human papilloma virus) infection     HSV-1 infection     HSV-2 infection        Patient Active Problem List   Diagnosis    Lumbar radiculopathy - Left    Lumbar disc herniation    Palpitations    Galactorrhea    Migraine without aura and with status migrainosus, not intractable       Medications:      Current Outpatient Medications   Medication Sig Dispense Refill    albuterol (PROVENTIL HFA,VENTOLIN HFA) 90 mcg/act inhaler Inhale 2 puffs every 6 (six) hours as needed for wheezing      docusate sodium (COLACE) 50 mg capsule Take by mouth as needed       ergocalciferol (VITAMIN D2) 50,000 units 1 weekly 12 capsule 3    fluticasone (FLONASE) 50 mcg/act nasal spray 1 spray into each nostril as needed        ketorolac (TORADOL) 10 mg tablet 1-tabs at onset of migraine, can repeat X1 in 6 hours, can combine with triptan    Take with food/milk/antacid  10 tablet 3    Loratadine (CLARITIN) 10 MG CAPS Take 10 mg by mouth daily       magnesium oxide (MAG-OX) 400 mg Take 1 tablet (400 mg total) by mouth 2 (two) times a day 180 tablet 3    omeprazole (PriLOSEC) 20 mg delayed release capsule Take 1 capsule (20 mg total) by mouth daily (Patient taking differently: Take 40 mg by mouth daily ) 90 capsule 0    ondansetron (ZOFRAN) 4 mg tablet Take 4 mg by mouth every 8 (eight) hours as needed for nausea or vomiting      Riboflavin (Vitamin B-2) 100 MG TABS Two in am and two in pm 360 tablet 3    rizatriptan (MAXALT-MLT) 10 MG disintegrating tablet Take 1 tablet (10 mg total) by mouth once as needed for migraine May repeat every 2 hours if needed, max 20mg/24 hours 12 tablet 3    valACYclovir (VALTREX) 500 mg tablet Take 1 tablet (500 mg total) by mouth daily 90 tablet 0    cyproheptadine (PERIACTIN) 4 mg tablet Take 1 tablet (4 mg total) by mouth daily at bedtime as needed for allergies (headaches) 30 tablet 0    dexamethasone (DECADRON) 2 mg tablet Take 1 tablet (2 mg total) by mouth 2 (two) times a day with meals (Patient not taking: Reported on 5/21/2021) 5 tablet 0    metoclopramide (REGLAN) 5 mg tablet Take 2 tablets (10 mg total) by mouth daily as needed (headache) Take 2 tabs with onset of headache (Patient not taking: Reported on 5/21/2021) 10 tablet 0     No current facility-administered medications for this visit  Allergies:       Allergies   Allergen Reactions    Sulfa Antibiotics      Family has allergy she has never taken    Doxycycline Rash and Edema     Whole body rash,bruises behind both thighs       Family History:     Family History   Problem Relation Age of Onset    Diabetes Mother     Arthritis Mother     Diabetes Father     Arthritis Father         psoriatic    Hyperlipidemia Father     Cancer Father     Cancer Maternal Grandmother     Esophageal varices Maternal Grandmother     Abnormal EKG Maternal Grandmother     Alcohol abuse Maternal Grandmother     Cirrhosis Maternal Grandmother     Cancer Maternal Grandfather     Heart disease Maternal Grandfather     Stroke Maternal Grandfather     Hypertension Paternal Grandmother     Diabetes Paternal Grandmother     Diabetes Paternal Grandfather     Heart disease Maternal Uncle     Hypertension Paternal Aunt     Diabetes Paternal Aunt     Hypertension Paternal Uncle     Diabetes Paternal Uncle        Social History:     Social History     Socioeconomic History    Marital status: /Civil Union     Spouse name: Not on file    Number of children: Not on file    Years of education: Not on file    Highest education level: Not on file   Occupational History    Not on file   Social Needs    Financial resource strain: Not hard at all   [place] insecurity     Worry: Sometimes true     Inability: Sometimes true   [place] Transportation needs     Medical: No     Non-medical: No   Tobacco Use    Smoking status: Former Smoker    Smokeless tobacco: Never Used    Tobacco comment: quit > 4 years ago    Substance and Sexual Activity    Alcohol use: Not Currently    Drug use: No    Sexual activity: Yes     Partners: Male     Birth control/protection: None   Lifestyle    Physical activity     Days per week: 0 days     Minutes per session: 0 min    Stress: Not at all   Relationships    Social connections     Talks on phone: More than three times a week     Gets together: More than three times a week     Attends L[employer] service: Never     Active member of club or organization: Yes     Attends meetings of clubs or organizations: Never     Relationship status:     Intimate partner violence     Fear of current or ex partner: No     Emotionally abused: No     Physically abused: No     Forced sexual activity: No   Other Topics Concern    Not on file   Social History Narrative    Not on file      I have reviewed the patient's medical, social and surgical history as well as medications in detail and updated the computerized patient record  Objective:   Physical Exam:                                                                   Vitals:            /76 (BP Location: Right arm, Patient Position: Sitting, Cuff Size: Standard)   Pulse 60   Temp 97 7 °F (36 5 °C) (Temporal)   Ht 5' 11" (1 803 m)   Wt 115 kg (254 lb 1 6 oz)   BMI 35 44 kg/m²   BP Readings from Last 3 Encounters:   05/21/21 131/76   02/19/21 120/60   11/19/20 126/76     Pulse Readings from Last 3 Encounters:   05/21/21 60   02/19/21 66   11/19/20 78          CONSTITUTIONAL: Well developed, well nourished, well groomed  No dysmorphic features  Eyes:  EOM normal      Neck:  Normal ROM, neck supple  HEENT:  Normocephalic atraumatic  Chest:  Respirations regular and unlabored  Psychiatric:  Normal behavior and appropriate affect      MENTAL STATUS  Orientation: Alert and oriented x 3  Fund of knowledge: Intact  MOTOR (Upper and lower extremities)   Bulk/tone/abnormal movement: Normal muscle bulk and tone  COORDINATION   Station/Gait: Normal baseline gait  Review of Systems:   Review of Systems  Constitutional: Negative  HENT: Negative  Eyes: Positive for visual disturbance (Right Eye )  Respiratory: Negative  Cardiovascular: Negative  Gastrointestinal: Positive for nausea  Endocrine: Negative  Genitourinary: Negative  Musculoskeletal: Negative  Skin: Negative  Allergic/Immunologic: Negative  Neurological: Positive for headaches  Hematological: Negative  Psychiatric/Behavioral: Negative      I personally reviewed the ROS entered by the MA    I spent 20 minutes in face-to-face discussion regarding  the pathophysiology of her current symptoms and further plan, as well as counseling, educating, and coordinating the patient's care including prognosis of diagnosis, diagnostic results, impression, and recommendations, risks and benefits of treatment, instructions for disease self management and treatment instructions and spent 20 minutes non-face to face    Author:  Manuel Nava PA-C 5/21/2021 10:50 AM

## 2021-05-28 ENCOUNTER — OCCMED (OUTPATIENT)
Dept: URGENT CARE | Facility: CLINIC | Age: 36
End: 2021-05-28
Payer: OTHER MISCELLANEOUS

## 2021-05-28 ENCOUNTER — APPOINTMENT (OUTPATIENT)
Dept: RADIOLOGY | Facility: CLINIC | Age: 36
End: 2021-05-28
Payer: OTHER MISCELLANEOUS

## 2021-05-28 DIAGNOSIS — S69.91XA INJURY OF RIGHT WRIST, INITIAL ENCOUNTER: ICD-10-CM

## 2021-05-28 DIAGNOSIS — S99.911A INJURY OF RIGHT ANKLE, INITIAL ENCOUNTER: Primary | ICD-10-CM

## 2021-05-28 DIAGNOSIS — S99.911A INJURY OF RIGHT ANKLE, INITIAL ENCOUNTER: ICD-10-CM

## 2021-05-28 PROCEDURE — 99283 EMERGENCY DEPT VISIT LOW MDM: CPT | Performed by: FAMILY MEDICINE

## 2021-05-28 PROCEDURE — 73610 X-RAY EXAM OF ANKLE: CPT

## 2021-05-28 PROCEDURE — G0382 LEV 3 HOSP TYPE B ED VISIT: HCPCS | Performed by: FAMILY MEDICINE

## 2021-05-28 PROCEDURE — 73130 X-RAY EXAM OF HAND: CPT

## 2021-06-04 ENCOUNTER — APPOINTMENT (OUTPATIENT)
Dept: URGENT CARE | Facility: CLINIC | Age: 36
End: 2021-06-04
Payer: OTHER MISCELLANEOUS

## 2021-06-04 DIAGNOSIS — B00.9 HERPES: ICD-10-CM

## 2021-06-04 PROCEDURE — 99213 OFFICE O/P EST LOW 20 MIN: CPT

## 2021-06-04 RX ORDER — VALACYCLOVIR HYDROCHLORIDE 500 MG/1
500 TABLET, FILM COATED ORAL DAILY
Qty: 90 TABLET | Refills: 0 | Status: CANCELLED | OUTPATIENT
Start: 2021-06-04 | End: 2021-09-02

## 2021-06-07 DIAGNOSIS — B00.9 HERPES: ICD-10-CM

## 2021-06-07 RX ORDER — VALACYCLOVIR HYDROCHLORIDE 500 MG/1
500 TABLET, FILM COATED ORAL DAILY
Qty: 90 TABLET | Refills: 0 | Status: SHIPPED | OUTPATIENT
Start: 2021-06-07 | End: 2021-10-04 | Stop reason: SDUPTHER

## 2021-06-08 ENCOUNTER — TELEPHONE (OUTPATIENT)
Dept: OBGYN CLINIC | Facility: CLINIC | Age: 36
End: 2021-06-08

## 2021-06-08 ENCOUNTER — OFFICE VISIT (OUTPATIENT)
Dept: OBGYN CLINIC | Facility: CLINIC | Age: 36
End: 2021-06-08
Payer: COMMERCIAL

## 2021-06-08 VITALS
HEIGHT: 71 IN | WEIGHT: 252.8 LBS | SYSTOLIC BLOOD PRESSURE: 110 MMHG | BODY MASS INDEX: 35.39 KG/M2 | DIASTOLIC BLOOD PRESSURE: 68 MMHG

## 2021-06-08 DIAGNOSIS — N94.6 DYSMENORRHEA: ICD-10-CM

## 2021-06-08 DIAGNOSIS — N92.0 MENORRHAGIA WITH REGULAR CYCLE: Primary | ICD-10-CM

## 2021-06-08 PROCEDURE — 99213 OFFICE O/P EST LOW 20 MIN: CPT | Performed by: OBSTETRICS & GYNECOLOGY

## 2021-06-08 RX ORDER — NAPROXEN SODIUM 550 MG/1
550 TABLET ORAL 2 TIMES DAILY WITH MEALS
Qty: 30 TABLET | Refills: 1 | Status: SHIPPED | OUTPATIENT
Start: 2021-06-08 | End: 2021-12-27

## 2021-06-08 NOTE — PROGRESS NOTES
Assessment/Plan:     Heavy menses/dysmenorrhea-she had a normal CBC in October  She denies symptoms of anemia  Pelvic ultrasound ordered  We discussed treatment options for this including progesterone only pills, Lysteda, Depo-Provera, Nexplanon, another Mirena  She is not a good candidate for combination OCs due to her migraines  She will consider these options  There are no diagnoses linked to this encounter  Subjective:     Patient ID: Krystyna Chaidez is a 30 y o  female  Patient here to discuss her menstrual cycles  She has regular cycles however over the past year and a half her flow is heavier and her cramps have been more severe  The cramps start 1 day prior to her cycle and lasts for 2 more days  After that, her symptoms are minimal   Her bleeding has also become heavier with large clots  She sometimes takes over-the-counter Advil or Aleve  She does have migraines without aura and she uses Toradol for them  She is aware to not use Toradol with the other NSAIDs  She is infrequently sexually active  She is not actively attempting pregnancy but would be open to another pregnancy  She is not using any contraception  She did have a Mirena however it was not in good placement and she was having irregular bleeding, it was removed in 2018  Review of Systems   Constitutional: Negative  Gastrointestinal: Negative  Genitourinary: Positive for menstrual problem  Heavy menses, severe menstrual cramps         Objective:     Physical Exam  Genitourinary:     General: Normal vulva        Vagina: Normal       Cervix: Normal       Uterus: Normal        Adnexa: Right adnexa normal and left adnexa normal       Comments: Small to moderate amount blood noted

## 2021-06-08 NOTE — TELEPHONE ENCOUNTER
----- Message from Azucena Castano sent at 6/8/2021  2:58 AM EDT -----  Regarding: Non-Urgent Medical Question  Contact: [phone]  I have been suffering at the start of my menstrual cycle  I get unbearable cramping the day before and Day 1 and 2 of my cycle  I pass large clots those first two days as well  I went off off oral birth control almost 10 years ago and the Mirena was ineffective for regulating my period and my body expelled the IUD twice  Are there any other remedies or suggestions for my menses and the cramping? I am able  to schedule an appointment if needed

## 2021-06-18 ENCOUNTER — APPOINTMENT (OUTPATIENT)
Dept: URGENT CARE | Facility: CLINIC | Age: 36
End: 2021-06-18
Payer: OTHER MISCELLANEOUS

## 2021-06-18 PROCEDURE — 99213 OFFICE O/P EST LOW 20 MIN: CPT | Performed by: FAMILY MEDICINE

## 2021-06-22 ENCOUNTER — OFFICE VISIT (OUTPATIENT)
Dept: URGENT CARE | Facility: CLINIC | Age: 36
End: 2021-06-22
Payer: COMMERCIAL

## 2021-06-22 VITALS
WEIGHT: 253 LBS | RESPIRATION RATE: 16 BRPM | SYSTOLIC BLOOD PRESSURE: 104 MMHG | HEIGHT: 71 IN | HEART RATE: 61 BPM | TEMPERATURE: 97.9 F | DIASTOLIC BLOOD PRESSURE: 68 MMHG | BODY MASS INDEX: 35.42 KG/M2 | OXYGEN SATURATION: 100 %

## 2021-06-22 DIAGNOSIS — K08.89 PAIN, DENTAL: Primary | ICD-10-CM

## 2021-06-22 PROCEDURE — G0382 LEV 3 HOSP TYPE B ED VISIT: HCPCS | Performed by: PHYSICIAN ASSISTANT

## 2021-06-22 RX ORDER — PREDNISONE 50 MG/1
50 TABLET ORAL DAILY
Qty: 5 TABLET | Refills: 0 | Status: SHIPPED | OUTPATIENT
Start: 2021-06-22 | End: 2021-06-27

## 2021-06-22 RX ORDER — PENICILLIN V POTASSIUM 500 MG/1
500 TABLET ORAL EVERY 8 HOURS SCHEDULED
Qty: 21 TABLET | Refills: 0 | Status: SHIPPED | OUTPATIENT
Start: 2021-06-22 | End: 2021-06-29

## 2021-06-22 NOTE — PROGRESS NOTES
[facility]        NAME: Earline Godinez is a 29 y o  female  : 1985    MRN: [id number]  DATE: 2021  TIME: 3:08 PM    Assessment and Plan   Pain, dental [K08 89]  1  Pain, dental  penicillin V potassium (VEETID) 500 mg tablet    predniSONE 50 mg tablet         Patient Instructions      Motrin and/or Tylenol as needed for pain control   take prednisone as directed until completed   take penicillin as directed until completed  Follow up with PCP in 3-5 days  Proceed to  ER if symptoms worsen  Chief Complaint     Chief Complaint   Patient presents with    Earache     Onset last night left ear pain  Tender upper and lower left side gums  Left eye pressure  Hx TMJ         History of Present Illness        78-year-old female presents with left-sided dental pain  Patient reports he had some dental work done over week ago and continues to have dental issues since then  She reports that the pain is radiating up to her ear now  Denies any ear drainage or ear pain  No sore throats reported  Patient reports has history of TMJ which gets irritated whenever dental work has been done  Denies any fevers or chills  No swelling of the face  No chest pain shortness of breath  Dental Pain   This is a new problem  The current episode started 1 to 4 weeks ago  The problem occurs constantly  The problem has been waxing and waning  The pain is moderate  Associated symptoms include facial pain  Pertinent negatives include no difficulty swallowing, fever, oral bleeding, sinus pressure or thermal sensitivity  She has tried NSAIDs for the symptoms  The treatment provided no relief  Review of Systems   Review of Systems   Constitutional: Negative  Negative for fever  HENT: Negative  Negative for sinus pressure  Eyes: Negative  Respiratory: Negative  Cardiovascular: Negative  Gastrointestinal: Negative  Musculoskeletal: Negative  Skin: Negative      Neurological: Negative  Current Medications       Current Outpatient Medications:     albuterol (PROVENTIL HFA,VENTOLIN HFA) 90 mcg/act inhaler, Inhale 2 puffs every 6 (six) hours as needed for wheezing, Disp: , Rfl:     cyproheptadine (PERIACTIN) 4 mg tablet, Take 1 tablet (4 mg total) by mouth daily at bedtime as needed for allergies (headaches), Disp: 30 tablet, Rfl: 0    docusate sodium (COLACE) 50 mg capsule, Take by mouth as needed , Disp: , Rfl:     ergocalciferol (VITAMIN D2) 50,000 units, 1 weekly, Disp: 12 capsule, Rfl: 3    fluticasone (FLONASE) 50 mcg/act nasal spray, 1 spray into each nostril as needed  , Disp: , Rfl:     ketorolac (TORADOL) 10 mg tablet, 1-tabs at onset of migraine, can repeat X1 in 6 hours, can combine with triptan    Take with food/milk/antacid , Disp: 10 tablet, Rfl: 3    Loratadine (CLARITIN) 10 MG CAPS, Take 10 mg by mouth daily , Disp: , Rfl:     magnesium oxide (MAG-OX) 400 mg, Take 1 tablet (400 mg total) by mouth 2 (two) times a day, Disp: 180 tablet, Rfl: 3    naproxen sodium (ANAPROX) 550 mg tablet, Take 1 tablet (550 mg total) by mouth 2 (two) times a day with meals For menstrual cramps, Disp: 30 tablet, Rfl: 1    omeprazole (PriLOSEC) 20 mg delayed release capsule, Take 1 capsule (20 mg total) by mouth daily (Patient taking differently: Take 40 mg by mouth daily ), Disp: 90 capsule, Rfl: 0    ondansetron (ZOFRAN) 4 mg tablet, Take 4 mg by mouth every 8 (eight) hours as needed for nausea or vomiting, Disp: , Rfl:     Riboflavin (Vitamin B-2) 100 MG TABS, Two in am and two in pm, Disp: 360 tablet, Rfl: 3    rizatriptan (MAXALT-MLT) 10 MG disintegrating tablet, Take 1 tablet (10 mg total) by mouth once as needed for migraine May repeat every 2 hours if needed, max 20mg/24 hours, Disp: 12 tablet, Rfl: 3    valACYclovir (VALTREX) 500 mg tablet, Take 1 tablet (500 mg total) by mouth daily, Disp: 90 tablet, Rfl: 0    dexamethasone (DECADRON) 2 mg tablet, Take 1 tablet (2 mg total) by mouth 2 (two) times a day with meals (Patient not taking: Reported on 5/21/2021), Disp: 5 tablet, Rfl: 0    metoclopramide (REGLAN) 5 mg tablet, Take 2 tablets (10 mg total) by mouth daily as needed (headache) Take 2 tabs with onset of headache (Patient not taking: Reported on 5/21/2021), Disp: 10 tablet, Rfl: 0    penicillin V potassium (VEETID) 500 mg tablet, Take 1 tablet (500 mg total) by mouth every 8 (eight) hours for 7 days, Disp: 21 tablet, Rfl: 0    predniSONE 50 mg tablet, Take 1 tablet (50 mg total) by mouth daily for 5 days, Disp: 5 tablet, Rfl: 0    Current Allergies     Allergies as of 06/22/2021 - Reviewed 06/22/2021   Allergen Reaction Noted    Sulfa antibiotics  07/13/2018    Doxycycline Rash and Edema 07/13/2018            The following portions of the patient's history were reviewed and updated as appropriate: allergies, current medications, past family history, past medical history, past social history, past surgical history and problem list      Past Medical History:   Diagnosis Date    Allergic     Asthma     GERD (gastroesophageal reflux disease)     HPV (human papilloma virus) infection     HSV-1 infection     HSV-2 infection        Past Surgical History:   Procedure Laterality Date    CERVICAL BIOPSY  W/ LOOP ELECTRODE EXCISION  2012    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    CT EPIDURAL STEROID INJECTION (TAMI LUMBAR) N/A 2018    L5-S1, x2    POSTERIOR LAMINECTOMY THORACIC AND LUMBAR SPINE N/A 2/5/2019    Procedure: Lumbar laminectomy, decompression, discectomy left L5-S1 ;  Surgeon: Maritza Linares MD;  Location: [facility];  Service: Orthopedics    TONSILECTOMY AND ADNOIDECTOMY  1990    WISDOM TOOTH EXTRACTION  2007       Family History   Problem Relation Age of Onset    Diabetes Mother    Nancy Courser Arthritis Mother     Diabetes Father     Arthritis Father         psoriatic    Hyperlipidemia Father     Cancer Father     Cancer Maternal Grandmother     Esophageal varices Maternal Grandmother     Abnormal EKG Maternal Grandmother     Alcohol abuse Maternal Grandmother     Cirrhosis Maternal Grandmother     Cancer Maternal Grandfather     Heart disease Maternal Grandfather     Stroke Maternal Grandfather     Hypertension Paternal Grandmother     Diabetes Paternal Grandmother     Diabetes Paternal Grandfather     Heart disease Maternal Uncle     Hypertension Paternal Aunt     Diabetes Paternal Aunt     Hypertension Paternal Uncle     Diabetes Paternal Uncle          Medications have been verified  Objective   /68   Pulse 61   Temp 97 9 °F (36 6 °C) (Tympanic)   Resp 16   Ht 5' 11" (1 803 m)   Wt 115 kg (253 lb)   LMP 06/06/2021 (Exact Date)   SpO2 100%   BMI 35 29 kg/m²   Patient's last menstrual period was 06/06/2021 (exact date)  Physical Exam     Physical Exam  Vitals and nursing note reviewed  Constitutional:       General: She is not in acute distress  Appearance: She is well-developed  HENT:      Head: Normocephalic and atraumatic  Right Ear: Hearing, tympanic membrane, ear canal and external ear normal       Left Ear: Hearing, tympanic membrane, ear canal and external ear normal       Nose: Nose normal       Mouth/Throat:      Lips: Pink  Mouth: Mucous membranes are moist  No injury or oral lesions  Dentition: Abnormal dentition  Does not have dentures  Dental tenderness present  No gingival swelling, dental caries, dental abscesses or gum lesions  Tongue: No lesions  Palate: No mass  Pharynx: Oropharynx is clear  No oropharyngeal exudate  Eyes:      General:         Right eye: No discharge  Left eye: No discharge  Conjunctiva/sclera: Conjunctivae normal    Pulmonary:      Effort: Pulmonary effort is normal  No respiratory distress  Musculoskeletal:         General: Normal range of motion  Cervical back: Normal range of motion and neck supple  Skin:     General: Skin is warm and dry  Neurological:      Mental Status: She is alert and oriented to person, place, and time

## 2021-06-22 NOTE — PATIENT INSTRUCTIONS
Motrin and/or Tylenol as needed for pain control   take prednisone as directed until completed   take penicillin as directed until completed  Follow up with PCP in 3-5 days  Proceed to  ER if symptoms worsen  Temporomandibular Disorder   WHAT YOU NEED TO KNOW:   Temporomandibular disorder is a condition that causes pain in your jaw  The disorder affects the joint between your temporal bone and your mandible (jawbone)  The muscles and nerves around the joint are also affected  DISCHARGE INSTRUCTIONS:   Medicines:   · Pain medicine: You may be given a prescription medicine to decrease pain  Do not wait until the pain is severe before you take this medicine  · NSAIDs:  These medicines decrease swelling and pain  You can buy NSAIDs without a doctor's order  Ask your healthcare provider which medicine is right for you, and how much to take  Take as directed  NSAIDs can cause stomach bleeding or kidney problems if not taken correctly  · Muscle relaxers  help decrease pain and muscle spasms  · Take your medicine as directed  Contact your healthcare provider if you think your medicine is not helping or if you have side effects  Tell him of her if you are allergic to any medicine  Keep a list of the medicines, vitamins, and herbs you take  Include the amounts, and when and why you take them  Bring the list or the pill bottles to follow-up visits  Carry your medicine list with you in case of an emergency  Follow up with your healthcare provider as directed:  Write down your questions so you remember to ask them during your visits  Manage your symptoms:   · Eat soft foods: Your healthcare provider may suggest that you eat only soft foods for several days  A dietitian may work with you to find foods that are easier to bite, chew, or swallow  Examples are soup, applesauce, cottage cheese, pudding, yogurt, and soft fruits       · Use jaw supporting devices:  Splints may be used to support your jaw or keep your jaw from moving  You may need to wear a mouth guard to keep you from clenching or grinding your teeth while you are sleeping  · Use ice and heat:  Ice helps decrease swelling and pain  Ice may also help prevent tissue damage  Use an ice pack, or put crushed ice in a plastic bag  Cover it with a towel and place it on your jaw for 15 to 20 minutes every hour or as directed  After the first 24 to 48 hours, use heat to decrease pain, swelling, and muscle spasms  Apply heat on the area for 20 to 30 minutes every 2 hours for as many days as directed  Use a heating pad, moist warm compress, or a hot water bottle  · Go to physical therapy:  A physical therapist teaches you exercises to help improve movement and strength, and to decrease pain in your jaw  A speech therapist may help you with swallowing and speech exercises  Contact your healthcare provider if:   · You have a fever  · Your splint or mouth guard is loose  · You have questions or concerns about your condition or care  Return to the emergency department if:   · You have nausea, are vomiting, or cannot keep liquids down  · You have pain that does not go away even after you take your pain medicine  · You have problems breathing, talking, drinking, eating, or swallowing  · Your splint or mouth guard gets damaged or broken  © Copyright [address] Information is for End User's use only and may not be sold, redistributed or otherwise used for commercial purposes  All illustrations and images included in CareNotes® are the copyrighted property of A D A M , Inc  or [address] [place], [place] [address]   The above information is an  only  It is not intended as medical advice for individual conditions or treatments  Talk to your doctor, nurse or pharmacist before following any medical regimen to see if it is safe and effective for you        Toothache   WHAT YOU NEED TO KNOW:   A toothache is pain that is caused by irritation of the nerves in the center of your tooth  The irritation may be caused by several problems, such as a cavity, an infection, a cracked tooth, or gum disease  DISCHARGE INSTRUCTIONS:   Return to the emergency department if:   · You have trouble breathing or swallowing  · You have swelling in your face or neck  Contact your dentist if:   · You have a fever and chills  · You have trouble opening or closing your mouth  · You have swelling around your tooth  · You have questions or concerns about your condition or care  Medicines: You may  need any of the following:  · NSAIDs , such as ibuprofen, help decrease swelling, pain, and fever  This medicine is available with or without a doctor's order  NSAIDs can cause stomach bleeding or kidney problems in certain people  If you take blood thinner medicine, always ask if NSAIDs are safe for you  Always read the medicine label and follow directions  Do not give these medicines to children under 10months of age without direction from your child's healthcare provider  · Acetaminophen  decreases pain and fever  It is available without a doctor's order  Ask how much to take and how often to take it  Follow directions  Acetaminophen can cause liver damage if not taken correctly  · Prescription pain medicine  may be given  Ask your healthcare provider how to take this medicine safely  Some prescription pain medicines contain acetaminophen  Do not take other medicines that contain acetaminophen without talking to your healthcare provider  Too much acetaminophen may cause liver damage  Prescription pain medicine may cause constipation  Ask your healthcare provider how to prevent or treat constipation  · Antibiotics  help treat or prevent a bacterial infection  · Take your medicine as directed  Contact your healthcare provider if you think your medicine is not helping or if you have side effects   Tell him of her if you are allergic to any medicine  Keep a list of the medicines, vitamins, and herbs you take  Include the amounts, and when and why you take them  Bring the list or the pill bottles to follow-up visits  Carry your medicine list with you in case of an emergency  Self-care:   · Rinse your mouth with warm salt water  4 times a day or as directed  · Eat soft foods  to help relieve pain caused by chewing  · Apply ice on your jaw or cheek  for 15 to 20 minutes every hour or as directed  Use an ice pack, or put crushed ice in a plastic bag  Cover it with a towel before you apply it  Ice helps prevent tissue damage and decreases swelling and pain  Help prevent a toothache:   · Brush your teeth at least 2 times a day  · Use dental floss to clean between your teeth at least 1 time a day  · See your dentist regularly every 6 months for dental cleanings and oral exams  Follow up with your dentist as directed: You may be referred to a dental surgeon  Write down your questions so you remember to ask them during your visits  © Copyright [address] Information is for End User's use only and may not be sold, redistributed or otherwise used for commercial purposes  All illustrations and images included in CareNotes® are the copyrighted property of A D A M , Inc  or [address] [place], [place] [address]   The above information is an  only  It is not intended as medical advice for individual conditions or treatments  Talk to your doctor, nurse or pharmacist before following any medical regimen to see if it is safe and effective for you

## 2021-09-09 ENCOUNTER — LAB (OUTPATIENT)
Dept: LAB | Facility: HOSPITAL | Age: 36
End: 2021-09-09
Payer: COMMERCIAL

## 2021-09-09 DIAGNOSIS — Z00.8 HEALTH EXAMINATION IN POPULATION SURVEYS: ICD-10-CM

## 2021-09-09 LAB
CHOLEST SERPL-MCNC: 184 MG/DL (ref 50–200)
EST. AVERAGE GLUCOSE BLD GHB EST-MCNC: 103 MG/DL
HBA1C MFR BLD: 5.2 %
HDLC SERPL-MCNC: 45 MG/DL
LDLC SERPL CALC-MCNC: 121 MG/DL (ref 0–100)
NONHDLC SERPL-MCNC: 139 MG/DL
TRIGL SERPL-MCNC: 89 MG/DL

## 2021-09-09 PROCEDURE — 80061 LIPID PANEL: CPT

## 2021-09-09 PROCEDURE — 36415 COLL VENOUS BLD VENIPUNCTURE: CPT

## 2021-09-09 PROCEDURE — 83036 HEMOGLOBIN GLYCOSYLATED A1C: CPT

## 2021-09-20 ENCOUNTER — ANNUAL EXAM (OUTPATIENT)
Dept: OBGYN CLINIC | Facility: CLINIC | Age: 36
End: 2021-09-20
Payer: COMMERCIAL

## 2021-09-20 VITALS
BODY MASS INDEX: 34.72 KG/M2 | HEIGHT: 71 IN | DIASTOLIC BLOOD PRESSURE: 78 MMHG | SYSTOLIC BLOOD PRESSURE: 120 MMHG | WEIGHT: 248 LBS

## 2021-09-20 DIAGNOSIS — Z01.419 ENCOUNTER FOR ANNUAL ROUTINE GYNECOLOGICAL EXAMINATION: Primary | ICD-10-CM

## 2021-09-20 PROCEDURE — 99395 PREV VISIT EST AGE 18-39: CPT | Performed by: OBSTETRICS & GYNECOLOGY

## 2021-09-20 NOTE — PROGRESS NOTES
Assessment/Plan:    pap is up to date    Discussed self breast exams    Discussed avoiding bladder irritants in [address]  Heavy menses - the naproxen is working well for her  If she decides that she needs something further for treatment, she will call  discussed preventive care, regular exercise and a healthy diet      No problem-specific Assessment & Plan notes found for this encounter  Diagnoses and all orders for this visit:    Encounter for annual routine gynecological examination          Subjective:      Patient ID: Cleve Reyes is a 29 y o  female  Pt here for yearly  For the last 2 months, her bleeding and cramping have been better  She is using Naproxen and this seems to be helping  She has some urinary frequency, min incontinence  Normal pap and negative HPV in 2020  The following portions of the patient's history were reviewed and updated as appropriate: allergies, current medications, past family history, past medical history, past social history, past surgical history and problem list     Review of Systems   Constitutional: Negative  Gastrointestinal: Negative  Genitourinary: Negative  Objective: There were no vitals taken for this visit  Physical Exam  Vitals reviewed  Constitutional:       Appearance: She is well-developed  Neck:      Thyroid: No thyromegaly  Trachea: No tracheal deviation  Cardiovascular:      Rate and Rhythm: Normal rate and regular rhythm  Pulmonary:      Effort: Pulmonary effort is normal       Breath sounds: Normal breath sounds  Chest:      Breasts: Breasts are symmetrical          Right: No inverted nipple, mass, nipple discharge, skin change or tenderness  Left: No inverted nipple, mass, nipple discharge, skin change or tenderness  Abdominal:      General: There is no distension  Palpations: Abdomen is soft  There is no mass  Tenderness: There is no abdominal tenderness  Genitourinary:     Labia:         Right: No rash, tenderness, lesion or injury  Left: No rash, tenderness, lesion or injury  Vagina: Normal       Cervix: No cervical motion tenderness, discharge or friability  Adnexa:         Right: No mass, tenderness or fullness  Left: No mass, tenderness or fullness

## 2021-09-22 ENCOUNTER — TELEPHONE (OUTPATIENT)
Dept: FAMILY MEDICINE CLINIC | Facility: CLINIC | Age: 36
End: 2021-09-22

## 2021-09-22 DIAGNOSIS — J02.9 SORE THROAT: Primary | ICD-10-CM

## 2021-09-22 PROCEDURE — U0003 INFECTIOUS AGENT DETECTION BY NUCLEIC ACID (DNA OR RNA); SEVERE ACUTE RESPIRATORY SYNDROME CORONAVIRUS 2 (SARS-COV-2) (CORONAVIRUS DISEASE [COVID-19]), AMPLIFIED PROBE TECHNIQUE, MAKING USE OF HIGH THROUGHPUT TECHNOLOGIES AS DESCRIBED BY CMS-2020-01-R: HCPCS | Performed by: NURSE PRACTITIONER

## 2021-09-22 PROCEDURE — U0005 INFEC AGEN DETEC AMPLI PROBE: HCPCS | Performed by: NURSE PRACTITIONER

## 2021-09-22 NOTE — TELEPHONE ENCOUNTER
I ordered this for the patient she should go to urgent care to be tested and call when she arrives   Thank you

## 2021-09-26 ENCOUNTER — OFFICE VISIT (OUTPATIENT)
Dept: URGENT CARE | Facility: CLINIC | Age: 36
End: 2021-09-26
Payer: COMMERCIAL

## 2021-09-26 VITALS
BODY MASS INDEX: 34.17 KG/M2 | TEMPERATURE: 98.9 F | RESPIRATION RATE: 16 BRPM | HEART RATE: 87 BPM | WEIGHT: 245 LBS | OXYGEN SATURATION: 98 %

## 2021-09-26 DIAGNOSIS — J06.9 ACUTE URI: Primary | ICD-10-CM

## 2021-09-26 DIAGNOSIS — Z11.59 SPECIAL SCREENING EXAMINATION FOR UNSPECIFIED VIRAL DISEASE: ICD-10-CM

## 2021-09-26 DIAGNOSIS — Z20.822 EXPOSURE TO COVID-19 VIRUS: ICD-10-CM

## 2021-09-26 DIAGNOSIS — R50.9 FEVER, UNSPECIFIED FEVER CAUSE: ICD-10-CM

## 2021-09-26 PROCEDURE — G0382 LEV 3 HOSP TYPE B ED VISIT: HCPCS | Performed by: PHYSICIAN ASSISTANT

## 2021-09-26 PROCEDURE — U0003 INFECTIOUS AGENT DETECTION BY NUCLEIC ACID (DNA OR RNA); SEVERE ACUTE RESPIRATORY SYNDROME CORONAVIRUS 2 (SARS-COV-2) (CORONAVIRUS DISEASE [COVID-19]), AMPLIFIED PROBE TECHNIQUE, MAKING USE OF HIGH THROUGHPUT TECHNOLOGIES AS DESCRIBED BY CMS-2020-01-R: HCPCS | Performed by: PHYSICIAN ASSISTANT

## 2021-09-26 PROCEDURE — U0005 INFEC AGEN DETEC AMPLI PROBE: HCPCS | Performed by: PHYSICIAN ASSISTANT

## 2021-09-27 LAB — SARS-COV-2 RNA RESP QL NAA+PROBE: NEGATIVE

## 2021-10-04 DIAGNOSIS — R11.0 NAUSEA: Primary | ICD-10-CM

## 2021-10-04 DIAGNOSIS — G43.001 MIGRAINE WITHOUT AURA AND WITH STATUS MIGRAINOSUS, NOT INTRACTABLE: ICD-10-CM

## 2021-10-04 DIAGNOSIS — B00.9 HERPES: ICD-10-CM

## 2021-10-04 RX ORDER — KETOROLAC TROMETHAMINE 10 MG/1
TABLET, FILM COATED ORAL
Qty: 10 TABLET | Refills: 0 | Status: SHIPPED | OUTPATIENT
Start: 2021-10-04 | End: 2021-12-27 | Stop reason: ALTCHOICE

## 2021-10-04 RX ORDER — ONDANSETRON 4 MG/1
4 TABLET, FILM COATED ORAL EVERY 8 HOURS PRN
Qty: 20 TABLET | Refills: 0 | Status: SHIPPED | OUTPATIENT
Start: 2021-10-04

## 2021-10-04 RX ORDER — RIZATRIPTAN BENZOATE 10 MG/1
10 TABLET, ORALLY DISINTEGRATING ORAL ONCE AS NEEDED
Qty: 12 TABLET | Refills: 0 | Status: SHIPPED | OUTPATIENT
Start: 2021-10-04

## 2021-10-04 RX ORDER — VALACYCLOVIR HYDROCHLORIDE 500 MG/1
500 TABLET, FILM COATED ORAL DAILY
Qty: 90 TABLET | Refills: 0 | Status: SHIPPED | OUTPATIENT
Start: 2021-10-04 | End: 2022-02-08 | Stop reason: SDUPTHER

## 2021-10-28 DIAGNOSIS — K21.9 GASTROESOPHAGEAL REFLUX DISEASE WITHOUT ESOPHAGITIS: ICD-10-CM

## 2021-10-28 RX ORDER — OMEPRAZOLE 20 MG/1
20 CAPSULE, DELAYED RELEASE ORAL DAILY
Qty: 90 CAPSULE | Refills: 0 | Status: SHIPPED | OUTPATIENT
Start: 2021-10-28 | End: 2022-02-08 | Stop reason: SDUPTHER

## 2021-11-02 ENCOUNTER — IMMUNIZATIONS (OUTPATIENT)
Dept: FAMILY MEDICINE CLINIC | Facility: HOSPITAL | Age: 36
End: 2021-11-02

## 2021-11-02 DIAGNOSIS — Z23 ENCOUNTER FOR IMMUNIZATION: Primary | ICD-10-CM

## 2021-11-02 PROCEDURE — 91300 COVID-19 PFIZER VACC 0.3 ML: CPT

## 2021-11-02 PROCEDURE — 0001A COVID-19 PFIZER VACC 0.3 ML: CPT

## 2021-11-08 ENCOUNTER — LAB (OUTPATIENT)
Dept: LAB | Facility: MEDICAL CENTER | Age: 36
End: 2021-11-08
Payer: COMMERCIAL

## 2021-11-08 ENCOUNTER — OFFICE VISIT (OUTPATIENT)
Dept: BARIATRICS | Facility: CLINIC | Age: 36
End: 2021-11-08
Payer: COMMERCIAL

## 2021-11-08 VITALS
DIASTOLIC BLOOD PRESSURE: 68 MMHG | RESPIRATION RATE: 16 BRPM | TEMPERATURE: 99 F | HEIGHT: 70 IN | WEIGHT: 247.2 LBS | BODY MASS INDEX: 35.39 KG/M2 | HEART RATE: 66 BPM | SYSTOLIC BLOOD PRESSURE: 116 MMHG

## 2021-11-08 DIAGNOSIS — E66.9 OBESITY, CLASS II, BMI 35-39.9: ICD-10-CM

## 2021-11-08 DIAGNOSIS — G43.001 MIGRAINE WITHOUT AURA AND WITH STATUS MIGRAINOSUS, NOT INTRACTABLE: ICD-10-CM

## 2021-11-08 DIAGNOSIS — E66.9 OBESITY, CLASS II, BMI 35-39.9: Primary | ICD-10-CM

## 2021-11-08 PROBLEM — E66.812 OBESITY, CLASS II, BMI 35-39.9: Status: ACTIVE | Noted: 2021-11-08

## 2021-11-08 PROBLEM — K21.9 GERD (GASTROESOPHAGEAL REFLUX DISEASE): Status: ACTIVE | Noted: 2021-11-08

## 2021-11-08 LAB
ALBUMIN SERPL BCP-MCNC: 3.6 G/DL (ref 3.5–5)
ALP SERPL-CCNC: 68 U/L (ref 46–116)
ALT SERPL W P-5'-P-CCNC: 19 U/L (ref 12–78)
ANION GAP SERPL CALCULATED.3IONS-SCNC: 4 MMOL/L (ref 4–13)
AST SERPL W P-5'-P-CCNC: 13 U/L (ref 5–45)
BILIRUB SERPL-MCNC: 0.47 MG/DL (ref 0.2–1)
BUN SERPL-MCNC: 14 MG/DL (ref 5–25)
CALCIUM SERPL-MCNC: 9.6 MG/DL (ref 8.3–10.1)
CHLORIDE SERPL-SCNC: 108 MMOL/L (ref 100–108)
CO2 SERPL-SCNC: 27 MMOL/L (ref 21–32)
CREAT SERPL-MCNC: 0.93 MG/DL (ref 0.6–1.3)
GFR SERPL CREATININE-BSD FRML MDRD: 80 ML/MIN/1.73SQ M
GLUCOSE P FAST SERPL-MCNC: 96 MG/DL (ref 65–99)
INSULIN SERPL-ACNC: 6.7 MU/L (ref 3–25)
POTASSIUM SERPL-SCNC: 4.2 MMOL/L (ref 3.5–5.3)
PROT SERPL-MCNC: 7.1 G/DL (ref 6.4–8.2)
SODIUM SERPL-SCNC: 139 MMOL/L (ref 136–145)
TSH SERPL DL<=0.05 MIU/L-ACNC: 2.27 UIU/ML (ref 0.36–3.74)

## 2021-11-08 PROCEDURE — 36415 COLL VENOUS BLD VENIPUNCTURE: CPT

## 2021-11-08 PROCEDURE — 80053 COMPREHEN METABOLIC PANEL: CPT

## 2021-11-08 PROCEDURE — 84443 ASSAY THYROID STIM HORMONE: CPT

## 2021-11-08 PROCEDURE — 99244 OFF/OP CNSLTJ NEW/EST MOD 40: CPT | Performed by: PHYSICIAN ASSISTANT

## 2021-11-08 PROCEDURE — 83525 ASSAY OF INSULIN: CPT

## 2021-11-08 RX ORDER — MELATONIN
4000 DAILY
COMMUNITY

## 2021-11-16 ENCOUNTER — OFFICE VISIT (OUTPATIENT)
Dept: BARIATRICS | Facility: CLINIC | Age: 36
End: 2021-11-16

## 2021-11-16 VITALS — WEIGHT: 254.08 LBS | BODY MASS INDEX: 36.37 KG/M2 | HEIGHT: 70 IN

## 2021-11-16 DIAGNOSIS — R63.5 ABNORMAL WEIGHT GAIN: ICD-10-CM

## 2021-11-16 PROCEDURE — WMPFE WEIGHT MANAGEMENT PRO FEE EMPLOYEE

## 2021-11-16 PROCEDURE — RECHECK

## 2021-11-22 ENCOUNTER — OFFICE VISIT (OUTPATIENT)
Dept: FAMILY MEDICINE CLINIC | Facility: CLINIC | Age: 36
End: 2021-11-22
Payer: COMMERCIAL

## 2021-11-22 VITALS
HEIGHT: 71 IN | DIASTOLIC BLOOD PRESSURE: 76 MMHG | RESPIRATION RATE: 16 BRPM | BODY MASS INDEX: 34.52 KG/M2 | SYSTOLIC BLOOD PRESSURE: 120 MMHG | OXYGEN SATURATION: 94 % | HEART RATE: 76 BPM | WEIGHT: 246.6 LBS | TEMPERATURE: 98 F

## 2021-11-22 DIAGNOSIS — Z00.00 ANNUAL PHYSICAL EXAM: Primary | ICD-10-CM

## 2021-11-22 PROCEDURE — 99395 PREV VISIT EST AGE 18-39: CPT | Performed by: NURSE PRACTITIONER

## 2021-11-23 ENCOUNTER — CLINICAL SUPPORT (OUTPATIENT)
Dept: BARIATRICS | Facility: CLINIC | Age: 36
End: 2021-11-23

## 2021-11-23 ENCOUNTER — OFFICE VISIT (OUTPATIENT)
Dept: NEUROLOGY | Facility: CLINIC | Age: 36
End: 2021-11-23
Payer: COMMERCIAL

## 2021-11-23 VITALS
SYSTOLIC BLOOD PRESSURE: 120 MMHG | WEIGHT: 249 LBS | TEMPERATURE: 96.9 F | HEIGHT: 70 IN | HEART RATE: 60 BPM | BODY MASS INDEX: 35.65 KG/M2 | DIASTOLIC BLOOD PRESSURE: 72 MMHG

## 2021-11-23 VITALS — BODY MASS INDEX: 35.42 KG/M2 | WEIGHT: 247.4 LBS | HEIGHT: 70 IN

## 2021-11-23 DIAGNOSIS — R63.5 ABNORMAL WEIGHT GAIN: Primary | ICD-10-CM

## 2021-11-23 DIAGNOSIS — E53.8 B12 DEFICIENCY: ICD-10-CM

## 2021-11-23 DIAGNOSIS — E55.9 VITAMIN D DEFICIENCY: Primary | ICD-10-CM

## 2021-11-23 DIAGNOSIS — G43.001 MIGRAINE WITHOUT AURA AND WITH STATUS MIGRAINOSUS, NOT INTRACTABLE: ICD-10-CM

## 2021-11-23 PROCEDURE — RECHECK

## 2021-11-23 PROCEDURE — 99214 OFFICE O/P EST MOD 30 MIN: CPT | Performed by: PHYSICIAN ASSISTANT

## 2021-12-02 ENCOUNTER — CLINICAL SUPPORT (OUTPATIENT)
Dept: BARIATRICS | Facility: CLINIC | Age: 36
End: 2021-12-02

## 2021-12-02 ENCOUNTER — OFFICE VISIT (OUTPATIENT)
Dept: BARIATRICS | Facility: CLINIC | Age: 36
End: 2021-12-02

## 2021-12-02 VITALS — WEIGHT: 245.1 LBS | BODY MASS INDEX: 35.09 KG/M2 | HEIGHT: 70 IN

## 2021-12-02 VITALS — BODY MASS INDEX: 35.09 KG/M2 | WEIGHT: 245.1 LBS | HEIGHT: 70 IN

## 2021-12-02 DIAGNOSIS — R63.5 ABNORMAL WEIGHT GAIN: Primary | ICD-10-CM

## 2021-12-02 PROCEDURE — RECHECK

## 2021-12-06 ENCOUNTER — OFFICE VISIT (OUTPATIENT)
Dept: BARIATRICS | Facility: CLINIC | Age: 36
End: 2021-12-06

## 2021-12-06 VITALS — BODY MASS INDEX: 34.7 KG/M2 | WEIGHT: 243.2 LBS

## 2021-12-06 DIAGNOSIS — R63.5 ABNORMAL WEIGHT GAIN: Primary | ICD-10-CM

## 2021-12-06 PROCEDURE — RECHECK

## 2021-12-09 ENCOUNTER — CLINICAL SUPPORT (OUTPATIENT)
Dept: BARIATRICS | Facility: CLINIC | Age: 36
End: 2021-12-09

## 2021-12-09 VITALS — BODY MASS INDEX: 34.53 KG/M2 | WEIGHT: 241.2 LBS | HEIGHT: 70 IN

## 2021-12-09 DIAGNOSIS — R63.5 ABNORMAL WEIGHT GAIN: Primary | ICD-10-CM

## 2021-12-09 PROCEDURE — RECHECK

## 2021-12-12 DIAGNOSIS — E55.9 VITAMIN D DEFICIENCY: ICD-10-CM

## 2021-12-13 RX ORDER — ERGOCALCIFEROL 1.25 MG/1
CAPSULE ORAL
Qty: 12 CAPSULE | Refills: 3 | Status: SHIPPED | OUTPATIENT
Start: 2021-12-13

## 2021-12-16 ENCOUNTER — CLINICAL SUPPORT (OUTPATIENT)
Dept: BARIATRICS | Facility: CLINIC | Age: 36
End: 2021-12-16

## 2021-12-16 VITALS — BODY MASS INDEX: 34.36 KG/M2 | HEIGHT: 70 IN | WEIGHT: 240 LBS

## 2021-12-16 DIAGNOSIS — R63.5 ABNORMAL WEIGHT GAIN: Primary | ICD-10-CM

## 2021-12-16 PROCEDURE — RECHECK

## 2021-12-27 DIAGNOSIS — N94.6 DYSMENORRHEA: ICD-10-CM

## 2021-12-27 RX ORDER — NAPROXEN SODIUM 550 MG/1
550 TABLET ORAL 2 TIMES DAILY WITH MEALS
Qty: 30 TABLET | Refills: 1 | Status: SHIPPED | OUTPATIENT
Start: 2021-12-27

## 2021-12-30 ENCOUNTER — TELEMEDICINE (OUTPATIENT)
Dept: FAMILY MEDICINE CLINIC | Facility: CLINIC | Age: 36
End: 2021-12-30
Payer: COMMERCIAL

## 2021-12-30 ENCOUNTER — PATIENT MESSAGE (OUTPATIENT)
Dept: BARIATRICS | Facility: CLINIC | Age: 36
End: 2021-12-30

## 2021-12-30 VITALS — WEIGHT: 240 LBS | HEIGHT: 70 IN | BODY MASS INDEX: 34.36 KG/M2 | TEMPERATURE: 102.6 F

## 2021-12-30 DIAGNOSIS — B34.9 VIRAL SYNDROME: Primary | ICD-10-CM

## 2021-12-30 PROCEDURE — U0005 INFEC AGEN DETEC AMPLI PROBE: HCPCS | Performed by: NURSE PRACTITIONER

## 2021-12-30 PROCEDURE — 99442 PR PHYS/QHP TELEPHONE EVALUATION 11-20 MIN: CPT | Performed by: FAMILY MEDICINE

## 2021-12-30 PROCEDURE — U0003 INFECTIOUS AGENT DETECTION BY NUCLEIC ACID (DNA OR RNA); SEVERE ACUTE RESPIRATORY SYNDROME CORONAVIRUS 2 (SARS-COV-2) (CORONAVIRUS DISEASE [COVID-19]), AMPLIFIED PROBE TECHNIQUE, MAKING USE OF HIGH THROUGHPUT TECHNOLOGIES AS DESCRIBED BY CMS-2020-01-R: HCPCS | Performed by: NURSE PRACTITIONER

## 2022-01-03 DIAGNOSIS — Z20.822 EXPOSURE TO COVID-19 VIRUS: Primary | ICD-10-CM

## 2022-01-03 PROCEDURE — U0003 INFECTIOUS AGENT DETECTION BY NUCLEIC ACID (DNA OR RNA); SEVERE ACUTE RESPIRATORY SYNDROME CORONAVIRUS 2 (SARS-COV-2) (CORONAVIRUS DISEASE [COVID-19]), AMPLIFIED PROBE TECHNIQUE, MAKING USE OF HIGH THROUGHPUT TECHNOLOGIES AS DESCRIBED BY CMS-2020-01-R: HCPCS | Performed by: FAMILY MEDICINE

## 2022-01-03 PROCEDURE — U0005 INFEC AGEN DETEC AMPLI PROBE: HCPCS | Performed by: FAMILY MEDICINE

## 2022-01-06 ENCOUNTER — OFFICE VISIT (OUTPATIENT)
Dept: BARIATRICS | Facility: CLINIC | Age: 37
End: 2022-01-06

## 2022-01-06 VITALS — HEIGHT: 70 IN | BODY MASS INDEX: 34.09 KG/M2 | WEIGHT: 238.1 LBS

## 2022-01-06 DIAGNOSIS — R63.5 ABNORMAL WEIGHT GAIN: Primary | ICD-10-CM

## 2022-01-06 PROCEDURE — RECHECK

## 2022-01-06 NOTE — PROGRESS NOTES
Weight Management Medical Nutrition Assessment  Francisco Box is here for f/u RD session for Healthy [facility]  Current wt: 238 1  Lbs  She has lost 15 9  lbs x 6 wks  Was recently sick so routine much different  Now that she is feeling better she does notice increased hunger which has resulted in not so great options  Does have a better plan in place for this evening (just went back to work yesterday)  Suggest resuming food logging for accountability  May wish to pursue topamax as discussed in the initial consult and will discuss this further with PA in March  Patient seen by Medical Provider in past 6 months:  yes  Requested to schedule appointment with Medical Provider: No    Anthropometric Measurements  Start Weight (#): 254 lbs 11/16/21  Current Weight (#): 238 1 lbs   TBW % Change from start weight:  6 3%  Ideal Body Weight (#): BMI 25 174 6 lbs  (150 lbs [place])  Goal Weight (#): 180-190 lbs  Highest: 255 lbs  Lowest: 153 lbs     Weight Loss History  Previous weight loss attempts: Commercial Programs ([employer], [facility], etc )  Exercise  OTC meds/supplements  Self Created Diets (Portion Control, Healthy Food Choices, etc )    Food and Nutrition Related History  Wake up:  5:15 p m  Bed Time: 8:30-10:30 a m      Food Recall NOT COMPLETED 1/6/22 due to recent illness  6-6:30: replacement OR chicken/turkey, veggies, sometimes sweet potato noodles  11:00: protein bar  3:00: chicken thigh, roasted vegetables  8-8:30: egg white omelet, turkey valentin, sometimes toast, almond butter OR egg/banana pancakes     Beverages: water,   Volume of beverage intake: 64->gallon water     Weekends: Worse, might eat even less  Cravings: ice cream, sweets  Trouble area of day: varies     Frequency of Eating out: 3x/wk  Food restrictions: n/a  Cooking: self   Food Shopping: self    Physical Activity Intake  Activity:currently none  Frequency:n/a  Physical limitations/barriers to exercise: n/a    Estimated Needs  Energy  B[employer] Energy Needs: BMR : 9423   1-2# loss weekly sedentary: 3907-2111          1-2# loss weekly lightly active: 1010-7575  Maintenance calories for sedentary activity level: 2231  Protein:  gm     (1 2-1 5g/kg IBW)  Fluid: 80 oz     (35mL/kg IBW)    Nutrition Diagnosis  Yes; Overweight/obesity  related to Excess energy intake as evidenced by  BMI more than normative standard for age and sex (obesity-grade I 26-30  9)       Nutrition Intervention    Nutrition Prescription  Calories:3855-5396  Protein:  gm    Meal Plan (Bobby/Pro)  5:30-7:00 p m : 300, 30  11:00 p m : 100-200, 5-27  3:00 a m : 300-500, 20-40  7:30-8:00 a m  400-450, 20-25:     Nutrition Education:    Healthy Core Manual  Calorie controlled menu  Lean protein food choices  Healthy snack options  Food journaling tips      Nutrition Counseling:  Strategies: meal planning, portion sizes, healthy snack choices, hydration, fiber intake, protein intake, exercise, food journal      Monitoring and Evaluation:  Evaluation criteria:  Energy Intake  Meet protein needs  Maintain adequate hydration  Monitor weekly weight  Meal planning/preparation  Food journal   Decreased portions at mealtimes and snacks  Physical activity     Barriers to learning:none  Readiness to change: Action:  (Changing behavior)  Comprehension: very good  Expected Compliance: very good

## 2022-01-13 ENCOUNTER — CLINICAL SUPPORT (OUTPATIENT)
Dept: BARIATRICS | Facility: CLINIC | Age: 37
End: 2022-01-13

## 2022-01-13 VITALS — BODY MASS INDEX: 33.79 KG/M2 | WEIGHT: 236 LBS | HEIGHT: 70 IN

## 2022-01-13 DIAGNOSIS — R63.5 ABNORMAL WEIGHT GAIN: Primary | ICD-10-CM

## 2022-01-13 PROCEDURE — RECHECK

## 2022-01-14 ENCOUNTER — TELEMEDICINE (OUTPATIENT)
Dept: FAMILY MEDICINE CLINIC | Facility: CLINIC | Age: 37
End: 2022-01-14
Payer: COMMERCIAL

## 2022-01-14 DIAGNOSIS — U07.1 COVID: Primary | ICD-10-CM

## 2022-01-14 DIAGNOSIS — R05.9 COUGH: ICD-10-CM

## 2022-01-14 PROCEDURE — 99213 OFFICE O/P EST LOW 20 MIN: CPT | Performed by: NURSE PRACTITIONER

## 2022-01-14 RX ORDER — PROMETHAZINE HYDROCHLORIDE AND CODEINE PHOSPHATE 6.25; 1 MG/5ML; MG/5ML
5 SYRUP ORAL EVERY 4 HOURS PRN
Qty: 118 ML | Refills: 0 | Status: SHIPPED | OUTPATIENT
Start: 2022-01-14

## 2022-01-14 RX ORDER — ALBUTEROL SULFATE 90 UG/1
2 AEROSOL, METERED RESPIRATORY (INHALATION) EVERY 6 HOURS PRN
Qty: 6.7 G | Refills: 2 | Status: SHIPPED | OUTPATIENT
Start: 2022-01-14 | End: 2022-02-13

## 2022-01-14 RX ORDER — PREDNISONE 10 MG/1
10 TABLET ORAL DAILY
Qty: 21 TABLET | Refills: 0 | Status: SHIPPED | OUTPATIENT
Start: 2022-01-14

## 2022-01-14 NOTE — PROGRESS NOTES
Virtual Regular Visit    Verification of patient location:    Patient is located in the following state in which I hold an active license [place]      Assessment/Plan:    Problem List Items Addressed This Visit     None      Visit Diagnoses     COVID    -  Primary    Relevant Medications    albuterol (PROVENTIL HFA,VENTOLIN HFA) 90 mcg/act inhaler    predniSONE 10 mg tablet    promethazine-codeine (PHENERGAN WITH CODEINE) 6 25-10 mg/5 mL syrup    Cough        Relevant Medications    albuterol (PROVENTIL HFA,VENTOLIN HFA) 90 mcg/act inhaler    predniSONE 10 mg tablet    promethazine-codeine (PHENERGAN WITH CODEINE) 6 25-10 mg/5 mL syrup               Reason for visit is   Chief Complaint   Patient presents with    Virtual Regular Visit    COVID-19        Encounter provider DEB Pittman    Provider located at [address] [place] [address] 200  [address] , [address] [id number] [phone]      Recent Visits  No visits were found meeting these conditions  Showing recent visits within past 7 days and meeting all other requirements  Today's Visits  Date Type Provider Dept   01/14/22 Telemedicine DEB Pittman [facility]   Showing today's visits and meeting all other requirements  Future Appointments  No visits were found meeting these conditions  Showing future appointments within next 150 days and meeting all other requirements       The patient was identified by name and date of birth  Lorenzo Ann was informed that this is a telemedicine visit and that the visit is being conducted through [address] [place] [address] Now and patient was informed that this is a secure, HIPAA-compliant platform  She agrees to proceed     My office door was closed  No one else was in the room  She acknowledged consent and understanding of privacy and security of the video platform  The patient has agreed to participate and understands they can discontinue the visit at any time      Patient is aware this is a billable service  Beni Ornelas is a 29 y o  female   Symptoms began 1/12/2022 tested positive covid - home test 1/14 positive   Cough and congestion increasing     Will add oral steroids - unable to use ICS due to previous issues, adding cough medication and albuterol  Will follow up day 5 for follow up and return to work           Cough  This is a new problem  The current episode started in the past 7 days  The problem has been gradually worsening  The problem occurs constantly  The cough is non-productive  Associated symptoms include a fever, headaches, nasal congestion and postnasal drip  Pertinent negatives include no rash, shortness of breath or wheezing  Nothing aggravates the symptoms  Risk factors: covid positive  She has tried a beta-agonist inhaler for the symptoms  The treatment provided mild relief  Her past medical history is significant for asthma          Past Medical History:   Diagnosis Date    Allergic     Asthma     GERD (gastroesophageal reflux disease)     HPV (human papilloma virus) infection     HSV-1 infection     HSV-2 infection        Past Surgical History:   Procedure Laterality Date    CERVICAL BIOPSY  W/ LOOP ELECTRODE EXCISION  2012    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    CT EPIDURAL STEROID INJECTION (TAMI LUMBAR) N/A 2018    L5-S1, x2    POSTERIOR LAMINECTOMY THORACIC AND LUMBAR SPINE N/A 2/5/2019    Procedure: Lumbar laminectomy, decompression, discectomy left L5-S1 ;  Surgeon: Nellie Langford MD;  [facility]: [facility];  Service: Orthopedics    TONSILECTOMY AND ADNOIDECTOMY  1990    WISDOM TOOTH EXTRACTION  2007       Current Outpatient Medications   Medication Sig Dispense Refill    albuterol (PROVENTIL HFA,VENTOLIN HFA) 90 mcg/act inhaler Inhale 2 puffs every 6 (six) hours as needed for wheezing 6 7 g 2    cholecalciferol (VITAMIN D3) 1,000 units tablet Take 4,000 Units by mouth daily      cyproheptadine (PERIACTIN) 4 mg tablet Take 1 tablet (4 mg total) by mouth daily at bedtime as needed for allergies (headaches) 30 tablet 0    docusate sodium (COLACE) 50 mg capsule Take by mouth as needed       ergocalciferol (VITAMIN D2) 50,000 units TAKE 1 CAPSULE BY MOUTH WEEKLY 12 capsule 3    fluticasone (FLONASE) 50 mcg/act nasal spray 1 spray into each nostril as needed        Loratadine (CLARITIN) 10 MG CAPS Take 10 mg by mouth daily       magnesium oxide (MAG-OX) 400 mg Take 1 tablet (400 mg total) by mouth 2 (two) times a day 180 tablet 3    naproxen sodium (ANAPROX) 550 mg tablet TAKE 1 TABLET (550 MG TOTAL) BY MOUTH 2 (TWO) TIMES A DAY WITH MEALS FOR MENSTRUAL CRAMPS 30 tablet 1    omeprazole (PriLOSEC) 20 mg delayed release capsule Take 1 capsule (20 mg total) by mouth daily 90 capsule 0    ondansetron (ZOFRAN) 4 mg tablet Take 1 tablet (4 mg total) by mouth every 8 (eight) hours as needed for nausea or vomiting 20 tablet 0    predniSONE 10 mg tablet Take 1 tablet (10 mg total) by mouth daily 6 tabs 1/14, 5 tabs 1/15, 4 tabs 1/16, 3 tabs 1/17, 2 tabs 1/18, 1 tab 1/19 21 tablet 0    promethazine-codeine (PHENERGAN WITH CODEINE) 6 25-10 mg/5 mL syrup Take 5 mL by mouth every 4 (four) hours as needed for cough 118 mL 0    Riboflavin (Vitamin B-2) 100 MG TABS Two in am and two in pm 360 tablet 3    rizatriptan (MAXALT-MLT) 10 MG disintegrating tablet Take 1 tablet (10 mg total) by mouth once as needed for migraine May repeat every 2 hours if needed, max 20mg/24 hours 12 tablet 0    valACYclovir (VALTREX) 500 mg tablet Take 1 tablet (500 mg total) by mouth daily 90 tablet 0     No current facility-administered medications for this visit  Allergies   Allergen Reactions    Sulfa Antibiotics      Family has allergy she has never taken    Doxycycline Rash and Edema     Whole body rash,bruises behind both thighs       Review of Systems   Constitutional: Positive for fever     HENT: Positive for postnasal drip  Eyes: Negative  Respiratory: Positive for cough  Negative for shortness of breath and wheezing  Cardiovascular: Negative  Gastrointestinal: Negative  Endocrine: Negative  Genitourinary: Negative  Musculoskeletal: Negative  Skin: Negative  Negative for rash  Allergic/Immunologic: Negative  Neurological: Positive for headaches  Hematological: Negative  Psychiatric/Behavioral: Negative  Video Exam    There were no vitals filed for this visit  Physical Exam  Vitals and nursing note reviewed  Constitutional:       Appearance: She is ill-appearing  HENT:      Head: Normocephalic and atraumatic  Nose: Congestion present  Eyes:      General:         Right eye: No discharge  Left eye: No discharge  Extraocular Movements: Extraocular movements intact  Conjunctiva/sclera: Conjunctivae normal    Pulmonary:      Effort: Pulmonary effort is normal  No respiratory distress  Abdominal:      Palpations: Abdomen is soft  Musculoskeletal:         General: Normal range of motion  Cervical back: Normal range of motion  Skin:     General: Skin is warm and dry  Neurological:      Mental Status: She is alert and oriented to person, place, and time  Psychiatric:         Mood and Affect: Mood normal          Behavior: Behavior normal          Thought Content: Thought content normal          Judgment: Judgment normal           I spent 15 minutes directly with the patient during this visit    [address] [place] [address] verbally agrees to participate in [employer]  Pt is aware that [employer] could be limited without vital signs or the ability to perform a full hands-on physical Pernell Kate understands she or the provider may request at any time to terminate the video visit and request the patient to seek care or treatment in person

## 2022-01-14 NOTE — PATIENT INSTRUCTIONS
COVID-19 (Coronavirus Disease 2019)   WHAT YOU NEED TO KNOW:   Coronavirus disease 2019 (COVID-19) is the disease caused by a coronavirus first discovered in December 2019  Coronaviruses generally cause upper respiratory (nose, throat, and lung) infections, such as a cold  The new virus spreads quickly and easily  The virus can be spread starting 2 days before symptoms even begin  The virus has also changed into several new forms (called variants) since it was discovered  The variants may be more contagious (easily spread) than the original form  Some may also cause more severe illness than others  It is important to follow local, national, and worldwide measures to protect yourself and others from infection  DISCHARGE INSTRUCTIONS:   If you think you or someone you know may be infected:  Do the following to protect others:  · If emergency care is needed,  tell the  about the possible infection, or call ahead and tell the emergency department  · Call a healthcare provider  for instructions if symptoms are mild  Anyone who may be infected should not  arrive without calling first  The provider will need to protect staff members and other patients  · The person who may be infected needs to wear a face covering  while getting medical care  This will help lower the risk of infecting others  Coverings are not used for anyone who is younger than 2 years, has breathing problems, or cannot remove it  The provider can give you instructions for anyone who cannot wear a covering  Call your local emergency number (911 in the [address] [place] [address],[address]) or an emergency department if:   · You have trouble breathing or shortness of breath at rest     · You have chest pain or pressure that lasts longer than 5 minutes  · You become confused or hard to wake  · Your lips or face are blue  · You have a fever of 104°F (40°C) or higher      Call your doctor if:   · You do not  have symptoms of COVID-19 but had close physical contact within 14 days with someone who tested positive  · You have questions or concerns about your condition or care  Medicines: You may need any of the following for mild symptoms:  · Decongestants  help reduce nasal congestion and help you breathe more easily  If you take decongestant pills, they may make you feel restless or cause problems with your sleep  Do not use decongestant sprays for more than a few days  · Cough suppressants  help reduce coughing  Ask your healthcare provider which type of cough medicine is best for you  · Acetaminophen  decreases pain and fever  It is available without a doctor's order  Ask how much to take and how often to take it  Follow directions  Read the labels of all other medicines you are using to see if they also contain acetaminophen, or ask your doctor or pharmacist  Acetaminophen can cause liver damage if not taken correctly  Do not use more than 4 grams (4,000 milligrams) total of acetaminophen in one day  · NSAIDs , such as ibuprofen, help decrease swelling, pain, and fever  NSAIDs can cause stomach bleeding or kidney problems in certain people  If you take blood thinner medicine, always ask your healthcare provider if NSAIDs are safe for you  Always read the medicine label and follow directions  · Take your medicine as directed  Contact your healthcare provider if you think your medicine is not helping or if you have side effects  Tell him or her if you are allergic to any medicine  Keep a list of the medicines, vitamins, and herbs you take  Include the amounts, and when and why you take them  Bring the list or the pill bottles to follow-up visits  Carry your medicine list with you in case of an emergency  What you need to know about COVID-19 vaccines:  Get a vaccine even if you already had COVID-19  · COVID-19 vaccines are given as a shot in 1 or 2 doses  Some vaccines have emergency use authorization (EUA)   An EUA means the vaccine is not approved but is given because the benefits outweigh the risks  A 2-dose vaccine is fully approved for use in those 16 years or older  This vaccine also has an EUA for adolescents 12 to 15 years  Your healthcare provider can help you understand the benefits and risks  · A third dose is recommended for adults with a weakened immune system who get a 2-dose vaccine  The third dose is given at least 28 days after the second  · Even after you get the vaccine, continue social distancing and other measures  Experts are still learning how well the vaccines work to prevent infection, transmission, and severe illness  Although rare, you can become infected after you get the vaccine  You may also be able to pass the virus to others without knowing you are infected  · After you get the vaccine, check local, national, and international travel rules  Check to see if you need to be tested before you travel  You may also need to quarantine after you return  Some countries require proof of a negative test before you leave  You should also be tested 3 to 5 days after you return from another country  How the 2019 coronavirus spreads: The following are ways the virus is thought to spread, but more information may be coming:  · Droplets are the main way all coronaviruses spread  The virus travels in droplets that form when a person talks, coughs, or sneezes  The droplets can also float in the air for minutes or hours  Infection happens when you breathe in the droplets or get them in your eyes or nose  Close personal contact with an infected person increases your risk for infection  This means being within 6 feet (2 meters) of the person for at least 15 minutes over 24 hours  · Person-to-person contact can spread the virus  For example, a person with the virus on his or her hands can spread it by shaking hands with someone  · The virus can stay on objects and surfaces for a short time    You may become infected by touching the object or surface and then touching your eyes or mouth  · An infected animal may be able to infect a person who touches it  This may happen at live markets or on a farm  Help lower the risk for COVID-19:  The best way to prevent infection is to avoid anyone who is infected, but this can be hard to do  An infected person can spread the virus before signs or symptoms begin, or even if signs or symptoms never develop  The following can help lower the risk for infection:      · Wash your hands often throughout the day  Use soap and water  Rub your soapy hands together, lacing your fingers, for at least 20 seconds  Rinse with warm, running water  Dry your hands with a clean towel or paper towel  Use hand  that contains alcohol if soap and water are not available  Teach children how to wash their hands and use hand   · Cover sneezes and coughs  Turn your face away and cover your mouth and nose with a tissue  Throw the tissue away  Use the bend of your arm if a tissue is not available  Then wash your hands well with soap and water or use hand   Teach children how to cover a cough or sneeze  · Wear a face covering (mask) around anyone who does not live in your home  Use a cloth covering with at least 2 layers  You can also create layers by putting a cloth covering over a disposable non-medical mask  Cover your mouth and your nose  The covering should fit snugly against the bridge of your nose  Securely fasten it under your chin and on the sides of your face  Do not  wear a plastic face shield instead of a covering  Continue social distancing and washing your hands often  A face covering is not a substitute for social distancing safety measures  · Follow worldwide, national, and local social distancing guidelines  Keep at least 6 feet (2 meters) between you and others  Also keep this distance from anyone who comes to your home, such as someone making a delivery  Wear a face covering while you are around others  You will need to wear a covering in restaurants, stores, and other public buildings  You will also need a covering on mass transit, such as a bus, subway, or airplane  Remember to use a covering made from thick material or wear 2 coverings together  · Make a habit of not touching your face  If you get the virus on your hands, you can transfer it to your eyes, nose, or mouth and become infected  You can also transfer it to objects, surfaces, or people  Do not touch your eyes, nose, or mouth without washing your hands first     · Clean and disinfect high-touch surfaces and objects often  Use disinfecting wipes, or make a solution of 4 teaspoons of bleach in 1 quart (4 cups) of water  Clean and disinfect even if you think no one living in or coming to your home is infected with the virus  · Ask about other vaccines you may need  Get the influenza (flu) vaccine as soon as recommended each year, usually starting in September or October  Get the pneumonia vaccine if recommended  Your healthcare provider can tell you if you should also get other vaccines, and when to get them  Follow social distancing guidelines:  National and local social distancing rules vary  Rules may change over time as restrictions are lifted  Restrictions may return if an outbreak happens where you live  It is important to know and follow all current social distancing rules in your area  The following are general guidelines:  · Stay home if you are sick or think you may have COVID-19  It is important to stay home if you are waiting for a testing appointment or for test results  Even if you do not have symptoms, you can pass the virus to others  · Limit trips out of your home  Have food, medicines, and other supplies delivered and left at your door or other area, if possible  Plan trips out of your home so you make the fewest stops possible to limit close personal contact   Keep track of places you go  This will help contact tracers notify others if you become infected  · Avoid close physical contact with anyone who does not live in your home  Do not shake hands with, hug, or kiss a person as a greeting  If you must use public transportation (such as a bus or subway), try to sit or stand away from others  Only allow necessary people into your home  Wear your face covering, and remind others to wear a face covering  Remind them to wash their hands when they arrive and before they leave  Do not  let someone into your home or go to someone's home just to visit  Even if you both do not feel sick, the virus can pass from one of you to the other  · Avoid in-person gatherings and crowds  Gatherings or crowds of 10 or more individuals can cause the virus to spread  Avoid places such as [place], beaches, sporting events, and tourist attractions  For events such as parties, holiday meals, [employer] services, and conferences, attend virtually (on a computer), if possible  · Ask your healthcare provider for other ways to have appointments  Some providers offer phone, video, or other types of appointments  You may also be able to get prescriptions for a few months of your medicines at a time  · Stay safe if you must go out to work  Keep physical distance between you and other workers as much as possible  Follow your employer's rules so everyone stays safe  If you have COVID-19 and are recovering at home,  healthcare providers will give you specific instructions to follow  The following are general guidelines to remind you how to keep others safe until you are well:  · Wash your hands often  Use soap and water as much as possible  Use hand  that contains alcohol if soap and water are not available  Dry your hands with a clean towel or paper towel  Do not share towels with anyone  If you use paper towels, throw them away in a lined trash can kept in your room or area   Use a covered trash can, if possible  · Do not go out of your home unless it is necessary  Ask someone who is not infected to go out for groceries or supplies, or have them delivered  Do not go to your healthcare provider's office without an appointment  · Only have close physical contact with a person giving direct care, or a baby or child you must care for  Family members and friends should not visit you  If possible, stay in a separate area or room of your home if you live with others  No one should go into the area or room except to give you care  You can visit with others by phone, video chat, e-mail, or similar systems  · Wear a face covering while others are near you  This can help prevent droplets from spreading the virus when you talk, sneeze, or cough  Put the covering on before anyone comes into your room or area  Remind the person to cover his or her nose and mouth before coming in to provide care for you  · Do not share items  Do not share dishes, towels, or other items with anyone  Items need to be washed after you use them  · Protect your baby  Some newborns have tested positive for the virus  It is not known if they became infected before or after birth  The highest risk is when a  has close contact with an infected person  If you are pregnant or breastfeeding, talk to your healthcare provider or obstetrician about any concerns you have  He or she will tell you when to bring your baby in for check-ups and vaccines  He or she will also tell you what to do if you think your baby was infected with the coronavirus  Wash your hands and put on a clean face covering before you breastfeed or care for your baby  · Do not handle live animals unless it is necessary  Some animals, including pets, have been infected with the new coronavirus  Ask someone who is not infected to take care of your pet until you are well  If you must care for a pet, wear a face covering   Wash your hands before and after you give care  Talk to your healthcare provider about how to keep a service animal safe, if needed  · Follow directions from your healthcare provider for being around others after you recover  It is not known if or for how long a recovered person can pass the virus to others  Your provider may give you instructions, such as continuing social distancing and wearing a face covering  He or she will tell you when it is okay to be around others again  This may be 10 to 20 days after symptoms started or you had a positive test  Most symptoms will also need to be gone  Your provider will give you more information  Follow up with your doctor as directed:  Write down your questions so you remember to ask them during your visits  For more information:   · Centers for Disease Control and Prevention  [address] [place] , [address]  Phone: 5- 886 - 676-4390  Web Address: [employer]    © [address] [place] [address] 2021 Information is for End User's use only and may not be sold, redistributed or otherwise used for commercial purposes  All illustrations and images included in CareNotes® are the copyrighted property of [employer] M [employer]  or [address] [place], [place] [address]   The above information is an  only  It is not intended as medical advice for individual conditions or treatments  Talk to your doctor, nurse or pharmacist before following any medical regimen to see if it is safe and effective for you

## 2022-01-17 ENCOUNTER — OFFICE VISIT (OUTPATIENT)
Dept: FAMILY MEDICINE CLINIC | Facility: CLINIC | Age: 37
End: 2022-01-17
Payer: COMMERCIAL

## 2022-01-17 VITALS
HEIGHT: 70 IN | HEART RATE: 71 BPM | RESPIRATION RATE: 17 BRPM | TEMPERATURE: 97.5 F | SYSTOLIC BLOOD PRESSURE: 120 MMHG | WEIGHT: 236.6 LBS | OXYGEN SATURATION: 97 % | DIASTOLIC BLOOD PRESSURE: 78 MMHG | BODY MASS INDEX: 33.87 KG/M2

## 2022-01-17 DIAGNOSIS — U07.1 COVID-19: Primary | ICD-10-CM

## 2022-01-17 PROCEDURE — 99213 OFFICE O/P EST LOW 20 MIN: CPT | Performed by: NURSE PRACTITIONER

## 2022-01-17 NOTE — PROGRESS NOTES
Assessment/Plan   Problem List Items Addressed This Visit     None      Visit Diagnoses     COVID-19    -  Primary                Chief Complaint   Patient presents with    Follow-up     Follow up COVID       Subjective   Patient ID: Pramod Postal is a 29 y o  female  Vitals:    01/17/22 1520   BP: 120/78   Pulse: 71   Resp: 17   Temp: 97 5 °F (36 4 °C)   SpO2: 97%     Onset of symptoms 1/12/2022, tested positive on 1/13/2022- cough remains- completing course of prednisone and inhalers   Mild nasal congestion - fever resolved 3 days ago  The following portions of the patient's history were reviewed and updated as appropriate: allergies, current medications, past medical history, past social history, past surgical history and problem list     Review of Systems   Constitutional: Negative  HENT: Negative  Eyes: Negative  Respiratory: Negative  Cardiovascular: Negative  Gastrointestinal: Negative  Endocrine: Negative  Genitourinary: Negative  Musculoskeletal: Negative  Skin: Negative  Allergic/Immunologic: Negative  Neurological: Negative  Hematological: Negative  Psychiatric/Behavioral: Negative  Objective     Physical Exam  Vitals and nursing note reviewed  Constitutional:       General: She is not in acute distress  Appearance: Normal appearance  She is not ill-appearing  HENT:      Head: Normocephalic and atraumatic  Right Ear: Tympanic membrane, ear canal and external ear normal  There is no impacted cerumen  Left Ear: Tympanic membrane, ear canal and external ear normal  There is no impacted cerumen  Nose: Nose normal       Mouth/Throat:      Mouth: Mucous membranes are moist       Pharynx: Oropharynx is clear  No oropharyngeal exudate or posterior oropharyngeal erythema  Eyes:      General:         Right eye: No discharge  Left eye: No discharge  Extraocular Movements: Extraocular movements intact  Conjunctiva/sclera: Conjunctivae normal       Pupils: Pupils are equal, round, and reactive to light  Neck:      Vascular: No carotid bruit  Cardiovascular:      Rate and Rhythm: Normal rate and regular rhythm  Pulses: Normal pulses  Heart sounds: Normal heart sounds  No murmur heard  No friction rub  Pulmonary:      Effort: Pulmonary effort is normal  No respiratory distress  Breath sounds: Normal breath sounds  No rales  Chest:      Chest wall: No tenderness  Abdominal:      General: Bowel sounds are normal       Palpations: Abdomen is soft  Tenderness: There is no right CVA tenderness or left CVA tenderness  Musculoskeletal:         General: No swelling  Normal range of motion  Cervical back: Normal range of motion and neck supple  No rigidity or tenderness  Right lower leg: No edema  Left lower leg: No edema  Lymphadenopathy:      Cervical: No cervical adenopathy  Skin:     General: Skin is warm and dry  Capillary Refill: Capillary refill takes less than 2 seconds  Findings: No bruising or erythema  Neurological:      Mental Status: She is alert and oriented to person, place, and time  Psychiatric:         Mood and Affect: Mood normal          Behavior: Behavior normal          Thought Content:  Thought content normal          Judgment: Judgment normal        Allergies   Allergen Reactions    Sulfa Antibiotics      Family has allergy she has never taken    Doxycycline Rash and Edema     Whole body rash,bruises behind both thighs

## 2022-01-17 NOTE — PATIENT INSTRUCTIONS
COVID-19 and Chronic Health Conditions   AMBULATORY CARE:   What you need to know about coronavirus disease 2019 (COVID-19) and chronic health conditions: Your chronic condition may increase your risk for COVID-19 or serious problems it can cause  Healthcare providers might need to make changes that affect how you usually manage your chronic health condition  Providers may change hours of operation or not have patients come in to be seen  You may not be able to make appointments to get blood drawn or to have tests or procedures  This may continue until the virus that causes COVID-19 is controlled  Until then, you can take steps to manage your condition  The steps will also lower your risk for COVID-19 or the serious problems it causes  If you do develop COVID-19, healthcare providers will tell you when it is okay to be around others after you recover  This depends on your chronic condition, any symptoms of COVID-19 that developed, and how severe the symptoms were  What you need to know about serious problems from the virus:  Serious health problems may improve or continue for weeks or months, and possibly years  Health problems that continue may be called long COVID  · The virus can affect many parts of the body  Information is still being learned  You may develop these or other health problems:    ? Serious lower respiratory conditions, such as pneumonia or acute respiratory distress syndrome (ARDS)    ? Blood vessel damage, leading to blood clots    ? Organ damage from a lack of oxygen or from blood clots    ? Sleep problems    ? Problems thinking clearly, remembering information, or concentrating    ? Mood changes, depression, or anxiety    · Anyone can develop serious problems from the virus,  but some health conditions increase the risk  Healthcare professionals are still learning how the virus affects a person who has a chronic health condition   Protect yourself from infection, even if your chronic condition is not listed below  More is being learned about the virus every day  Health conditions known to increase the risk for COVID-19 or its serious complications include the following:    ? Obesity, diabetes, cancer, Down syndrome, or a liver disease    ? Kidney failure, chronic kidney disease, or sickle cell disease    ? Lung disease, COPD, moderate-to-severe asthma, cystic fibrosis, or pulmonary fibrosis (scarring in your lungs)    ? A severe heart disease, such as coronary artery disease or heart failure    ? A brain blood vessel disorder or disease, or a condition such as dementia    ? Hypertension (or high blood pressure), or thalassemia    ? An immune system problem, HIV or AIDS, or a blood, bone marrow, or organ transplant    · Other factors that increase your risk  are age 72 or older or living in a long-term care facility  Pregnancy, cigarette smoking, or long-term corticosteroid use can also increase your risk  If you think you may be infected with the coronavirus,  do the following to protect others:  · If emergency care is needed,  tell the  about the possible infection, or call ahead and tell the emergency department  · Call a healthcare provider  for instructions if symptoms are mild  Do not  arrive without calling first  Your provider will need to protect staff members and other patients  · Cover your mouth and nose  while you are getting medical care  This will help lower the risk of infecting others  Call your local emergency number [phone] in the [address] [place] [address],[address]) or an emergency department if:   · You have trouble breathing or shortness of breath  · You have chest pain or pressure that lasts longer than 5 minutes  · You become confused or hard to wake  · Your lips or face are blue  · You have a fever of 104°F (40°C) or higher      Call your doctor or healthcare provider if:   · You do not  have symptoms of COVID-19 but had close physical contact within 14 days with someone who tested positive  · You have questions or concerns about your COVID-19 or your chronic condition  How the 2019 coronavirus spreads: The virus spreads quickly and easily  The virus can be passed starting 2 days before symptoms begin or before a positive test if symptoms never begin  The following are ways the virus is thought to spread, but more information may be coming:  · Droplets are the main way all coronaviruses spread  The virus travels in droplets that form when a person talks, coughs, or sneezes  The droplets can also float in the air for minutes or hours  Infection happens when you breathe in the droplets or get them in your eyes or nose  Close personal contact with an infected person increases your risk for infection  This means being within 6 feet (2 meters) of the person for at least 15 minutes over 24 hours  · Person-to-person contact can spread the virus  For example, a person with the virus on his or her hands can spread it by shaking hands with someone  · The virus can stay on objects and surfaces for a short time  You may become infected by touching the object or surface and then touching your eyes or mouth  · An infected animal may be able to infect a person who touches it  This may happen at live markets or on a farm  Manage your chronic health condition during this time:  If you do not have a regular healthcare provider, experts recommend you contact a local community health center or health department  The following can help you manage your condition and prevent COVID-19:  · Get emergency care for your condition if needed  Talk to your healthcare providers about symptoms of your chronic condition that need immediate care  Your providers can help you create a plan or add exacerbation management to your plan  The plan will include when to go to an emergency department and when to call your local emergency number   This will depend on where you live and the services that are available during this time  · Go to dialysis appointments as scheduled  It is important to stay on schedule  You will need to have enough food to be able to follow the emergency diet plan if you must miss a session  The emergency diet needs to be part of the management plan for your condition  · Reschedule any upcoming appointments as needed  Medical facilities may be closed until the coronavirus is better controlled  This means you may need to reschedule a surgery, procedure, or check-up appointment  If you cannot have a phone or video appointment, you will need to make a new appointment  Some providers may be scheduling appointments several months in advance  Some surgeries and procedures will happen as scheduled  This depends on the medical condition and the reason for the surgery or procedure  You may need to have extra testing for COVID-19 several days before  · Follow any regular management plan you use  Your healthcare provider will tell you if you need to make any changes to your regular management plan  For example, if you have asthma, continue to follow your asthma action plan  If you have diabetes, you may need to check your blood sugar level more often  Stress and illness can make blood sugar levels go up  You may need to adjust medicine such as insulin  If you have a heart condition or high blood pressure, you may need to check your blood pressure more often  Stress and illness can also raise your blood pressure  · Talk to your healthcare providers about your medicines  You may be able to get more than 1 month of medicine at a time  This will lower the number of times you need to go to a pharmacy to get your medicines  Make sure you have enough medicine if you have a condition that can lead to an emergency  Examples include asthma medicines, insulin, or an epinephrine pen  Check the expiration dates on the medicines you currently have  Ask for refills as soon as possible, if needed   If it is not time to refill prescriptions, you may be able to get an emergency supply of some medicines  Medicine plans vary, so ask your healthcare provider or pharmacist for options  · Have supplies available in your home  If possible, get extra supplies you use regularly  Examples include absorbent pads, syringes, and wound cleaning solutions  This will limit the number of trips out of your home to get supplies  · Know the signs and symptoms of COVID-19  Signs and symptoms usually start about 5 days after infection but can take 2 to 14 days  Signs and symptoms range from mild to severe  You may feel like you have the flu or a bad cold  Your chronic health condition may cause some of the same symptoms COVID-19 causes  This can make it hard to know if a symptom is from COVID-19 or your chronic condition  Keep a record of any new or worsening symptom you have  This is especially important if you have a condition that often causes shortness of breath  Your provider can tell you if you should be tested for COVID-19  Information is still being learned  Tell your healthcare provider if you think you were infected but develop signs or symptoms not listed below:    ? A cough    ? Shortness of breath or trouble breathing that may become severe    ? A fever of at least 100 4°F, or 38°C (may be lower in adults 65 or older)    ? Chills that might include shaking    ? Muscle pain, body aches, or a headache    ? A sore throat    ? Suddenly not being able to taste or smell anything    ? Feeling very tired (fatigue)    ? Congestion (stuffy head and nose), or a runny nose    ? Diarrhea, nausea, or vomiting    What you can do to prevent having to go out of your home during this time:   · Ask your healthcare provider for other ways to have appointments  Some providers offer phone, video, or other types of appointments  · Have food, medicines, and other supplies delivered    Some pharmacies can send certain medicines to you through the mail  Grocery stores and restaurants may be able to deliver food and other items  If possible, have delivered items placed somewhere  Try not to have someone hand you an item  You will be so close to the person that the virus can spread between you  · Ask someone to get items you need  The person can get groceries, medicines, or other needed items for you  Choose a person who does not have signs or symptoms of COVID-19 or has tested negative for it  The person should not be waiting for test results  He or she should not have a condition that increases the risk for COVID-19 or serious problems it causes  What you need to know about COVID-19 vaccines: Your healthcare provider can give you more information about what to expect, depending on your chronic condition  · COVID-19 vaccines are given as a shot in 1 or 2 doses  A 2-dose vaccine is fully approved for use in those 16 years or older  Other vaccines have emergency use authorization (EUA)  An EUA means the vaccine is not approved but is given because the benefits outweigh the risks  Your healthcare provider can help you understand the benefits and risks  · A third dose is recommended for adults with a weakened immune system who get a 2-dose vaccine  The third dose is given at least 28 days after the second  · Even after you get the vaccine, continue social distancing and other measures  Experts are still learning how well the vaccines work to prevent infection, transmission, and severe illness  Although rare, you can become infected after you get the vaccine  You may also be able to pass the virus to others without knowing you are infected  · After you get the vaccine, check local, national, and international travel rules  Check to see if you need to be tested before you travel  You may also need to quarantine after you return  Some countries require proof of a negative test before you leave   You should also be tested 3 to 5 days after you return from another country  Lower your risk for COVID-19:  The best way to prevent infection is to avoid anyone who is infected, but this can be hard to do  An infected person can spread the virus before signs or symptoms begin, or even if signs or symptoms never develop  The following are ways to prevent the spread of the virus and lower your risk for COVID-19:      · Wash your hands often throughout the day  Use soap and water  Rub your soapy hands together, lacing your fingers  Wash the front and back of each hand, and in between your fingers  Use the fingers of one hand to scrub under the fingernails of the other hand  Wash for at least 20 seconds  Rinse with warm, running water for several seconds  Then dry your hands with a clean towel or paper towel  Use hand  that contains alcohol if soap and water are not available  Do not touch your eyes, nose, or mouth without washing your hands first  Teach children how to wash their hands  · Cover sneezes and coughs  Turn your face away and cover your mouth and nose with a tissue  Throw the tissue away  Use the bend of your arm if a tissue is not available  Then wash your hands well with soap and water or use hand   Turn your head and cover your face if someone near you is coughing or sneezing  Teach children how to cover a cough or sneeze  Remind them to wash their hands  · Make a habit of not touching your face  If you get the virus on your hands, you can transfer it to your eyes, nose, or mouth and become infected  · Wear a face covering (mask) around anyone who does not live in your home  A covering helps protect the person wearing it from being infected or passing the virus to others  Use a cloth covering with at least 2 layers  You can also create layers by putting a cloth face covering over a disposable non-medical mask  Cover your mouth and your nose   The covering should fit snugly against the bridge of your nose  Securely fasten it under your chin and on the sides of your face  Do not use coverings on children younger than 2 years or on anyone who has breathing problems or cannot remove it  Your healthcare provider can tell you what to do if you cannot wear a face covering  · Clean and disinfect high-touch surfaces and objects in your home often  Some surfaces and objects may need to be cleaned several times each day, depending on how often they are used  Use disinfecting wipes, or make a solution by mixing 4 teaspoons of bleach with 1 quart (4 cups) of water  Do not  use any cleaning or disinfecting products that can trigger an asthma attack or other breathing problems  Open windows or have circulating air as you clean  Do not  mix ammonia with bleach  This will create toxic fumes  How to follow social distancing guidelines:  Social distancing means people avoid close personal contact so the virus cannot spread from one person to another  Close personal contact means being within 6 feet (2 meters) of someone for at least 15 minutes over 24 hours  National and local social distancing rules vary  Rules may change over time as restrictions are lifted, but they may return if an outbreak happens where you live  It is important to know and follow all current social distancing rules in your area  The following are general guidelines:  · Stay home if you are sick or think you may have COVID-19  It is important to stay home if you are waiting for a testing appointment or for test results  Even if you do not have symptoms, you can pass the virus to others  · Limit trips out of your home  Plan your route so you make the fewest stops possible to limit contact  Keep track of places you go  This will help contact tracers notify others if you become infected  Wear a face covering while you are out  You will need to wear the covering on mass transit, such as a bus or the subway   You will also need to wear it while you travel on an airplane  · Stay at least 6 feet (2 meters) away from anyone who does not live in your home  Do not shake hands with, hug, or kiss a person as a greeting  Stand or walk as far from others as possible, especially around anyone who is sneezing or coughing  If you must use public transportation (such as a bus or subway), try to sit or stand away from others  Check in on anyone who lives alone  · Do not have anyone over to your home unless it is necessary  It is okay to have medical workers or other helpers in to provide care  Wear a face covering, and remind others to wear a mask or face covering  Remind them to wash their hands when they arrive and before they leave  Do not  have anyone over just to visit, even if you both do not feel sick  The virus can pass from one of you to the other before symptoms of COVID-19 begin  Some people never even develop symptoms  It is important that you continue social distancing with everyone, including children  It may be hard to tell a child not to hug or kiss you  Explain that this is how he or she can help you stay healthy  · Do not go to someone else's home unless it is necessary  Do not go over to visit, even if the person is lonely  Only go if you need to help him or her  · Avoid in-person gatherings and crowds  Gatherings or crowds of 10 or more individuals can cause the virus to spread  Avoid places such as [place], beaches, sporting events, and tourist attractions  For events such as parties, holiday meals, [employer] services, and conferences, attend virtually (on a computer), if possible  · Stay safe if you must go out to work  Keep physical distance between you and other workers as much as possible  Follow your employer's rules so everyone stays safe  Help strengthen your immune system:   · Ask about other vaccines you may need    Get the influenza (flu) vaccine as soon as recommended each year, usually starting in September or October  Get the pneumonia vaccine if recommended  Your healthcare provider can tell you if you should also get other vaccines, and when to get them  · Do not smoke  Nicotine and other chemicals in cigarettes and cigars can increase your risk for infection and for serious COVID-19 effects  Ask your healthcare provider for information if you currently smoke and need help to quit  E-cigarettes or smokeless tobacco still contain nicotine  Talk to your healthcare provider before you use these products  · Eat a variety of healthy foods  Examples include vegetables, fruits, whole-grain breads and cereals, lean meats and poultry, fish, low-fat dairy products, and cooked beans  Healthy foods contain nutrients that help keep your immune system strong  · Find ways to manage stress  You may be feeling more stressed than usual because of the COVID-19 outbreak  The situation is very stressful to many people  Talk to your healthcare providers about ways to manage stress during this time  Stress can lead to breathing problems or make the problems worse  Stress can trigger an attack or exacerbation of many health conditions  It is important to do things that help you feel more relaxed, such as the following:     ? Pick 1 or 2 times a day to watch the news  Constant news watching can increase your stress levels  ? Talk to a friend on the phone or through a video chat  ? Take a warm, soothing bath  ? Listen to music  ? Exercise can also help relieve stress  This may be hard if your regular gym or outdoor exercise area is closed  If you do not have exercise equipment at home, try walking inside your home  You can walk quickly or turn on music and dance  Follow up with your doctor or healthcare provider as directed: Your providers will tell you when you can come in for tests, procedures, or check-ups  Bring your symptom record with you to all appointments   Write down your questions so you remember to ask them during your visits  For more information:   · Centers for Disease Control and Prevention  [address] , [address]  Phone: 6- 146 - 7156375  Phone: 5- 623 - 3947143  Web Address: [employer]     © [address] [place] [address] 2021 Information is for End User's use only and may not be sold, redistributed or otherwise used for commercial purposes  All illustrations and images included in CareNotes® are the copyrighted property of [employer]  or [address] [place], [place] [address]   The above information is an  only  It is not intended as medical advice for individual conditions or treatments  Talk to your doctor, nurse or pharmacist before following any medical regimen to see if it is safe and effective for you

## 2022-01-17 NOTE — LETTER
January 17, 2022     Patient: García Shay   YOB: 1985   Date of Visit: 1/17/2022       To Whom it May Concern:    García Shay is under my professional care  She was seen in my office on 1/17/2022  She may return to work on 1/17//2022  If you have any questions or concerns, please don't hesitate to call           Sincerely,          DEB Prescott        CC: No Recipients

## 2022-01-19 ENCOUNTER — APPOINTMENT (OUTPATIENT)
Dept: RADIOLOGY | Facility: CLINIC | Age: 37
End: 2022-01-19
Payer: COMMERCIAL

## 2022-01-19 ENCOUNTER — PATIENT MESSAGE (OUTPATIENT)
Dept: FAMILY MEDICINE CLINIC | Facility: CLINIC | Age: 37
End: 2022-01-19

## 2022-01-19 DIAGNOSIS — R05.9 COUGH: ICD-10-CM

## 2022-01-19 DIAGNOSIS — U07.1 COVID: ICD-10-CM

## 2022-01-19 DIAGNOSIS — R05.9 COUGH: Primary | ICD-10-CM

## 2022-01-19 PROCEDURE — 71046 X-RAY EXAM CHEST 2 VIEWS: CPT

## 2022-01-19 RX ORDER — DEXAMETHASONE 4 MG/1
2 TABLET ORAL 2 TIMES DAILY
Qty: 12 G | Refills: 1 | Status: SHIPPED | OUTPATIENT
Start: 2022-01-19

## 2022-01-20 ENCOUNTER — CLINICAL SUPPORT (OUTPATIENT)
Dept: BARIATRICS | Facility: CLINIC | Age: 37
End: 2022-01-20

## 2022-01-20 VITALS — HEIGHT: 70 IN | BODY MASS INDEX: 33.33 KG/M2 | WEIGHT: 232.8 LBS

## 2022-01-20 DIAGNOSIS — R63.5 ABNORMAL WEIGHT GAIN: Primary | ICD-10-CM

## 2022-01-20 PROCEDURE — RECHECK

## 2022-01-27 ENCOUNTER — CLINICAL SUPPORT (OUTPATIENT)
Dept: BARIATRICS | Facility: CLINIC | Age: 37
End: 2022-01-27

## 2022-01-27 VITALS — WEIGHT: 233 LBS | BODY MASS INDEX: 33.36 KG/M2 | HEIGHT: 70 IN

## 2022-01-27 DIAGNOSIS — R63.5 ABNORMAL WEIGHT GAIN: Primary | ICD-10-CM

## 2022-01-27 PROCEDURE — RECHECK

## 2022-02-03 ENCOUNTER — CLINICAL SUPPORT (OUTPATIENT)
Dept: BARIATRICS | Facility: CLINIC | Age: 37
End: 2022-02-03

## 2022-02-03 VITALS — HEIGHT: 70 IN | BODY MASS INDEX: 33.27 KG/M2 | WEIGHT: 232.4 LBS

## 2022-02-03 DIAGNOSIS — R63.5 ABNORMAL WEIGHT GAIN: Primary | ICD-10-CM

## 2022-02-03 PROCEDURE — RECHECK

## 2022-02-07 NOTE — PROGRESS NOTES
Weight Management Medical Nutrition Assessment  Deshawn hernandez is here for f/u RD session for [facility] with [employer] and REE  Current wt: 234 6   Lbs  She has lost 19 5   lbs x 3 months  Juhi shows fat loss of 11 9 lbs (61%), muscle loss of 4 5 lbs (23%)  REE WNL  Recently changed to different shift which has been an adjustment period  Having trouble getting all her calories in due to pace at work and sleep schedule  Then craves sweets, does feel cravings have been much stronger lately  May be due to sleep/stress  She will meet with PA today to discussed medication  Is trying to meal prep so that she has food ready  Does have healthy options at work just not always utilizing them  Options for f/u reviewed  Due to schedule will likely f/u with bundles  Last class 3/3         Patient seen by Medical Provider in past 6 months:  yes  Requested to schedule appointment with Medical Provider: No    Anthropometric Measurements  Start Weight (#): 254 lbs 11/16/21  Current Weight (#): 234 6 lbs   TBW % Change from start weight:  7 7%  Ideal Body Weight (#): BMI 25 174 6 lbs  (150 lbs Ham[place]ii)  Goal Weight (#): 180-190 lbs  Highest: 255 lbs  Lowest: 153 lbs     Weight Loss History  Previous weight loss attempts: Commercial Programs ([employer], [employer]oo, etc )  Exercise  OTC meds/supplements  Self Created Diets (Portion Control, Healthy Food Choices, etc )    Food and Nutrition Related History  Wake up:   12:00-1:30 but early when on 3-11   Bed Time: 9 a m  if night (on 3-11 shift then 1:30)    Food Recall   1-2:00 chicken/turkey, veggies, sometimes sweet potato noodles  Times vary: items vary  11:3 when working 3-11: chicken pouch, crackers   8-8:30: varies OR skip    Beverages: water,   Volume of beverage intake: 64->gallon water     Weekends: Worse, might eat even less  Cravings: ice cream, sweets  Trouble area of day: varies     Frequency of Eating out: 3x/wk  Food restrictions: n/a  Cooking: self   Food Shopping: self    Physical Activity Intake  Activity:currently none  Frequency:n/a  Physical limitations/barriers to exercise: n/a    Estimated Needs  Energy  [employer] REE 1710 x 1 [phone]  [employer] REE 1915, wt loss w/o exercise 8277-7911; w/exercise 7302-8385  [employer] [place] Energy Needs: BMR : 0145   1-2# loss weekly sedentary: 1620-0052          1-2# loss weekly lightly active: 2898-5500  Maintenance calories for sedentary activity level: 2212  Protein:  gm     (1 2-1 5g/kg IBW)  Fluid: 80 oz     (35mL/kg IBW)    Nutrition Diagnosis  Yes; Overweight/obesity  related to Excess energy intake as evidenced by  BMI more than normative standard for age and sex (obesity-grade I 26-30  9)       Nutrition Intervention    Nutrition Prescription  Calories:5916-2428  Protein:  gm    Meal Plan (Bobby/Pro)  5:30-7:00 p m : 300, 30  11:00 p m : 100-200, 5-27  3:00 a m : 300-500, 20-40  7:30-8:00 a m  400-450, 20-25:     Nutrition Education:    Healthy Core Manual  Calorie controlled menu  Lean protein food choices  Healthy snack options  Food journaling tips      Nutrition Counseling:  Strategies: meal planning, portion sizes, healthy snack choices, hydration, fiber intake, protein intake, exercise, food journal      Monitoring and Evaluation:  Evaluation criteria:  Energy Intake  Meet protein needs  Maintain adequate hydration  Monitor weekly weight  Meal planning/preparation  Food journal   Decreased portions at mealtimes and snacks  Physical activity     Barriers to learning:none  Readiness to change: Action:  (Changing behavior) & relapse  Comprehension: very good  Expected Compliance: very good

## 2022-02-08 DIAGNOSIS — K21.9 GASTROESOPHAGEAL REFLUX DISEASE WITHOUT ESOPHAGITIS: ICD-10-CM

## 2022-02-08 DIAGNOSIS — B00.9 HERPES: ICD-10-CM

## 2022-02-08 RX ORDER — OMEPRAZOLE 20 MG/1
20 CAPSULE, DELAYED RELEASE ORAL DAILY
Qty: 90 CAPSULE | Refills: 0 | Status: SHIPPED | OUTPATIENT
Start: 2022-02-08

## 2022-02-08 RX ORDER — VALACYCLOVIR HYDROCHLORIDE 500 MG/1
500 TABLET, FILM COATED ORAL DAILY
Qty: 90 TABLET | Refills: 0 | Status: SHIPPED | OUTPATIENT
Start: 2022-02-08 | End: 2022-05-29

## 2022-02-08 NOTE — TELEPHONE ENCOUNTER
Kathia Kiel Venegas is a direct care Nurse in [facility]  And needs a covid test due to sore throat and just not feeling well in general   Thank you

## 2022-02-10 ENCOUNTER — CLINICAL SUPPORT (OUTPATIENT)
Dept: BARIATRICS | Facility: CLINIC | Age: 37
End: 2022-02-10

## 2022-02-10 VITALS — BODY MASS INDEX: 33.13 KG/M2 | HEIGHT: 70 IN | WEIGHT: 231.4 LBS

## 2022-02-10 DIAGNOSIS — R63.5 ABNORMAL WEIGHT GAIN: Primary | ICD-10-CM

## 2022-02-10 PROCEDURE — RECHECK

## 2022-02-16 ENCOUNTER — OFFICE VISIT (OUTPATIENT)
Dept: BARIATRICS | Facility: CLINIC | Age: 37
End: 2022-02-16
Payer: COMMERCIAL

## 2022-02-16 ENCOUNTER — OFFICE VISIT (OUTPATIENT)
Dept: BARIATRICS | Facility: CLINIC | Age: 37
End: 2022-02-16

## 2022-02-16 VITALS
TEMPERATURE: 97.5 F | DIASTOLIC BLOOD PRESSURE: 70 MMHG | WEIGHT: 234.6 LBS | HEART RATE: 53 BPM | BODY MASS INDEX: 33.58 KG/M2 | SYSTOLIC BLOOD PRESSURE: 108 MMHG | HEIGHT: 70 IN

## 2022-02-16 VITALS — BODY MASS INDEX: 33.58 KG/M2 | HEIGHT: 70 IN | WEIGHT: 234.57 LBS

## 2022-02-16 DIAGNOSIS — K21.9 GERD (GASTROESOPHAGEAL REFLUX DISEASE): ICD-10-CM

## 2022-02-16 DIAGNOSIS — G43.001 MIGRAINE WITHOUT AURA AND WITH STATUS MIGRAINOSUS, NOT INTRACTABLE: ICD-10-CM

## 2022-02-16 DIAGNOSIS — R63.5 ABNORMAL WEIGHT GAIN: Primary | ICD-10-CM

## 2022-02-16 DIAGNOSIS — E66.9 OBESITY, CLASS II, BMI 35-39.9: Primary | ICD-10-CM

## 2022-02-16 PROCEDURE — 99214 OFFICE O/P EST MOD 30 MIN: CPT | Performed by: PHYSICIAN ASSISTANT

## 2022-02-16 PROCEDURE — RECHECK

## 2022-02-16 NOTE — ASSESSMENT & PLAN NOTE
-Patient is pursuing HealthyCORE-Intensive Lifestyle Intervention Program  -Initial weight loss goal of 5-10% weight loss for improved health  -discussed side effects,titration of wegovy started 234 6 lbs on 2/16/22     Initial: 247 2 lbs   Current: 234 6 lbs   Change:-12 6 lbs   Goal:180-190 lbs

## 2022-02-16 NOTE — PROGRESS NOTES
Assessment/Plan:    Obesity, Class II, BMI 35-39 9  -Patient is pursuing HealthyCORE-Intensive Lifestyle Intervention Program  -Initial weight loss goal of 5-10% weight loss for improved health  -discussed side effects,titration of wegovy started 234 6 lbs on 2/16/22     Initial: 247 2 lbs   Current: 234 6 lbs   Change:-12 6 lbs   Goal:180-190 lbs     GERD (gastroesophageal reflux disease)  Taking prilosec  -should improve with weight loss, dietary, and lifestyle changes  -continue management with prescribing provider      Migraine without aura and with status migrainosus, not intractable  Taking maxalt and toradol  -continue management with prescribing provider  Has not tried Topamax           Follow up in approximately 1 month with Non-Surgical Physician/Advanced Practitioner  Diagnoses and all orders for this visit:    Obesity, Class II, BMI 35-39 9  -     Semaglutide-Weight Management (WEGOVY) 0 25 MG/0 5ML; Inject 0 5 mL (0 25 mg total) under the skin once a week    GERD (gastroesophageal reflux disease)  -     Semaglutide-Weight Management (WEGOVY) 0 25 MG/0 5ML; Inject 0 5 mL (0 25 mg total) under the skin once a week    Migraine without aura and with status migrainosus, not intractable  -     Semaglutide-Weight Management (WEGOVY) 0 25 MG/0 5ML; Inject 0 5 mL (0 25 mg total) under the skin once a week          Subjective:   Chief Complaint   Patient presents with    Follow-up     MWM /FUP to discuss meds  Patient ID: Cynthia House  is a 29 y o  female with excess weight/obesity here to pursue weight managment  Patient is pursuing Healthy[facility]-Intensive Lifestyle Intervention Program      HPI  Patient is healthycore  Had visit with RD this morning  Would like to discuss medications  Patient denies personal and family history of MCT and MEN2 tumors  Patient denies personal history of pancreatitis  -had uncle with thyroid cancer but is unsure of the type   Discussed the potential risk     The following portions of the patient's history were reviewed and updated as appropriate: allergies, current medications, past family history, past medical history, past social history, past surgical history and problem list     Review of Systems   HENT: Negative for sore throat  Respiratory: Negative for cough and shortness of breath  Cardiovascular: Negative for chest pain and palpitations  Gastrointestinal: Positive for constipation  Negative for abdominal pain, diarrhea, nausea and vomiting         + GERD-controlled    Skin: Negative for rash  Psychiatric/Behavioral: Negative for suicidal ideas (denies HI)  Denies depression and anxiety-life stressors        Objective:    /70   Pulse (!) 53   Temp 97 5 °F (36 4 °C) (Tympanic)   Ht 5' 10 2" (1 783 m)   Wt 106 kg (234 lb 9 6 oz)   BMI 33 47 kg/m²      Physical Exam  Vitals and nursing note reviewed  Constitutional   General appearance: Abnormal   well developed and obese  Eyes No conjunctival injection   Pulmonary   Respiratory effort: No increased work of breathing or signs of respiratory distress  Abdomen   Abdomen: Abnormal   The abdomen was obese  Musculoskeletal   Gait and station: Normal     Skin  Dry   No visible rashes   Psychiatric   Orientation to person, place and time: Normal     Affect: appropriate  Neurological   Normal gait

## 2022-02-17 ENCOUNTER — CLINICAL SUPPORT (OUTPATIENT)
Dept: BARIATRICS | Facility: CLINIC | Age: 37
End: 2022-02-17

## 2022-02-17 VITALS — WEIGHT: 234.6 LBS | HEIGHT: 70 IN | BODY MASS INDEX: 33.58 KG/M2

## 2022-02-17 DIAGNOSIS — R63.5 ABNORMAL WEIGHT GAIN: Primary | ICD-10-CM

## 2022-02-17 PROCEDURE — RECHECK

## 2022-02-24 ENCOUNTER — CLINICAL SUPPORT (OUTPATIENT)
Dept: BARIATRICS | Facility: CLINIC | Age: 37
End: 2022-02-24

## 2022-02-24 VITALS — WEIGHT: 235.6 LBS | BODY MASS INDEX: 33.73 KG/M2 | HEIGHT: 70 IN

## 2022-02-24 DIAGNOSIS — R63.5 ABNORMAL WEIGHT GAIN: Primary | ICD-10-CM

## 2022-02-24 PROCEDURE — RECHECK

## 2022-02-28 ENCOUNTER — TELEPHONE (OUTPATIENT)
Dept: BARIATRICS | Facility: CLINIC | Age: 37
End: 2022-02-28

## 2022-03-03 ENCOUNTER — CLINICAL SUPPORT (OUTPATIENT)
Dept: BARIATRICS | Facility: CLINIC | Age: 37
End: 2022-03-03

## 2022-03-03 VITALS — HEIGHT: 70 IN | BODY MASS INDEX: 33.5 KG/M2 | WEIGHT: 234 LBS

## 2022-03-03 DIAGNOSIS — R63.5 ABNORMAL WEIGHT GAIN: Primary | ICD-10-CM

## 2022-03-03 PROCEDURE — RECHECK

## 2022-03-08 ENCOUNTER — OFFICE VISIT (OUTPATIENT)
Dept: BARIATRICS | Facility: CLINIC | Age: 37
End: 2022-03-08
Payer: COMMERCIAL

## 2022-03-08 VITALS
HEIGHT: 70 IN | DIASTOLIC BLOOD PRESSURE: 76 MMHG | WEIGHT: 232.1 LBS | TEMPERATURE: 97.8 F | RESPIRATION RATE: 14 BRPM | BODY MASS INDEX: 33.23 KG/M2 | HEART RATE: 74 BPM | SYSTOLIC BLOOD PRESSURE: 110 MMHG

## 2022-03-08 DIAGNOSIS — K21.9 GERD (GASTROESOPHAGEAL REFLUX DISEASE): ICD-10-CM

## 2022-03-08 DIAGNOSIS — E66.9 OBESITY, CLASS I, BMI 30-34.9: Primary | ICD-10-CM

## 2022-03-08 DIAGNOSIS — G43.001 MIGRAINE WITHOUT AURA AND WITH STATUS MIGRAINOSUS, NOT INTRACTABLE: ICD-10-CM

## 2022-03-08 PROBLEM — E66.811 OBESITY, CLASS I, BMI 30-34.9: Status: ACTIVE | Noted: 2021-11-08

## 2022-03-08 PROCEDURE — 99214 OFFICE O/P EST MOD 30 MIN: CPT | Performed by: PHYSICIAN ASSISTANT

## 2022-03-08 NOTE — ASSESSMENT & PLAN NOTE
-Patient is pursuing Healthy[facility]-Intensive Lifestyle Intervention Program  -Initial weight loss goal of 5-10% weight loss for improved health  -discussed side effects,titration of wegovy started 234 6 lbs on 2/16/22     Initial: 247 2 lbs   Current: 232 1 lbs   Change:-15 1 lbs   Goal:180-190 lbs     Continue logging stating between 3991-6291 calories  Continue exercise 3 days a week  Continue wegovy

## 2022-03-08 NOTE — PROGRESS NOTES
Assessment/Plan:    Obesity, Class I, BMI 30-34 9  -Patient is pursuing Healthy[facility]-Intensive Lifestyle Intervention Program  -Initial weight loss goal of 5-10% weight loss for improved health  -discussed side effects,titration of wegovy started 234 6 lbs on 2/16/22     Initial: 247 2 lbs   Current: 232 1 lbs   Change:-15 1 lbs   Goal:180-190 lbs     Continue logging stating between 8989-2479 calories  Continue exercise 3 days a week  Continue wegovy     GERD (gastroesophageal reflux disease)  Taking prilosec  -should improve with weight loss, dietary, and lifestyle changes  -continue management with prescribing provider      Migraine without aura and with status migrainosus, not intractable  Taking maxalt and toradol  -continue management with prescribing provider  Has not tried Topamax           Follow up in approximately 3 months with Non-Surgical Physician/Advanced Practitioner  Diagnoses and all orders for this visit:    Obesity, Class I, BMI 30-34 9  -     Semaglutide-Weight Management (WEGOVY) 0 5 MG/0 5ML; Inject 0 5 mL (0 5 mg total) under the skin once a week    GERD (gastroesophageal reflux disease)  -     Semaglutide-Weight Management (WEGOVY) 0 5 MG/0 5ML; Inject 0 5 mL (0 5 mg total) under the skin once a week    Migraine without aura and with status migrainosus, not intractable  -     Semaglutide-Weight Management (WEGOVY) 0 5 MG/0 5ML; Inject 0 5 mL (0 5 mg total) under the skin once a week          Subjective:   Chief Complaint   Patient presents with    Follow-up     [address] MWM /FUP        Patient ID: Darrell Beltran  is a 29 y o  female with excess weight/obesity here to pursue weight managment    Patient is pursuing Healthy[facility]-Intensive Lifestyle Intervention Program      HPI  Finished with -enjoyed it and learned alot  Food logging:logging and staying 1000-had a few bad days this week which were over 2500 calories   Fruit/Vegetable servings: 3-5 servings   Exercise: walking dog or strength training 3 days a week   Hydration: 1 gallon of water, occasionally crystal light   Started wegovy last week- did not have any side effects-helped appetite     Wants to follow up through conservative program     The following portions of the patient's history were reviewed and updated as appropriate: allergies, current medications, past family history, past medical history, past social history, past surgical history and problem list     Review of Systems   HENT: Negative for sore throat  Respiratory: Negative for cough and shortness of breath  Cardiovascular: Negative for chest pain and palpitations  Gastrointestinal: Positive for constipation  Negative for abdominal pain, diarrhea, nausea and vomiting         + GERD-controlled    Skin: Positive for rash (eczema)  Psychiatric/Behavioral: Negative for suicidal ideas (denies HI)  + depression and anxiety-weather, sleep, work schedule       Objective:    /76   Pulse 74   Temp 97 8 °F (36 6 °C) (Tympanic)   Resp 14   Ht 5' 10 2" (1 783 m)   Wt 105 kg (232 lb 1 6 oz)   BMI 33 11 kg/m²      Physical Exam  Vitals and nursing note reviewed  Constitutional   General appearance: Abnormal   well developed and obese  Eyes No conjunctival injection   Pulmonary   Respiratory effort: No increased work of breathing or signs of respiratory distress  Abdomen   Abdomen: Abnormal   The abdomen was obese  Musculoskeletal   Gait and station: Normal     Skin  Dry   No visible rashes   Psychiatric   Orientation to person, place and time: Normal     Affect: appropriate  Neurological   Normal gait

## 2022-03-28 ENCOUNTER — OFFICE VISIT (OUTPATIENT)
Dept: OBGYN CLINIC | Facility: CLINIC | Age: 37
End: 2022-03-28
Payer: COMMERCIAL

## 2022-03-28 VITALS
WEIGHT: 232.8 LBS | HEIGHT: 70 IN | SYSTOLIC BLOOD PRESSURE: 122 MMHG | BODY MASS INDEX: 33.33 KG/M2 | DIASTOLIC BLOOD PRESSURE: 78 MMHG

## 2022-03-28 DIAGNOSIS — N64.4 BREAST PAIN, LEFT: Primary | ICD-10-CM

## 2022-03-28 PROCEDURE — 99213 OFFICE O/P EST LOW 20 MIN: CPT | Performed by: OBSTETRICS & GYNECOLOGY

## 2022-03-28 NOTE — PROGRESS NOTES
Assessment/Plan:     Left breast pain-we discussed rechecking after her next menstrual cycle verses an ultrasound  Since this just started 2 days ago and she is just finishing her cycle, I would recommend repeating the breast check after her next menstrual cycle  Her exam is normal today  She is agreeable  She will return in approximately 4 weeks  She is aware to call if anything changes in the interim  There are no diagnoses linked to this encounter  Subjective:     Patient ID: Ginette Butler is a 29 y o  female  Pt here for breast check  On Saturday, she started having left breast pain  She had her menstrual cycle from March 23rd to 27th and this pain started on Saturday the 26th  Typically, she does get breast pain with her cycle but it is bilateral   This is different in that it is only on the left side, it is at 12:00 p m  About 1-2 inches above the nipple  It is intermittent and very mild with no radiation  She has not taken anything for  The only new medication is a weight loss medication that she started taking 4 weeks ago  Menses are regular  She has not yet had a mammogram due to her age  She had a great grandmother with breast cancer, no other family history  Review of Systems   Constitutional: Negative      Genitourinary:        Left breast pain         Objective:     Physical Exam  Chest:   Breasts:      Right: Normal       Left: Normal         Comments: Fibrocystic changes b/l  Examined seated and supine  No discrete mass in either breast, no reproducible pain

## 2022-03-29 ENCOUNTER — APPOINTMENT (OUTPATIENT)
Dept: LAB | Facility: HOSPITAL | Age: 37
End: 2022-03-29
Payer: COMMERCIAL

## 2022-03-29 ENCOUNTER — TELEPHONE (OUTPATIENT)
Dept: NEUROLOGY | Facility: CLINIC | Age: 37
End: 2022-03-29

## 2022-03-29 DIAGNOSIS — Z00.8 HEALTH EXAMINATION IN POPULATION SURVEYS: ICD-10-CM

## 2022-03-29 DIAGNOSIS — E53.8 B12 DEFICIENCY: ICD-10-CM

## 2022-03-29 DIAGNOSIS — E55.9 VITAMIN D DEFICIENCY: ICD-10-CM

## 2022-03-29 LAB
25(OH)D3 SERPL-MCNC: 95.7 NG/ML (ref 30–100)
CHOLEST SERPL-MCNC: 145 MG/DL
EST. AVERAGE GLUCOSE BLD GHB EST-MCNC: 103 MG/DL
HBA1C MFR BLD: 5.2 %
HDLC SERPL-MCNC: 39 MG/DL
LDLC SERPL CALC-MCNC: 82 MG/DL (ref 0–100)
NONHDLC SERPL-MCNC: 106 MG/DL
TRIGL SERPL-MCNC: 122 MG/DL
VIT B12 SERPL-MCNC: 454 PG/ML (ref 100–900)

## 2022-03-29 PROCEDURE — 82306 VITAMIN D 25 HYDROXY: CPT

## 2022-03-29 PROCEDURE — 82607 VITAMIN B-12: CPT

## 2022-03-29 PROCEDURE — 80061 LIPID PANEL: CPT

## 2022-03-29 PROCEDURE — 36415 COLL VENOUS BLD VENIPUNCTURE: CPT

## 2022-03-29 PROCEDURE — 83036 HEMOGLOBIN GLYCOSYLATED A1C: CPT

## 2022-03-29 NOTE — TELEPHONE ENCOUNTER
Rosmery Parada  to QUIN Huffman          3/29/22 2:16 PM  My Vitamin B-12 did not improve much  You asked if I was taking supplements  I was put on B-2 and Mag Ox, never B-12

## 2022-03-29 NOTE — TELEPHONE ENCOUNTER
Please see my message from earlier about her abnormal B12  We can do her B12 injections, or she can have this done at her primary care office

## 2022-03-29 NOTE — TELEPHONE ENCOUNTER
----- Message from Calli Pickard PA-C sent at 3/29/2022  1:16 PM EDT -----  Please call the patient regarding her abnormal result  B12 is still a bit low  Make sure she is taking b12 sublingual 500-1000 mcg  If she is then needs injections    1 a week for 4 weeks then 1 month for 5 months

## 2022-04-06 DIAGNOSIS — E66.9 OBESITY, CLASS I, BMI 30-34.9: Primary | ICD-10-CM

## 2022-04-06 DIAGNOSIS — E66.9 OBESITY, CLASS I, BMI 30-34.9: ICD-10-CM

## 2022-04-25 ENCOUNTER — OFFICE VISIT (OUTPATIENT)
Dept: OBGYN CLINIC | Facility: CLINIC | Age: 37
End: 2022-04-25
Payer: COMMERCIAL

## 2022-04-25 VITALS
HEIGHT: 70 IN | BODY MASS INDEX: 31.75 KG/M2 | WEIGHT: 221.8 LBS | DIASTOLIC BLOOD PRESSURE: 68 MMHG | SYSTOLIC BLOOD PRESSURE: 120 MMHG

## 2022-04-25 DIAGNOSIS — N63.20 MASS OF LEFT BREAST, UNSPECIFIED QUADRANT: Primary | ICD-10-CM

## 2022-04-25 PROCEDURE — 99213 OFFICE O/P EST LOW 20 MIN: CPT | Performed by: OBSTETRICS & GYNECOLOGY

## 2022-04-25 NOTE — PROGRESS NOTES
Assessment/Plan:     Left breast tenderness and mass-ultrasound ordered  She does have bilateral fibrocystic changes  At the area of tenderness, there is a more discrete, movable mass  Will ultrasound this and proceed as indicated  Diagnoses and all orders for this visit:    Mass of left breast, unspecified quadrant  -     US breast left limited (diagnostic); Future          Subjective:     Patient ID: Amrita Claros is a 29 y o  female  Patient here for breast check  She was seen last month with left breast tenderness that began 2 days before her visit  She had bilateral fibrocystic changes and no discrete mass  I recommended a repeat exam after her menstrual cycle  She just finished her cycle about 3 days ago  She feels that the pain is gone but she still feels a sensation in this same area of the left breast and she thinks she may feel a mass  Review of Systems   Genitourinary:        Left breast mass         Objective:     Physical Exam  Chest:   Breasts:      Right: Normal       Left: Mass and tenderness present  Comments: Small, movable, tender mass at 12:00 p m  About 2 cm from the nipple, in the same area as her last exam     Bilateral fibrocystic changes  The mass in the left is slightly more discrete than the surrounding tissue and it is tender

## 2022-05-03 ENCOUNTER — HOSPITAL ENCOUNTER (OUTPATIENT)
Dept: MAMMOGRAPHY | Facility: CLINIC | Age: 37
Discharge: HOME | End: 2022-05-03
Payer: COMMERCIAL

## 2022-05-03 ENCOUNTER — HOSPITAL ENCOUNTER (OUTPATIENT)
Dept: ULTRASOUND IMAGING | Facility: CLINIC | Age: 37
Discharge: HOME | End: 2022-05-03
Payer: COMMERCIAL

## 2022-05-03 VITALS — HEIGHT: 70 IN | BODY MASS INDEX: 31.64 KG/M2 | WEIGHT: 221 LBS

## 2022-05-03 DIAGNOSIS — N63.20 MASS OF LEFT BREAST, UNSPECIFIED QUADRANT: ICD-10-CM

## 2022-05-03 PROCEDURE — G0279 TOMOSYNTHESIS, MAMMO: HCPCS

## 2022-05-03 PROCEDURE — 76642 ULTRASOUND BREAST LIMITED: CPT

## 2022-05-03 PROCEDURE — 77066 DX MAMMO INCL CAD BI: CPT

## 2022-05-05 DIAGNOSIS — E66.9 OBESITY, CLASS I, BMI 30-34.9: Primary | ICD-10-CM

## 2022-05-26 ENCOUNTER — OFFICE VISIT (OUTPATIENT)
Dept: BARIATRICS | Facility: CLINIC | Age: 37
End: 2022-05-26
Payer: COMMERCIAL

## 2022-05-26 VITALS
DIASTOLIC BLOOD PRESSURE: 72 MMHG | HEART RATE: 75 BPM | SYSTOLIC BLOOD PRESSURE: 116 MMHG | TEMPERATURE: 97.6 F | OXYGEN SATURATION: 98 %

## 2022-05-26 DIAGNOSIS — E66.9 OBESITY, CLASS I, BMI 30-34.9: Primary | ICD-10-CM

## 2022-05-26 DIAGNOSIS — K21.9 GERD (GASTROESOPHAGEAL REFLUX DISEASE): ICD-10-CM

## 2022-05-26 PROCEDURE — 99214 OFFICE O/P EST MOD 30 MIN: CPT | Performed by: PHYSICIAN ASSISTANT

## 2022-05-26 NOTE — PROGRESS NOTES
Assessment/Plan:    Obesity, Class I, BMI 30-34 9  -Patient is pursuing [facility]  -Initial weight loss goal of 5-10% weight loss for improved health  -discussed side effects,titration of wegovy started 234 6 lbs on 2/16/22     Initial: 247 2 lbs   Current: 232 1 lbs 221  Change:-26 2lbs   Goal:180-190 lbs     Continue logging stating between 5547-3013 calories  Try to make sure getting 70 gm of protein  Start to exercise  Continue wegovy currently on 1 7mg and will be increasing up to 2 4 mg in 3 weeks    GERD (gastroesophageal reflux disease)  On omeprazole  No increased symtpoms with wegovy        Follow up in approximately 2 months with Non-Surgical Physician/Advanced Practitioner  Diagnoses and all orders for this visit:    Obesity, Class I, BMI 30-34 9  -     Semaglutide-Weight Management (WEGOVY) 2 4 MG/0 75ML; Inject 0 75 mL (2 4 mg total) under the skin once a week    GERD (gastroesophageal reflux disease)          Subjective:   Chief Complaint   Patient presents with    Follow-up     MWM 2-3 mo f/u        Patient ID: Christie Harper  is a 29 y o  female with excess weight/obesity here to pursue weight managment  Patient is pursuing [facility]  She completed [facility]  HPI    She is taking wegovy 1 7mg yesterday  She is not having any increased nausea or side effects  She does get headaches and follows with neurology    They think it may be allergy related    She will be starting a job where she is working more consistent hours and thinks this will help with her weight loss      Wt Readings from Last 10 Encounters:   05/03/22 100 kg (221 lb)   04/25/22 101 kg (221 lb 12 8 oz)   03/28/22 106 kg (232 lb 12 8 oz)   03/08/22 105 kg (232 lb 1 6 oz)   03/03/22 106 kg (234 lb)   02/24/22 107 kg (235 lb 9 6 oz)   02/17/22 106 kg (234 lb 9 6 oz)   02/16/22 106 kg (234 lb 9 6 oz)   02/16/22 106 kg (234 lb 9 1 oz)   02/10/22 105 kg (231 lb 6 4 oz) Food logging:yes-perlita, 1450   Sometimes under and not hitting protein goal  Increased appetite/cravings:yes junk food  Fruit/Vegetable servings:  Exercise:none-wants to start  Hydration:4-10 bottles of water, sometimes crystla light        Sleep: irregular due to work  Works night shift , then evening and sometimes day  Colonoscopy-Not applicable    The following portions of the patient's history were reviewed and updated as appropriate:   She  has a past medical history of Allergic, Asthma, GERD (gastroesophageal reflux disease), HPV (human papilloma virus) infection, HSV-1 infection, and HSV-2 infection  She   Patient Active Problem List    Diagnosis Date Noted    Viral syndrome 12/30/2021    Obesity, Class I, BMI 30-34 9 11/08/2021    GERD (gastroesophageal reflux disease) 11/08/2021    Migraine without aura and with status migrainosus, not intractable 02/19/2021    Galactorrhea 09/16/2020    Palpitations 05/29/2019    Lumbar disc herniation 11/21/2018    Lumbar radiculopathy - Left 11/13/2018     She  has a past surgical history that includes TONSILECTOMY AND ADNOIDECTOMY (1990); [place] tooth extraction (2007); Cervical biopsy w/ loop electrode excision (2012); Posterior laminectomy thoracic and lumbar spine (N/A, 2/5/2019); and CT epidural steroid injection (TAMI lumbar) (N/A, 2018)    Her family history includes Abnormal EKG in her maternal grandmother; Alcohol abuse in her maternal grandmother; Arthritis in her father and mother; Brain cancer in her maternal grandfather; Cancer in her father, maternal grandfather, and maternal grandmother; Cirrhosis in her maternal grandmother; Colon cancer in her paternal aunt; Diabetes in her father, mother, paternal aunt, paternal grandfather, paternal grandmother, and paternal uncle; Esophageal varices in her maternal grandmother; Heart disease in her maternal grandfather and maternal uncle; Hyperlipidemia in her father; Hypertension in her father, mother, paternal aunt, paternal grandmother, and paternal uncle; Lung cancer in her paternal grandmother; Stroke in her maternal grandfather; Thyroid cancer in her paternal uncle  She  reports that she quit smoking about 6 years ago  She has never used smokeless tobacco  She reports previous alcohol use  She reports that she does not use drugs  Current Outpatient Medications   Medication Sig Dispense Refill    cholecalciferol (VITAMIN D3) 1,000 units tablet Take 4,000 Units by mouth daily      cyproheptadine (PERIACTIN) 4 mg tablet Take 1 tablet (4 mg total) by mouth daily at bedtime as needed for allergies (headaches) 30 tablet 0    docusate sodium (COLACE) 50 mg capsule Take by mouth as needed       ergocalciferol (VITAMIN D2) 50,000 units TAKE 1 CAPSULE BY MOUTH WEEKLY 12 capsule 3    fluticasone (FLONASE) 50 mcg/act nasal spray 1 spray into each nostril as needed        fluticasone (Flovent HFA) 110 MCG/ACT inhaler Inhale 2 puffs 2 (two) times a day Rinse mouth after use   12 g 1    Loratadine 10 MG CAPS Take 10 mg by mouth daily       magnesium oxide (MAG-OX) 400 mg Take 1 tablet (400 mg total) by mouth 2 (two) times a day 180 tablet 3    naproxen sodium (ANAPROX) 550 mg tablet TAKE 1 TABLET (550 MG TOTAL) BY MOUTH 2 (TWO) TIMES A DAY WITH MEALS FOR MENSTRUAL CRAMPS 30 tablet 1    omeprazole (PriLOSEC) 20 mg delayed release capsule Take 1 capsule (20 mg total) by mouth daily 90 capsule 0    ondansetron (ZOFRAN) 4 mg tablet Take 1 tablet (4 mg total) by mouth every 8 (eight) hours as needed for nausea or vomiting 20 tablet 0    predniSONE 10 mg tablet Take 1 tablet (10 mg total) by mouth daily 6 tabs 1/14, 5 tabs 1/15, 4 tabs 1/16, 3 tabs 1/17, 2 tabs 1/18, 1 tab 1/19 21 tablet 0    promethazine-codeine (PHENERGAN WITH CODEINE) 6 25-10 mg/5 mL syrup Take 5 mL by mouth every 4 (four) hours as needed for cough 118 mL 0    Riboflavin (Vitamin B-2) 100 MG TABS Two in am and two in pm 360 tablet 3    rizatriptan (MAXALT-MLT) 10 MG disintegrating tablet Take 1 tablet (10 mg total) by mouth once as needed for migraine May repeat every 2 hours if needed, max 20mg/24 hours 12 tablet 0    Semaglutide-Weight Management (WEGOVY) 1 7 MG/0 75ML Inject 0 75 mL (1 7 mg total) under the skin once a week 3 mL 0    Semaglutide-Weight Management (WEGOVY) 2 4 MG/0 75ML Inject 0 75 mL (2 4 mg total) under the skin once a week 9 mL 0    valACYclovir (VALTREX) 500 mg tablet Take 1 tablet (500 mg total) by mouth daily 90 tablet 0     No current facility-administered medications for this visit  Current Outpatient Medications on File Prior to Visit   Medication Sig    cholecalciferol (VITAMIN D3) 1,000 units tablet Take 4,000 Units by mouth daily    cyproheptadine (PERIACTIN) 4 mg tablet Take 1 tablet (4 mg total) by mouth daily at bedtime as needed for allergies (headaches)    docusate sodium (COLACE) 50 mg capsule Take by mouth as needed     ergocalciferol (VITAMIN D2) 50,000 units TAKE 1 CAPSULE BY MOUTH WEEKLY    fluticasone (FLONASE) 50 mcg/act nasal spray 1 spray into each nostril as needed      fluticasone (Flovent HFA) 110 MCG/ACT inhaler Inhale 2 puffs 2 (two) times a day Rinse mouth after use      Loratadine 10 MG CAPS Take 10 mg by mouth daily     magnesium oxide (MAG-OX) 400 mg Take 1 tablet (400 mg total) by mouth 2 (two) times a day    naproxen sodium (ANAPROX) 550 mg tablet TAKE 1 TABLET (550 MG TOTAL) BY MOUTH 2 (TWO) TIMES A DAY WITH MEALS FOR MENSTRUAL CRAMPS    omeprazole (PriLOSEC) 20 mg delayed release capsule Take 1 capsule (20 mg total) by mouth daily    ondansetron (ZOFRAN) 4 mg tablet Take 1 tablet (4 mg total) by mouth every 8 (eight) hours as needed for nausea or vomiting    predniSONE 10 mg tablet Take 1 tablet (10 mg total) by mouth daily 6 tabs 1/14, 5 tabs 1/15, 4 tabs 1/16, 3 tabs 1/17, 2 tabs 1/18, 1 tab 1/19    promethazine-codeine (PHENERGAN WITH CODEINE) 6 25-10 mg/5 mL syrup Take 5 mL by mouth every 4 (four) hours as needed for cough    Riboflavin (Vitamin B-2) 100 MG TABS Two in am and two in pm    rizatriptan (MAXALT-MLT) 10 MG disintegrating tablet Take 1 tablet (10 mg total) by mouth once as needed for migraine May repeat every 2 hours if needed, max 20mg/24 hours    Semaglutide-Weight Management (WEGOVY) 1 7 MG/0 75ML Inject 0 75 mL (1 7 mg total) under the skin once a week    [DISCONTINUED] Semaglutide-Weight Management (WEGOVY) 1 MG/0 5ML Inject 0 5 mL (1 mg total) under the skin once a week    valACYclovir (VALTREX) 500 mg tablet Take 1 tablet (500 mg total) by mouth daily     No current facility-administered medications on file prior to visit  She is allergic to sulfa antibiotics and doxycycline       Review of Systems   Constitutional: Positive for fatigue (related to schedule/sleep)  Negative for chills and fever  Eyes: Negative for pain and visual disturbance  Respiratory: Negative for cough and shortness of breath  Cardiovascular: Negative for chest pain and palpitations  Gastrointestinal: Negative for abdominal pain and vomiting  Genitourinary: Negative for dysuria and hematuria  Musculoskeletal: Positive for arthralgias (knee pain)  Skin: Negative for rash  Neurological: Negative for seizures and syncope  Psychiatric/Behavioral: Negative for dysphoric mood  The patient is not nervous/anxious  All other systems reviewed and are negative  Objective:    /72 (BP Location: Left arm, Patient Position: Sitting, Cuff Size: Standard)   Pulse 75   Temp 97 6 °F (36 4 °C) (Tympanic)   SpO2 98%      Physical Exam  Vitals and nursing note reviewed  Constitutional:       General: She is not in acute distress  Appearance: She is well-developed  She is obese  HENT:      Head: Normocephalic and atraumatic  Eyes:      Conjunctiva/sclera: Conjunctivae normal    Neck:      Thyroid: No thyromegaly     Pulmonary:      Effort: Pulmonary effort is normal  No respiratory distress  Skin:     Findings: No rash (visible)  Neurological:      Mental Status: She is alert and oriented to person, place, and time     Psychiatric:         Behavior: Behavior normal

## 2022-05-26 NOTE — ASSESSMENT & PLAN NOTE
-Patient is pursuing [facility]  -Initial weight loss goal of 5-10% weight loss for improved health  -discussed side effects,titration of wegovy started 234 6 lbs on 2/16/22     Initial: 247 2 lbs   Current: 232 1 lbs 221  Change:-26 2lbs   Goal:180-190 lbs     Continue logging stating between 8621-5449 calories   Try to make sure getting 70 gm of protein  Start to exercise  Continue wegovy currently on 1 7mg and will be increasing up to 2 4 mg in 3 weeks

## 2022-05-29 DIAGNOSIS — B00.9 HERPES: ICD-10-CM

## 2022-05-29 RX ORDER — VALACYCLOVIR HYDROCHLORIDE 500 MG/1
TABLET, FILM COATED ORAL
Qty: 90 TABLET | Refills: 0 | Status: SHIPPED | OUTPATIENT
Start: 2022-05-29 | End: 2022-08-27

## 2022-07-19 ENCOUNTER — TELEPHONE (OUTPATIENT)
Dept: BARIATRICS | Facility: CLINIC | Age: 37
End: 2022-07-19

## 2022-07-19 NOTE — TELEPHONE ENCOUNTER
Called patient and left a message to give the office a call back to reschedule her 7/26/22 appointment that she cancelled through My Chart with Shalom Reddy PA-C

## 2022-07-19 NOTE — TELEPHONE ENCOUNTER
----- Message from Andrez Ernst sent at 7/19/2022  6:45 AM EDT -----  Regarding: FW: Appointment    ----- Message -----  From: Krystyna Chaidez  Sent: 7/18/2022   6:47 PM EDT  To: , #  Subject: Appointment                                      I just canceled my appointment for Tues July 26 due to our family vacation dates changing  I would love to reschedule with Shalom Reddy for another day  Monday thru Thursday

## 2022-08-15 DIAGNOSIS — K21.9 GERD (GASTROESOPHAGEAL REFLUX DISEASE): ICD-10-CM

## 2022-08-15 DIAGNOSIS — E66.9 OBESITY, CLASS I, BMI 30-34.9: Primary | ICD-10-CM

## 2022-08-16 ENCOUNTER — OFFICE VISIT (OUTPATIENT)
Dept: BARIATRICS | Facility: CLINIC | Age: 37
End: 2022-08-16
Payer: COMMERCIAL

## 2022-08-16 VITALS
HEART RATE: 77 BPM | OXYGEN SATURATION: 97 % | SYSTOLIC BLOOD PRESSURE: 102 MMHG | WEIGHT: 199.4 LBS | DIASTOLIC BLOOD PRESSURE: 70 MMHG | HEIGHT: 70 IN | BODY MASS INDEX: 28.55 KG/M2 | TEMPERATURE: 97.9 F

## 2022-08-16 DIAGNOSIS — K21.9 GERD (GASTROESOPHAGEAL REFLUX DISEASE): ICD-10-CM

## 2022-08-16 DIAGNOSIS — E66.3 OVERWEIGHT: Primary | ICD-10-CM

## 2022-08-16 PROCEDURE — 99214 OFFICE O/P EST MOD 30 MIN: CPT | Performed by: PHYSICIAN ASSISTANT

## 2022-08-16 NOTE — ASSESSMENT & PLAN NOTE
-Patient is pursuing [facility]  -Initial weight loss goal of 5-10% weight loss for improved health-MET    Initial: 247 2 lbs   Current: 199 4  Change:-47 8bs   Goal:180s     Continue logging stating between 4665-4455 calories  Try to make sure getting 70 gm of protein  Continue with current exercise routine of weight training and running    Continue wegovy 2 4 mg - met 5% weight loss    Initial weight 234 6lb on 2/17/22

## 2022-08-16 NOTE — PROGRESS NOTES
Assessment/Plan:    Overweight  -Patient is pursuing [facility]  -Initial weight loss goal of 5-10% weight loss for improved health-MET    Initial: 247 2 lbs   Current: 199 4  Change:-47 8bs   Goal:180s     Continue logging stating between 8697-3255 calories  Try to make sure getting 70 gm of protein  Continue with current exercise routine of weight training and running    Continue wegovy 2 4 mg - met 5% weight loss  Initial weight 234 6lb on 2/17/22        Follow up in approximately 3 months with Non-Surgical Physician/Advanced Practitioner  Diagnoses and all orders for this visit:    Overweight    GERD (gastroesophageal reflux disease)          Subjective:   Chief Complaint   Patient presents with    Follow-up     MWM 2 mth fu         Patient ID: Robbie Kapoor  is a 40 y o  female with excess weight/obesity here to pursue weight managment  Patient is pursuing [facility] after [facility]    HPI     Here for MWM follow up   Started to titrate wegovy at 234 6lb on 2/17/22  She denies any side effects  She does have a history of constipation but it has been controlled  No abdominal pain or blood in her stool  She has some days where she feels hungrier and sometimes she has a low appetite and has to force herself to eat to hit 1000 calories  She is no longer on night shift which has helped meal prep  Wt Readings from Last 10 Encounters:   08/16/22 90 4 kg (199 lb 6 4 oz)   05/03/22 100 kg (221 lb)   04/25/22 101 kg (221 lb 12 8 oz)   03/28/22 106 kg (232 lb 12 8 oz)   03/08/22 105 kg (232 lb 1 6 oz)   03/03/22 106 kg (234 lb)   02/24/22 107 kg (235 lb 9 6 oz)   02/17/22 106 kg (234 lb 9 6 oz)   02/16/22 106 kg (234 lb 9 6 oz)   02/16/22 106 kg (234 lb 9 1 oz)       Food logging:yes, usually less than 1450   Does eat 3 meals aday  Increased appetite/cravings:no  Fruit/Vegetable servings:trying to integrate more in  Exercise: weight lifting 30-60 minutes, 3-5 days a week  Hydration: water at least a gallon a day, 3 coffees a week  Colonoscopy-Not applicable    The following portions of the patient's history were reviewed and updated as appropriate:   She  has a past medical history of Allergic, Asthma, GERD (gastroesophageal reflux disease), HPV (human papilloma virus) infection, HSV-1 infection, and HSV-2 infection  She   Patient Active Problem List    Diagnosis Date Noted    Viral syndrome 12/30/2021    Overweight 11/08/2021    GERD (gastroesophageal reflux disease) 11/08/2021    Migraine without aura and with status migrainosus, not intractable 02/19/2021    Galactorrhea 09/16/2020    Palpitations 05/29/2019    Lumbar disc herniation 11/21/2018    Lumbar radiculopathy - Left 11/13/2018     She  has a past surgical history that includes TONSILECTOMY AND ADNOIDECTOMY (1990); [place] tooth extraction (2007); Cervical biopsy w/ loop electrode excision (2012); Posterior laminectomy thoracic and lumbar spine (N/A, 2/5/2019); and CT epidural steroid injection (TAMI lumbar) (N/A, 2018)  Her family history includes Abnormal EKG in her maternal grandmother; Alcohol abuse in her maternal grandmother; Arthritis in her father and mother; Brain cancer in her maternal grandfather; Cancer in her father, maternal grandfather, and maternal grandmother; Cirrhosis in her maternal grandmother; Colon cancer in her paternal aunt; Diabetes in her father, mother, paternal aunt, paternal grandfather, paternal grandmother, and paternal uncle; Esophageal varices in her maternal grandmother; Heart disease in her maternal grandfather and maternal uncle; Hyperlipidemia in her father; Hypertension in her father, mother, paternal aunt, paternal grandmother, and paternal uncle; Lung cancer in her paternal grandmother; Stroke in her maternal grandfather; Thyroid cancer in her paternal uncle  She  reports that she quit smoking about 6 years ago   She has never used smokeless tobacco  She reports previous alcohol use  She reports that she does not use drugs  Current Outpatient Medications   Medication Sig Dispense Refill    cholecalciferol (VITAMIN D3) 1,000 units tablet Take 4,000 Units by mouth daily      cyproheptadine (PERIACTIN) 4 mg tablet Take 1 tablet (4 mg total) by mouth daily at bedtime as needed for allergies (headaches) 30 tablet 0    docusate sodium (COLACE) 50 mg capsule Take by mouth as needed       ergocalciferol (VITAMIN D2) 50,000 units TAKE 1 CAPSULE BY MOUTH WEEKLY 12 capsule 3    fluticasone (FLONASE) 50 mcg/act nasal spray 1 spray into each nostril as needed        Loratadine 10 MG CAPS Take 10 mg by mouth daily       naproxen sodium (ANAPROX) 550 mg tablet TAKE 1 TABLET (550 MG TOTAL) BY MOUTH 2 (TWO) TIMES A DAY WITH MEALS FOR MENSTRUAL CRAMPS 30 tablet 1    omeprazole (PriLOSEC) 20 mg delayed release capsule Take 1 capsule (20 mg total) by mouth daily 90 capsule 0    ondansetron (ZOFRAN) 4 mg tablet Take 1 tablet (4 mg total) by mouth every 8 (eight) hours as needed for nausea or vomiting 20 tablet 0    promethazine-codeine (PHENERGAN WITH CODEINE) 6 25-10 mg/5 mL syrup Take 5 mL by mouth every 4 (four) hours as needed for cough 118 mL 0    Riboflavin (Vitamin B-2) 100 MG TABS Two in am and two in pm 360 tablet 3    Semaglutide-Weight Management (WEGOVY) 2 4 MG/0 75ML Inject 0 75 mL (2 4 mg total) under the skin once a week 9 mL 2    valACYclovir (VALTREX) 500 mg tablet TAKE ONE TABLET BY MOUTH EVERY DAY 90 tablet 0    fluticasone (Flovent HFA) 110 MCG/ACT inhaler Inhale 2 puffs 2 (two) times a day Rinse mouth after use   (Patient not taking: Reported on 8/16/2022) 12 g 1    magnesium oxide (MAG-OX) 400 mg Take 1 tablet (400 mg total) by mouth 2 (two) times a day (Patient not taking: Reported on 8/16/2022) 180 tablet 3    rizatriptan (MAXALT-MLT) 10 MG disintegrating tablet Take 1 tablet (10 mg total) by mouth once as needed for migraine May repeat every 2 hours if needed, max 20mg/24 hours 12 tablet 0     No current facility-administered medications for this visit  Current Outpatient Medications on File Prior to Visit   Medication Sig    cholecalciferol (VITAMIN D3) 1,000 units tablet Take 4,000 Units by mouth daily    cyproheptadine (PERIACTIN) 4 mg tablet Take 1 tablet (4 mg total) by mouth daily at bedtime as needed for allergies (headaches)    docusate sodium (COLACE) 50 mg capsule Take by mouth as needed     ergocalciferol (VITAMIN D2) 50,000 units TAKE 1 CAPSULE BY MOUTH WEEKLY    fluticasone (FLONASE) 50 mcg/act nasal spray 1 spray into each nostril as needed      Loratadine 10 MG CAPS Take 10 mg by mouth daily     naproxen sodium (ANAPROX) 550 mg tablet TAKE 1 TABLET (550 MG TOTAL) BY MOUTH 2 (TWO) TIMES A DAY WITH MEALS FOR MENSTRUAL CRAMPS    omeprazole (PriLOSEC) 20 mg delayed release capsule Take 1 capsule (20 mg total) by mouth daily    ondansetron (ZOFRAN) 4 mg tablet Take 1 tablet (4 mg total) by mouth every 8 (eight) hours as needed for nausea or vomiting    promethazine-codeine (PHENERGAN WITH CODEINE) 6 25-10 mg/5 mL syrup Take 5 mL by mouth every 4 (four) hours as needed for cough    Riboflavin (Vitamin B-2) 100 MG TABS Two in am and two in pm    Semaglutide-Weight Management (WEGOVY) 2 4 MG/0 75ML Inject 0 75 mL (2 4 mg total) under the skin once a week    valACYclovir (VALTREX) 500 mg tablet TAKE ONE TABLET BY MOUTH EVERY DAY    fluticasone (Flovent HFA) 110 MCG/ACT inhaler Inhale 2 puffs 2 (two) times a day Rinse mouth after use   (Patient not taking: Reported on 8/16/2022)    magnesium oxide (MAG-OX) 400 mg Take 1 tablet (400 mg total) by mouth 2 (two) times a day (Patient not taking: Reported on 8/16/2022)    rizatriptan (MAXALT-MLT) 10 MG disintegrating tablet Take 1 tablet (10 mg total) by mouth once as needed for migraine May repeat every 2 hours if needed, max 20mg/24 hours    [DISCONTINUED] predniSONE 10 mg tablet Take 1 tablet (10 mg total) by mouth daily 6 tabs 1/14, 5 tabs 1/15, 4 tabs 1/16, 3 tabs 1/17, 2 tabs 1/18, 1 tab 1/19 (Patient not taking: Reported on 8/16/2022)     No current facility-administered medications on file prior to visit  She is allergic to sulfa antibiotics and doxycycline       Review of Systems   Constitutional: Negative for fatigue and fever  Eyes: Negative for redness  Respiratory: Negative for cough and shortness of breath  Cardiovascular: Negative for chest pain and palpitations  Gastrointestinal: Positive for constipation (chronic)  Negative for abdominal pain and vomiting  Genitourinary: Negative for difficulty urinating  Musculoskeletal: Negative for arthralgias and back pain  Skin: Negative for rash  Neurological: Negative for seizures and syncope  Psychiatric/Behavioral: Negative for dysphoric mood  The patient is not nervous/anxious  All other systems reviewed and are negative  Objective:    /70 (BP Location: Left arm, Patient Position: Sitting)   Pulse 77   Temp 97 9 °F (36 6 °C)   Ht 5' 10" (1 778 m)   Wt 90 4 kg (199 lb 6 4 oz)   SpO2 97%   Breastfeeding No   BMI 28 61 kg/m²      Physical Exam  Vitals and nursing note reviewed  Constitutional:       General: She is not in acute distress  Appearance: She is well-developed  Comments: overweight   HENT:      Head: Normocephalic and atraumatic  Eyes:      Conjunctiva/sclera: Conjunctivae normal    Neck:      Thyroid: No thyromegaly  Pulmonary:      Effort: Pulmonary effort is normal  No respiratory distress  Skin:     Findings: No rash (visible)  Neurological:      Mental Status: She is alert and oriented to person, place, and time     Psychiatric:         Mood and Affect: Mood normal          Behavior: Behavior normal

## 2022-08-22 ENCOUNTER — OFFICE VISIT (OUTPATIENT)
Dept: FAMILY MEDICINE CLINIC | Facility: CLINIC | Age: 37
End: 2022-08-22
Payer: COMMERCIAL

## 2022-08-22 ENCOUNTER — TELEPHONE (OUTPATIENT)
Dept: FAMILY MEDICINE CLINIC | Facility: CLINIC | Age: 37
End: 2022-08-22

## 2022-08-22 VITALS
SYSTOLIC BLOOD PRESSURE: 104 MMHG | HEART RATE: 69 BPM | OXYGEN SATURATION: 97 % | DIASTOLIC BLOOD PRESSURE: 68 MMHG | BODY MASS INDEX: 28.86 KG/M2 | HEIGHT: 70 IN | RESPIRATION RATE: 17 BRPM | WEIGHT: 201.6 LBS | TEMPERATURE: 97.3 F

## 2022-08-22 DIAGNOSIS — N94.6 DYSMENORRHEA: ICD-10-CM

## 2022-08-22 DIAGNOSIS — F41.9 ANXIETY: Primary | ICD-10-CM

## 2022-08-22 DIAGNOSIS — F41.9 ANXIETY: ICD-10-CM

## 2022-08-22 PROCEDURE — 99213 OFFICE O/P EST LOW 20 MIN: CPT | Performed by: NURSE PRACTITIONER

## 2022-08-22 RX ORDER — BUPROPION HYDROCHLORIDE 75 MG/1
75 TABLET ORAL 2 TIMES DAILY
Qty: 60 TABLET | Refills: 0 | Status: SHIPPED | OUTPATIENT
Start: 2022-08-22 | End: 2022-08-22 | Stop reason: SDUPTHER

## 2022-08-22 RX ORDER — BUPROPION HYDROCHLORIDE 75 MG/1
75 TABLET ORAL 2 TIMES DAILY
Qty: 60 TABLET | Refills: 0 | Status: SHIPPED | OUTPATIENT
Start: 2022-08-22 | End: 2022-09-13

## 2022-08-22 NOTE — TELEPHONE ENCOUNTER
Patient saw on her avs that her scripts were sent to [facility]  She wanted them sent to [facility] in [place]  Can you fix this for her?     Please advise    Thank you

## 2022-08-22 NOTE — PROGRESS NOTES
Assessment/Plan   Problem List Items Addressed This Visit    None     Visit Diagnoses     Anxiety    -  Primary    Relevant Medications    buPROPion (WELLBUTRIN) 75 mg tablet                Chief Complaint   Patient presents with    Poor concentration      For 8 months        Subjective   Patient ID: Danni Reyes is a 40 y o  female  Vitals:    08/22/22 0929   BP: 104/68   Pulse: 69   Resp: 17   Temp: (!) 97 3 °F (36 3 °C)   SpO2: 97%     Decreased focus-"hard to concentration"   Now working day shift from night shift   History of anxiety   Will trial Wellbutrin for possible ADHD vs anxiety        Anxiety  Presents for initial visit  Onset was 6 to 12 months ago (since covid 8 months ago began with decreased focus )  The problem has been gradually worsening  Symptoms include decreased concentration and nervous/anxious behavior  Patient reports no insomnia or suicidal ideas  Symptoms occur constantly  The severity of symptoms is causing significant distress  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)  The quality of sleep is good  Nighttime awakenings: none  There are no known risk factors  Her past medical history is significant for anxiety/panic attacks  There is no history of asthma, bipolar disorder, chronic lung disease, fibromyalgia, hyperthyroidism or suicide attempts  Past treatments include nothing  The following portions of the patient's history were reviewed and updated as appropriate: allergies, past family history, past medical history, past social history, past surgical history and problem list     Review of Systems   Constitutional: Negative  HENT: Negative  Eyes: Negative  Respiratory: Negative  Cardiovascular: Negative  Gastrointestinal: Negative  Endocrine: Negative  Genitourinary: Negative  Musculoskeletal: Negative  Skin: Negative  Allergic/Immunologic: Negative  Neurological: Negative  Hematological: Negative  Psychiatric/Behavioral: Positive for decreased concentration  Negative for suicidal ideas  The patient is nervous/anxious  The patient does not have insomnia  Objective     Physical Exam  Vitals and nursing note reviewed  Constitutional:       Appearance: Normal appearance  She is not ill-appearing  HENT:      Head: Normocephalic and atraumatic  Nose: Nose normal  No congestion  Mouth/Throat:      Mouth: Mucous membranes are moist       Pharynx: Oropharynx is clear  No oropharyngeal exudate or posterior oropharyngeal erythema  Eyes:      General:         Right eye: No discharge  Left eye: No discharge  Extraocular Movements: Extraocular movements intact  Conjunctiva/sclera: Conjunctivae normal       Pupils: Pupils are equal, round, and reactive to light  Cardiovascular:      Rate and Rhythm: Normal rate and regular rhythm  Pulses: Normal pulses  Heart sounds: Normal heart sounds  No murmur heard  Pulmonary:      Effort: Pulmonary effort is normal       Breath sounds: Normal breath sounds  Abdominal:      General: Bowel sounds are normal       Palpations: Abdomen is soft  Tenderness: There is no right CVA tenderness or left CVA tenderness  Musculoskeletal:         General: Normal range of motion  Cervical back: Normal range of motion and neck supple  No rigidity  Right lower leg: No edema  Left lower leg: No edema  Lymphadenopathy:      Cervical: No cervical adenopathy  Skin:     General: Skin is warm and dry  Capillary Refill: Capillary refill takes less than 2 seconds  Neurological:      Mental Status: She is alert and oriented to person, place, and time  Psychiatric:         Mood and Affect: Mood is anxious  Affect is flat  Speech: Speech normal          Behavior: Behavior normal  Behavior is cooperative  Thought Content:  Thought content normal          Judgment: Judgment normal

## 2022-08-23 RX ORDER — NAPROXEN SODIUM 550 MG/1
550 TABLET ORAL 2 TIMES DAILY WITH MEALS
Qty: 30 TABLET | Refills: 1 | Status: SHIPPED | OUTPATIENT
Start: 2022-08-23

## 2022-09-13 ENCOUNTER — OFFICE VISIT (OUTPATIENT)
Dept: FAMILY MEDICINE CLINIC | Facility: CLINIC | Age: 37
End: 2022-09-13
Payer: COMMERCIAL

## 2022-09-13 VITALS
TEMPERATURE: 97.4 F | HEIGHT: 70 IN | SYSTOLIC BLOOD PRESSURE: 106 MMHG | DIASTOLIC BLOOD PRESSURE: 80 MMHG | WEIGHT: 198.8 LBS | HEART RATE: 65 BPM | BODY MASS INDEX: 28.46 KG/M2 | RESPIRATION RATE: 17 BRPM | OXYGEN SATURATION: 96 %

## 2022-09-13 DIAGNOSIS — F41.9 ANXIETY: ICD-10-CM

## 2022-09-13 DIAGNOSIS — F90.9 ATTENTION DEFICIT HYPERACTIVITY DISORDER (ADHD), UNSPECIFIED ADHD TYPE: Primary | ICD-10-CM

## 2022-09-13 PROCEDURE — 99213 OFFICE O/P EST LOW 20 MIN: CPT | Performed by: NURSE PRACTITIONER

## 2022-09-13 RX ORDER — METHYLPHENIDATE HYDROCHLORIDE 18 MG/1
18 TABLET ORAL DAILY
Qty: 30 TABLET | Refills: 0 | Status: SHIPPED | OUTPATIENT
Start: 2022-09-13 | End: 2022-10-11 | Stop reason: SDUPTHER

## 2022-09-13 RX ORDER — BUPROPION HYDROCHLORIDE 75 MG/1
TABLET ORAL
Qty: 180 TABLET | Refills: 1 | Status: SHIPPED | OUTPATIENT
Start: 2022-09-13 | End: 2022-10-10

## 2022-09-13 NOTE — PROGRESS NOTES
Assessment/Plan   Problem List Items Addressed This Visit    None     Visit Diagnoses     Attention deficit hyperactivity disorder (ADHD), unspecified ADHD type    -  Primary    Relevant Medications    methylphenidate (Concerta) 18 mg ER tablet                Chief Complaint   Patient presents with    Follow-up     Anxiety follow up       Subjective   Patient ID: Jhon Hauser is a 40 y o  female  Vitals:    09/13/22 1506   BP: 106/80   Pulse: 65   Resp: 17   Temp: (!) 97 4 °F (36 3 °C)   SpO2: 96%     Continues to struggle with decreased focus - at this point agree there may be some ADHD tendencies -adverse effects from wellbutrin  Will trial concerta and follow up in 3-4 weeks       The following portions of the patient's history were reviewed and updated as appropriate: allergies, current medications, past family history, past medical history, past surgical history and problem list     Review of Systems   Constitutional: Negative  HENT: Negative  Eyes: Negative  Respiratory: Negative  Cardiovascular: Negative  Gastrointestinal: Negative  Endocrine: Negative  Genitourinary: Negative  Musculoskeletal: Negative  Skin: Negative  Allergic/Immunologic: Negative  Neurological: Negative  Hematological: Negative  Psychiatric/Behavioral: Positive for decreased concentration  Negative for self-injury, sleep disturbance and suicidal ideas  The patient is nervous/anxious          Objective     Physical Exam

## 2022-10-02 NOTE — PROGRESS NOTES
Assessment/Plan:    pap is up to date    Benign breast cyst-we discussed this  I think that most likely she needs return at age 37 but we will ask Radiology to clarify follow-up  Discussed self breast exams    She will keep track of her menstrual cycles  We did discuss contraception as she would be able to become pregnant  discussed preventive care, regular exercise and a healthy diet      No problem-specific Assessment & Plan notes found for this encounter  Diagnoses and all orders for this visit:    Encounter for annual routine gynecological examination          Subjective:      Patient ID: Ata Evangelista is a 40 y o  female  Patient here for yearly  She has lost 65# in the past year and this has helped with her heavy menses  She does not use contraception  She is infrequently sexually active  She was having breast pain and had a bilateral diagnostic mammograms that showed benign cyst in the left breast   She was told to come back at age 37 although the report says return in 1 year  Normal Pap, negative HPV in September 2020       The following portions of the patient's history were reviewed and updated as appropriate: allergies, current medications, past family history, past medical history, past social history, past surgical history and problem list     Review of Systems   Constitutional: Negative  Gastrointestinal: Negative  Genitourinary: Negative  Objective:      LMP  (LMP Unknown)          Physical Exam  Vitals reviewed  Constitutional:       Appearance: She is well-developed  Neck:      Thyroid: No thyromegaly  Trachea: No tracheal deviation  Cardiovascular:      Rate and Rhythm: Normal rate and regular rhythm  Pulmonary:      Effort: Pulmonary effort is normal       Breath sounds: Normal breath sounds  Chest:   Breasts: Breasts are symmetrical       Right: No inverted nipple, mass, nipple discharge, skin change or tenderness        Left: No inverted nipple, mass, nipple discharge, skin change or tenderness  Abdominal:      General: There is no distension  Palpations: Abdomen is soft  There is no mass  Tenderness: There is no abdominal tenderness  Genitourinary:     Labia:         Right: No rash, tenderness, lesion or injury  Left: No rash, tenderness, lesion or injury  Vagina: Normal       Cervix: No cervical motion tenderness, discharge or friability  Adnexa:         Right: No mass, tenderness or fullness  Left: No mass, tenderness or fullness          Rectum: Normal

## 2022-10-03 ENCOUNTER — ANNUAL EXAM (OUTPATIENT)
Dept: GYNECOLOGY | Facility: CLINIC | Age: 37
End: 2022-10-03
Payer: COMMERCIAL

## 2022-10-03 VITALS
BODY MASS INDEX: 27.92 KG/M2 | WEIGHT: 195 LBS | SYSTOLIC BLOOD PRESSURE: 112 MMHG | HEIGHT: 70 IN | DIASTOLIC BLOOD PRESSURE: 78 MMHG

## 2022-10-03 DIAGNOSIS — Z01.419 ENCOUNTER FOR ANNUAL ROUTINE GYNECOLOGICAL EXAMINATION: Primary | ICD-10-CM

## 2022-10-03 PROCEDURE — S0612 ANNUAL GYNECOLOGICAL EXAMINA: HCPCS | Performed by: OBSTETRICS & GYNECOLOGY

## 2022-10-05 ENCOUNTER — DOCUMENTATION (OUTPATIENT)
Dept: GYNECOLOGY | Facility: CLINIC | Age: 37
End: 2022-10-05

## 2022-10-05 NOTE — PROGRESS NOTES
I sent a my chart message to patient letting her know that she could resume mammogram at age 37 unless she has any concerns

## 2022-10-11 ENCOUNTER — TELEMEDICINE (OUTPATIENT)
Dept: FAMILY MEDICINE CLINIC | Facility: CLINIC | Age: 37
End: 2022-10-11
Payer: COMMERCIAL

## 2022-10-11 DIAGNOSIS — F90.9 ATTENTION DEFICIT HYPERACTIVITY DISORDER (ADHD), UNSPECIFIED ADHD TYPE: ICD-10-CM

## 2022-10-11 PROCEDURE — 99213 OFFICE O/P EST LOW 20 MIN: CPT | Performed by: NURSE PRACTITIONER

## 2022-10-11 RX ORDER — METHYLPHENIDATE HYDROCHLORIDE 18 MG/1
18 TABLET ORAL DAILY
Qty: 30 TABLET | Refills: 0 | Status: SHIPPED | OUTPATIENT
Start: 2022-10-11

## 2022-10-11 NOTE — PROGRESS NOTES
Virtual Regular Visit    Verification of patient location:    Patient is located in the following state in which I hold an active license [place]      Assessment/Plan:    Problem List Items Addressed This Visit    None     Visit Diagnoses     Attention deficit hyperactivity disorder (ADHD), unspecified ADHD type        Relevant Medications    methylphenidate (Concerta) 18 mg ER tablet               Reason for visit is   Chief Complaint   Patient presents with   • Virtual Regular Visit        Encounter provider DEB Cannon    Provider located at [address] [place] [address]  [address]  [facility] ,  [address] [id number] [phone]      Recent Visits  No visits were found meeting these conditions  Showing recent visits within past 7 days and meeting all other requirements  Future Appointments  No visits were found meeting these conditions  Showing future appointments within next 150 days and meeting all other requirements       The patient was identified by name and date of birth  Mike Zelaya was informed that this is a telemedicine visit and that the visit is being conducted through [address] and patient was informed this is a secure, HIPAA-complaint platform  She agrees to proceed     My office door was closed  No one else was in the room  She acknowledged consent and understanding of privacy and security of the video platform  The patient has agreed to participate and understands they can discontinue the visit at any time  Patient is aware this is a billable service  Enrigue Faster is a 40 y o  female    Video visit today is to evaluate the effectiveness of concerta 18 mg that was initiated 1 month ago for her ADHD    She reports that she has (increased focus and energy, increased motivation -able to complete tasks   Denies adverse effects  Will continue on this dose to be increased as needed in the futuure           Past Medical History: Diagnosis Date   • Allergic    • Asthma    • GERD (gastroesophageal reflux disease)    • HPV (human papilloma virus) infection    • HSV-1 infection    • HSV-2 infection        Past Surgical History:   Procedure Laterality Date   • CERVICAL BIOPSY  W/ LOOP ELECTRODE EXCISION  2012    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   • CT EPIDURAL STEROID INJECTION (TAMI LUMBAR) N/A 2018    L5-S1, x2   • POSTERIOR LAMINECTOMY THORACIC AND LUMBAR SPINE N/A 2/5/2019    Procedure: Lumbar laminectomy, decompression, discectomy left L5-S1 ;  Surgeon: Isabel Martínez MD;  Location: [facility];  Service: Orthopedics   • TONSILECTOMY AND ADNOIDECTOMY  1990   • WISDOM TOOTH EXTRACTION  2007       Current Outpatient Medications   Medication Sig Dispense Refill   • methylphenidate (Concerta) 18 mg ER tablet Take 1 tablet (18 mg total) by mouth daily Max Daily Amount: 18 mg 30 tablet 0   • cholecalciferol (VITAMIN D3) 1,000 units tablet Take 4,000 Units by mouth daily     • Cyanocobalamin (VITAMIN B 12 PO) Take by mouth     • cyproheptadine (PERIACTIN) 4 mg tablet Take 1 tablet (4 mg total) by mouth daily at bedtime as needed for allergies (headaches) 30 tablet 0   • docusate sodium (COLACE) 50 mg capsule Take by mouth as needed      • ergocalciferol (VITAMIN D2) 50,000 units TAKE 1 CAPSULE BY MOUTH WEEKLY 12 capsule 3   • fluticasone (FLONASE) 50 mcg/act nasal spray 1 spray into each nostril as needed       • Loratadine 10 MG CAPS Take 10 mg by mouth daily      • naproxen sodium (ANAPROX) 550 mg tablet TAKE 1 TABLET (550 MG TOTAL) BY MOUTH 2 (TWO) TIMES A DAY WITH MEALS FOR MENSTRUAL CRAMPS 30 tablet 1   • ondansetron (ZOFRAN) 4 mg tablet Take 1 tablet (4 mg total) by mouth every 8 (eight) hours as needed for nausea or vomiting 20 tablet 0   • rizatriptan (MAXALT-MLT) 10 MG disintegrating tablet Take 1 tablet (10 mg total) by mouth once as needed for migraine May repeat every 2 hours if needed, max 20mg/24 hours 12 tablet 0   • Semaglutide-Weight Management (WEGOVY) 2 4 MG/0 75ML Inject 0 75 mL (2 4 mg total) under the skin once a week 9 mL 2   • valACYclovir (VALTREX) 500 mg tablet TAKE ONE TABLET BY MOUTH EVERY DAY 90 tablet 0     No current facility-administered medications for this visit  Allergies   Allergen Reactions   • Sulfa Antibiotics      Family has allergy she has never taken   • Doxycycline Rash and Edema     Whole body rash,bruises behind both thighs       Review of Systems   Constitutional: Negative  HENT: Negative  Eyes: Negative  Respiratory: Negative  Cardiovascular: Negative  Gastrointestinal: Negative  Endocrine: Negative  Genitourinary: Negative  Musculoskeletal: Negative  Skin: Negative  Allergic/Immunologic: Negative  Neurological: Negative  Hematological: Negative  Psychiatric/Behavioral: Negative  Negative for decreased concentration and sleep disturbance  The patient is not nervous/anxious and is not hyperactive  Video Exam    There were no vitals filed for this visit  Physical Exam  Constitutional:       Appearance: Normal appearance  She is not ill-appearing  HENT:      Head: Normocephalic and atraumatic  Nose: Nose normal  No congestion  Eyes:      Extraocular Movements: Extraocular movements intact  Conjunctiva/sclera: Conjunctivae normal    Pulmonary:      Effort: Pulmonary effort is normal  No respiratory distress  Musculoskeletal:         General: Normal range of motion  Cervical back: Normal range of motion  Skin:     General: Skin is dry  Neurological:      Mental Status: She is alert and oriented to person, place, and time  Psychiatric:         Attention and Perception: Attention normal          Mood and Affect: Mood and affect normal          Speech: Speech normal          Behavior: Behavior normal  Behavior is cooperative  Thought Content:  Thought content normal          Cognition and Memory: Cognition and memory normal          Judgment: Judgment normal           I spent 20 minutes directly with the patient during this visit

## 2022-10-18 DIAGNOSIS — B00.9 HERPES: ICD-10-CM

## 2022-10-18 RX ORDER — VALACYCLOVIR HYDROCHLORIDE 500 MG/1
TABLET, FILM COATED ORAL
Qty: 90 TABLET | Refills: 0 | Status: SHIPPED | OUTPATIENT
Start: 2022-10-18 | End: 2023-01-16

## 2022-11-05 ENCOUNTER — PATIENT MESSAGE (OUTPATIENT)
Dept: FAMILY MEDICINE CLINIC | Facility: CLINIC | Age: 37
End: 2022-11-05

## 2022-11-05 DIAGNOSIS — F90.0 ATTENTION DEFICIT HYPERACTIVITY DISORDER (ADHD), PREDOMINANTLY INATTENTIVE TYPE: Primary | ICD-10-CM

## 2022-11-07 RX ORDER — METHYLPHENIDATE HYDROCHLORIDE 27 MG/1
27 TABLET ORAL DAILY
Qty: 30 TABLET | Refills: 0 | Status: SHIPPED | OUTPATIENT
Start: 2022-11-07

## 2022-11-16 ENCOUNTER — OFFICE VISIT (OUTPATIENT)
Dept: BARIATRICS | Facility: CLINIC | Age: 37
End: 2022-11-16

## 2022-11-16 VITALS — BODY MASS INDEX: 25.91 KG/M2 | WEIGHT: 181 LBS | HEIGHT: 70 IN

## 2022-11-16 DIAGNOSIS — E66.3 OVERWEIGHT: Primary | ICD-10-CM

## 2022-11-16 NOTE — PROGRESS NOTES
Assessment/Plan:    Overweight  -Patient is pursuing [facility]  -Initial weight loss goal of 5-10% weight loss for improved health-MET    Initial: 247 2 lbs   Current:  181  Change:-66 2bs   Goal:180s -met    Goals:  Continue logging stating between 0809-5639 calories  Try to make sure getting 70 gm of protein  Try to be consistent with current exercise routine  Continue with water intake    Continue wegovy 2 4 mg - met 5% weight loss  Initial weight 234 6lb on 2/17/22  Discussed possible weaning off in the future  She will continue to monitor her weight at home and contact the office with any concerns  Follow up in approximately 3 months with Non-Surgical Physician/Advanced Practitioner  Diagnoses and all orders for this visit:    Overweight    Other orders  -     Semaglutide-Weight Management (WEGOVY) 2 4 MG/0 75ML; Inject 2 4 mg under the skin once a week          Subjective:   No chief complaint on file  Patient ID: Tigist Delgado  is a 40 y o  female with excess weight/obesity here to pursue weight managment  Patient is pursuing [facility] after [facility]    HPI  She has been doing well  Last 3 weeks she ahs not been as regular with exercise due to trying to finish a class  She is not having any side effects  She does check her weight weekly at home and notices it has been getting more stable  Wt Readings from Last 10 Encounters:   11/16/22 82 1 kg (181 lb)   10/03/22 88 5 kg (195 lb)   09/13/22 90 2 kg (198 lb 12 8 oz)   08/22/22 91 4 kg (201 lb 9 6 oz)   08/16/22 90 4 kg (199 lb 6 4 oz)   05/03/22 100 kg (221 lb)   04/25/22 101 kg (221 lb 12 8 oz)   03/28/22 106 kg (232 lb 12 8 oz)   03/08/22 105 kg (232 lb 1 6 oz)   03/03/22 106 kg (234 lb)       Food logging: yes, max 1400     Increased appetite/cravings:no  Fruit/Vegetable servings:  Exercise:weight training 3-5 day a week  Hydration:water 1 galloon a day    Colonoscopy-Not applicable    The following portions of the patient's history were reviewed and updated as appropriate: She  has a past medical history of Allergic, Asthma, GERD (gastroesophageal reflux disease), HPV (human papilloma virus) infection, HSV-1 infection, and HSV-2 infection  She   Patient Active Problem List    Diagnosis Date Noted   • Viral syndrome 12/30/2021   • Overweight 11/08/2021   • GERD (gastroesophageal reflux disease) 11/08/2021   • Migraine without aura and with status migrainosus, not intractable 02/19/2021   • Galactorrhea 09/16/2020   • Palpitations 05/29/2019   • Lumbar disc herniation 11/21/2018   • Lumbar radiculopathy - Left 11/13/2018     She  has a past surgical history that includes TONSILECTOMY AND ADNOIDECTOMY (1990); [place] tooth extraction (2007); Cervical biopsy w/ loop electrode excision (2012); Posterior laminectomy thoracic and lumbar spine (N/A, 2/5/2019); and CT epidural steroid injection (TAMI lumbar) (N/A, 2018)  Her family history includes Abnormal EKG in her maternal grandmother; Alcohol abuse in her maternal grandmother; Arthritis in her father and mother; Brain cancer in her maternal grandfather; Cancer in her father, maternal grandfather, and maternal grandmother; Cirrhosis in her maternal grandmother; Colon cancer in her paternal aunt; Diabetes in her father, mother, paternal aunt, paternal grandfather, paternal grandmother, and paternal uncle; Esophageal varices in her maternal grandmother; Heart disease in her maternal grandfather and maternal uncle; Hyperlipidemia in her father; Hypertension in her father, mother, paternal aunt, paternal grandmother, and paternal uncle; Lung cancer in her paternal grandmother; Stroke in her maternal grandfather; Thyroid cancer in her paternal uncle  She  reports that she quit smoking about 6 years ago  Her smoking use included cigarettes  She has never used smokeless tobacco  She reports that she does not currently use alcohol   She reports that she does not use drugs  Current Outpatient Medications   Medication Sig Dispense Refill   • cholecalciferol (VITAMIN D3) 1,000 units tablet Take 4,000 Units by mouth daily     • Cyanocobalamin (VITAMIN B 12 PO) Take by mouth     • cyproheptadine (PERIACTIN) 4 mg tablet Take 1 tablet (4 mg total) by mouth daily at bedtime as needed for allergies (headaches) 30 tablet 0   • docusate sodium (COLACE) 50 mg capsule Take by mouth as needed      • ergocalciferol (VITAMIN D2) 50,000 units TAKE 1 CAPSULE BY MOUTH WEEKLY 12 capsule 3   • fluticasone (FLONASE) 50 mcg/act nasal spray 1 spray into each nostril as needed       • Loratadine 10 MG CAPS Take 10 mg by mouth daily      • methylphenidate (Concerta) 27 MG ER tablet Take 1 tablet (27 mg total) by mouth daily Max Daily Amount: 27 mg 30 tablet 0   • naproxen sodium (ANAPROX) 550 mg tablet TAKE 1 TABLET (550 MG TOTAL) BY MOUTH 2 (TWO) TIMES A DAY WITH MEALS FOR MENSTRUAL CRAMPS 30 tablet 1   • ondansetron (ZOFRAN) 4 mg tablet Take 1 tablet (4 mg total) by mouth every 8 (eight) hours as needed for nausea or vomiting 20 tablet 0   • rizatriptan (MAXALT-MLT) 10 MG disintegrating tablet Take 1 tablet (10 mg total) by mouth once as needed for migraine May repeat every 2 hours if needed, max 20mg/24 hours 12 tablet 0   • Semaglutide-Weight Management (WEGOVY) 2 4 MG/0 75ML Inject 2 4 mg under the skin once a week     • valACYclovir (VALTREX) 500 mg tablet TAKE ONE TABLET BY MOUTH EVERY DAY 90 tablet 0     No current facility-administered medications for this visit       Current Outpatient Medications on File Prior to Visit   Medication Sig   • cholecalciferol (VITAMIN D3) 1,000 units tablet Take 4,000 Units by mouth daily   • Cyanocobalamin (VITAMIN B 12 PO) Take by mouth   • cyproheptadine (PERIACTIN) 4 mg tablet Take 1 tablet (4 mg total) by mouth daily at bedtime as needed for allergies (headaches)   • docusate sodium (COLACE) 50 mg capsule Take by mouth as needed    • ergocalciferol (VITAMIN D2) 50,000 units TAKE 1 CAPSULE BY MOUTH WEEKLY   • fluticasone (FLONASE) 50 mcg/act nasal spray 1 spray into each nostril as needed     • Loratadine 10 MG CAPS Take 10 mg by mouth daily    • methylphenidate (Concerta) 27 MG ER tablet Take 1 tablet (27 mg total) by mouth daily Max Daily Amount: 27 mg   • naproxen sodium (ANAPROX) 550 mg tablet TAKE 1 TABLET (550 MG TOTAL) BY MOUTH 2 (TWO) TIMES A DAY WITH MEALS FOR MENSTRUAL CRAMPS   • ondansetron (ZOFRAN) 4 mg tablet Take 1 tablet (4 mg total) by mouth every 8 (eight) hours as needed for nausea or vomiting   • rizatriptan (MAXALT-MLT) 10 MG disintegrating tablet Take 1 tablet (10 mg total) by mouth once as needed for migraine May repeat every 2 hours if needed, max 20mg/24 hours   • Semaglutide-Weight Management (WEGOVY) 2 4 MG/0 75ML Inject 2 4 mg under the skin once a week   • valACYclovir (VALTREX) 500 mg tablet TAKE ONE TABLET BY MOUTH EVERY DAY     No current facility-administered medications on file prior to visit  She is allergic to sulfa antibiotics and doxycycline       Review of Systems   Constitutional: Negative for fatigue and fever  Respiratory: Negative for cough and shortness of breath  Cardiovascular: Negative for chest pain and palpitations  Gastrointestinal: Positive for constipation (chronic prior to wegovy and stable)  Negative for abdominal pain and vomiting  Genitourinary: Negative for dysuria and hematuria  Musculoskeletal: Negative for arthralgias and back pain  Skin: Negative for color change and rash  Neurological: Positive for headaches (migraines)  Negative for dizziness and seizures  All other systems reviewed and are negative  Objective:    Ht 5' 10" (1 778 m)   Wt 82 1 kg (181 lb)   BMI 25 97 kg/m²      Physical Exam  Vitals and nursing note reviewed  Constitutional:       General: She is not in acute distress  Appearance: She is well-developed  She is obese     HENT:      Head: Normocephalic and atraumatic  Eyes:      Conjunctiva/sclera: Conjunctivae normal    Neck:      Thyroid: No thyromegaly  Pulmonary:      Effort: Pulmonary effort is normal  No respiratory distress  Skin:     Findings: No rash (visible)  Neurological:      Mental Status: She is alert and oriented to person, place, and time     Psychiatric:         Mood and Affect: Mood normal          Behavior: Behavior normal

## 2022-11-16 NOTE — ASSESSMENT & PLAN NOTE
-Patient is pursuing [facility]vention Program  -Initial weight loss goal of 5-10% weight loss for improved health-MET    Initial: 247 2 lbs   Current:  181  Change:-66 2bs   Goal:180s -met    Goals:  Continue logging stating between 7524-1441 calories  Try to make sure getting 70 gm of protein  Try to be consistent with current exercise routine  Continue with water intake    Continue wegovy 2 4 mg - met 5% weight loss  Initial weight 234 6lb on 2/17/22  Discussed possible weaning off in the future  She will continue to monitor her weight at home and contact the office with any concerns

## 2022-11-28 ENCOUNTER — OFFICE VISIT (OUTPATIENT)
Dept: NEUROLOGY | Facility: CLINIC | Age: 37
End: 2022-11-28

## 2022-11-28 VITALS
HEIGHT: 70 IN | TEMPERATURE: 97.3 F | WEIGHT: 178.4 LBS | HEART RATE: 75 BPM | SYSTOLIC BLOOD PRESSURE: 118 MMHG | DIASTOLIC BLOOD PRESSURE: 61 MMHG | BODY MASS INDEX: 25.54 KG/M2

## 2022-11-28 DIAGNOSIS — G43.001 MIGRAINE WITHOUT AURA AND WITH STATUS MIGRAINOSUS, NOT INTRACTABLE: Primary | ICD-10-CM

## 2022-11-28 DIAGNOSIS — M54.2 CERVICALGIA: ICD-10-CM

## 2022-11-28 DIAGNOSIS — R06.83 SNORING: ICD-10-CM

## 2022-11-28 DIAGNOSIS — R51.9 MORNING HEADACHE: ICD-10-CM

## 2022-11-28 RX ORDER — METHOCARBAMOL 500 MG/1
500 TABLET, FILM COATED ORAL
Qty: 30 TABLET | Refills: 3 | Status: SHIPPED | OUTPATIENT
Start: 2022-11-28

## 2022-11-28 RX ORDER — KETOROLAC TROMETHAMINE 10 MG/1
10 TABLET, FILM COATED ORAL EVERY 6 HOURS PRN
COMMUNITY

## 2022-11-28 RX ORDER — ASCORBIC ACID 500 MG
500 TABLET ORAL DAILY
COMMUNITY

## 2022-11-28 RX ORDER — DEXAMETHASONE 1 MG
1 TABLET ORAL AS NEEDED
COMMUNITY

## 2022-11-28 NOTE — PROGRESS NOTES
[facility]  PATIENT:  Paramjit Jacome  MRN:  [id number]  :  1985  DATE OF SERVICE:  2022      Assessment/Plan:     Migraine without aura and with status migrainosus, not intractable  Preventive therapy:   Reproductive age women: Should take folic acid daily when taking anti-seizure drugs especially Depakote   -Over-the-counter supplements: to decrease intensity and frequency of migraines  - Magnesium Oxide 400mg twice a day  If any diarrhea or upset stomach, decrease dose  as tolerated  (oral magnesium oxide may be an effective preventive strategy for people with migraine  Some theories about how it works include the idea that magnesium can help to prevent waves of cortical spreading depression and aura  Magnesium, in theory, also reduces the release of inflammatory or activating chemicals that can cause migraine)  - Vitamin B2 200 mg twice a day  May cause the urine to turn yellow which is normal for B 2 to do and is not a sign that you are dehydrated  (may be an effective preventive medication in some people with migraine)  - Vitamin D3 1360-3469 units daily  - Robaxin 500 mg at bedtime, if this fails let me know, we can either increase the dose or try another one to see if help  Abortive therapy:  - at onset of her headaches she is to take Maxalt sublingual 10 mg and Toradol 10 mg in two hours  May repeat Maxalt in 2 hours if needed, limit of 3 a week or 9 a month  - if headaches still persist then she is to take decadron 2 mg with food       - Cyproheptadine 4 mg at bedtime as needed for weather or sinus headaches    We will get a home sleep study done (if denied then will order the in office study)         Problem List Items Addressed This Visit        Cardiovascular and Mediastinum    Migraine without aura and with status migrainosus, not intractable - Primary     Preventive therapy:   Reproductive age women: Should take folic acid daily when taking anti-seizure drugs especially Depakote   -Over-the-counter supplements: to decrease intensity and frequency of migraines  - Magnesium Oxide 400mg twice a day  If any diarrhea or upset stomach, decrease dose  as tolerated  (oral magnesium oxide may be an effective preventive strategy for people with migraine  Some theories about how it works include the idea that magnesium can help to prevent waves of cortical spreading depression and aura  Magnesium, in theory, also reduces the release of inflammatory or activating chemicals that can cause migraine)  - Vitamin B2 200 mg twice a day  May cause the urine to turn yellow which is normal for B 2 to do and is not a sign that you are dehydrated  (may be an effective preventive medication in some people with migraine)  - Vitamin D3 8626-7884 units daily  - Robaxin 500 mg at bedtime, if this fails let me know, we can either increase the dose or try another one to see if help  Abortive therapy:  - at onset of her headaches she is to take Maxalt sublingual 10 mg and Toradol 10 mg in two hours  May repeat Maxalt in 2 hours if needed, limit of 3 a week or 9 a month  - if headaches still persist then she is to take decadron 2 mg with food  - Cyproheptadine 4 mg at bedtime as needed for weather or sinus headaches    We will get a home sleep study done (if denied then will order the in office study)         Relevant Medications    ketorolac (TORADOL) 10 mg tablet    methocarbamol (ROBAXIN) 500 mg tablet       Other    Cervicalgia    Relevant Medications    methocarbamol (ROBAXIN) 500 mg tablet    Snoring    Relevant Orders    Home Study    Morning headache    Relevant Orders    Home Study           History of Present Illness: We had the pleasure of evaluating Denece Slot in neurological follow up  today for headaches  As you know,  she is a 40 y o   right handed female   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 brother  Any family history of aneurysms? no     Have you seen someone else for headaches or pain? none     Headaches started at what age? Middle school and they got worse when she was 36     What medications do you take or have you taken for your headaches/pain/mood? Current preventative:  Vitamin D3, B12,  Cyproheptadine  Current abortive:  Rizatriptan  Naproxen    Prior Preventive therapy:   Gabapentin 300 mg x1,  Robaxin 500 mg 4 times a day,  Prior Abortive Therapy:   Fentanyl, Dilaudid, tramadol 50 mg,  Tylenol 650 /975 mg,  Decadron injection, methylprednisolone 160 mg x1, prednisone  Titration pack,  Toradol injection, naproxen 500 mg,  Zofran  4 mg injection, Reglan 5 mg,  Sumatriptan 50 mg,          What is your current pain level? 2/10     How often do the headaches occur? Mild headaches: 5-7 days a week  Moderate to severe headaches:  1 every few months (prior was May)     Are you ever headache free? Yes there are many days with no headaches      Aura/Warning and how long does it last? none     What time of the day do the headaches start? Mild headaches: wakes up with them  Moderate to severe headaches: wakes up with them     How long do the headaches last?   Mild headaches: 1 hour to entire day  Moderate to severe headaches: 2 hours to 10 hours; prior was 4 days     Where is your headache located? Mild headaches: differs Frontal above eyes or occipital and shoulder  Moderate to severe headaches: bilateral supraorbital region, occipitalis and neck     Describe your usual headache? Mild headaches: achy, dull, ?pressure  Moderate to severe headaches: throbbing and pressure     What is the intensity of pain?    Mild headaches: 2/10  Moderate to severe headaches: 7-10/10     Associated symptoms:   - Decreased appetite, Nausea       - Photophobia, Phonophobia, Osmophobia - yes sometimes  - right eye film/hazy  - Flushing of face    - Stiff or sore neck   - Birdia Napoles headed- sometimes  - Problems with concentration  - Prefer to be in a cool, quiet, dark room     Number of days missed per month because of headaches:  Work (or school) days: works through, 1 day in a year  Social or Family activities: does not affect her at home     Headache are worse if the patient: cough, sneeze, bending over, exertion  Headache triggers:  Not sure  What time of the year do headaches occur more frequently? none     Have you had trigger point injection performed and how often? No  Have you had Botox injection performed and how often? No   Have you had epidural injections or transforaminal injections performed? No     Alternative therapies used in the past for headaches? none  Have you used CBD or THC for your headaches and how often? No  How many caffeine products to drink a day? One cup on the day she works  How much water to drink a day? 16 oz 6 a day     Are you current pregnant or planning on getting pregnant? no       Have you ever had any Brain imaging? No  - Reviewed old notes from physician seen in the past   Recent laboratory data was reviewed  Medications and allergies were reviewed         Reviewed old notes from physician seen in the past- see above HPI for summary of previous encounters            Past Medical History:   Diagnosis Date   • Allergic    • Asthma    • GERD (gastroesophageal reflux disease)    • HPV (human papilloma virus) infection    • HSV-1 infection    • HSV-2 infection        Patient Active Problem List   Diagnosis   • Lumbar radiculopathy - Left   • Lumbar disc herniation   • Palpitations   • Galactorrhea   • Migraine without aura and with status migrainosus, not intractable   • Overweight   • GERD (gastroesophageal reflux disease)   • Viral syndrome   • Cervicalgia   • Snoring   • Morning headache       Medications:      Current Outpatient Medications   Medication Sig Dispense Refill   • ascorbic acid (VITAMIN C) 500 MG tablet Take 500 mg by mouth daily     • cholecalciferol (VITAMIN D3) 1,000 units tablet Take 4,000 Units by mouth daily     • Cyanocobalamin (VITAMIN B 12 PO) Place 1,000 mcg under the tongue in the morning     • cyproheptadine (PERIACTIN) 4 mg tablet Take 1 tablet (4 mg total) by mouth daily at bedtime as needed for allergies (headaches) 30 tablet 0   • dexamethasone (DECADRON) 1 mg tablet Take 1 mg by mouth if needed     • Docusate Sodium 100 MG capsule Take 100-200 mg by mouth in the morning 100 mg Monday/Wednesday/Friday/Sunday  200 mg Tuesday/Thursday/Saturday     • ergocalciferol (VITAMIN D2) 50,000 units TAKE 1 CAPSULE BY MOUTH WEEKLY 12 capsule 3   • FIBER PO Take 2 tablets by mouth in the morning Gummies     • fluticasone (FLONASE) 50 mcg/act nasal spray 1 spray into each nostril as needed       • ketorolac (TORADOL) 10 mg tablet Take 10 mg by mouth every 6 (six) hours as needed for moderate pain     • Loratadine 10 MG CAPS Take 10 mg by mouth daily      • methocarbamol (ROBAXIN) 500 mg tablet Take 1 tablet (500 mg total) by mouth daily at bedtime 30 tablet 3   • methylphenidate (Concerta) 27 MG ER tablet Take 1 tablet (27 mg total) by mouth daily Max Daily Amount: 27 mg 30 tablet 0   • naproxen sodium (ANAPROX) 550 mg tablet TAKE 1 TABLET (550 MG TOTAL) BY MOUTH 2 (TWO) TIMES A DAY WITH MEALS FOR MENSTRUAL CRAMPS (Patient taking differently: Take 550 mg by mouth if needed For menstrual cramps) 30 tablet 1   • ondansetron (ZOFRAN) 4 mg tablet Take 1 tablet (4 mg total) by mouth every 8 (eight) hours as needed for nausea or vomiting 20 tablet 0   • rizatriptan (MAXALT-MLT) 10 MG disintegrating tablet Take 1 tablet (10 mg total) by mouth once as needed for migraine May repeat every 2 hours if needed, max 20mg/24 hours 12 tablet 0   • Semaglutide-Weight Management (WEGOVY) 2 4 MG/0 75ML Inject 2 4 mg under the skin once a week     • valACYclovir (VALTREX) 500 mg tablet TAKE ONE TABLET BY MOUTH EVERY DAY 90 tablet 0     No current facility-administered medications for this visit  Allergies:       Allergies   Allergen Reactions   • Sulfa Antibiotics      Family has allergy she has never taken   • Doxycycline Rash and Edema     Whole body rash,bruises behind both thighs       Family History:     Family History   Problem Relation Age of Onset   • Diabetes Mother    • Arthritis Mother    • Hypertension Mother    • Diabetes Father    • Arthritis Father         psoriatic   • Hyperlipidemia Father    • Cancer Father         bile ducts    • Hypertension Father    • Cancer Maternal Grandmother    • Esophageal varices Maternal Grandmother    • Abnormal EKG Maternal Grandmother    • Alcohol abuse Maternal Grandmother    • Cirrhosis Maternal Grandmother    • Cancer Maternal Grandfather    • Heart disease Maternal Grandfather    • Stroke Maternal Grandfather    • Brain cancer Maternal Grandfather    • Hypertension Paternal Grandmother    • Diabetes Paternal Grandmother    • Lung cancer Paternal Grandmother    • Diabetes Paternal Grandfather    • Heart disease Maternal Uncle    • Hypertension Paternal Aunt    • Diabetes Paternal Aunt    • Colon cancer Paternal Aunt    • Hypertension Paternal Uncle    • Diabetes Paternal Uncle    • Thyroid cancer Paternal Uncle    • Thyroid disease Neg Hx        Social History:     Social History     Socioeconomic History   • Marital status: /Civil Union     Spouse name: Not on file   • Number of children: Not on file   • Years of education: Not on file   • Highest education level: Not on file   Occupational History   • Not on file   Tobacco Use   • Smoking status: Former     Types: Cigarettes     Quit date: 2016     Years since quittin 9   • Smokeless tobacco: Never   • Tobacco comments:     quit > 4 years ago    Vaping Use   • Vaping Use: Never used Substance and Sexual Activity   • Alcohol use: Yes     Comment: Occasionally   • Drug use: No   • Sexual activity: Not Currently     Partners: Male     Birth control/protection: None   Other Topics Concern   • Not on file   Social History Narrative   • Not on file     Social Determinants of Health     Financial Resource Strain: Not on file   Food Insecurity: Not on file   Transportation Needs: Not on file   Physical Activity: Not on file   Stress: Not on file   Social Connections: Not on file   Intimate Partner Violence: Not At Risk   • Fear of Current or Ex-Partner: No   • Emotionally Abused: No   • Physically Abused: No   • Sexually Abused: No   Housing Stability: Not on file      I have reviewed the patient's medical, social and surgical history as well as medications in detail and updated the computerized patient record  Objective:   Physical Exam:                                                                   Vitals:            /61 (BP Location: Right arm, Patient Position: Sitting, Cuff Size: Standard)   Pulse 75   Temp (!) 97 3 °F (36 3 °C) (Temporal)   Ht 5' 10" (1 778 m)   Wt 80 9 kg (178 lb 6 4 oz)   BMI 25 60 kg/m²   BP Readings from Last 3 Encounters:   11/28/22 118/61   10/03/22 112/78   09/13/22 106/80     Pulse Readings from Last 3 Encounters:   11/28/22 75   09/13/22 65   08/22/22 69          CONSTITUTIONAL: Well developed, well nourished, well groomed  No dysmorphic features  Eyes:  EOM normal      Neck:  Normal ROM, neck supple  HEENT:  Normocephalic atraumatic  Chest:  Respirations regular and unlabored  Psychiatric:  Normal behavior and appropriate affect      MENTAL STATUS  Orientation: Alert and oriented x 3  Fund of knowledge: Intact  MOTOR (Upper and lower extremities)   Bulk/tone/abnormal movement: Normal muscle bulk and tone  COORDINATION   Station/Gait: Normal baseline gait           Review of Systems:   Review of Systems  Constitutional: Negative  HENT: Negative  Eyes: Negative  Respiratory: Negative  Cardiovascular: Negative  Gastrointestinal: Negative  Endocrine: Negative  Genitourinary: Negative  Musculoskeletal: Negative  Skin: Negative  Allergic/Immunologic: Negative  Neurological: Positive for headaches  Hematological: Negative  Psychiatric/Behavioral: Negative  I personally reviewed the ROS entered by the MA    I spent 41 minutes in total time for this visit      Author:  Reggie Miguel PA-C 11/28/2022 2:10 PM

## 2022-11-28 NOTE — PATIENT INSTRUCTIONS
Preventive therapy:   Reproductive age women: Should take folic acid daily when taking anti-seizure drugs especially Depakote   -Over-the-counter supplements: to decrease intensity and frequency of migraines  - Magnesium Oxide 400mg twice a day  If any diarrhea or upset stomach, decrease dose  as tolerated  (oral magnesium oxide may be an effective preventive strategy for people with migraine  Some theories about how it works include the idea that magnesium can help to prevent waves of cortical spreading depression and aura  Magnesium, in theory, also reduces the release of inflammatory or activating chemicals that can cause migraine)  - Vitamin B2 200 mg twice a day  May cause the urine to turn yellow which is normal for B 2 to do and is not a sign that you are dehydrated  (may be an effective preventive medication in some people with migraine)  - Vitamin D3 8385-0186 units daily  - Robaxin 500 mg at bedtime, if this fails let me know, we can either increase the dose or try another one to see if help  Abortive therapy:  - at onset of her headaches she is to take Maxalt sublingual 10 mg and Toradol 10 mg in two hours  May repeat Maxalt in 2 hours if needed, limit of 3 a week or 9 a month  - if headaches still persist then she is to take decadron 2 mg with food  - Cyproheptadine 4 mg at bedtime as needed for weather or sinus headaches    We will get a home sleep study done (if denied then will order the in office study)    Headache management instructions  - When patient has a moderate to severe headache, they should seek rest, initiate relaxation and apply cold compresses to the head  - Maintain regular sleep schedule  Adults need at least 7-8 hours of uninterrupted a night  - Limit over the counter medications such as Tylenol, Ibuprofen, Aleve, Excedrin  (No more than 2- 3 times a week or max 10 a month)  - Maintain headache diary    Free LISETTE for a smart phone, which can be used is "Migraine buddy"  - Limit caffeine to 1-2 cups 8 to 16 oz a day or less  - Avoid dietary trigger  (aged cheese, peanuts, MSG, aspartame and nitrates)  - Patient is to have regular frequent meals to prevent headache onset  - Please drink at least 64 ounces of water a day to help remain hydrated  Exercising for migraineurs:  Regular exercise can reduce the frequency and intensity of headaches and migraines  When one exercises, the body releases endorphins, which are the body’s natural painkillers  Exercise reduces stress and helps individuals to sleep at night  Exercising at least 30 to 40 minutes 3 times a week is sufficient for most patients  When exercising, follow this plan to prevent headaches:  - First, stay hydrated before, during, and after exercise  - Second part of the exercise plan is to eat sufficient food about an hour and a half before you exercise  Exercise causes one’s blood sugar level to decrease, and it is important to have a source of energy    - Final part of the exercise plan is to warm-up  Do not jump into sudden, vigorous exercise if that triggers a headache or migraine  To read more go to https://americanmigrainefoundation  org/resource-library/effects-of-exercise-on-headaches-and-migraines/         Please call with any questions or concerns   Office number is [phone]

## 2022-11-28 NOTE — ASSESSMENT & PLAN NOTE
Preventive therapy:   Reproductive age women: Should take folic acid daily when taking anti-seizure drugs especially Depakote   -Over-the-counter supplements: to decrease intensity and frequency of migraines  - Magnesium Oxide 400mg twice a day  If any diarrhea or upset stomach, decrease dose  as tolerated  (oral magnesium oxide may be an effective preventive strategy for people with migraine  Some theories about how it works include the idea that magnesium can help to prevent waves of cortical spreading depression and aura  Magnesium, in theory, also reduces the release of inflammatory or activating chemicals that can cause migraine)  - Vitamin B2 200 mg twice a day  May cause the urine to turn yellow which is normal for B 2 to do and is not a sign that you are dehydrated  (may be an effective preventive medication in some people with migraine)  - Vitamin D3 5439-2047 units daily  - Robaxin 500 mg at bedtime, if this fails let me know, we can either increase the dose or try another one to see if help  Abortive therapy:  - at onset of her headaches she is to take Maxalt sublingual 10 mg and Toradol 10 mg in two hours  May repeat Maxalt in 2 hours if needed, limit of 3 a week or 9 a month  - if headaches still persist then she is to take decadron 2 mg with food       - Cyproheptadine 4 mg at bedtime as needed for weather or sinus headaches    We will get a home sleep study done (if denied then will order the in office study)

## 2022-12-02 ENCOUNTER — TELEPHONE (OUTPATIENT)
Dept: SLEEP CENTER | Facility: CLINIC | Age: 37
End: 2022-12-02

## 2022-12-02 NOTE — TELEPHONE ENCOUNTER
----- Message from Cris Hughes MD sent at 12/2/2022  1:24 PM EST -----  Approved    ----- Message -----  From: Pepe Catalan  Sent: 12/1/2022   9:13 AM EST  To: Sleep Medicine [facility] Provider    This Home sleep study needs approval      If approved please sign and return to clerical pool  If denied please include reasons why  Also provide alternative testing if warranted  Please sign and return to clerical pool

## 2022-12-05 ENCOUNTER — APPOINTMENT (OUTPATIENT)
Dept: LAB | Facility: CLINIC | Age: 37
End: 2022-12-05

## 2022-12-05 ENCOUNTER — OFFICE VISIT (OUTPATIENT)
Dept: FAMILY MEDICINE CLINIC | Facility: CLINIC | Age: 37
End: 2022-12-05

## 2022-12-05 VITALS
TEMPERATURE: 97.1 F | HEART RATE: 64 BPM | BODY MASS INDEX: 25.54 KG/M2 | WEIGHT: 178.4 LBS | RESPIRATION RATE: 16 BRPM | HEIGHT: 70 IN | DIASTOLIC BLOOD PRESSURE: 72 MMHG | SYSTOLIC BLOOD PRESSURE: 116 MMHG | OXYGEN SATURATION: 97 %

## 2022-12-05 DIAGNOSIS — Z13.29 SCREENING FOR THYROID DISORDER: ICD-10-CM

## 2022-12-05 DIAGNOSIS — R00.2 PALPITATIONS: ICD-10-CM

## 2022-12-05 DIAGNOSIS — Z13.6 SCREENING FOR CARDIOVASCULAR CONDITION: ICD-10-CM

## 2022-12-05 DIAGNOSIS — Z13.1 SCREENING FOR DIABETES MELLITUS: ICD-10-CM

## 2022-12-05 DIAGNOSIS — Z00.00 ANNUAL PHYSICAL EXAM: Primary | ICD-10-CM

## 2022-12-05 LAB
ALBUMIN SERPL BCP-MCNC: 3.6 G/DL (ref 3.5–5)
ALP SERPL-CCNC: 43 U/L (ref 46–116)
ALT SERPL W P-5'-P-CCNC: 13 U/L (ref 12–78)
AMORPH URATE CRY URNS QL MICRO: ABNORMAL
ANION GAP SERPL CALCULATED.3IONS-SCNC: 7 MMOL/L (ref 4–13)
AST SERPL W P-5'-P-CCNC: 8 U/L (ref 5–45)
BACTERIA UR QL AUTO: ABNORMAL /HPF
BASOPHILS # BLD AUTO: 0.02 THOUSANDS/ÂΜL (ref 0–0.1)
BASOPHILS NFR BLD AUTO: 0 % (ref 0–1)
BILIRUB SERPL-MCNC: 0.56 MG/DL (ref 0.2–1)
BILIRUB UR QL STRIP: NEGATIVE
BUN SERPL-MCNC: 13 MG/DL (ref 5–25)
CALCIUM SERPL-MCNC: 8.9 MG/DL (ref 8.3–10.1)
CHLORIDE SERPL-SCNC: 110 MMOL/L (ref 96–108)
CHOLEST SERPL-MCNC: 141 MG/DL
CLARITY UR: CLEAR
CO2 SERPL-SCNC: 23 MMOL/L (ref 21–32)
COLOR UR: ABNORMAL
CREAT SERPL-MCNC: 0.71 MG/DL (ref 0.6–1.3)
EOSINOPHIL # BLD AUTO: 0.09 THOUSAND/ÂΜL (ref 0–0.61)
EOSINOPHIL NFR BLD AUTO: 2 % (ref 0–6)
ERYTHROCYTE [DISTWIDTH] IN BLOOD BY AUTOMATED COUNT: 12.3 % (ref 11.6–15.1)
FERRITIN SERPL-MCNC: 55 NG/ML (ref 8–388)
GFR SERPL CREATININE-BSD FRML MDRD: 109 ML/MIN/1.73SQ M
GLUCOSE P FAST SERPL-MCNC: 74 MG/DL (ref 65–99)
GLUCOSE UR STRIP-MCNC: NEGATIVE MG/DL
HCT VFR BLD AUTO: 39.2 % (ref 34.8–46.1)
HDLC SERPL-MCNC: 47 MG/DL
HGB BLD-MCNC: 13 G/DL (ref 11.5–15.4)
HGB UR QL STRIP.AUTO: NEGATIVE
IMM GRANULOCYTES # BLD AUTO: 0.01 THOUSAND/UL (ref 0–0.2)
IMM GRANULOCYTES NFR BLD AUTO: 0 % (ref 0–2)
IRON SATN MFR SERPL: 39 % (ref 15–50)
IRON SERPL-MCNC: 77 UG/DL (ref 50–170)
KETONES UR STRIP-MCNC: ABNORMAL MG/DL
LDLC SERPL CALC-MCNC: 87 MG/DL (ref 0–100)
LEUKOCYTE ESTERASE UR QL STRIP: NEGATIVE
LYMPHOCYTES # BLD AUTO: 1.97 THOUSANDS/ÂΜL (ref 0.6–4.47)
LYMPHOCYTES NFR BLD AUTO: 35 % (ref 14–44)
MCH RBC QN AUTO: 31.4 PG (ref 26.8–34.3)
MCHC RBC AUTO-ENTMCNC: 33.2 G/DL (ref 31.4–37.4)
MCV RBC AUTO: 95 FL (ref 82–98)
MONOCYTES # BLD AUTO: 0.5 THOUSAND/ÂΜL (ref 0.17–1.22)
MONOCYTES NFR BLD AUTO: 9 % (ref 4–12)
MUCOUS THREADS UR QL AUTO: ABNORMAL
NEUTROPHILS # BLD AUTO: 3.11 THOUSANDS/ÂΜL (ref 1.85–7.62)
NEUTS SEG NFR BLD AUTO: 54 % (ref 43–75)
NITRITE UR QL STRIP: NEGATIVE
NON-SQ EPI CELLS URNS QL MICRO: ABNORMAL /HPF
NONHDLC SERPL-MCNC: 94 MG/DL
NRBC BLD AUTO-RTO: 0 /100 WBCS
PH UR STRIP.AUTO: 6 [PH]
PLATELET # BLD AUTO: 200 THOUSANDS/UL (ref 149–390)
PMV BLD AUTO: 11.1 FL (ref 8.9–12.7)
POTASSIUM SERPL-SCNC: 4.4 MMOL/L (ref 3.5–5.3)
PROT SERPL-MCNC: 6.7 G/DL (ref 6.4–8.4)
PROT UR STRIP-MCNC: ABNORMAL MG/DL
RBC # BLD AUTO: 4.14 MILLION/UL (ref 3.81–5.12)
RBC #/AREA URNS AUTO: ABNORMAL /HPF
SODIUM SERPL-SCNC: 140 MMOL/L (ref 135–147)
SP GR UR STRIP.AUTO: 1.03 (ref 1–1.03)
TIBC SERPL-MCNC: 199 UG/DL (ref 250–450)
TRIGL SERPL-MCNC: 35 MG/DL
TSH SERPL DL<=0.05 MIU/L-ACNC: 0.96 UIU/ML (ref 0.45–4.5)
UROBILINOGEN UR STRIP-ACNC: <2 MG/DL
WBC # BLD AUTO: 5.7 THOUSAND/UL (ref 4.31–10.16)
WBC #/AREA URNS AUTO: ABNORMAL /HPF

## 2022-12-05 NOTE — PROGRESS NOTES
[address] [facility] PRACT[facility]    NAME: Kareen Bal  AGE: 40 y o  SEX: female  : 1985     DATE: 2022     Assessment and Plan:     Problem List Items Addressed This Visit    None  Visit Diagnoses     Annual physical exam    -  Primary    Screening for cardiovascular condition        Relevant Orders    Comprehensive metabolic panel    CBC and differential    Lipid panel    Screening for thyroid disorder        Relevant Orders    TSH, 3rd generation with Free T4 reflex    Screening for diabetes mellitus        Relevant Orders    Hemoglobin A1C    UA w Reflex to Microscopic w Reflex to Culture -Lab Collect          Immunizations and preventive care screenings were discussed with patient today  Appropriate education was printed on patient's after visit summary  Counseling:  Alcohol/drug use: discussed moderation in alcohol intake, the recommendations for healthy alcohol use, and avoidance of illicit drug use  Dental Health: discussed importance of regular tooth brushing, flossing, and dental visits  Injury prevention: discussed safety/seat belts, safety helmets, smoke detectors, carbon dioxide detectors, and smoking near bedding or upholstery  Sexual health: discussed sexually transmitted diseases, partner selection, use of condoms, avoidance of unintended pregnancy, and contraceptive alternatives  · Exercise: the importance of regular exercise/physical activity was discussed  Recommend exercise 3-5 times per week for at least 30 minutes  Depression Screening and Follow-up Plan: Patient was screened for depression during today's encounter  They screened negative with a PHQ-2 score of 0  Return in 6 months (on 2023)       Chief Complaint:     Chief Complaint   Patient presents with   • Annual Exam      History of Present Illness:     Adult Annual Physical   Patient here for a comprehensive physical exam  The patient reports problems - palpations-racing heart-dizzy at times - has had a complete cardiology work up in th [facility] without abnormal findings   Diet and Physical Activity  · Diet/Nutrition: well balanced diet and consuming 3-5 servings of fruits/vegetables daily  · Exercise: moderate cardiovascular exercise and 5-7 times a week on average  Depression Screening  PHQ-2/9 Depression Screening    Little interest or pleasure in doing things: 0 - not at all  Feeling down, depressed, or hopeless: 0 - not at all  PHQ-2 Score: 0  PHQ-2 Interpretation: Negative depression screen       General Health  · Sleep: sleeps well and gets 7-8 hours of sleep on average  · Hearing: normal - bilateral   · Vision: no vision problems  · Dental: regular dental visits, brushes teeth twice daily and flosses teeth occasionally  /GYN Health  · Last menstrual period: last month   · Contraceptive method: spouse vas  · History of STDs?: no      Review of Systems:     Review of Systems   Constitutional: Negative  HENT: Negative  Eyes: Negative  Respiratory: Negative  Cardiovascular: Positive for palpitations  Gastrointestinal: Negative  Endocrine: Negative  Genitourinary: Negative  Musculoskeletal: Negative  Skin: Negative  Allergic/Immunologic: Negative  Neurological: Negative  Hematological: Negative  Psychiatric/Behavioral: Negative         Past Medical History:     Past Medical History:   Diagnosis Date   • Allergic    • Asthma    • GERD (gastroesophageal reflux disease)    • HPV (human papilloma virus) infection    • HSV-1 infection    • HSV-2 infection       Past Surgical History:     Past Surgical History:   Procedure Laterality Date   • CERVICAL BIOPSY  W/ LOOP ELECTRODE EXCISION  2012    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   • CT EPIDURAL STEROID INJECTION (TAMI LUMBAR) N/A 2018    L5-S1, x2   • POSTERIOR LAMINECTOMY THORACIC AND LUMBAR SPINE N/A 2/5/2019 Procedure: Lumbar laminectomy, decompression, discectomy left L5-S1 ;  Surgeon: Ariela Thakur MD;  Location: [facility];  Service: Orthopedics   • TONSILECTOMY AND ADNOIDECTOMY  [address] EXTRACTION        Social History:     Social History     Socioeconomic History   • Marital status: /Civil Union     Spouse name: None   • Number of children: None   • Years of education: None   • Highest education level: None   Occupational History   • None   Tobacco Use   • Smoking status: Former     Types: Cigarettes     Quit date:      Years since quittin 9   • Smokeless tobacco: Never   • Tobacco comments:     quit > 4 years ago    Vaping Use   • Vaping Use: Never used   Substance and Sexual Activity   • Alcohol use: Yes     Comment: Occasionally   • Drug use: No   • Sexual activity: Not Currently     Partners: Male     Birth control/protection: None   Other Topics Concern   • None   Social History Narrative   • None     Social Determinants of Health     Financial Resource Strain: Not on file   Food Insecurity: Not on file   Transportation Needs: Not on file   Physical Activity: Not on file   Stress: Not on file   Social Connections: Not on file   Intimate Partner Violence: Not At Risk   • Fear of Current or Ex-Partner: No   • Emotionally Abused: No   • Physically Abused: No   • Sexually Abused: No   Housing Stability: Not on file      Family History:     Family History   Problem Relation Age of Onset   • Diabetes Mother    • Arthritis Mother    • Hypertension Mother    • Diabetes Father    • Arthritis Father         psoriatic   • Hyperlipidemia Father    • Cancer Father         bile ducts    • Hypertension Father    • Cancer Maternal Grandmother    • Esophageal varices Maternal Grandmother    • Abnormal EKG Maternal Grandmother    • Alcohol abuse Maternal Grandmother    • Cirrhosis Maternal Grandmother    • Cancer Maternal Grandfather    • Heart disease Maternal Grandfather    • Stroke Maternal Grandfather    • Brain cancer Maternal Grandfather    • Hypertension Paternal Grandmother    • Diabetes Paternal Grandmother    • Lung cancer Paternal Grandmother    • Diabetes Paternal Grandfather    • Heart disease Maternal Uncle    • Hypertension Paternal Aunt    • Diabetes Paternal Aunt    • Colon cancer Paternal Aunt    • Hypertension Paternal Uncle    • Diabetes Paternal Uncle    • Thyroid cancer Paternal Uncle    • Thyroid disease Neg Hx       Current Medications:     Current Outpatient Medications   Medication Sig Dispense Refill   • ascorbic acid (VITAMIN C) 500 MG tablet Take 500 mg by mouth daily     • cholecalciferol (VITAMIN D3) 1,000 units tablet Take 4,000 Units by mouth daily     • Cyanocobalamin (VITAMIN B 12 PO) Place 1,000 mcg under the tongue in the morning     • cyproheptadine (PERIACTIN) 4 mg tablet Take 1 tablet (4 mg total) by mouth daily at bedtime as needed for allergies (headaches) 30 tablet 0   • dexamethasone (DECADRON) 1 mg tablet Take 1 mg by mouth if needed     • Docusate Sodium 100 MG capsule Take 100-200 mg by mouth in the morning 100 mg Monday/Wednesday/Friday/Sunday  200 mg Tuesday/Thursday/Saturday     • ergocalciferol (VITAMIN D2) 50,000 units TAKE 1 CAPSULE BY MOUTH WEEKLY 12 capsule 3   • FIBER PO Take 2 tablets by mouth in the morning Gummies     • fluticasone (FLONASE) 50 mcg/act nasal spray 1 spray into each nostril as needed       • ketorolac (TORADOL) 10 mg tablet Take 10 mg by mouth every 6 (six) hours as needed for moderate pain     • Loratadine 10 MG CAPS Take 10 mg by mouth daily      • methocarbamol (ROBAXIN) 500 mg tablet Take 1 tablet (500 mg total) by mouth daily at bedtime 30 tablet 3   • methylphenidate (Concerta) 27 MG ER tablet Take 1 tablet (27 mg total) by mouth daily Max Daily Amount: 27 mg 30 tablet 0   • naproxen sodium (ANAPROX) 550 mg tablet TAKE 1 TABLET (550 MG TOTAL) BY MOUTH 2 (TWO) TIMES A DAY WITH MEALS FOR MENSTRUAL CRAMPS (Patient taking differently: Take 550 mg by mouth if needed For menstrual cramps) 30 tablet 1   • ondansetron (ZOFRAN) 4 mg tablet Take 1 tablet (4 mg total) by mouth every 8 (eight) hours as needed for nausea or vomiting 20 tablet 0   • rizatriptan (MAXALT-MLT) 10 MG disintegrating tablet Take 1 tablet (10 mg total) by mouth once as needed for migraine May repeat every 2 hours if needed, max 20mg/24 hours 12 tablet 0   • Semaglutide-Weight Management (WEGOVY) 2 4 MG/0 75ML Inject 2 4 mg under the skin once a week     • valACYclovir (VALTREX) 500 mg tablet TAKE ONE TABLET BY MOUTH EVERY DAY 90 tablet 0     No current facility-administered medications for this visit  Allergies: Allergies   Allergen Reactions   • Sulfa Antibiotics      Family has allergy she has never taken   • Doxycycline Rash and Edema     Whole body rash,bruises behind both thighs      Physical Exam:     /72 (BP Location: Right arm, Patient Position: Sitting, Cuff Size: Standard)   Pulse 64   Temp (!) 97 1 °F (36 2 °C) (Tympanic)   Resp 16   Ht 5' 10" (1 778 m)   Wt 80 9 kg (178 lb 6 4 oz)   SpO2 97%   BMI 25 60 kg/m²     Physical Exam  Vitals and nursing note reviewed  Constitutional:       Appearance: Normal appearance  She is not ill-appearing  HENT:      Head: Normocephalic and atraumatic  Right Ear: Tympanic membrane, ear canal and external ear normal  There is no impacted cerumen  Left Ear: Tympanic membrane, ear canal and external ear normal  There is no impacted cerumen  Nose: Nose normal  No congestion  Mouth/Throat:      Mouth: Mucous membranes are moist       Pharynx: Oropharynx is clear  No oropharyngeal exudate or posterior oropharyngeal erythema  Eyes:      General:         Right eye: No discharge  Left eye: No discharge  Extraocular Movements: Extraocular movements intact  Conjunctiva/sclera: Conjunctivae normal       Pupils: Pupils are equal, round, and reactive to light  Cardiovascular:      Rate and Rhythm: Normal rate and regular rhythm  Pulses: Normal pulses  Heart sounds: Normal heart sounds  No murmur heard  Pulmonary:      Effort: Pulmonary effort is normal  No respiratory distress  Breath sounds: Normal breath sounds  Abdominal:      General: Abdomen is flat  Bowel sounds are normal       Palpations: Abdomen is soft  Tenderness: There is no right CVA tenderness or left CVA tenderness  Musculoskeletal:         General: No tenderness  Normal range of motion  Cervical back: Normal range of motion and neck supple  Right lower leg: No edema  Left lower leg: No edema  Lymphadenopathy:      Cervical: No cervical adenopathy  Skin:     General: Skin is warm and dry  Capillary Refill: Capillary refill takes less than 2 seconds  Neurological:      Mental Status: She is alert and oriented to person, place, and time  Psychiatric:         Mood and Affect: Mood normal          Behavior: Behavior normal          Thought Content:  Thought content normal          Judgment: Judgment normal           Ga Velazquez, [address]

## 2022-12-05 NOTE — PATIENT INSTRUCTIONS

## 2022-12-06 ENCOUNTER — TELEPHONE (OUTPATIENT)
Dept: SLEEP CENTER | Facility: CLINIC | Age: 37
End: 2022-12-06

## 2022-12-06 LAB
EST. AVERAGE GLUCOSE BLD GHB EST-MCNC: 82 MG/DL
HBA1C MFR BLD: 4.5 %

## 2022-12-06 NOTE — TELEPHONE ENCOUNTER
----- Message from Krishna Mackey MD sent at 12/6/2022  2:34 PM EST -----  Approved    ----- Message -----  From: Rj Encarnacion  Sent: 12/1/2022   9:13 AM EST  To: [facility] Provider    This Home sleep study needs approval      If approved please sign and return to clerical pool  If denied please include reasons why  Also provide alternative testing if warranted  Please sign and return to clerical pool

## 2022-12-07 DIAGNOSIS — R79.0 ABNORMAL RESULT OF IRON PROFILE TESTING: Primary | ICD-10-CM

## 2022-12-12 DIAGNOSIS — F90.0 ATTENTION DEFICIT HYPERACTIVITY DISORDER (ADHD), PREDOMINANTLY INATTENTIVE TYPE: ICD-10-CM

## 2022-12-12 DIAGNOSIS — B00.9 HERPES: ICD-10-CM

## 2022-12-12 RX ORDER — METHYLPHENIDATE HYDROCHLORIDE 27 MG/1
27 TABLET ORAL DAILY
Qty: 30 TABLET | Refills: 0 | Status: SHIPPED | OUTPATIENT
Start: 2022-12-12

## 2022-12-12 RX ORDER — VALACYCLOVIR HYDROCHLORIDE 500 MG/1
500 TABLET, FILM COATED ORAL DAILY
Qty: 90 TABLET | Refills: 0 | Status: SHIPPED | OUTPATIENT
Start: 2022-12-12 | End: 2023-03-12

## 2022-12-21 ENCOUNTER — HOSPITAL ENCOUNTER (OUTPATIENT)
Dept: SLEEP CENTER | Facility: HOSPITAL | Age: 37
Discharge: HOME | End: 2022-12-21

## 2022-12-21 DIAGNOSIS — R51.9 MORNING HEADACHE: ICD-10-CM

## 2022-12-21 DIAGNOSIS — R06.83 SNORING: ICD-10-CM

## 2022-12-23 NOTE — PROGRESS NOTES
Home Sleep Study Documentation    HOME STUDY DEVICE: Noxturnal no                                           Haritha G3 yes      Pre-Sleep Home Study:    Set-up and instructions performed by: Hamzah Moody performed demonstration for Patient: yes    Return demonstration performed by Patient: yes    Written instructions provided to Patient: yes    Patient signed consent form: yes        Post-Sleep Home Study:    Additional comments by Patient:     Home Sleep Study Failed:no:    Failure reason: N/A    Reported or Detected: N/A    Scored by:  JULISSA Cancino

## 2022-12-31 DIAGNOSIS — E55.9 VITAMIN D DEFICIENCY: ICD-10-CM

## 2023-01-03 RX ORDER — ERGOCALCIFEROL 1.25 MG/1
CAPSULE ORAL
Qty: 4 CAPSULE | Refills: 11 | OUTPATIENT
Start: 2023-01-03

## 2023-01-12 NOTE — PROGRESS NOTES
Hematology/Oncology Outpatient Consult Note  Ronan Rubin 40 y o  female MRN: @ Encounter: [id number]        Date:  1/16/2023        CC:  Low TIBC      HPI:  Ronan Rubin is a 80-year-old female with a history of headaches, menorrhagia who was referred to hematology for evaluation of low TIBC  She is being seen for initial consultation 1/16/2023 regarding    Patient was seen by her PCP in December for her annual well check  Routine labs ordered including CBC, CMP, iron panel TSH  CBC with differential resulted in normal range  No evidence of anemia  Normal indices  CBC review dating back to 2018 has always been normal without any evidence of anemia or microcytosis  Her baseline hemoglobin ranges between 12 5 and 13 4  CMP is unremarkable  No evidence of renal insufficiency or abnormal LFTs  Iron panel demonstrated normal serum iron, normal iron saturation, normal ferritin, mildly decreased TIBC    Component Ref Range & Units 12/5/22 1118 10/6/20 1026   Iron Saturation 15 - 50 % 39  22 R    TIBC 250 - 450 ug/dL 199 Low   304    Iron 50 - 170 ug/dL 77  66 CM      Component Ref Range & Units 12/5/22 1118 10/6/20 1026   Ferritin 8 - 388 ng/mL 55  28        Patient was referred to hematology given findings of mildly decreased TIBC and concern she may be iron deficient  Patient denies any constitutional symptoms  Denies any concerning symptoms today  She does have some fatigue  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  Patient has experiencing heavy menstrual cycles  Fortunately this not has resulted in any anemia or evidence of iron deficiency        Test Results:    Imaging:       Labs:   Lab Results   Component Value Date    WBC 5 70 12/05/2022    HGB 13 0 12/05/2022    HCT 39 2 12/05/2022    MCV 95 12/05/2022     12/05/2022     Lab Results   Component Value Date    K 4 4 12/05/2022     (H) 12/05/2022    CO2 23 12/05/2022    BUN 13 12/05/2022    CREATININE 0 71 12/05/2022    GLUF 74 12/05/2022    CALCIUM 8 9 12/05/2022    AST 8 12/05/2022    ALT 13 12/05/2022    ALKPHOS 43 (L) 12/05/2022    EGFR 109 12/05/2022             ROS:  Review of Systems   All other systems reviewed and are negative            Active Problems:   Patient Active Problem List   Diagnosis   • Lumbar radiculopathy - Left   • Lumbar disc herniation   • Palpitations   • Galactorrhea   • Migraine without aura and with status migrainosus, not intractable   • Overweight   • GERD (gastroesophageal reflux disease)   • Viral syndrome   • Cervicalgia   • Snoring   • Morning headache   • ROMINA (obstructive sleep apnea)   • Abnormal result of iron profile testing       Past Medical History:   Past Medical History:   Diagnosis Date   • Allergic    • Asthma    • GERD (gastroesophageal reflux disease)    • HPV (human papilloma virus) infection    • HSV-1 infection    • HSV-2 infection        Surgical History:   Past Surgical History:   Procedure Laterality Date   • CERVICAL BIOPSY  W/ LOOP ELECTRODE EXCISION  2012    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   • CT EPIDURAL STEROID INJECTION (TAMI LUMBAR) N/A 2018    L5-S1, x2   • POSTERIOR LAMINECTOMY THORACIC AND LUMBAR SPINE N/A 2/5/2019    Procedure: Lumbar laminectomy, decompression, discectomy left L5-S1 ;  Surgeon: Bradley Tran MD;  Location: [facility];  Service: Orthopedics   • TONSILECTOMY AND ADNOIDECTOMY  1990   • WISDOM TOOTH EXTRACTION  2007       Family History:    Family History   Problem Relation Age of Onset   • Diabetes Mother    • Arthritis Mother    • Hypertension Mother    • Diabetes Father    • Arthritis Father         psoriatic   • Hyperlipidemia Father    • Cancer Father         bile ducts    • Hypertension Father    • Cancer Maternal Grandmother    • Esophageal varices Maternal Grandmother    • Abnormal EKG Maternal Grandmother    • Alcohol abuse Maternal Grandmother    • Cirrhosis Maternal Grandmother    • Cancer Maternal Grandfather    • Heart disease Maternal Grandfather    • Stroke Maternal Grandfather    • Brain cancer Maternal Grandfather    • Hypertension Paternal Grandmother    • Diabetes Paternal Grandmother    • Lung cancer Paternal Grandmother    • Diabetes Paternal Grandfather    • Heart disease Maternal Uncle    • Hypertension Paternal Aunt    • Diabetes Paternal Aunt    • Colon cancer Paternal Aunt    • Hypertension Paternal Uncle    • Diabetes Paternal Uncle    • Thyroid cancer Paternal Uncle    • Thyroid disease Neg Hx        Cancer-related family history includes Brain cancer in her maternal grandfather; Cancer in her father, maternal grandfather, and maternal grandmother; Colon cancer in her paternal aunt; Lung cancer in her paternal grandmother; Thyroid cancer in her paternal uncle      Social History:   Social History     Socioeconomic History   • Marital status: /Civil Union     Spouse name: Not on file   • Number of children: Not on file   • Years of education: Not on file   • Highest education level: Not on file   Occupational History   • Not on file   Tobacco Use   • Smoking status: Former     Types: Cigarettes     Quit date: 2016     Years since quittin 0   • Smokeless tobacco: Never   • Tobacco comments:     quit > 4 years ago    Vaping Use   • Vaping Use: Never used   Substance and Sexual Activity   • Alcohol use: Yes     Comment: Occasionally   • Drug use: No   • Sexual activity: Not Currently     Partners: Male     Birth control/protection: None   Other Topics Concern   • Not on file   Social History Narrative   • Not on file     Social Determinants of Health     Financial Resource Strain: Not on file   Food Insecurity: Not on file   Transportation Needs: Not on file   Physical Activity: Not on file   Stress: Not on file   Social Connections: Not on file   Intimate Partner Violence: Not At Risk   • Fear of Current or Ex-Partner: No   • Emotionally Abused: No   • Physically Abused: No   • Sexually Abused: No   Housing Stability: Not on file       Current Medications:   Current Outpatient Medications   Medication Sig Dispense Refill   • ascorbic acid (VITAMIN C) 500 MG tablet Take 500 mg by mouth daily     • cholecalciferol (VITAMIN D3) 1,000 units tablet Take 4,000 Units by mouth daily     • Cyanocobalamin (VITAMIN B 12 PO) Place 1,000 mcg under the tongue in the morning     • cyproheptadine (PERIACTIN) 4 mg tablet Take 1 tablet (4 mg total) by mouth daily at bedtime as needed for allergies (headaches) 30 tablet 0   • dexamethasone (DECADRON) 1 mg tablet Take 1 mg by mouth if needed     • Docusate Sodium 100 MG capsule Take 100-200 mg by mouth in the morning 100 mg Monday/Wednesday/Friday/Sunday  200 mg Tuesday/Thursday/Saturday     • FIBER PO Take 2 tablets by mouth in the morning Gummies     • fluticasone (FLONASE) 50 mcg/act nasal spray 1 spray into each nostril as needed       • ketorolac (TORADOL) 10 mg tablet Take 10 mg by mouth every 6 (six) hours as needed for moderate pain     • Loratadine 10 MG CAPS Take 10 mg by mouth daily      • methocarbamol (ROBAXIN) 500 mg tablet Take 1 tablet (500 mg total) by mouth daily at bedtime 30 tablet 3   • methylphenidate (Concerta) 27 MG ER tablet Take 1 tablet (27 mg total) by mouth daily Max Daily Amount: 27 mg 30 tablet 0   • naproxen sodium (ANAPROX) 550 mg tablet TAKE 1 TABLET (550 MG TOTAL) BY MOUTH 2 (TWO) TIMES A DAY WITH MEALS FOR MENSTRUAL CRAMPS (Patient taking differently: Take 550 mg by mouth if needed For menstrual cramps) 30 tablet 1   • ondansetron (ZOFRAN) 4 mg tablet Take 1 tablet (4 mg total) by mouth every 8 (eight) hours as needed for nausea or vomiting 20 tablet 0   • rizatriptan (MAXALT-MLT) 10 MG disintegrating tablet Take 1 tablet (10 mg total) by mouth once as needed for migraine May repeat every 2 hours if needed, max 20mg/24 hours 12 tablet 0   • Semaglutide-Weight Management (WEGOVY) 2 4 MG/0 75ML Inject 2 4 mg under the skin once a week • valACYclovir (VALTREX) 500 mg tablet Take 1 tablet (500 mg total) by mouth daily 90 tablet 0   • ergocalciferol (VITAMIN D2) 50,000 units TAKE 1 CAPSULE BY MOUTH WEEKLY (Patient not taking: Reported on 1/16/2023) 12 capsule 3     No current facility-administered medications for this visit  Allergies: Allergies   Allergen Reactions   • Sulfa Antibiotics      Family has allergy she has never taken   • Doxycycline Rash and Edema     Whole body rash,bruises behind both thighs         Physical Exam:    Body surface area is 1 97 meters squared  Wt Readings from Last 3 Encounters:   01/16/23 79 4 kg (175 lb)   12/05/22 80 9 kg (178 lb 6 4 oz)   11/28/22 80 9 kg (178 lb 6 4 oz)        Temp Readings from Last 3 Encounters:   01/16/23 98 °F (36 7 °C) (Temporal)   12/05/22 (!) 97 1 °F (36 2 °C) (Tympanic)   11/28/22 (!) 97 3 °F (36 3 °C) (Temporal)        BP Readings from Last 3 Encounters:   01/16/23 110/66   12/05/22 116/72   11/28/22 118/61         Pulse Readings from Last 3 Encounters:   01/16/23 103   12/05/22 64   11/28/22 75          Physical Exam  Constitutional:       General: She is not in acute distress  Appearance: Normal appearance  HENT:      Head: Normocephalic and atraumatic  Eyes:      General: No scleral icterus  Right eye: No discharge  Left eye: No discharge  Conjunctiva/sclera: Conjunctivae normal    Cardiovascular:      Rate and Rhythm: Normal rate and regular rhythm  Pulmonary:      Effort: Pulmonary effort is normal  No respiratory distress  Breath sounds: Normal breath sounds  Abdominal:      General: Bowel sounds are normal  There is no distension  Palpations: Abdomen is soft  There is no mass  Tenderness: There is no abdominal tenderness  Musculoskeletal:         General: Normal range of motion  Lymphadenopathy:      Cervical: No cervical adenopathy        Upper Body:      Right upper body: No supraclavicular, axillary or pectoral adenopathy  Left upper body: No supraclavicular, axillary or pectoral adenopathy  Skin:     General: Skin is warm and dry  Neurological:      General: No focal deficit present  Mental Status: She is alert and oriented to person, place, and time  Psychiatric:         Mood and Affect: Mood normal          Behavior: Behavior normal               Assessment/ Plan:    1  Abnormal result of iron profile testing      The patient is a very pleasant 56-year-old female who was noted to have mildly decreased total iron-binding capacity on recent routine labs  No evidence of anemia or iron deficiency  I reviewed with her today that a low TIBC is not clinically significant given all of her other normal findings  Low TIBC is not an indication of iron deficiency  A high TIBC would be indicative of iron deficiency  TIBC stands for total iron binding capacity and is reflection of transferring a protein that binds to iron molecules  This protein is synthesized in the liver and becomes considerably increased and states of iron deficiency  When there is an excess amount of free transferrin in the blood TIBC values decreased  Over the past 2 years, patient has had an increase in her total serum iron, iron saturation and ferritin this is likely why her TIBC is lower than it was previously  These findings are not clinically significant and do not indicate any evidence of iron deficiency or need for additional iron supplementation    There is nothing to do for this finding  Patient verbalized understanding of information discussed today  If she is noted to have any anemia or iron deficiency on future labs would be happy to see her and treat if needed  Patient will follow with hematology on an as-needed basis  I did instruct her to call or message me on my chart if she has any questions or concerns about any future lab results  Patient was in agreement with this plan of care  Barriers: None      Patient able to self care  Portions of the record may have been created with voice recognition software  Occasional wrong word or "sound a like" substitutions may have occurred due to the inherent limitations of voice recognition software  Read the chart carefully and recognize, using context, where substitutions have occurred

## 2023-01-16 ENCOUNTER — CONSULT (OUTPATIENT)
Dept: HEMATOLOGY ONCOLOGY | Facility: HOSPITAL | Age: 38
End: 2023-01-16

## 2023-01-16 VITALS
RESPIRATION RATE: 14 BRPM | SYSTOLIC BLOOD PRESSURE: 110 MMHG | OXYGEN SATURATION: 98 % | WEIGHT: 175 LBS | TEMPERATURE: 98 F | HEART RATE: 103 BPM | BODY MASS INDEX: 25.05 KG/M2 | DIASTOLIC BLOOD PRESSURE: 66 MMHG | HEIGHT: 70 IN

## 2023-01-16 DIAGNOSIS — R79.0 ABNORMAL RESULT OF IRON PROFILE TESTING: ICD-10-CM

## 2023-01-20 DIAGNOSIS — F90.0 ATTENTION DEFICIT HYPERACTIVITY DISORDER (ADHD), PREDOMINANTLY INATTENTIVE TYPE: ICD-10-CM

## 2023-01-20 DIAGNOSIS — J45.909 ASTHMA DUE TO ENVIRONMENTAL ALLERGIES: Primary | ICD-10-CM

## 2023-01-20 DIAGNOSIS — N94.6 DYSMENORRHEA: ICD-10-CM

## 2023-01-20 RX ORDER — FLUTICASONE PROPIONATE 50 MCG
1 SPRAY, SUSPENSION (ML) NASAL AS NEEDED
Qty: 9.9 G | Refills: 2 | Status: SHIPPED | OUTPATIENT
Start: 2023-01-20

## 2023-01-20 RX ORDER — METHYLPHENIDATE HYDROCHLORIDE 27 MG/1
27 TABLET ORAL DAILY
Qty: 30 TABLET | Refills: 0 | Status: SHIPPED | OUTPATIENT
Start: 2023-01-20

## 2023-01-20 RX ORDER — ALBUTEROL SULFATE 90 UG/1
2 AEROSOL, METERED RESPIRATORY (INHALATION) EVERY 6 HOURS PRN
Qty: 6.7 G | Refills: 1 | Status: SHIPPED | OUTPATIENT
Start: 2023-01-20 | End: 2023-02-19

## 2023-01-20 NOTE — TELEPHONE ENCOUNTER
Patient also sent in a message requesting a refill of Albuterol - however, her records only show that we only ever gave her albuterol once for having COVID over a year ago  Will route request to patient's provider to determine wether or not refill is appropriate

## 2023-01-24 RX ORDER — NAPROXEN SODIUM 550 MG/1
550 TABLET ORAL 2 TIMES DAILY WITH MEALS
Qty: 30 TABLET | Refills: 0 | Status: SHIPPED | OUTPATIENT
Start: 2023-01-24

## 2023-01-27 ENCOUNTER — PATIENT MESSAGE (OUTPATIENT)
Dept: FAMILY MEDICINE CLINIC | Facility: CLINIC | Age: 38
End: 2023-01-27

## 2023-01-27 DIAGNOSIS — R00.0 TACHYCARDIA: Primary | ICD-10-CM

## 2023-01-30 ENCOUNTER — APPOINTMENT (OUTPATIENT)
Dept: LAB | Facility: CLINIC | Age: 38
End: 2023-01-30

## 2023-01-30 DIAGNOSIS — R00.0 TACHYCARDIA: ICD-10-CM

## 2023-01-30 LAB
ALBUMIN SERPL BCP-MCNC: 3.9 G/DL (ref 3.5–5)
ALP SERPL-CCNC: 45 U/L (ref 46–116)
ALT SERPL W P-5'-P-CCNC: 12 U/L (ref 12–78)
ANION GAP SERPL CALCULATED.3IONS-SCNC: 4 MMOL/L (ref 4–13)
AST SERPL W P-5'-P-CCNC: 9 U/L (ref 5–45)
BASOPHILS # BLD AUTO: 0.03 THOUSANDS/ÂΜL (ref 0–0.1)
BASOPHILS NFR BLD AUTO: 1 % (ref 0–1)
BILIRUB SERPL-MCNC: 0.46 MG/DL (ref 0.2–1)
BUN SERPL-MCNC: 11 MG/DL (ref 5–25)
CALCIUM SERPL-MCNC: 9.4 MG/DL (ref 8.3–10.1)
CHLORIDE SERPL-SCNC: 111 MMOL/L (ref 96–108)
CO2 SERPL-SCNC: 24 MMOL/L (ref 21–32)
CREAT SERPL-MCNC: 0.77 MG/DL (ref 0.6–1.3)
EOSINOPHIL # BLD AUTO: 0.15 THOUSAND/ÂΜL (ref 0–0.61)
EOSINOPHIL NFR BLD AUTO: 3 % (ref 0–6)
ERYTHROCYTE [DISTWIDTH] IN BLOOD BY AUTOMATED COUNT: 12.2 % (ref 11.6–15.1)
FERRITIN SERPL-MCNC: 43 NG/ML (ref 8–388)
GFR SERPL CREATININE-BSD FRML MDRD: 99 ML/MIN/1.73SQ M
GLUCOSE SERPL-MCNC: 76 MG/DL (ref 65–140)
HCT VFR BLD AUTO: 38.9 % (ref 34.8–46.1)
HGB BLD-MCNC: 13 G/DL (ref 11.5–15.4)
IMM GRANULOCYTES # BLD AUTO: 0.01 THOUSAND/UL (ref 0–0.2)
IMM GRANULOCYTES NFR BLD AUTO: 0 % (ref 0–2)
IRON SATN MFR SERPL: 34 % (ref 15–50)
IRON SERPL-MCNC: 80 UG/DL (ref 50–170)
LYMPHOCYTES # BLD AUTO: 2 THOUSANDS/ÂΜL (ref 0.6–4.47)
LYMPHOCYTES NFR BLD AUTO: 33 % (ref 14–44)
MAGNESIUM SERPL-MCNC: 2 MG/DL (ref 1.6–2.6)
MCH RBC QN AUTO: 31 PG (ref 26.8–34.3)
MCHC RBC AUTO-ENTMCNC: 33.4 G/DL (ref 31.4–37.4)
MCV RBC AUTO: 93 FL (ref 82–98)
MONOCYTES # BLD AUTO: 0.52 THOUSAND/ÂΜL (ref 0.17–1.22)
MONOCYTES NFR BLD AUTO: 9 % (ref 4–12)
NEUTROPHILS # BLD AUTO: 3.37 THOUSANDS/ÂΜL (ref 1.85–7.62)
NEUTS SEG NFR BLD AUTO: 54 % (ref 43–75)
NRBC BLD AUTO-RTO: 0 /100 WBCS
PLATELET # BLD AUTO: 205 THOUSANDS/UL (ref 149–390)
PMV BLD AUTO: 11 FL (ref 8.9–12.7)
POTASSIUM SERPL-SCNC: 4 MMOL/L (ref 3.5–5.3)
PROT SERPL-MCNC: 6.9 G/DL (ref 6.4–8.4)
RBC # BLD AUTO: 4.2 MILLION/UL (ref 3.81–5.12)
SODIUM SERPL-SCNC: 139 MMOL/L (ref 135–147)
TIBC SERPL-MCNC: 232 UG/DL (ref 250–450)
TSH SERPL DL<=0.05 MIU/L-ACNC: 1.53 UIU/ML (ref 0.45–4.5)
WBC # BLD AUTO: 6.08 THOUSAND/UL (ref 4.31–10.16)

## 2023-02-01 ENCOUNTER — OFFICE VISIT (OUTPATIENT)
Dept: FAMILY MEDICINE CLINIC | Facility: CLINIC | Age: 38
End: 2023-02-01

## 2023-02-01 VITALS
BODY MASS INDEX: 25.11 KG/M2 | WEIGHT: 175.4 LBS | DIASTOLIC BLOOD PRESSURE: 66 MMHG | HEART RATE: 87 BPM | SYSTOLIC BLOOD PRESSURE: 100 MMHG | RESPIRATION RATE: 16 BRPM | TEMPERATURE: 97.2 F | HEIGHT: 70 IN | OXYGEN SATURATION: 99 %

## 2023-02-01 DIAGNOSIS — R00.2 PALPITATIONS: Primary | ICD-10-CM

## 2023-02-01 DIAGNOSIS — R00.0 TACHYCARDIA: ICD-10-CM

## 2023-02-01 DIAGNOSIS — J45.998 ASTHMA NIGHT-TIME SYMPTOMS: ICD-10-CM

## 2023-02-01 LAB
THYROGLOB AB SERPL-ACNC: <1 IU/ML (ref 0–0.9)
THYROPEROXIDASE AB SERPL-ACNC: <9 IU/ML (ref 0–34)

## 2023-02-01 NOTE — PATIENT INSTRUCTIONS
Try decreasing dose of Concerta to 18 mg daily from 27 mg  Call and schedule Holter monitor  Schedule with cardiology   Schedule with pulmonary   Follow up as needed

## 2023-02-01 NOTE — PROGRESS NOTES
Name: Cisco Lockhart      : 1985      MRN: [id number]  Encounter Provider: DEB Jha  Encounter Date: 2023   Encounter department: [facility]    Assessment & Plan     1  Palpitations  -     Holter monitor; Future; Expected date: 2023  -     Ambulatory Referral to Cardiology; Future    2  Tachycardia  -     Holter monitor; Future; Expected date: 2023  -     Ambulatory Referral to Cardiology; Future    3  Asthma night-time symptoms  -     Ambulatory Referral to Pulmonology; Future           Subjective       Reports has to - Yawn  to take a deep breath   Has had an initial work up with pulmonary but "could not finish testing"  Has cut back  Caffeine intake at work this week end at work but still had "runs of tachycardia"   WIll trial reducing concerta to 18 mg daily and monitor effects   Referral to pulmonary    Referral to cardiology      Palpitations  This is a chronic (more tachycardia than palpations) problem  The current episode started today  The problem occurs intermittently  The problem has been unchanged  Pertinent negatives include no abdominal pain, anorexia, arthralgias, congestion, fatigue, joint swelling, nausea, neck pain, swollen glands, vertigo or vomiting  Nothing aggravates the symptoms  She has tried nothing for the symptoms  Review of Systems   Constitutional: Negative  Negative for activity change, appetite change and fatigue  HENT: Negative  Negative for congestion  Eyes: Negative  Respiratory: Negative  Cardiovascular: Positive for palpitations ("racing heart")  Gastrointestinal: Negative  Negative for abdominal pain, anorexia, nausea and vomiting  Endocrine: Negative  Genitourinary: Negative  Musculoskeletal: Negative  Negative for arthralgias, joint swelling and neck pain  Skin: Negative  Allergic/Immunologic: Negative  Neurological: Negative  Negative for vertigo  Hematological: Negative  Psychiatric/Behavioral: Negative          Current Outpatient Medications on File Prior to Visit   Medication Sig   • albuterol (PROVENTIL HFA,VENTOLIN HFA) 90 mcg/act inhaler Inhale 2 puffs every 6 (six) hours as needed for wheezing   • ascorbic acid (VITAMIN C) 500 MG tablet Take 500 mg by mouth daily   • Cyanocobalamin (VITAMIN B 12 PO) Place 1,000 mcg under the tongue in the morning   • cyproheptadine (PERIACTIN) 4 mg tablet Take 1 tablet (4 mg total) by mouth daily at bedtime as needed for allergies (headaches)   • dexamethasone (DECADRON) 1 mg tablet Take 1 mg by mouth if needed   • Docusate Sodium 100 MG capsule Take 100-200 mg by mouth in the morning 100 mg Monday/Wednesday/Friday/Sunday  200 mg Tuesday/Thursday/Saturday   • FIBER PO Take 2 tablets by mouth in the morning Gummies   • fluticasone (FLONASE) 50 mcg/act nasal spray 1 spray into each nostril as needed for allergies   • ketorolac (TORADOL) 10 mg tablet Take 10 mg by mouth every 6 (six) hours as needed for moderate pain   • Loratadine 10 MG CAPS Take 10 mg by mouth daily    • methocarbamol (ROBAXIN) 500 mg tablet Take 1 tablet (500 mg total) by mouth daily at bedtime   • methylphenidate (Concerta) 27 MG ER tablet Take 1 tablet (27 mg total) by mouth daily Max Daily Amount: 27 mg   • naproxen sodium (ANAPROX) 550 mg tablet Take 1 tablet (550 mg total) by mouth 2 (two) times a day with meals For menstrual cramps   • ondansetron (ZOFRAN) 4 mg tablet Take 1 tablet (4 mg total) by mouth every 8 (eight) hours as needed for nausea or vomiting   • rizatriptan (MAXALT-MLT) 10 MG disintegrating tablet Take 1 tablet (10 mg total) by mouth once as needed for migraine May repeat every 2 hours if needed, max 20mg/24 hours   • Semaglutide-Weight Management (WEGOVY) 2 4 MG/0 75ML Inject 2 4 mg under the skin once a week   • valACYclovir (VALTREX) 500 mg tablet Take 1 tablet (500 mg total) by mouth daily   • [DISCONTINUED] cholecalciferol (VITAMIN D3) 1,000 units tablet Take 4,000 Units by mouth daily   • ergocalciferol (VITAMIN D2) 50,000 units TAKE 1 CAPSULE BY MOUTH WEEKLY (Patient not taking: Reported on 1/16/2023)       Objective     /66 (BP Location: Right arm, Patient Position: Sitting, Cuff Size: Large)   Pulse 87   Temp (!) 97 2 °F (36 2 °C) (Tympanic)   Resp 16   Ht 5' 10" (1 778 m)   Wt 79 6 kg (175 lb 6 4 oz)   LMP 01/06/2023   SpO2 99%   BMI 25 17 kg/m²     Physical Exam  Vitals and nursing note reviewed  Constitutional:       General: She is not in acute distress  Appearance: Normal appearance  She is not ill-appearing  HENT:      Head: Normocephalic and atraumatic  Nose: Nose normal  No congestion  Mouth/Throat:      Mouth: Mucous membranes are moist       Pharynx: Oropharynx is clear  No oropharyngeal exudate  Eyes:      General:         Right eye: No discharge  Left eye: No discharge  Extraocular Movements: Extraocular movements intact  Conjunctiva/sclera: Conjunctivae normal    Cardiovascular:      Rate and Rhythm: Normal rate and regular rhythm  Pulses: Normal pulses  Heart sounds: Normal heart sounds  No murmur heard  Pulmonary:      Effort: Pulmonary effort is normal       Breath sounds: Normal breath sounds  Abdominal:      General: Bowel sounds are normal       Palpations: Abdomen is soft  Musculoskeletal:         General: Normal range of motion  Cervical back: Normal range of motion and neck supple  No rigidity  Lymphadenopathy:      Cervical: No cervical adenopathy  Skin:     General: Skin is warm and dry  Capillary Refill: Capillary refill takes less than 2 seconds  Neurological:      Mental Status: She is alert and oriented to person, place, and time  Psychiatric:         Mood and Affect: Mood normal          Thought Content:  Thought content normal          Judgment: Judgment normal        DEB Leal

## 2023-02-09 ENCOUNTER — CONSULT (OUTPATIENT)
Dept: PULMONOLOGY | Facility: CLINIC | Age: 38
End: 2023-02-09

## 2023-02-09 ENCOUNTER — HOSPITAL ENCOUNTER (OUTPATIENT)
Dept: NON INVASIVE DIAGNOSTICS | Age: 38
Discharge: HOME | End: 2023-02-09

## 2023-02-09 VITALS
DIASTOLIC BLOOD PRESSURE: 68 MMHG | HEIGHT: 70 IN | SYSTOLIC BLOOD PRESSURE: 112 MMHG | OXYGEN SATURATION: 99 % | TEMPERATURE: 97.8 F | BODY MASS INDEX: 25.34 KG/M2 | WEIGHT: 177 LBS | HEART RATE: 56 BPM

## 2023-02-09 DIAGNOSIS — J45.998 ASTHMA NIGHT-TIME SYMPTOMS: ICD-10-CM

## 2023-02-09 DIAGNOSIS — R00.0 TACHYCARDIA: ICD-10-CM

## 2023-02-09 DIAGNOSIS — R00.2 PALPITATIONS: ICD-10-CM

## 2023-02-09 RX ORDER — PREDNISONE 20 MG/1
40 TABLET ORAL DAILY
Qty: 10 TABLET | Refills: 0 | Status: SHIPPED | OUTPATIENT
Start: 2023-02-09 | End: 2023-02-22 | Stop reason: ALTCHOICE

## 2023-02-09 RX ORDER — BUDESONIDE AND FORMOTEROL FUMARATE DIHYDRATE 160; 4.5 UG/1; UG/1
2 AEROSOL RESPIRATORY (INHALATION) 2 TIMES DAILY
Qty: 10.2 G | Refills: 1 | Status: SHIPPED | OUTPATIENT
Start: 2023-02-09

## 2023-02-09 NOTE — PROGRESS NOTES
Pulmonary Initial Visit  Olvin Isbell 40 y o  female MRN: [id number]  @ Encounter: [id number]      Impressions/Recommendations:   Patient is a 52-year-old male who presents to establish pulmonary care in setting of abnormal breathing episodes  She has a yawning type sensation which makes it feel that she has difficulty taking a deep breath  The etiology of this is unclear at this time  Will trial Symbicort to see if there is any benefit from this  Additionally, will provide patient with a peak flow meter to evaluate his actual lung volumes  There is no indication for additional imaging at this immediate time  I asked the patient  A symptom diary to know for any associated changes  It is unclear the exact benefit of prednisone, may trial on an as-needed basis  She does report her current seasonal allergies are controlled  Patient may follow up in 3 months or sooner as necessary  History of Present Illness   HPI:  Olvin Isbell is a 40 y o  female who presents to establish pulmonary care  The patient will note a yawning to feel like she can take it a deep breath  She has noted the episodes 2-3x/week  There is no association with menstrual cycle or other activity  The patient current is wearing a holter monitor  She has resting tachycardia  Her mother was recently diagnosed with AFib  There are associated palpitations  She was noted to have PVCs previously  Her symptoms at that time did not coincide with events on monitor  She had exercise induced asthma as a child  She underwent a + methacholine challenge test   Her current symptoms will feel like she did at that time  She was using her albuterol inhaler about 1x/year but has noted increased frequency  There is only transient benefit  She only used her steroid inhaler after flu/covid  She is allergic to dust and change of seasons  She does not wake from sleep with similar symptoms        She has been on Celestine for about 1 year  She has been on concerta for > 6 months  She denies fevers, chills, nausea or vomiting  She denies night sweats  The patient noted she did well in [place]  The patient notes islet cells with significant reflux  Review of systems:  Review of systems was completed and was otherwise negative except as listed in HPI      Historical Information   Past Medical History:   Diagnosis Date   • Allergic    • Asthma    • GERD (gastroesophageal reflux disease)    • HPV (human papilloma virus) infection    • HSV-1 infection    • HSV-2 infection      Past Surgical History:   Procedure Laterality Date   • CERVICAL BIOPSY  W/ LOOP ELECTRODE EXCISION  2012    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   • CT EPIDURAL STEROID INJECTION (TAMI LUMBAR) N/A 2018    L5-S1, x2   • POSTERIOR LAMINECTOMY THORACIC AND LUMBAR SPINE N/A 2/5/2019    Procedure: Lumbar laminectomy, decompression, discectomy left L5-S1 ;  Surgeon: Eligio Desai MD;  Location: [facility];  Service: Orthopedics   • TONSILECTOMY AND ADNOIDECTOMY  1990   • WISDOM TOOTH EXTRACTION  2007     Family History   Problem Relation Age of Onset   • Diabetes Mother    • Arthritis Mother    • Hypertension Mother    • Diabetes Father    • Arthritis Father         psoriatic   • Hyperlipidemia Father    • Cancer Father         bile ducts    • Hypertension Father    • Cancer Maternal Grandmother    • Esophageal varices Maternal Grandmother    • Abnormal EKG Maternal Grandmother    • Alcohol abuse Maternal Grandmother    • Cirrhosis Maternal Grandmother    • Cancer Maternal Grandfather    • Heart disease Maternal Grandfather    • Stroke Maternal Grandfather    • Brain cancer Maternal Grandfather    • Hypertension Paternal Grandmother    • Diabetes Paternal Grandmother    • Lung cancer Paternal Grandmother    • Diabetes Paternal Grandfather    • Heart disease Maternal Uncle    • Hypertension Paternal Aunt    • Diabetes Paternal Aunt    • Colon cancer Paternal Aunt    • Hypertension Paternal Uncle    • Diabetes Paternal Uncle    • Thyroid cancer Paternal Uncle    • Thyroid disease Neg Hx        Social History     Socioeconomic History   • Marital status: /Civil Union     Spouse name: None   • Number of children: None   • Years of education: None   • Highest education level: None   Occupational History   • None   Tobacco Use   • Smoking status: Former     Types: Cigarettes     Start date:      Quit date: 2016     Years since quittin 1   • Smokeless tobacco: Never   • Tobacco comments:     quit > 4 years ago    Vaping Use   • Vaping Use: Never used   Substance and Sexual Activity   • Alcohol use: Yes     Comment: Occasionally   • Drug use: Yes     Types: Marijuana     Comment: vapes medical narijuana   • Sexual activity: Not Currently     Partners: Male     Birth control/protection: None   Other Topics Concern   • None   Social History Narrative   • None     Social Determinants of Health     Financial Resource Strain: Not on file   Food Insecurity: Not on file   Transportation Needs: Not on file   Physical Activity: Not on file   Stress: Not on file   Social Connections: Not on file   Intimate Partner Violence: Not At Risk   • Fear of Current or Ex-Partner: No   • Emotionally Abused: No   • Physically Abused: No   • Sexually Abused: No   Housing Stability: Not on file       Meds/Allergies   No current facility-administered medications for this visit  (Not in a hospital admission)    Allergies   Allergen Reactions   • Sulfa Antibiotics      Family has allergy she has never taken   • Doxycycline Rash and Edema     Whole body rash,bruises behind both thighs       Vitals: Blood pressure 112/68, pulse 56, temperature 97 8 °F (36 6 °C), temperature source Tympanic, height 5' 10" (1 778 m), weight 80 3 kg (177 lb), SpO2 99 %, not currently breastfeeding , RA, Body mass index is 25 4 kg/m²      Physical Exam  General: Pleasant, Awake alert and oriented x 3, conversant without conversational dyspnea, NAD, normal affect  HEENT:  PERRL, Sclera noninjected, nonicteric OU, Nares patent, no nasal flaring, no nasal drainage, Mucous membranes, moist, no oral lesions, normal dentition  NECK: Trachea midline, no accessory muscle use, no stridor, no cervical or supraclavicular adenopathy, JVP not elevated  CARDIAC: Reg, single s1/S2, no m/r/g  PULM: CTA b/l; no w/r/c  CHEST: No gross deformities, equal chest expansion on inspiration bilaterally  ABD: Normoactive bowel sounds, soft nontender, nondistended, no rebound, no rigidity, no guarding  EXT: No cyanosis, no clubbing, no edema, normal capillary refill  SKIN:  No rashes, no lesions  NEURO: no focal neurologic deficits, AAOx3, moving all extremities appropriately    Labs: I have personally reviewed pertinent lab results  Lab Results   Component Value Date    SODIUM 139 01/30/2023    K 4 0 01/30/2023     (H) 01/30/2023    CO2 24 01/30/2023    BUN 11 01/30/2023    CREATININE 0 77 01/30/2023    GLUC 76 01/30/2023    CALCIUM 9 4 01/30/2023     Lab Results   Component Value Date    WBC 6 08 01/30/2023    HGB 13 0 01/30/2023    HCT 38 9 01/30/2023    MCV 93 01/30/2023     01/30/2023     Imaging and other studies: I have personally reviewed pertinent reports  and I have personally reviewed pertinent films in PACS  CXR 1/19/2022:  Cardiomediastinal silhouette appears unremarkable  The lungs are clear  No pneumothorax or pleural effusion  Osseous structures appear within normal limits for patient age  Pulmonary function testing:    No pulmonary function testing available for review  Echocardiogram: I have personally reviewed pertinent reports  6/26/2019:  LEFT VENTRICLE: Size was normal  Systolic function was normal by visual assessment  Ejection fraction was estimated to be 60 %  There were no regional wall motion abnormalities   Wall thickness was normal  DOPPLER: The ratio of early  ventricular filling to atrial contraction velocities was within the normal range  Left ventricular diastolic function parameters were normal   RIGHT VENTRICLE: The size was normal  Systolic function was normal   LEFT ATRIUM: Size was normal   RIGHT ATRIUM: Size was normal   MITRAL VALVE: Valve structure was normal  There was normal leaflet separation  DOPPLER: The transmitral velocity was within the normal range  There was no evidence for stenosis  There was trace regurgitation  AORTIC VALVE: The valve was trileaflet  Leaflets exhibited normal thickness and normal cuspal separation  DOPPLER: Transaortic velocity was within the normal range  There was no evidence for stenosis  There was no regurgitation  TRICUSPID VALVE: The valve structure was normal  There was normal leaflet separation  DOPPLER: The transtricuspid velocity was within the normal range  There was no evidence for stenosis  There was trace regurgitation  EKG, Pathology, and Other Studies:   Holter - limited    Home Study 12/23/2022: This study did not demonstrate evidence for obstructive sleep apnea but findings may be suggestive  Referring:  DEB Montgomery  [address] [facility] [address]  [place],  [facility] [address]

## 2023-02-11 DIAGNOSIS — J45.909 ASTHMA DUE TO ENVIRONMENTAL ALLERGIES: ICD-10-CM

## 2023-02-11 RX ORDER — FLUTICASONE PROPIONATE 50 MCG
1 SPRAY, SUSPENSION (ML) NASAL AS NEEDED
Qty: 48 ML | Refills: 1 | Status: SHIPPED | OUTPATIENT
Start: 2023-02-11

## 2023-02-22 ENCOUNTER — OFFICE VISIT (OUTPATIENT)
Dept: BARIATRICS | Facility: CLINIC | Age: 38
End: 2023-02-22

## 2023-02-22 VITALS
HEART RATE: 76 BPM | WEIGHT: 175.4 LBS | BODY MASS INDEX: 25.11 KG/M2 | SYSTOLIC BLOOD PRESSURE: 110 MMHG | HEIGHT: 70 IN | DIASTOLIC BLOOD PRESSURE: 68 MMHG

## 2023-02-22 DIAGNOSIS — E66.3 OVERWEIGHT: Primary | ICD-10-CM

## 2023-02-22 DIAGNOSIS — G43.001 MIGRAINE WITHOUT AURA AND WITH STATUS MIGRAINOSUS, NOT INTRACTABLE: ICD-10-CM

## 2023-02-22 RX ORDER — MELATONIN
4000 DAILY
COMMUNITY

## 2023-02-22 NOTE — ASSESSMENT & PLAN NOTE
-Patient is pursuing Conservative Program  Previously completed Healthy CORE  -Initial weight loss goal of 5-10% weight loss for improved health-MET   - Not a candidate for phentermine, on Concerta and sporadic medical marijuana  - Patient denies personal history of pancreatitis  Patient also denies personal and family history of medullary thyroid cancer and multiple endocrine neoplasia type 2 (MEN 2 tumor)  Uncle has history of papillary thyroid cancer    - Wegovy started at 234 6lb on 2/17/22  Currently taking 2 4 mg weekly  Tolerating well and helping with appetite  Has on pen left and cost has increased  Given BMI so close to normal - current BMI 25 02, will discontinue Wegovy and continue to monitor weight  Can consider restarting if needed in the future  - Labs reviewed: CMP, CBC, and TSH 1/30/2023 were within acceptable range  A1C and lipid panel 12/5/2022  HDL low, which will likely improve with cardiovascular exercise, otherwise labs within acceptable range  Initial: 247 2 lbs   Last visit:  181 lbs  Current: 175 4 lbs BMI 25 02  Change:- 71 8 lbs (-5 6 lbs since the last office visit)  Goal:180s -met    Goals:  Continue food logging, trying to stay between 4249-6240 calories  Try to make sure getting 70 gm of protein  Keep up the great work with water intake - at least 64 oz daily  Gradually increase exercise to goal of 5 days per week for 30 minutes cardiovascular exercise  She also plans on starting weight lifting

## 2023-02-22 NOTE — PROGRESS NOTES
Assessment/Plan:     Overweight  -Patient is pursuing Conservative Program  Previously completed Healthy CORE  -Initial weight loss goal of 5-10% weight loss for improved health-MET   - Not a candidate for phentermine, on Concerta and sporadic medical marijuana  - Patient denies personal history of pancreatitis  Patient also denies personal and family history of medullary thyroid cancer and multiple endocrine neoplasia type 2 (MEN 2 tumor)  Uncle has history of papillary thyroid cancer    - Wegovy started at 234 6lb on 2/17/22  Currently taking 2 4 mg weekly  Tolerating well and helping with appetite  Has on pen left and cost has increased  Given BMI so close to normal - current BMI 25 02, will discontinue Wegovy and continue to monitor weight  Can consider restarting if needed in the future  - Labs reviewed: CMP, CBC, and TSH 1/30/2023 were within acceptable range  A1C and lipid panel 12/5/2022  HDL low, which will likely improve with cardiovascular exercise, otherwise labs within acceptable range  Initial: 247 2 lbs   Last visit:  181 lbs  Current: 175 4 lbs BMI 25 02  Change:- 71 8 lbs (-5 6 lbs since the last office visit)  Goal:180s -met    Goals:  Continue food logging, trying to stay between 0671-2687 calories  Try to make sure getting 70 gm of protein  Keep up the great work with water intake - at least 64 oz daily  Gradually increase exercise to goal of 5 days per week for 30 minutes cardiovascular exercise  She also plans on starting weight lifting  Migraine without aura and with status migrainosus, not intractable  - Taking maxalt as needed  May improve with weight loss and lifestyle modification  Continue management with prescribing provider  Kathryn Qureshi was seen today for follow-up      Diagnoses and all orders for this visit:    Overweight    Migraine without aura and with status migrainosus, not intractable          Follow up in approximately 3 months with Non-Surgical Physician/Advanced Practitioner  Subjective:   Chief Complaint   Patient presents with   • Follow-up     3 month MWM       Patient ID: Ginette Butler  is a 40 y o  female with excess weight/obesity here to pursue weight management  Patient is pursuing Conservative Program    Most recent notes and records were reviewed  HPI    Wt Readings from Last 10 Encounters:   02/22/23 79 6 kg (175 lb 6 4 oz)   02/09/23 80 3 kg (177 lb)   02/01/23 79 6 kg (175 lb 6 4 oz)   01/16/23 79 4 kg (175 lb)   12/05/22 80 9 kg (178 lb 6 4 oz)   11/28/22 80 9 kg (178 lb 6 4 oz)   11/16/22 82 1 kg (181 lb)   10/03/22 88 5 kg (195 lb)   09/13/22 90 2 kg (198 lb 12 8 oz)   08/22/22 91 4 kg (201 lb 9 6 oz)     Previously completed Healthy CORE  Taking Wegovy 2 4 mg weekly  No negative side effects  Helping with appetite  Cost increased recently and does not plan on continuing  Has one pen left  Occasional craving around catamenial time  Has been food logging  Getting 0621-6704 typically  Typically has a hard time getting to the recommended 1450 calories per day  B- skips or avocado toast with egg whites or oatmeal   S- none typically  L- leftovers or chicken and rice   S- none or protein bar or candy   D- protein and sometimes veggies and starch  S- none or ice cream or rice pudding    Hydration- minimum 60 oz water, coffee with cream and sugar 3 days per week, occ gatorade 12 oz, very rare diet soda   Alcohol- very rare  Tobacco- denies, sporadic medical marijuana vape  Exercise- 20 minute exercise video 3-5 times per week  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    Sleep- 5-8 hours    Colonoscopy: UTD, due age 37  Mammogram: N/A      The following portions of the patient's history were reviewed and updated as appropriate: allergies, current medications, past family history, past medical history, past social history, past surgical history, and problem list     Family History Problem Relation Age of Onset   • Diabetes Mother    • Arthritis Mother    • Hypertension Mother    • Atrial fibrillation Mother    • Diabetes Father    • Arthritis Father         psoriatic   • Hyperlipidemia Father    • Cancer Father         bile ducts    • Hypertension Father    • Heart disease Maternal Uncle    • Hypertension Paternal Aunt    • Diabetes Paternal Aunt    • Colon cancer Paternal Aunt    • Hypertension Paternal Uncle    • Diabetes Paternal Uncle    • Thyroid cancer Paternal Uncle         Papillary thyroid cancer   • Cancer Maternal Grandmother    • Esophageal varices Maternal Grandmother    • Abnormal EKG Maternal Grandmother    • Alcohol abuse Maternal Grandmother    • Cirrhosis Maternal Grandmother    • Cancer Maternal Grandfather    • Heart disease Maternal Grandfather    • Stroke Maternal Grandfather    • Brain cancer Maternal Grandfather    • Hypertension Paternal Grandmother    • Diabetes Paternal Grandmother    • Lung cancer Paternal Grandmother    • Diabetes Paternal Grandfather    • Thyroid disease Neg Hx         Review of Systems   HENT: Negative for sore throat  Respiratory: Positive for shortness of breath (sees pulmonary)  Negative for cough  Cardiovascular: Positive for palpitations (seeing cardiology)  Negative for chest pain  Gastrointestinal: Positive for constipation (chronic)  Negative for abdominal pain, diarrhea, nausea and vomiting         + GERD controlled with diet and medication   Musculoskeletal: Positive for arthralgias (chronic) and back pain (chronic)  Skin: Negative for rash  Psychiatric/Behavioral: Negative for suicidal ideas (or HI)  The patient is not nervous/anxious  Denies depression   + anxiety situational       Objective:  /68   Pulse 76   Ht 5' 10 2" (1 783 m)   Wt 79 6 kg (175 lb 6 4 oz)   BMI 25 02 kg/m²     Physical Exam  Vitals and nursing note reviewed          Constitutional   General appearance: Abnormal   well developed and overweight  Eyes No conjunctival injection  Ears, Nose, Mouth, and Throat Oral mucosa moist    Pulmonary   Respiratory effort: No increased work of breathing or signs of respiratory distress  Cardiovascular   Examination of extremities for edema and/or varicosities: Normal   no edema  Abdomen   Abdomen: Abnormal overweight       Musculoskeletal   Normal range of motion  Neurological   Gait and station: Normal    Psychiatric   Orientation to person, place and time: Normal     Affect: appropriate

## 2023-02-22 NOTE — ASSESSMENT & PLAN NOTE
- Taking maxalt as needed  May improve with weight loss and lifestyle modification  Continue management with prescribing provider

## 2023-02-24 DIAGNOSIS — F90.0 ATTENTION DEFICIT HYPERACTIVITY DISORDER (ADHD), PREDOMINANTLY INATTENTIVE TYPE: ICD-10-CM

## 2023-02-24 RX ORDER — METHYLPHENIDATE HYDROCHLORIDE 27 MG/1
27 TABLET ORAL DAILY
Qty: 30 TABLET | Refills: 0 | Status: SHIPPED | OUTPATIENT
Start: 2023-02-24

## 2023-03-08 ENCOUNTER — OFFICE VISIT (OUTPATIENT)
Dept: CARDIOLOGY CLINIC | Facility: CLINIC | Age: 38
End: 2023-03-08

## 2023-03-08 ENCOUNTER — TELEPHONE (OUTPATIENT)
Dept: CARDIOLOGY CLINIC | Facility: CLINIC | Age: 38
End: 2023-03-08

## 2023-03-08 VITALS
OXYGEN SATURATION: 98 % | DIASTOLIC BLOOD PRESSURE: 70 MMHG | BODY MASS INDEX: 25.82 KG/M2 | SYSTOLIC BLOOD PRESSURE: 102 MMHG | WEIGHT: 181 LBS | HEART RATE: 67 BPM

## 2023-03-08 DIAGNOSIS — R00.2 PALPITATIONS: Primary | ICD-10-CM

## 2023-03-08 NOTE — PROGRESS NOTES
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  Patient comes for relation of palpitations, the patient is a good historian  She describes intermittent palpitations, over the last 6 to 7 months  With waxing and waning  No particular triggering factors, she noticed that her heart fast, no syncope or presyncope  Her smart phone notices heart rates in the 120s as well  O2 sat monitor  No associated chest pain or chest discomfort or dyspnea or diaphoresis  She does have several family members including both parents with atrial fibrillation  The patient was on weight loss medication until recently, no phentermine like medications  Denies any thyroid problems  Recent blood work revealed normal electrolytes including potassium calcium and magnesium  He tries to be active, exercise 3 times a week, no exertional symptoms  The patient was seen by myself 4 years ago because of palpitations, her symptoms were Somewhat different than her current symptoms  More isolated and short  At that time a stress test and echocardiogram were unrevealing, she recent underwent a 24-hour Holter monitor revealed no significant abnormalities  She had no symptoms however  Patient is currently not pregnant, she manages stress adequately    Non-smoker    Patient Active Problem List   Diagnosis   • Lumbar radiculopathy - Left   • Lumbar disc herniation   • Palpitations   • Galactorrhea   • Migraine without aura and with status migrainosus, not intractable   • Overweight   • GERD (gastroesophageal reflux disease)   • Viral syndrome   • Cervicalgia   • Snoring   • Morning headache   • ROMINA (obstructive sleep apnea)   • Abnormal result of iron profile testing     Past Medical History:   Diagnosis Date   • Allergic    • Asthma    • GERD (gastroesophageal reflux disease)    • HPV (human papilloma virus) infection    • HSV-1 infection    • HSV-2 infection      Social History     Socioeconomic History   • Marital status: /Civil Union     Spouse name: Not on file   • Number of children: Not on file   • Years of education: Not on file   • Highest education level: Not on file   Occupational History   • Not on file   Tobacco Use   • Smoking status: Former     Types: Cigarettes     Start date: 57     Quit date: 2016     Years since quittin 1   • Smokeless tobacco: Never   • Tobacco comments:     quit > 4 years ago    Vaping Use   • Vaping Use: Never used   Substance and Sexual Activity   • Alcohol use: Yes     Comment: Occasionally   • Drug use: Yes     Types: Marijuana     Comment: vapes medical narijuana   • Sexual activity: Not Currently     Partners: Male     Birth control/protection: None   Other Topics Concern   • Not on file   Social History Narrative   • Not on file     Social Determinants of Health     Financial Resource Strain: Not on file   Food Insecurity: Not on file   Transportation Needs: Not on file   Physical Activity: Not on file   Stress: Not on file   Social Connections: Not on file   Intimate Partner Violence: Not At Risk   • Fear of Current or Ex-Partner: No   • Emotionally Abused: No   • Physically Abused: No   • Sexually Abused: No   Housing Stability: Not on file      Family History   Problem Relation Age of Onset   • Diabetes Mother    • Arthritis Mother    • Hypertension Mother    • Atrial fibrillation Mother    • Diabetes Father    • Arthritis Father         psoriatic   • Hyperlipidemia Father    • Cancer Father         bile ducts    • Hypertension Father    • Heart disease Maternal Uncle    • Hypertension Paternal Aunt    • Diabetes Paternal Aunt    • Colon cancer Paternal Aunt    • Hypertension Paternal Uncle    • Diabetes Paternal Uncle    • Thyroid cancer Paternal Uncle         Papillary thyroid cancer   • Cancer Maternal Grandmother    • Esophageal varices Maternal Grandmother    • Abnormal EKG Maternal Grandmother    • Alcohol abuse Maternal Grandmother    • Cirrhosis Maternal Grandmother    • Cancer Maternal Grandfather    • Heart disease Maternal Grandfather    • Stroke Maternal Grandfather    • Brain cancer Maternal Grandfather    • Hypertension Paternal Grandmother    • Diabetes Paternal Grandmother    • Lung cancer Paternal Grandmother    • Diabetes Paternal Grandfather    • Thyroid disease Neg Hx      Past Surgical History:   Procedure Laterality Date   • CERVICAL BIOPSY  W/ LOOP ELECTRODE EXCISION  2012    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   • CT EPIDURAL STEROID INJECTION (TAMI LUMBAR) N/A 2018    L5-S1, x2   • POSTERIOR LAMINECTOMY THORACIC AND LUMBAR SPINE N/A 2/5/2019    Procedure: Lumbar laminectomy, decompression, discectomy left L5-S1 ;  Surgeon: Vinita Andre MD;  Location: [facility];  Service: Orthopedics   • TONSILECTOMY AND ADNOIDECTOMY  1990   • [address] EXTRACTION  2007       Current Outpatient Medications:   •  ascorbic acid (VITAMIN C) 500 MG tablet, Take 500 mg by mouth daily, Disp: , Rfl:   •  budesonide-formoterol (Symbicort) 160-4 5 mcg/act inhaler, Inhale 2 puffs 2 (two) times a day Rinse mouth after use , Disp: 10 2 g, Rfl: 1  •  cholecalciferol (VITAMIN D3) 1,000 units tablet, Take 4,000 Units by mouth daily, Disp: , Rfl:   •  Cyanocobalamin (VITAMIN B 12 PO), Place 1,000 mcg under the tongue in the morning, Disp: , Rfl:   •  cyproheptadine (PERIACTIN) 4 mg tablet, Take 1 tablet (4 mg total) by mouth daily at bedtime as needed for allergies (headaches), Disp: 30 tablet, Rfl: 0  •  dexamethasone (DECADRON) 1 mg tablet, Take 1 mg by mouth if needed, Disp: , Rfl:   •  Docusate Sodium 100 MG capsule, Take 100-200 mg by mouth in the morning 100 mg Monday/Wednesday/Friday/Sunday 200 mg Tuesday/Thursday/Saturday, Disp: , Rfl:   •  FIBER PO, Take 2 tablets by mouth in the morning Gummies, Disp: , Rfl:   •  fluticasone (FLONASE) 50 mcg/act nasal spray, 1 SPRAY INTO EACH NOSTRIL AS NEEDED FOR ALLERGIES, Disp: 48 mL, Rfl: 1  •  ketorolac (TORADOL) 10 mg tablet, Take 10 mg by mouth every 6 (six) hours as needed for moderate pain, Disp: , Rfl:   •  Loratadine 10 MG CAPS, Take 10 mg by mouth daily , Disp: , Rfl:   •  MAGNESIUM OXIDE 400 PO, Take 1 tablet by mouth daily, Disp: , Rfl:   •  methocarbamol (ROBAXIN) 500 mg tablet, Take 1 tablet (500 mg total) by mouth daily at bedtime, Disp: 30 tablet, Rfl: 3  •  methylphenidate (Concerta) 27 MG ER tablet, Take 1 tablet (27 mg total) by mouth daily Max Daily Amount: 27 mg, Disp: 30 tablet, Rfl: 0  •  naproxen sodium (ANAPROX) 550 mg tablet, Take 1 tablet (550 mg total) by mouth 2 (two) times a day with meals For menstrual cramps, Disp: 30 tablet, Rfl: 0  •  ondansetron (ZOFRAN) 4 mg tablet, Take 1 tablet (4 mg total) by mouth every 8 (eight) hours as needed for nausea or vomiting, Disp: 20 tablet, Rfl: 0  •  rizatriptan (MAXALT-MLT) 10 MG disintegrating tablet, Take 1 tablet (10 mg total) by mouth once as needed for migraine May repeat every 2 hours if needed, max 20mg/24 hours, Disp: 12 tablet, Rfl: 0  •  valACYclovir (VALTREX) 500 mg tablet, Take 1 tablet (500 mg total) by mouth daily, Disp: 90 tablet, Rfl: 0  Allergies   Allergen Reactions   • Sulfa Antibiotics      Family has allergy she has never taken   • Doxycycline Rash and Edema     Whole body rash,bruises behind both thighs       Labs:  Appointment on 01/30/2023   Component Date Value   • TSH 3RD GENERATON 01/30/2023 1 530    • WBC 01/30/2023 6 08    • RBC 01/30/2023 4 20    • Hemoglobin 01/30/2023 13 0    • Hematocrit 01/30/2023 38 9    • MCV 01/30/2023 93    • MCH 01/30/2023 31 0    • MCHC 01/30/2023 33 4    • RDW 01/30/2023 12 2    • MPV 01/30/2023 11 0    • Platelets 31/55/1844 205    • nRBC 01/30/2023 0    • Neutrophils Relative 01/30/2023 54    • Immat GRANS % 01/30/2023 0    • Lymphocytes Relative 01/30/2023 33    • Monocytes Relative 01/30/2023 9    • Eosinophils Relative 01/30/2023 3    • Basophils Relative 01/30/2023 1    • Neutrophils Absolute 01/30/2023 3 37    • Immature Grans Absolute 01/30/2023 0 01    • Lymphocytes Absolute 01/30/2023 2 00    • Monocytes Absolute 01/30/2023 0 52    • Eosinophils Absolute 01/30/2023 0 15    • Basophils Absolute 01/30/2023 0 03    • Sodium 01/30/2023 139    • Potassium 01/30/2023 4 0    • Chloride 01/30/2023 111 (H)    • CO2 01/30/2023 24    • ANION GAP 01/30/2023 4    • BUN 01/30/2023 11    • Creatinine 01/30/2023 0 77    • Glucose 01/30/2023 76    • Calcium 01/30/2023 9 4    • AST 01/30/2023 9    • ALT 01/30/2023 12    • Alkaline Phosphatase 01/30/2023 45 (L)    • Total Protein 01/30/2023 6 9    • Albumin 01/30/2023 3 9    • Total Bilirubin 01/30/2023 0 46    • eGFR 01/30/2023 99    • Magnesium 01/30/2023 2 0    • Iron Saturation 01/30/2023 34    • TIBC 01/30/2023 232 (L)    • Iron 01/30/2023 80    • Ferritin 01/30/2023 43    • THYROID MICROSOMAL ANTIB* 01/30/2023 <9    • Thyroglobulin Ab 01/30/2023 <1 0    Appointment on 12/05/2022   Component Date Value   • Sodium 12/05/2022 140    • Potassium 12/05/2022 4 4    • Chloride 12/05/2022 110 (H)    • CO2 12/05/2022 23    • ANION GAP 12/05/2022 7    • BUN 12/05/2022 13    • Creatinine 12/05/2022 0 71    • Glucose, Fasting 12/05/2022 74    • Calcium 12/05/2022 8 9    • AST 12/05/2022 8    • ALT 12/05/2022 13    • Alkaline Phosphatase 12/05/2022 43 (L)    • Total Protein 12/05/2022 6 7    • Albumin 12/05/2022 3 6    • Total Bilirubin 12/05/2022 0 56    • eGFR 12/05/2022 109    • WBC 12/05/2022 5 70    • RBC 12/05/2022 4 14    • Hemoglobin 12/05/2022 13 0    • Hematocrit 12/05/2022 39 2    • MCV 12/05/2022 95    • MCH 12/05/2022 31 4    • MCHC 12/05/2022 33 2    • RDW 12/05/2022 12 3    • MPV 12/05/2022 11 1    • Platelets 85/79/8708 200    • nRBC 12/05/2022 0    • Neutrophils Relative 12/05/2022 54    • Immat GRANS % 12/05/2022 0    • Lymphocytes Relative 12/05/2022 35    • Monocytes Relative 12/05/2022 9    • Eosinophils Relative 12/05/2022 2    • Basophils Relative 12/05/2022 0    • Neutrophils Absolute 12/05/2022 3 11    • Immature Grans Absolute 12/05/2022 0 01    • Lymphocytes Absolute 12/05/2022 1 97    • Monocytes Absolute 12/05/2022 0 50    • Eosinophils Absolute 12/05/2022 0 09    • Basophils Absolute 12/05/2022 0 02    • TSH 3RD GENERATON 12/05/2022 0 956    • Cholesterol 12/05/2022 141    • Triglycerides 12/05/2022 35    • HDL, Direct 12/05/2022 47 (L)    • LDL Calculated 12/05/2022 87    • Non-HDL-Chol (CHOL-HDL) 12/05/2022 94    • Hemoglobin A1C 12/05/2022 4 5    • EAG 12/05/2022 82    • Color, UA 12/05/2022 Light Yellow    • Clarity, UA 12/05/2022 Clear    • Specific Gravity, UA 12/05/2022 1 028    • pH, UA 12/05/2022 6 0    • Leukocytes, UA 12/05/2022 Negative    • Nitrite, UA 12/05/2022 Negative    • Protein, UA 12/05/2022 Trace (A)    • Glucose, UA 12/05/2022 Negative    • Ketones, UA 12/05/2022 10 (1+) (A)    • Urobilinogen, UA 12/05/2022 <2 0    • Bilirubin, UA 12/05/2022 Negative    • Occult Blood, UA 12/05/2022 Negative    • Iron Saturation 12/05/2022 39    • TIBC 12/05/2022 199 (L)    • Iron 12/05/2022 77    • Ferritin 12/05/2022 55    • RBC, UA 12/05/2022 None Seen    • WBC, UA 12/05/2022 None Seen    • Epithelial Cells 12/05/2022 Occasional    • Bacteria, UA 12/05/2022 Occasional    • MUCUS THREADS 12/05/2022 Occasional (A)    • Amorphous Crystals, UA 12/05/2022 Occasional      Imaging: Holter monitor    Result Date: 2/23/2023  Narrative: PATIENT NAME:  Patrica Montesinos AGE:  40 y o  Sex:  female MRN:  [id number] PROCEDURE: Holter monitor - 48 hour INDICATIONS: Palpitations DESCRIPTION OF FINDINGS: The patient was monitored for a total of 48 hours    The patient was predominantly in normal sinus rhythm throughout the study  The average heart rate was 68 beats per minute  The heart rate ranged from a low of 45 beats per minute to a maximum of 140 beats per minute  Ventricular ectopic activity consisted of 71 beats, of which 68 were single PVCs, and 3 occurred as a ventricular triplet  There was no sustained or nonsustained ventricular tachycardia  Supraventricular ectopic activity consisted of 14 beats  10 single PACs, 2 atrial pairs  There was no supraventricular tachycardia identified  There was no evidence of atrial fibrillation or atrial flutter  There were no significant pauses  The longest R-R interval was 1 7 seconds  The VA interval appears prolonged throughout the study, compatible with a first degree AV block  There was no evidence of advanced degree heart block  The accompanying patient diary notes symptoms of fluttering, although no time was specified  Impression: Predominantly normal sinus rhythm with first-degree AV block, with an average heart rate of 68 beats per minute Low burden of premature atrial contractions Low burden of premature ventricular contractions First-degree AV block appears new compared to prior ECGs available in the chart  No significant pauses or advanced degree heart block Jeremy Kohler MD      Review of Systems:  Review of Systems   Constitutional: Negative for activity change, diaphoresis, fatigue and unexpected weight change  HENT: Negative for nosebleeds  Eyes: Negative for visual disturbance  Respiratory: Negative for apnea, shortness of breath, wheezing and stridor  Cardiovascular: Positive for palpitations  Negative for chest pain and leg swelling  Neurological: Negative for dizziness, tremors, syncope, facial asymmetry, speech difficulty and weakness  Hematological: Does not bruise/bleed easily  Psychiatric/Behavioral: Negative for sleep disturbance  The patient is not nervous/anxious  Physical Exam:  Physical Exam  Vitals reviewed  Constitutional:       General: She is not in acute distress  Appearance: She is normal weight  She is not ill-appearing, toxic-appearing or diaphoretic  Eyes:      General: No scleral icterus  Neck:      Vascular: No carotid bruit  Cardiovascular:      Rate and Rhythm: Normal rate and regular rhythm  Pulses: Normal pulses  Heart sounds: Normal heart sounds  No murmur heard  No friction rub  No gallop  Pulmonary:      Effort: Pulmonary effort is normal  No respiratory distress  Breath sounds: Normal breath sounds  No stridor  No wheezing, rhonchi or rales  Musculoskeletal:      Right lower leg: No edema  Left lower leg: No edema  Skin:     General: Skin is warm and dry  Capillary Refill: Capillary refill takes less than 2 seconds  Coloration: Skin is not jaundiced or pale  Findings: No bruising or erythema  Neurological:      General: No focal deficit present  Mental Status: She is alert and oriented to person, place, and time  Psychiatric:         Mood and Affect: Mood normal          Behavior: Behavior normal          Discussion/Summary: Palpitations, possibly self terminated sustained arrhythmia  We will proceed with a 2-week monitor  Consideration for a longer monitor  She did have no structural heart disease by noninvasive ablation in 2019  No medications restrictions, for recommendations pending results of testing, recent Holter monitor suggested first-degree AV block, personally reviewed, I do not agree with  Today's EKG is completely normal     This note was completed in part utilizing [employer] fluency direct voice recognition software  Grammatical errors, random word insertion, spelling mistakes, and incomplete sentences may be an occasional consequence of the system secondary to software limitations, ambient noise and hardware issues   At the time of dictation, efforts were made to edit, clarify and /or correct errors  Please read the chart carefully and recognize, using context, where substitutions have occurred  If you have any questions or concerns about the context, text or information contained within the body of this dictation, please contact myself, the provider, for further clarification

## 2023-03-08 NOTE — TELEPHONE ENCOUNTER
2 week ZIO was ordered, pt has [employer] as Primary  Does this require an Auth? Please advise, thank you

## 2023-03-13 ENCOUNTER — OFFICE VISIT (OUTPATIENT)
Dept: DERMATOLOGY | Facility: CLINIC | Age: 38
End: 2023-03-13

## 2023-03-13 VITALS — WEIGHT: 178.5 LBS | HEIGHT: 70 IN | BODY MASS INDEX: 25.56 KG/M2 | TEMPERATURE: 98.5 F

## 2023-03-13 DIAGNOSIS — L65.9 HAIR LOSS: Primary | ICD-10-CM

## 2023-03-13 NOTE — PATIENT INSTRUCTIONS
Hair Loss    Assessment and Plan:  Based on a thorough discussion of this condition and the management approach to it (including a comprehensive discussion of the known risks, side effects and potential benefits of treatment), the patient (family) agrees to implement the following specific plan:  Discuss creams to help with hair loss from [facility]  First try in small area of scalp to observe for severe reaction for a couple of weeks  Discuss if creams do not work, will try further treatment

## 2023-03-13 NOTE — PROGRESS NOTES
[facility] Note     Patient Name: Carisa Vyas  Encounter Date: 03/13/23     Have you been cared for by a [place] [address] Dermatologist in the last 3 years and, if so, which description applies to you? NO  I am considered a "new" patient and must complete all patient intake questions  I am FEMALE/of child-bearing potential     REVIEW OF SYSTEMS:  Have you recently had or currently have any of the following? · Recent fever or chills? No  · Any non-healing wound? No  · Are you pregnant or planning to become pregnant? No  · Are you currently or planning to be nursing or breast feeding? No   PAST MEDICAL HISTORY:  Have you personally ever had or currently have any of the following? If "YES," then please provide more detail  · Skin cancer (such as Melanoma, Basal Cell Carcinoma, Squamous Cell Carcinoma? No  · Tuberculosis, HIV/AIDS, Hepatitis B or C: No  · Systemic Immunosuppression such as Diabetes, Biologic or Immunotherapy, Chemotherapy, Organ Transplantation, Bone Marrow Transplantation No  · Radiation Treatment No   FAMILY HISTORY:  Any "first degree relatives" (parent, brother, sister, or child) with the following? • Skin Cancer, Pancreatic or Other Cancer? YES, Maternal Uncle had melanoma, brain cancer maternal grandfather, father passed away from cholangiocarcinoma cancer, paternal uncle thyroid cancer/stem cell, paternal aunt had colon cancer, paternal grandmother had lung cancer, great-grandmother had breast cancer,    PATIENT EXPERIENCE:    • Do you want the Dermatologist to perform a COMPLETE skin exam today including a clinical examination under the "bra and underwear" areas? NO  • If necessary, do we have your permission to call and leave a detailed message on your Preferred Phone number that includes your specific medical information?   Yes      Allergies   Allergen Reactions   • Sulfa Antibiotics      Family has allergy she has never taken   • Doxycycline Rash and Edema Whole body rash,bruises behind both thighs      Current Outpatient Medications:   •  ascorbic acid (VITAMIN C) 500 MG tablet, Take 500 mg by mouth daily, Disp: , Rfl:   •  budesonide-formoterol (Symbicort) 160-4 5 mcg/act inhaler, Inhale 2 puffs 2 (two) times a day Rinse mouth after use , Disp: 10 2 g, Rfl: 1  •  cholecalciferol (VITAMIN D3) 1,000 units tablet, Take 4,000 Units by mouth daily, Disp: , Rfl:   •  Cyanocobalamin (VITAMIN B 12 PO), Place 1,000 mcg under the tongue in the morning, Disp: , Rfl:   •  cyproheptadine (PERIACTIN) 4 mg tablet, Take 1 tablet (4 mg total) by mouth daily at bedtime as needed for allergies (headaches), Disp: 30 tablet, Rfl: 0  •  dexamethasone (DECADRON) 1 mg tablet, Take 1 mg by mouth if needed, Disp: , Rfl:   •  Docusate Sodium 100 MG capsule, Take 100-200 mg by mouth in the morning 100 mg Monday/Wednesday/Friday/Sunday 200 mg Tuesday/Thursday/Saturday, Disp: , Rfl:   •  FIBER PO, Take 2 tablets by mouth in the morning Gummies, Disp: , Rfl:   •  fluticasone (FLONASE) 50 mcg/act nasal spray, 1 SPRAY INTO EACH NOSTRIL AS NEEDED FOR ALLERGIES, Disp: 48 mL, Rfl: 1  •  ketorolac (TORADOL) 10 mg tablet, Take 10 mg by mouth every 6 (six) hours as needed for moderate pain, Disp: , Rfl:   •  Loratadine 10 MG CAPS, Take 10 mg by mouth daily , Disp: , Rfl:   •  MAGNESIUM OXIDE 400 PO, Take 1 tablet by mouth daily, Disp: , Rfl:   •  methocarbamol (ROBAXIN) 500 mg tablet, Take 1 tablet (500 mg total) by mouth daily at bedtime, Disp: 30 tablet, Rfl: 3  •  methylphenidate (Concerta) 27 MG ER tablet, Take 1 tablet (27 mg total) by mouth daily Max Daily Amount: 27 mg, Disp: 30 tablet, Rfl: 0  •  naproxen sodium (ANAPROX) 550 mg tablet, Take 1 tablet (550 mg total) by mouth 2 (two) times a day with meals For menstrual cramps, Disp: 30 tablet, Rfl: 0  •  ondansetron (ZOFRAN) 4 mg tablet, Take 1 tablet (4 mg total) by mouth every 8 (eight) hours as needed for nausea or vomiting, Disp: 20 tablet, Rfl: 0  •  rizatriptan (MAXALT-MLT) 10 MG disintegrating tablet, Take 1 tablet (10 mg total) by mouth once as needed for migraine May repeat every 2 hours if needed, max 20mg/24 hours, Disp: 12 tablet, Rfl: 0  •  valACYclovir (VALTREX) 500 mg tablet, Take 1 tablet (500 mg total) by mouth daily, Disp: 90 tablet, Rfl: 0          • Whom besides the patient is providing clinical information about today's encounter?   o NO ADDITIONAL HISTORIAN (patient alone provided history)    Physical Exam and Assessment/Plan by Diagnosis:    Hair Loss  Physical Exam:  • Anatomic Location Affected: Back of head, left side of head  • Morphological Description:    • Pertinent Positives:  • Pertinent Negatives: Additional History of Present Condition:  Patient is new patient and is here for her bald spots on scalp  Balding since teenage year  Patient has had steroid creams and steroid injection in the past for hair loss in early 25s  But hair has grown back on its own at times  Dates back to teens  Assessment and Plan:  Based on a thorough discussion of this condition and the management approach to it (including a comprehensive discussion of the known risks, side effects and potential benefits of treatment), the patient (family) agrees to implement the following specific plan:  • Discuss cream to help with hair loss from [facility]  • First try in small area of scalp to observe for reaction for a couple of weeks  • Discuss if creams do not work, will try further treatment   Tofacitinib: 2%  Niacinamide: 2%  Vehicle: Cream     Recent labs reviewed    Scribe Attestation    I,:  Mayela Leslie am acting as a scribe while in the presence of the attending physician :       I,:  Kai Aguirre MD personally performed the services described in this documentation    as scribed in my presence :

## 2023-03-28 ENCOUNTER — CLINICAL SUPPORT (OUTPATIENT)
Dept: BARIATRICS | Facility: CLINIC | Age: 38
End: 2023-03-28

## 2023-03-28 VITALS
WEIGHT: 185.4 LBS | DIASTOLIC BLOOD PRESSURE: 60 MMHG | HEART RATE: 92 BPM | BODY MASS INDEX: 26.54 KG/M2 | RESPIRATION RATE: 16 BRPM | SYSTOLIC BLOOD PRESSURE: 108 MMHG | HEIGHT: 70 IN

## 2023-03-28 DIAGNOSIS — R63.5 ABNORMAL WEIGHT GAIN: Primary | ICD-10-CM

## 2023-04-04 ENCOUNTER — TELEPHONE (OUTPATIENT)
Dept: BARIATRICS | Facility: CLINIC | Age: 38
End: 2023-04-04

## 2023-04-04 ENCOUNTER — CLINICAL SUPPORT (OUTPATIENT)
Dept: CARDIOLOGY CLINIC | Facility: CLINIC | Age: 38
End: 2023-04-04

## 2023-04-04 DIAGNOSIS — R00.2 PALPITATIONS: ICD-10-CM

## 2023-04-04 DIAGNOSIS — B00.9 HERPES: ICD-10-CM

## 2023-04-04 DIAGNOSIS — R63.5 ABNORMAL WEIGHT GAIN: ICD-10-CM

## 2023-04-04 DIAGNOSIS — F90.0 ATTENTION DEFICIT HYPERACTIVITY DISORDER (ADHD), PREDOMINANTLY INATTENTIVE TYPE: ICD-10-CM

## 2023-04-04 DIAGNOSIS — E66.3 OVERWEIGHT: Primary | ICD-10-CM

## 2023-04-04 DIAGNOSIS — R63.8 ABNORMAL CRAVING: ICD-10-CM

## 2023-04-04 DIAGNOSIS — Z86.39 HISTORY OF OBESITY: ICD-10-CM

## 2023-04-04 DIAGNOSIS — J45.909 ASTHMA DUE TO ENVIRONMENTAL ALLERGIES: ICD-10-CM

## 2023-04-04 RX ORDER — METHYLPHENIDATE HYDROCHLORIDE 27 MG/1
27 TABLET ORAL DAILY
Qty: 30 TABLET | Refills: 0 | Status: SHIPPED | OUTPATIENT
Start: 2023-04-04

## 2023-04-04 RX ORDER — VALACYCLOVIR HYDROCHLORIDE 500 MG/1
500 TABLET, FILM COATED ORAL DAILY
Qty: 90 TABLET | Refills: 0 | Status: SHIPPED | OUTPATIENT
Start: 2023-04-04 | End: 2023-07-03

## 2023-04-04 RX ORDER — FLUTICASONE PROPIONATE 50 MCG
1 SPRAY, SUSPENSION (ML) NASAL AS NEEDED
Qty: 48 ML | Refills: 0 | Status: SHIPPED | OUTPATIENT
Start: 2023-04-04 | End: 2023-04-05

## 2023-04-04 NOTE — TELEPHONE ENCOUNTER
Spoke with Susy Parker  Since stopping Wegovy about 6 weeks ago, she has had return of cravings and she is gaining weight  She would like to restart [facility]  Will start at dose of 0 25 mg weekly and she will message me after taking the second pen with an update  Advised to use some form of birth control while on AOM's

## 2023-04-04 NOTE — TELEPHONE ENCOUNTER
Regarding: [id number]   Contact: [phone]  ----- Message from Gisela Reagan sent at 4/3/2023 10:29 AM EDT -----       ----- Message sent from DEB Huffman to Yonas Neville at 4/3/2023 10:28 AM -----   Ayush Rosales,     Thank you for letting me know  There was a bit of a glitch in our system and I never received the data from your visit last week  We have it fixed now  I am out of the office today, but will review everything when I am back tomorrow and I will give you a call  Camilla Field      ----- Message -----       From:oJ Barros       Sent:4/2/2023  8:38 PM EDT         To:Patient Medical Advice Request Message List    Subject:Wegovy     I was just wondering if there was an update as far as restarting Wegovy? Are we waiting for a prior authorization or anything? I stepped on my home scale this morning and my weight was 193lb, prior to food or drinks  It has been so upsetting and defeating for me these past few weeks to see the constant increase on the scale       ----- Message -----       From:Jo Barros       Sent:3/27/2023 10:03 AM EDT         To:Patient Medical Advice Request Message List    Subject:Wegovy     Okay  Thank you       ----- Message -----       Renaye Halsted       Sent:3/27/2023  9:41 AM EDT         To:Jo Barros    Subject:Wegovy     Ayush Rosales,     Thank you for the update  I am going to have my office reach out to you to schedule a nurse visit to get a BMI  Often the insurance is looking for this information for weight loss medications  Camilla Field      ----- Message -----       From:Jo Barros       Sent:3/24/2023 11:47 AM EDT         To:Shalom Reddy    Subject:Wegovy     I was seen on 2/22 by DEB Sampson and [id number] was discontinued  Since then I have struggled greatly with snacking and having intense cravings  I have gained ~10lbs in those 4 weeks  Can you please put me back on the [id number]?

## 2023-04-05 DIAGNOSIS — J45.909 ASTHMA DUE TO ENVIRONMENTAL ALLERGIES: ICD-10-CM

## 2023-04-05 RX ORDER — FLUTICASONE PROPIONATE 50 MCG
1 SPRAY, SUSPENSION (ML) NASAL DAILY
Qty: 48 ML | Refills: 0 | Status: SHIPPED | OUTPATIENT
Start: 2023-04-05

## 2023-04-06 ENCOUNTER — TELEPHONE (OUTPATIENT)
Dept: BARIATRICS | Facility: CLINIC | Age: 38
End: 2023-04-06

## 2023-04-06 NOTE — TELEPHONE ENCOUNTER
Patient and pharm was informed regarding the [facility] approval from 04/06/2023-04/06/2024  She was also informed about the 4-5% weight loss requirement for renewal purposes

## 2023-05-02 ENCOUNTER — OFFICE VISIT (OUTPATIENT)
Dept: DERMATOLOGY | Facility: CLINIC | Age: 38
End: 2023-05-02

## 2023-05-02 VITALS — TEMPERATURE: 97.4 F | BODY MASS INDEX: 27.84 KG/M2 | WEIGHT: 194 LBS

## 2023-05-02 DIAGNOSIS — L63.9 ALOPECIA AREATA: ICD-10-CM

## 2023-05-02 DIAGNOSIS — D22.5 MULTIPLE BENIGN MELANOCYTIC NEVI OF UPPER AND LOWER EXTREMITIES AND TRUNK: ICD-10-CM

## 2023-05-02 DIAGNOSIS — D22.60 MULTIPLE BENIGN MELANOCYTIC NEVI OF UPPER AND LOWER EXTREMITIES AND TRUNK: ICD-10-CM

## 2023-05-02 DIAGNOSIS — Z12.83 SKIN CANCER SCREENING: Primary | ICD-10-CM

## 2023-05-02 DIAGNOSIS — D22.70 MULTIPLE BENIGN MELANOCYTIC NEVI OF UPPER AND LOWER EXTREMITIES AND TRUNK: ICD-10-CM

## 2023-05-02 NOTE — PROGRESS NOTES
"[facility] Note     Patient Name: Paulo Manjarrez  Encounter Date: 5/2/23    Have you been cared for by a [place] [address] Dermatologist in the last 3 years and, if so, which description applies to you? Yes  I have been here within the last 3 years, and my medical history has NOT changed since that time  I am FEMALE/of child-bearing potential     REVIEW OF SYSTEMS:  Have you recently had or currently have any of the following? · No changes in my recent health  PAST MEDICAL HISTORY:  Have you personally ever had or currently have any of the following? If \"YES,\" then please provide more detail  · No changes in my medical history  FAMILY HISTORY:  Any \"first degree relatives\" (parent, brother, sister, or child) with the following?  No changes in my family's known health  PATIENT EXPERIENCE:     Do you want the Dermatologist to perform a COMPLETE skin exam today including a clinical examination under the \"bra and underwear\" areas? Yes   If necessary, do we have your permission to call and leave a detailed message on your Preferred Phone number that includes your specific medical information?   Yes      Allergies   Allergen Reactions    Sulfa Antibiotics      Family has allergy she has never taken    Doxycycline Rash and Edema     Whole body rash,bruises behind both thighs      Current Outpatient Medications:     ascorbic acid (VITAMIN C) 500 MG tablet, Take 500 mg by mouth daily, Disp: , Rfl:     budesonide-formoterol (Symbicort) 160-4 5 mcg/act inhaler, Inhale 2 puffs 2 (two) times a day Rinse mouth after use , Disp: 10 2 g, Rfl: 1    cholecalciferol (VITAMIN D3) 1,000 units tablet, Take 4,000 Units by mouth daily, Disp: , Rfl:     Cyanocobalamin (VITAMIN B 12 PO), Place 1,000 mcg under the tongue in the morning, Disp: , Rfl:     cyproheptadine (PERIACTIN) 4 mg tablet, Take 1 tablet (4 mg total) by mouth daily at bedtime as needed for allergies (headaches), Disp: 30 tablet, Rfl: " 0    dexamethasone (DECADRON) 1 mg tablet, Take 1 mg by mouth if needed, Disp: , Rfl:     Docusate Sodium 100 MG capsule, Take 100-200 mg by mouth in the morning 100 mg Monday/Wednesday/Friday/Sunday 200 mg Tuesday/Thursday/Saturday, Disp: , Rfl:     FIBER PO, Take 2 tablets by mouth in the morning Gummies, Disp: , Rfl:     fluticasone (FLONASE) 50 mcg/act nasal spray, 1 spray into each nostril daily, Disp: 48 mL, Rfl: 0    ketorolac (TORADOL) 10 mg tablet, Take 10 mg by mouth every 6 (six) hours as needed for moderate pain, Disp: , Rfl:     Loratadine 10 MG CAPS, Take 10 mg by mouth daily , Disp: , Rfl:     MAGNESIUM OXIDE 400 PO, Take 1 tablet by mouth daily, Disp: , Rfl:     methocarbamol (ROBAXIN) 500 mg tablet, Take 1 tablet (500 mg total) by mouth daily at bedtime, Disp: 90 tablet, Rfl: 3    methylphenidate (Concerta) 27 MG ER tablet, Take 1 tablet (27 mg total) by mouth daily Max Daily Amount: 27 mg, Disp: 30 tablet, Rfl: 0    naproxen sodium (ANAPROX) 550 mg tablet, Take 1 tablet (550 mg total) by mouth 2 (two) times a day with meals For menstrual cramps, Disp: 30 tablet, Rfl: 0    ondansetron (ZOFRAN) 4 mg tablet, Take 1 tablet (4 mg total) by mouth every 8 (eight) hours as needed for nausea or vomiting, Disp: 20 tablet, Rfl: 0    rizatriptan (MAXALT-MLT) 10 MG disintegrating tablet, Take 1 tablet (10 mg total) by mouth once as needed for migraine May repeat every 2 hours if needed, max 20mg/24 hours, Disp: 12 tablet, Rfl: 0    Semaglutide-Weight Management (WEGOVY) 0 5 MG/0 5ML, Inject 0 5 mL (0 5 mg total) under the skin once a week, Disp: 2 mL, Rfl: 0    valACYclovir (VALTREX) 500 mg tablet, Take 1 tablet (500 mg total) by mouth daily, Disp: 90 tablet, Rfl: 0           Whom besides the patient is providing clinical information about today's encounter?   o NO ADDITIONAL HISTORIAN (patient alone provided history)    Physical Exam and Assessment/Plan by Diagnosis:      SKIN CANCER "SCREENING  Physical Exam:   Anatomic Location Affected:  Whole body   Morphological Description:  Normal appearing skin   Pertinent Positives:   Pertinent Negatives: Additional History of Present Condition:  Patient has paternal uncle with hx of MM    Assessment and Plan:  Based on a thorough discussion of this condition and the management approach to it (including a comprehensive discussion of the known risks, side effects and potential benefits of treatment), the patient (family) agrees to implement the following specific plan:   The nature of sun-induced photo-aging and skin cancers is discussed  Sun avoidance, protective clothing, and the use of 30-SPF sunscreens is advised  Observe closely for skin damage/changes, and call if such occurs   Use a moisturizer + sunscreen \"combo\" product such as Neutrogena Daily Defense SPF 50+ or CeraVe AM at least three times a day   Yearly skin exams    MELANOCYTIC NEVI (\"Moles\")    Physical Exam:   Anatomic Location Affected:   SCATTERED OVER BODY, bottom of left foot   Morphological Description:  Scattered round to ovoid, symmetrical-appearing, even bordered, tan to light-brown macules/papules, not mostly in sun-exposed areas   Pertinent Positives:   Pertinent Negatives: Additional History of Present Condition:  Present for years    Assessment and Plan:  Based on a thorough discussion of this condition and the management approach to it (including a comprehensive discussion of the known risks, side effects and potential benefits of treatment), the patient (family) agrees to implement the following specific plan:   Any new or changing skin lesions  Return as soon as possible with     Melanocytic Nevi  Melanocytic nevi (\"moles\") are caused by collections of the color producing skin cells, or melanocytes, in 1 area in the skin  They can range in color from pink to dark brown and be either raised or flat        Some moles are present at birth (I e , " "\"congenital nevi\"), while others come up later in life (i e , \"acquired nevi\")  Murray Reddish exposure also stimulates the body to make more moles, ie the more sun you get the more moles you'll grow  Clinically distinguishing a healthy mole from melanoma may be difficult  The \"ABCDE's\" of moles have been suggested as a means of helping to alert a person to a suspicious mole and the possible increased risk of melanoma  Asymmetry: Healthy moles tend to be symmetric, while melanomas are often asymmetric  Asymmetry means if you draw a line through the mole, the two halves do not match in color, size, shape, or surface texture Any mole that starts to demonstrate \"asymmetry\" should be examined promptly by a board certified dermatologist      Border: Healthy moles tend to have discrete, even borders  The border of a melanoma often blends into the normal skin and does not sharply delineate the mole from normal skin  Any mole that starts to demonstrate \"uneven borders\" should be examined promptly     Color: Healthy moles tend to be one color throughout  Melanomas tend to be made up of different colors ranging from dark black, blue, white, or red  Any mole that demonstrates a color change should be examined promptly    Diameter: Healthy moles tend to be smaller than 0 6 cm in size; an exception are \"congenital nevi\" that can be larger  Melanomas tend to grow and can often be greater than 0 6 cm (1/4 of an inch, or the size of a pencil eraser)  This is only a guideline, and many normal moles may be larger than 0 6 cm without being unhealthy  Any mole that starts to change in size (small to bigger or bigger to smaller) should be examined promptly    Evolving: Healthy moles tend to [place] the same  \"  Melanomas may often show signs of change or evolution such as a change in size, shape, color, or elevation    Any mole that starts to itch, bleed, crust, burn, hurt, or ulcerate or demonstrate a change or evolution should be " "examined promptly by a board certified dermatologist       What are atypical moles or dysplastic nevi? Dysplastic moles are moles that have some of the ABCDE  changes listed above but  are not cancerous  Sometimes a biopsy and microscopic examination are needed to determine the difference  They may indicate an increased risk of melanoma in that person, especially if there is a family history of melanoma  What is a Melanoma? The main concern when looking at a new or changing mole it to evaluate whether it may be a melanoma  The appearance of a \"new mole\" remains one of the most reliable methods for identifying a malignant melanoma  A melanoma is a type of skin cancer that can be deadly if it spreads throughout the body  The prognosis of a Melanoma depends on how deep it has penetrated in the skin  If caught early, they generally will not have had time to grow into the deeper layers of the skin and they cure rate is then very high  Once the melanoma grows deeper into the skin, the cure rate drops dramatically  Therefore, early detection and removal of a malignant melanoma results in a much better chance of complete cure  Hair Loss  Physical Exam:   Anatomic Location Affected: Back of head, left side of head   Morphological Description:     Pertinent Positives:   Pertinent Negatives    Additional History of Present Condition:  Patient is new patient and is here for her bald spots on scalp  Balding since teenage year  Patient has had steroid creams and steroid injection in the past for hair loss in early 25s  But hair has grown back on its own at times    Dates back to teens      Assessment and Plan:  Based on a thorough discussion of this condition and the management approach to it (including a comprehensive discussion of the known risks, side effects and potential benefits of treatment), the patient (family) agrees to implement the following specific plan:   Continue with cream from [facility] for " scalp   Can Follow up with Dr Jacque Shah or Dr Matthew Francis for hair loss    Tofacitinib: 2%  Niacinamide: 2%  Vehicle: Cream         Scribe Attestation    I,:  Hillery Schlatter am acting as a scribe while in the presence of the attending physician :       I,:  Mariah Wolfe MD personally performed the services described in this documentation    as scribed in my presence :

## 2023-05-02 NOTE — PATIENT INSTRUCTIONS
"SKIN CANCER SCREENING    Assessment and Plan:  Based on a thorough discussion of this condition and the management approach to it (including a comprehensive discussion of the known risks, side effects and potential benefits of treatment), the patient (family) agrees to implement the following specific plan: The nature of sun-induced photo-aging and skin cancers is discussed  Sun avoidance, protective clothing, and the use of 30-SPF sunscreens is advised  Observe closely for skin damage/changes, and call if such occurs  Use a moisturizer + sunscreen \"combo\" product such as Neutrogena Daily Defense SPF 50+ or CeraVe AM at least three times a day  Yearly skin exams    MELANOCYTIC NEVI (\"Moles\")     Melanocytic Nevi  Melanocytic nevi (\"moles\") are caused by collections of the color producing skin cells, or melanocytes, in 1 area in the skin  They can range in color from pink to dark brown and be either raised or flat  Some moles are present at birth (I e , \"congenital nevi\"), while others come up later in life (i e , \"acquired nevi\")  Genice Hess exposure also stimulates the body to make more moles, ie the more sun you get the more moles you'll grow  Clinically distinguishing a healthy mole from melanoma may be difficult  The \"ABCDE's\" of moles have been suggested as a means of helping to alert a person to a suspicious mole and the possible increased risk of melanoma  Asymmetry: Healthy moles tend to be symmetric, while melanomas are often asymmetric  Asymmetry means if you draw a line through the mole, the two halves do not match in color, size, shape, or surface texture Any mole that starts to demonstrate \"asymmetry\" should be examined promptly by a board certified dermatologist      Border: Healthy moles tend to have discrete, even borders  The border of a melanoma often blends into the normal skin and does not sharply delineate the mole from normal skin    Any mole that starts to demonstrate \"uneven " "borders\" should be examined promptly     Color: Healthy moles tend to be one color throughout  Melanomas tend to be made up of different colors ranging from dark black, blue, white, or red  Any mole that demonstrates a color change should be examined promptly    Diameter: Healthy moles tend to be smaller than 0 6 cm in size; an exception are \"congenital nevi\" that can be larger  Melanomas tend to grow and can often be greater than 0 6 cm (1/4 of an inch, or the size of a pencil eraser)  This is only a guideline, and many normal moles may be larger than 0 6 cm without being unhealthy  Any mole that starts to change in size (small to bigger or bigger to smaller) should be examined promptly    Evolving: Healthy moles tend to [place] the same  \"  Melanomas may often show signs of change or evolution such as a change in size, shape, color, or elevation  Any mole that starts to itch, bleed, crust, burn, hurt, or ulcerate or demonstrate a change or evolution should be examined promptly by a board certified dermatologist       What are atypical moles or dysplastic nevi? Dysplastic moles are moles that have some of the ABCDE  changes listed above but  are not cancerous  Sometimes a biopsy and microscopic examination are needed to determine the difference  They may indicate an increased risk of melanoma in that person, especially if there is a family history of melanoma  What is a Melanoma? The main concern when looking at a new or changing mole it to evaluate whether it may be a melanoma  The appearance of a \"new mole\" remains one of the most reliable methods for identifying a malignant melanoma  A melanoma is a type of skin cancer that can be deadly if it spreads throughout the body  The prognosis of a Melanoma depends on how deep it has penetrated in the skin  If caught early, they generally will not have had time to grow into the deeper layers of the skin and they cure rate is then very high   Once the melanoma " grows deeper into the skin, the cure rate drops dramatically  Therefore, early detection and removal of a malignant melanoma results in a much better chance of complete cure  Hair Loss    Assessment and Plan:  Based on a thorough discussion of this condition and the management approach to it (including a comprehensive discussion of the known risks, side effects and potential benefits of treatment), the patient (family) agrees to implement the following specific plan:  Continue with cream from [facility] for scalp  Can Follow up with Dr Alize Rodriguez or Dr Martha James for hair loss  Some improvement with tofacitinib; no untoward reactions  Skin examinations at least yearly, or sooner with any new or changing skin lesions      Tofacitinib: 2%  Niacinamide: 2%  Vehicle: Cream

## 2023-05-08 DIAGNOSIS — F90.0 ATTENTION DEFICIT HYPERACTIVITY DISORDER (ADHD), PREDOMINANTLY INATTENTIVE TYPE: ICD-10-CM

## 2023-05-08 DIAGNOSIS — N94.6 DYSMENORRHEA: ICD-10-CM

## 2023-05-08 RX ORDER — NAPROXEN SODIUM 550 MG/1
550 TABLET ORAL 2 TIMES DAILY WITH MEALS
Qty: 30 TABLET | Refills: 0 | Status: SHIPPED | OUTPATIENT
Start: 2023-05-08

## 2023-05-09 RX ORDER — METHYLPHENIDATE HYDROCHLORIDE 27 MG/1
27 TABLET ORAL DAILY
Qty: 30 TABLET | Refills: 0 | Status: SHIPPED | OUTPATIENT
Start: 2023-05-09

## 2023-05-15 ENCOUNTER — PATIENT MESSAGE (OUTPATIENT)
Dept: BARIATRICS | Facility: CLINIC | Age: 38
End: 2023-05-15

## 2023-05-15 DIAGNOSIS — E66.3 OVERWEIGHT: Primary | ICD-10-CM

## 2023-05-15 DIAGNOSIS — R63.8 ABNORMAL CRAVING: ICD-10-CM

## 2023-05-15 DIAGNOSIS — Z86.39 HISTORY OF OBESITY: ICD-10-CM

## 2023-05-16 RX ORDER — SEMAGLUTIDE 1 MG/.5ML
1 INJECTION, SOLUTION SUBCUTANEOUS WEEKLY
Qty: 2 ML | Refills: 0 | Status: SHIPPED | OUTPATIENT
Start: 2023-05-16

## 2023-05-16 NOTE — TELEPHONE ENCOUNTER
"Jh Ruiz 5/16/2023 7:11 AM EDT      ----- Message -----  From: Gulshan Vigil"  Sent: 5/15/2023 11:53 PM EDT  To: , *  Subject: [id number]     I have taken the second dose of my 0 5mg Wegovy  Could you please order the 1mg dose?     "

## 2023-05-17 NOTE — PROGRESS NOTES
Weight Management Medical Nutrition Assessment  Ondina Vela is here for employee meal planning (1/12)  Last seen by RD in Feb 2022 but has been following with PA for medication management  She had been successful on wegovy and it was d/c'd in February 2023  She then experienced weight regain and cravings  [facility] was restart[facility] early in April  Current wt: 194   Lbs  She has gained 18 6 lbs from her lowest x 3 months with overall loss of 60 lbs since November 2021  Reports that after coming off wegovy she went from 1084-4453 calories up to ~2800 calories  Reports she would eat an entire bag of chips  Her goal currently is to decrease calories to 1650 calories day  She has continued to food log during this time  New meal plan provided as she is now on day shift  Suggestions provided to have an earlier breakfast to see if this helps her stay ahead of midday hunger  Would also decrease protein intake at dinner  She will call if she needs a f/u  Patient seen by Medical Provider in past 6 months:  yes  Requested to schedule appointment with Medical Provider: No    Anthropometric Measurements  Start Weight (#): 254 lbs 11/16/21  Current Weight (#): 194 lbs   TBW % Change from start weight:  23 6%  Ideal Body Weight (#): BMI 25 174 6 lbs  (150 lbs [place])  Goal Weight (#): 180-190 lbs  Highest: 255 lbs  Lowest: 153 lbs     Weight Loss History  Previous weight loss attempts: Commercial Programs ([employer], Pavel Viera, etc )  Exercise  OTC meds/supplements  Self Created Diets (Portion Control, Healthy Food Choices, etc )    Food and Nutrition Related History  Wake up:   5:30-7:45 a m    Bed Time:   11-11:30    Food Recall   Breakfast 8-skip (4x/wk): 3 Egg whites, low sodium turkey valentin, 1-2 pieces kandi's killer OR smart ones   Snack: skip  Lunch 11-2:00: if home ~2 p m  leftovers (chicken, rice) or frozen meal (lean cuisine) OR if at work 11 a m  1/2-3/4 cup chicken salad, celery or 1 serving wheat thins OR hot dog on potato roll, 1/2 cup potato salad OR frozen pizza OR 1/2 turkey [employer]: work days protein bar OR if home chips or 2-3 ice cream s/w  Dinner:  7-8:00:   creamed chip beef on kandi's killer bagel OR 6-8 oz chicken/turkey/steak, ~1 cup protein pasta or rice, veggies  Snack:     Beverages: water,   Volume of beverage intake: 64->gallon water     Weekends: Same  Cravings: chips, ice cream  Trouble area of day: midafternoon     Frequency of Eating out: 3x/wk  Food restrictions: n/a  Cooking: self   Food Shopping: self    Physical Activity Intake  Activity: strength, cardio  Frequency:4x/wk  Physical limitations/barriers to exercise: n/a    Estimated Needs  Energy  Bear Patricia Energy Needs: BMR : 0466   1-2# loss weekly sedentary: [phone]         1-2# loss weekly lightly active: 9311-2946  Maintenance calories for sedentary activity level:   Protein:  gm     (1 2-1 5g/kg IBW)  Fluid: 80 oz     (35mL/kg IBW)    Nutrition Diagnosis  Yes; Overweight/obesity  related to Excess energy intake as evidenced by  BMI more than normative standard for age and sex (overweight 25-29  9)       Nutrition Intervention    Nutrition Prescription  Calories: 0959-9619  Protein: 100-115 gm    Meal Plan (Bobby/Pro)  Breakfast within 2 hrs of wakin-375, 20  Snack: 0-150, 0-10  Lunch: 350-400, 20-25  Snack: 200, 10-15  Dinner: 500-550, 39  Snack: skip      Nutrition Education:    Healthy Core Manual  Calorie controlled menu  Lean protein food choices  Healthy snack options  Food journaling tips      Nutrition Counseling:  Strategies: meal planning, portion sizes, healthy snack choices, hydration, fiber intake, protein intake, exercise, food journal      Monitoring and Evaluation:  Evaluation criteria:  Energy Intake  Meet protein needs  Maintain adequate hydration  Monitor weekly weight  Meal planning/preparation  Food journal   Decreased portions at mealtimes and snacks  Physical activity     Barriers to learning:none  Readiness to change: Action:  (Changing behavior)    Comprehension: very good  Expected Compliance: very good

## 2023-05-24 ENCOUNTER — OFFICE VISIT (OUTPATIENT)
Dept: BARIATRICS | Facility: CLINIC | Age: 38
End: 2023-05-24

## 2023-05-24 VITALS
RESPIRATION RATE: 16 BRPM | HEART RATE: 68 BPM | WEIGHT: 194 LBS | BODY MASS INDEX: 27.77 KG/M2 | SYSTOLIC BLOOD PRESSURE: 116 MMHG | DIASTOLIC BLOOD PRESSURE: 64 MMHG | HEIGHT: 70 IN

## 2023-05-24 DIAGNOSIS — E66.3 OVERWEIGHT WITH BODY MASS INDEX (BMI) OF 27 TO 27.9 IN ADULT: ICD-10-CM

## 2023-05-24 DIAGNOSIS — E66.3 OVERWEIGHT: Primary | ICD-10-CM

## 2023-05-24 NOTE — PROGRESS NOTES
Assessment/Plan:    Overweight  -Patient is pursuing Conservative Program  Previously completed Healthy CORE  -Initial weight loss goal of 5-10% weight loss for improved healt  - Labs reviewed: CMP, CBC, and TSH 1/30/2023 were within acceptable range  A1C and lipid panel 12/5/2022  HDL low, which will likely improve with cardiovascular exercise, otherwise labs within acceptable range  Initial: 247 2 lbs  Current: 194lbs BMI 27 84  Change:- -53 2  Goal:180s    Goals:  Continue food logging-goal 1400 calories consistently  Currently working to lower calories to 1600 3 times a week  Keep up the great work with water intake - at least 64 oz daily  To resume weight training and continue at least 4 days a week of exercise  - to continue wegovy  Wegovy started at 234 6lb on 2/17/22  and lost weight to a low of 175lb  It was stopped in February but then restarted after increased appetite and weight gain  Currently on 0 5mg dose and tolerating well  To start 1mg next week- Patient denies personal history of pancreatitis  Patient also denies personal and family history of medullary thyroid cancer and multiple endocrine neoplasia type 2 (MEN 2 tumor)  Uncle has history of papillary thyroid cancer  I have spent a total time of 32 minutes on 05/24/23 in caring for this patient including Diagnostic results, Risks and benefits of tx options, Instructions for management, Patient and family education, Impressions, Counseling / Coordination of care, Documenting in the medical record and Reviewing / ordering tests, medicine, procedures    Follow up in approximately 3 months with Non-Surgical Physician/Advanced Practitioner  Diagnoses and all orders for this visit:    Overweight          Subjective:   Chief Complaint   Patient presents with   • Follow-up     MWM 3mth f/u; waist 31in        Patient ID: Lilo Cedeño  is a 40 y o  female with excess weight/obesity here to pursue weight managment  Patient is pursuing Conservative Program and has non-surgical dietician followup today    HPI she was on wegovy but stopped it due to weight loss and BMI of 25 02  She did notice when it was stopped in February her appetite was out of control and she was craving foods she did not typically eat  It was thus restarted after 20 lb weight gain  She is on 0 5mg of wegovy right now  She restarted it April 12th at 192 2lb  She will be starting 1mg next week  She is not seeing a big change in appetite at the current dose  She is working with health  through her insurance  Goal was less than 1600 calories  She is eating less on the weekends when she works  [place] from [facility] En[facility]ters:   05/24/23 88 kg (194 lb)   05/02/23 88 kg (194 lb)   03/28/23 84 1 kg (185 lb 6 4 oz)   03/13/23 81 kg (178 lb 8 oz)   03/08/23 82 1 kg (181 lb)   02/22/23 79 6 kg (175 lb 6 4 oz)   02/09/23 80 3 kg (177 lb)   02/01/23 79 6 kg (175 lb 6 4 oz)   01/16/23 79 4 kg (175 lb)   12/05/22 80 9 kg (178 lb 6 4 oz)       Food logging:Yes,   Increased appetite/cravings:  Fruit/Vegetable servings:  Exercise:4 days a week   Hydration:1 gal water    Colonoscopy-Not applicable    The following portions of the patient's history were reviewed and updated as appropriate:   She  has a past medical history of Allergic, Asthma, GERD (gastroesophageal reflux disease), HPV (human papilloma virus) infection, HSV-1 infection, and HSV-2 infection    She   Patient Active Problem List    Diagnosis Date Noted   • Abnormal result of iron profile testing 01/16/2023   • ROMINA (obstructive sleep apnea)    • Cervicalgia 11/28/2022   • Snoring 11/28/2022   • Morning headache 11/28/2022   • Viral syndrome 12/30/2021   • Overweight 11/08/2021   • GERD (gastroesophageal reflux disease) 11/08/2021   • Migraine without aura and with status migrainosus, not intractable 02/19/2021   • Galactorrhea 09/16/2020   • Palpitations 05/29/2019   • Lumbar disc herniation 11/21/2018   • Lumbar radiculopathy - Left 11/13/2018     She  has a past surgical history that includes TONSILECTOMY AND ADNOIDECTOMY (1990); [employer] tooth extraction (2007); Cervical biopsy w/ loop electrode excision (2012); Posterior laminectomy thoracic and lumbar spine (N/A, 02/05/2019); CT epidural steroid injection (TAMI lumbar) (N/A, 2018); and Skin biopsy  Her family history includes Abnormal EKG in her maternal grandmother; Alcohol abuse in her maternal grandmother; Arthritis in her father and mother; Atrial fibrillation in her mother; Brain cancer in her maternal grandfather; Cancer in her father, maternal grandfather, and maternal grandmother; Cirrhosis in her maternal grandmother; Colon cancer in her paternal aunt; Diabetes in her father, mother, paternal aunt, paternal grandfather, paternal grandmother, and paternal uncle; Esophageal varices in her maternal grandmother; Heart disease in her maternal grandfather and maternal uncle; Hyperlipidemia in her father; Hypertension in her father, mother, paternal aunt, paternal grandmother, and paternal uncle; Lung cancer in her paternal grandmother; Melanoma in her maternal uncle; Stroke in her maternal grandfather; Thyroid cancer in her paternal uncle  She  reports that she quit smoking about 7 years ago  Her smoking use included cigarettes  She started smoking about 27 years ago  She has never used smokeless tobacco  She reports current alcohol use  She reports current drug use  Drug: Marijuana  Current Outpatient Medications   Medication Sig Dispense Refill   • ascorbic acid (VITAMIN C) 500 MG tablet Take 500 mg by mouth daily     • budesonide-formoterol (Symbicort) 160-4 5 mcg/act inhaler Inhale 2 puffs 2 (two) times a day Rinse mouth after use   10 2 g 1   • cholecalciferol (VITAMIN D3) 1,000 units tablet Take 4,000 Units by mouth daily     • Cyanocobalamin (VITAMIN B 12 PO) Place 1,000 mcg under the tongue in the morning     • cyproheptadine (PERIACTIN) 4 mg tablet Take 1 tablet (4 mg total) by mouth daily at bedtime as needed for allergies (headaches) 30 tablet 0   • dexamethasone (DECADRON) 1 mg tablet Take 1 mg by mouth if needed     • Docusate Sodium 100 MG capsule Take 100-200 mg by mouth in the morning 100 mg Monday/Wednesday/Friday/Sunday  200 mg Tuesday/Thursday/Saturday     • FIBER PO Take 2 tablets by mouth in the morning Gummies     • fluticasone (FLONASE) 50 mcg/act nasal spray 1 spray into each nostril daily 48 mL 0   • ketorolac (TORADOL) 10 mg tablet Take 10 mg by mouth every 6 (six) hours as needed for moderate pain     • Loratadine 10 MG CAPS Take 10 mg by mouth daily      • MAGNESIUM OXIDE 400 PO Take 1 tablet by mouth daily     • methocarbamol (ROBAXIN) 500 mg tablet Take 1 tablet (500 mg total) by mouth daily at bedtime 90 tablet 3   • methylphenidate (Concerta) 27 MG ER tablet Take 1 tablet (27 mg total) by mouth daily Max Daily Amount: 27 mg 30 tablet 0   • naproxen sodium (ANAPROX) 550 mg tablet Take 1 tablet (550 mg total) by mouth 2 (two) times a day with meals For menstrual cramps 30 tablet 0   • ondansetron (ZOFRAN) 4 mg tablet Take 1 tablet (4 mg total) by mouth every 8 (eight) hours as needed for nausea or vomiting 20 tablet 0   • rizatriptan (MAXALT-MLT) 10 MG disintegrating tablet Take 1 tablet (10 mg total) by mouth once as needed for migraine May repeat every 2 hours if needed, max 20mg/24 hours 12 tablet 0   • Semaglutide-Weight Management [facility]) 1 MG/0 5ML Inject 0 5 mL (1 mg total) under the skin once a week 2 mL 0   • valACYclovir (VALTREX) 500 mg tablet Take 1 tablet (500 mg total) by mouth daily 90 tablet 0     No current facility-administered medications for this visit       Current Outpatient Medications on File Prior to Visit   Medication Sig   • ascorbic acid (VITAMIN C) 500 MG tablet Take 500 mg by mouth daily   • budesonide-formoterol (Symbicort) 160-4 5 mcg/act inhaler Inhale 2 puffs 2 (two) times a day Rinse mouth after use  • cholecalciferol (VITAMIN D3) 1,000 units tablet Take 4,000 Units by mouth daily   • Cyanocobalamin (VITAMIN B 12 PO) Place 1,000 mcg under the tongue in the morning   • cyproheptadine (PERIACTIN) 4 mg tablet Take 1 tablet (4 mg total) by mouth daily at bedtime as needed for allergies (headaches)   • dexamethasone (DECADRON) 1 mg tablet Take 1 mg by mouth if needed   • Docusate Sodium 100 MG capsule Take 100-200 mg by mouth in the morning 100 mg Monday/Wednesday/Friday/Sunday  200 mg Tuesday/Thursday/Saturday   • FIBER PO Take 2 tablets by mouth in the morning Gummies   • fluticasone (FLONASE) 50 mcg/act nasal spray 1 spray into each nostril daily   • ketorolac (TORADOL) 10 mg tablet Take 10 mg by mouth every 6 (six) hours as needed for moderate pain   • Loratadine 10 MG CAPS Take 10 mg by mouth daily    • MAGNESIUM OXIDE 400 PO Take 1 tablet by mouth daily   • methocarbamol (ROBAXIN) 500 mg tablet Take 1 tablet (500 mg total) by mouth daily at bedtime   • methylphenidate (Concerta) 27 MG ER tablet Take 1 tablet (27 mg total) by mouth daily Max Daily Amount: 27 mg   • naproxen sodium (ANAPROX) 550 mg tablet Take 1 tablet (550 mg total) by mouth 2 (two) times a day with meals For menstrual cramps   • ondansetron (ZOFRAN) 4 mg tablet Take 1 tablet (4 mg total) by mouth every 8 (eight) hours as needed for nausea or vomiting   • rizatriptan (MAXALT-MLT) 10 MG disintegrating tablet Take 1 tablet (10 mg total) by mouth once as needed for migraine May repeat every 2 hours if needed, max 20mg/24 hours   • Semaglutide-Weight Management [facility]) 1 MG/0 5ML Inject 0 5 mL (1 mg total) under the skin once a week   • valACYclovir (VALTREX) 500 mg tablet Take 1 tablet (500 mg total) by mouth daily     No current facility-administered medications on file prior to visit  She is allergic to sulfa antibiotics and doxycycline       Review of Systems   Constitutional: Negative for fever     Eyes: Negative for visual "disturbance  Respiratory: Negative for cough and shortness of breath  Cardiovascular: Negative for chest pain and palpitations  Gastrointestinal: Negative for abdominal pain and vomiting  Genitourinary: Positive for menstrual problem  Negative for dysuria  Skin: Negative for color change and rash  Neurological: Negative for seizures and syncope  All other systems reviewed and are negative  Objective:    /64   Pulse 68   Resp 16   Ht 5' 10\" (1 778 m)   Wt 88 kg (194 lb)   BMI 27 84 kg/m²      Physical Exam  Vitals and nursing note reviewed  Constitutional:       General: She is not in acute distress  Appearance: She is well-developed  She is obese  HENT:      Head: Normocephalic and atraumatic  Eyes:      Conjunctiva/sclera: Conjunctivae normal    Neck:      Thyroid: No thyromegaly  Pulmonary:      Effort: Pulmonary effort is normal  No respiratory distress  Skin:     Findings: No rash (visible)  Neurological:      Mental Status: She is alert and oriented to person, place, and time     Psychiatric:         Mood and Affect: Mood normal          Behavior: Behavior normal          "

## 2023-05-24 NOTE — ASSESSMENT & PLAN NOTE
-Patient is pursuing Conservative Program  Previously completed Healthy CORE  -Initial weight loss goal of 5-10% weight loss for improved healt  - Labs reviewed: CMP, CBC, and TSH 1/30/2023 were within acceptable range  A1C and lipid panel 12/5/2022  HDL low, which will likely improve with cardiovascular exercise, otherwise labs within acceptable range  Initial: 247 2 lbs  Current: 194lbs BMI 27 84  Change:- -53 2  Goal:180s    Goals:  Continue food logging-goal 1400 calories consistently  Currently working to lower calories to 1600 3 times a week  Keep up the great work with water intake - at least 64 oz daily  To resume weight training and continue at least 4 days a week of exercise  - to continue wegovy  Wegovy started at 234 6lb on 2/17/22  and lost weight to a low of 175lb  It was stopped in February but then restarted after increased appetite and weight gain  Currently on 0 5mg dose and tolerating well  To start 1mg next week- Patient denies personal history of pancreatitis  Patient also denies personal and family history of medullary thyroid cancer and multiple endocrine neoplasia type 2 (MEN 2 tumor)  Uncle has history of papillary thyroid cancer

## 2023-06-19 DIAGNOSIS — G47.33 OSA (OBSTRUCTIVE SLEEP APNEA): ICD-10-CM

## 2023-06-19 DIAGNOSIS — K21.9 GERD (GASTROESOPHAGEAL REFLUX DISEASE): ICD-10-CM

## 2023-06-19 DIAGNOSIS — E66.3 OVERWEIGHT: Primary | ICD-10-CM

## 2023-06-20 ENCOUNTER — OFFICE VISIT (OUTPATIENT)
Dept: FAMILY MEDICINE CLINIC | Facility: CLINIC | Age: 38
End: 2023-06-20
Payer: COMMERCIAL

## 2023-06-20 VITALS
HEART RATE: 67 BPM | OXYGEN SATURATION: 98 % | TEMPERATURE: 97.4 F | SYSTOLIC BLOOD PRESSURE: 116 MMHG | WEIGHT: 198.6 LBS | RESPIRATION RATE: 18 BRPM | DIASTOLIC BLOOD PRESSURE: 82 MMHG | BODY MASS INDEX: 28.43 KG/M2 | HEIGHT: 70 IN

## 2023-06-20 DIAGNOSIS — F90.0 ATTENTION DEFICIT HYPERACTIVITY DISORDER (ADHD), PREDOMINANTLY INATTENTIVE TYPE: Primary | ICD-10-CM

## 2023-06-20 PROCEDURE — 99213 OFFICE O/P EST LOW 20 MIN: CPT | Performed by: NURSE PRACTITIONER

## 2023-06-20 RX ORDER — METHYLPHENIDATE HYDROCHLORIDE 36 MG/1
36 TABLET ORAL DAILY
Qty: 30 TABLET | Refills: 0 | Status: SHIPPED | OUTPATIENT
Start: 2023-06-20

## 2023-06-20 NOTE — PROGRESS NOTES
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 - feeling over stimulated, decreased focus, increasing difficulty with focusing on task, forgetfulness and time management  Admits that other employees are noticing the difficulties she is experiencing     Also currently attending school for Masters Degree    Will increase to 36 mg Po daily and monitor the effects of the new dose in 3-4 weeks     Review of Systems   Constitutional: Negative  HENT: Negative  Eyes: Negative  Respiratory: Negative  Cardiovascular: Negative  Gastrointestinal: Negative  Endocrine: Negative  Genitourinary: Negative  Musculoskeletal: Negative  Skin: Negative  Allergic/Immunologic: Negative  Neurological: Negative  Hematological: Negative  Psychiatric/Behavioral: Positive for decreased concentration  The patient is nervous/anxious  Current Outpatient Medications on File Prior to Visit   Medication Sig   • ascorbic acid (VITAMIN C) 500 MG tablet Take 500 mg by mouth daily   • budesonide-formoterol (Symbicort) 160-4 5 mcg/act inhaler Inhale 2 puffs 2 (two) times a day Rinse mouth after use     • cholecalciferol (VITAMIN D3) 1,000 units tablet Take 4,000 Units by mouth daily   • Cyanocobalamin (VITAMIN B 12 PO) Place 1,000 mcg under the tongue in the morning   • cyproheptadine (PERIACTIN) 4 mg tablet Take 1 tablet (4 mg total) by mouth daily "at bedtime as needed for allergies (headaches)   • dexamethasone (DECADRON) 1 mg tablet Take 1 mg by mouth if needed   • Docusate Sodium 100 MG capsule Take 100-200 mg by mouth in the morning 100 mg Monday/Wednesday/Friday/Sunday  200 mg Tuesday/Thursday/Saturday   • FIBER PO Take 2 tablets by mouth in the morning Gummies   • fluticasone (FLONASE) 50 mcg/act nasal spray 1 spray into each nostril daily   • ketorolac (TORADOL) 10 mg tablet Take 10 mg by mouth every 6 (six) hours as needed for moderate pain   • Loratadine 10 MG CAPS Take 10 mg by mouth daily    • MAGNESIUM OXIDE 400 PO Take 1 tablet by mouth daily   • methocarbamol (ROBAXIN) 500 mg tablet Take 1 tablet (500 mg total) by mouth daily at bedtime   • naproxen sodium (ANAPROX) 550 mg tablet Take 1 tablet (550 mg total) by mouth 2 (two) times a day with meals For menstrual cramps   • ondansetron (ZOFRAN) 4 mg tablet Take 1 tablet (4 mg total) by mouth every 8 (eight) hours as needed for nausea or vomiting   • rizatriptan (MAXALT-MLT) 10 MG disintegrating tablet Take 1 tablet (10 mg total) by mouth once as needed for migraine May repeat every 2 hours if needed, max 20mg/24 hours   • Semaglutide-Weight Management (WEGOVY) 1 7 MG/0 75ML Inject 0 75 mL (1 7 mg total) under the skin once a week   • valACYclovir (VALTREX) 500 mg tablet Take 1 tablet (500 mg total) by mouth daily   • [DISCONTINUED] methylphenidate (Concerta) 27 MG ER tablet Take 1 tablet (27 mg total) by mouth daily Max Daily Amount: 27 mg       Objective     /82 (BP Location: Left arm, Patient Position: Sitting, Cuff Size: Large)   Pulse 67   Temp (!) 97 4 °F (36 3 °C) (Tympanic)   Resp 18   Ht 5' 10\" (1 778 m)   Wt 90 1 kg (198 lb 9 6 oz)   LMP 06/13/2023 (Exact Date)   SpO2 98%   BMI 28 50 kg/m²     Physical Exam  Vitals and nursing note reviewed  Constitutional:       Appearance: Normal appearance  She is not ill-appearing  HENT:      Head: Normocephalic and atraumatic        " Nose: Nose normal  No congestion  Eyes:      Extraocular Movements: Extraocular movements intact  Conjunctiva/sclera: Conjunctivae normal    Cardiovascular:      Rate and Rhythm: Normal rate and regular rhythm  Pulses: Normal pulses  Heart sounds: No murmur heard  Pulmonary:      Effort: Pulmonary effort is normal  No respiratory distress  Breath sounds: Normal breath sounds  Musculoskeletal:         General: Normal range of motion  Cervical back: Normal range of motion  Skin:     General: Skin is warm and dry  Neurological:      Mental Status: She is alert and oriented to person, place, and time  Psychiatric:         Mood and Affect: Mood normal          Behavior: Behavior normal          Thought Content:  Thought content normal          Judgment: Judgment normal        DEB [place]

## 2023-07-05 DIAGNOSIS — G47.33 OSA (OBSTRUCTIVE SLEEP APNEA): ICD-10-CM

## 2023-07-05 DIAGNOSIS — E66.3 OVERWEIGHT: Primary | ICD-10-CM

## 2023-07-20 ENCOUNTER — OFFICE VISIT (OUTPATIENT)
Dept: DERMATOLOGY | Facility: CLINIC | Age: 38
End: 2023-07-20
Payer: COMMERCIAL

## 2023-07-20 VITALS — HEIGHT: 70 IN | WEIGHT: 191.5 LBS | BODY MASS INDEX: 27.41 KG/M2 | TEMPERATURE: 97.4 F

## 2023-07-20 DIAGNOSIS — L65.9 HAIR LOSS: Primary | ICD-10-CM

## 2023-07-20 DIAGNOSIS — L63.9 ALOPECIA AREATA: ICD-10-CM

## 2023-07-20 PROCEDURE — 99213 OFFICE O/P EST LOW 20 MIN: CPT | Performed by: STUDENT IN AN ORGANIZED HEALTH CARE EDUCATION/TRAINING PROGRAM

## 2023-07-20 PROCEDURE — 11900 INJECT SKIN LESIONS </W 7: CPT | Performed by: STUDENT IN AN ORGANIZED HEALTH CARE EDUCATION/TRAINING PROGRAM

## 2023-07-20 RX ORDER — BIMATOPROST 3 UG/ML
1 SOLUTION TOPICAL
Qty: 5 ML | Refills: 3 | Status: SHIPPED | OUTPATIENT
Start: 2023-07-20 | End: 2023-07-20

## 2023-07-20 RX ORDER — BIMATOPROST 3 UG/ML
1 SOLUTION TOPICAL
Qty: 3 ML | Refills: 3 | Status: SHIPPED | OUTPATIENT
Start: 2023-07-20 | End: 2023-07-20

## 2023-07-20 RX ORDER — BIMATOPROST 3 UG/ML
1 SOLUTION TOPICAL
Qty: 3 ML | Refills: 3 | Status: SHIPPED | OUTPATIENT
Start: 2023-07-20

## 2023-07-20 NOTE — PROGRESS NOTES
[facility] Note     Patient Name: Vandana Sinha  Encounter Date: 07/20/2023     Have you been cared for by a [facility]atologist in the last 3 years and, if so, which description applies to you? Yes. I have been here within the last 3 years, and my medical history has NOT changed since that time. I am FEMALE/of child-bearing potential.    REVIEW OF SYSTEMS:  Have you recently had or currently have any of the following? · No changes in my recent health. PAST MEDICAL HISTORY:  Have you personally ever had or currently have any of the following? If "YES," then please provide more detail. · No changes in my medical history. FAMILY HISTORY:  Any "first degree relatives" (parent, brother, sister, or child) with the following? • No changes in my family's known health. PATIENT EXPERIENCE:    • Do you want the Dermatologist to perform a COMPLETE skin exam today including a clinical examination under the "bra and underwear" areas? NO  • If necessary, do we have your permission to call and leave a detailed message on your Preferred Phone number that includes your specific medical information? Yes      Allergies   Allergen Reactions   • Sulfa Antibiotics      Family has allergy she has never taken   • Doxycycline Rash and Edema     Whole body rash,bruises behind both thighs      Current Outpatient Medications:   •  ascorbic acid (VITAMIN C) 500 MG tablet, Take 500 mg by mouth daily, Disp: , Rfl:   •  bimatoprost (LATISSE) 0.03 % ophthalmic solution, Administer 1 drop to both eyes daily at bedtime Place one drop on applicator and apply evenly along the skin of the upper eyelid at base of eyelashes once daily at bedtime; repeat procedure for second eye (use a clean applicator). , Disp: 3 mL, Rfl: 3  •  cholecalciferol (VITAMIN D3) 1,000 units tablet, Take 4,000 Units by mouth daily, Disp: , Rfl:   •  Cyanocobalamin (VITAMIN B 12 PO), Place 1,000 mcg under the tongue in the morning, Disp: , Rfl:   •  cyproheptadine (PERIACTIN) 4 mg tablet, Take 1 tablet (4 mg total) by mouth daily at bedtime as needed for allergies (headaches), Disp: 30 tablet, Rfl: 0  •  dexamethasone (DECADRON) 1 mg tablet, Take 1 mg by mouth if needed, Disp: , Rfl:   •  Docusate Sodium 100 MG capsule, Take 100-200 mg by mouth in the morning 100 mg Monday/Wednesday/Friday/Sunday 200 mg Tuesday/Thursday/Saturday, Disp: , Rfl:   •  fluticasone (FLONASE) 50 mcg/act nasal spray, 1 spray into each nostril daily, Disp: 48 mL, Rfl: 0  •  ketorolac (TORADOL) 10 mg tablet, Take 10 mg by mouth every 6 (six) hours as needed for moderate pain, Disp: , Rfl:   •  Loratadine 10 MG CAPS, Take 10 mg by mouth daily , Disp: , Rfl:   •  MAGNESIUM OXIDE 400 PO, Take 1 tablet by mouth daily, Disp: , Rfl:   •  methocarbamol (ROBAXIN) 500 mg tablet, Take 1 tablet (500 mg total) by mouth daily at bedtime, Disp: 90 tablet, Rfl: 3  •  methylphenidate (Concerta) 36 MG ER tablet, Take 1 tablet (36 mg total) by mouth daily Max Daily Amount: 36 mg, Disp: 30 tablet, Rfl: 0  •  naproxen sodium (ANAPROX) 550 mg tablet, Take 1 tablet (550 mg total) by mouth 2 (two) times a day with meals For menstrual cramps, Disp: 30 tablet, Rfl: 0  •  ondansetron (ZOFRAN) 4 mg tablet, Take 1 tablet (4 mg total) by mouth every 8 (eight) hours as needed for nausea or vomiting, Disp: 20 tablet, Rfl: 0  •  rizatriptan (MAXALT-MLT) 10 MG disintegrating tablet, Take 1 tablet (10 mg total) by mouth once as needed for migraine May repeat every 2 hours if needed, max 20mg/24 hours, Disp: 12 tablet, Rfl: 0  •  Semaglutide-Weight Management (WEGOVY) 2.4 MG/0.75ML, Inject 0.75 mL (2.4 mg total) under the skin once a week, Disp: 9 mL, Rfl: 0  •  valACYclovir (VALTREX) 500 mg tablet, Take 1 tablet (500 mg total) by mouth daily, Disp: 90 tablet, Rfl: 0  •  budesonide-formoterol (Symbicort) 160-4.5 mcg/act inhaler, Inhale 2 puffs 2 (two) times a day Rinse mouth after use.  (Patient not taking: Reported on 7/20/2023), Disp: 10.2 g, Rfl: 1  •  FIBER PO, Take 2 tablets by mouth in the morning Gummies (Patient not taking: Reported on 7/20/2023), Disp: , Rfl:           • Whom besides the patient is providing clinical information about today's encounter?   o NO ADDITIONAL HISTORIAN (patient alone provided history)    Physical Exam and Assessment/Plan by Diagnosis:      ALOPECIA AREATA    Physical Exam:  • Anatomic Location Affected:  scalp  • Morphological Description:    o No perifollicular erythema or scale. • Pertinent Positives:  • Pertinent Negatives: Involvement of the eyelashes        Assessment and Plan:  - History and physical consistent with alopecia areata   - Educated that this is an autoimmune disease resulting in non-scarring hair loss  - Discussed the natural history of alopecia areata with most patients demonstrating limited areas of involvement of the scalp, eyebrows, eyelashes or body hair but that rarely alopecia areata can progress to alopecia totalis (complete loss of hair on the scalp) or alopecia universalis (complete loss of hair on the scalp and body). - Educated that 50% of patients with limited hair loss will recover within a year but many will experience multiple episodes. - Educated that in some cases hair loss does not respond to treatments. - Treatment options including topical steroids, intralesional steroids, mini pulse oral steroids, NAHUM inhibitors, minoxidil, including the benefits and risks.   - Based on a thorough discussion of this condition and the management approach to it (including a comprehensive discussion of the known risks, side effects and potential benefits of treatment), the patient (family) agrees to implement the following specific plan:  • Kenalog treatment done today in office as below - consent obtained       PROCEDURE:  INTRALESIONAL STEROID INJECTION (KENALOG INJECTION)    Purpose: Triamcinolone is a synthetic glucocorticoid corticosteroid that has marked anti-inflammatory action. It is prepared in sterile aqueous suspension suitable for injecting directly into a lesion on or immediately below the skin to treat a dermal inflammatory process. Indications: It is indicated for alopecia areata; inflammatory acne cysts; discoid lupus erythematosus; keloids and hypertrophic scars; inflammatory lesions of granuloma annulare, lichen planus, lichen simplex chronicus (neurodermatitis), psoriatic plaques, and other localized inflammatory skin conditions. Potential Side Effects: I understand that triamcinolone injection can potentially cause early and/or delayed adverse effects such as:   • Pain   • Impaired wound healing   • Increased hair growth   • Bleeding   • White or brown marks   • Steroid acne   • Infection   • Telangiectasia   • Skin thinning   • Cutaneous and subcutaneous lipoatrophy (most common) appearing as skin indentations or dimples around the injection sites a few weeks after treatment     PROCEDURE NOTE:  After verbal and written consent were obtained, the to-be-treated area was wiped and cleaned with rubbing alcohol 70%. Then, a total of 1.5  mL of Kenalog CONCENTRATION:  10 mg/mL diluted with saline to a final concentration of 0.75 mg/mL    (Lot# [id number]; Expiration 08/31/2024, NDC#: 5360-4977-47) was injected intralesionally into a total of 3 lesion/s on the following anatomic areas:   scalp  using a 3-mL syringe and a 30-gauge needle. There was less than 1 mL of blood loss and little to no discomfort. The area was bandaged with a Band-aid. The patient tolerated the procedure well and remained in the office for observation. With no signs of an adverse reaction, the patient was eventually discharged from clinic. COSMETIC CONCERN, LASHES  Physical Exam:  • Anatomic Location Affected:  B/l eyelashes  • Morphological Description:  No visible alopecic patches.  Short ~4mm straight terminal hairs   • Pertinent Positives:  • Pertinent Negatives: Additional History of Present Condition:  Pt notes frequent falling out of lashes, denies AA patcehs on areas other than scalp    Assessment and Plan:  Based on a thorough discussion of this condition and the management approach to it (including a comprehensive discussion of the known risks, side effects and potential benefits of treatment), the patient (family) agrees to implement the following specific plan:  · Discussed bimatoprost (generic latisse) to help elongate anagen phase. Discussed AEs profile. Pt agreeable.   · START topical bimatoprost (LATISSE) 0.03 % ophthalmic solution - Administer 1 drop to both eyes daily at bedtime Place one drop on applicator and apply evenly along the skin of the upper eyelid at base of eyelashes once daily at bedtime; repeat procedure for second eye (use a clean applicator each time)    Scribe Attestation    I,:  Lorrie Henry am acting as a scribe while in the presence of the attending physician.:       I,:  Rozetta Boast, MD personally performed the services described in this documentation    as scribed in my presence.:

## 2023-07-20 NOTE — PATIENT INSTRUCTIONS
ALOPECIA AREATA    Assessment and Plan:  - History and physical consistent with alopecia areata   - Educated that this is an autoimmune disease resulting in non-scarring hair loss  - Discussed the natural history of alopecia areata with most patients demonstrating limited areas of involvement of the scalp, eyebrows, eyelashes or body hair but that rarely alopecia areata can progress to alopecia totalis (complete loss of hair on the scalp) or alopecia universalis (complete loss of hair on the scalp and body). - Educated that 50% of patients with limited hair loss will recover within a year but many will experience multiple episodes. - Educated that in some cases hair loss does not respond to treatments. - Treatment options including topical steroids, intralesional steroids, mini pulse oral steroids, NAHUM inhibitors, minoxidil, including the benefits and risks. - Based on a thorough discussion of this condition and the management approach to it (including a comprehensive discussion of the known risks, side effects and potential benefits of treatment), the patient (family) agrees to implement the following specific plan:  Kenalog treatment done today in office as below - consent obtained  START topical bimatoprost (LATISSE) 0.03 % ophthalmic solution - Administer 1 drop to both eyes daily at bedtime Place one drop on applicator and apply evenly along the skin of the upper eyelid at base of eyelashes once daily at bedtime; repeat procedure for second eye (use a clean applicator    PROCEDURE:  INTRALESIONAL STEROID INJECTION (KENALOG INJECTION)    Purpose: Triamcinolone is a synthetic glucocorticoid corticosteroid that has marked anti-inflammatory action. It is prepared in sterile aqueous suspension suitable for injecting directly into a lesion on or immediately below the skin to treat a dermal inflammatory process. Indications:  It is indicated for alopecia areata; inflammatory acne cysts; discoid lupus erythematosus; keloids and hypertrophic scars; inflammatory lesions of granuloma annulare, lichen planus, lichen simplex chronicus (neurodermatitis), psoriatic plaques, and other localized inflammatory skin conditions.      Potential Side Effects: I understand that triamcinolone injection can potentially cause early and/or delayed adverse effects such as:    Pain    Impaired wound healing    Increased hair growth    Bleeding    White or brown marks    Steroid acne    Infection    Telangiectasia    Skin thinning    Cutaneous and subcutaneous lipoatrophy (most common) appearing as skin indentations or dimples around the injection sites a few weeks after treatment

## 2023-07-28 DIAGNOSIS — F90.0 ATTENTION DEFICIT HYPERACTIVITY DISORDER (ADHD), PREDOMINANTLY INATTENTIVE TYPE: ICD-10-CM

## 2023-07-28 RX ORDER — METHYLPHENIDATE HYDROCHLORIDE 36 MG/1
36 TABLET ORAL DAILY
Qty: 30 TABLET | Refills: 0 | Status: SHIPPED | OUTPATIENT
Start: 2023-07-28

## 2023-08-30 DIAGNOSIS — B00.9 HERPES: ICD-10-CM

## 2023-08-30 DIAGNOSIS — N94.6 DYSMENORRHEA: ICD-10-CM

## 2023-08-30 DIAGNOSIS — F90.0 ATTENTION DEFICIT HYPERACTIVITY DISORDER (ADHD), PREDOMINANTLY INATTENTIVE TYPE: ICD-10-CM

## 2023-08-30 RX ORDER — VALACYCLOVIR HYDROCHLORIDE 500 MG/1
500 TABLET, FILM COATED ORAL DAILY
Qty: 90 TABLET | Refills: 0 | Status: SHIPPED | OUTPATIENT
Start: 2023-08-30 | End: 2023-11-28

## 2023-08-30 RX ORDER — METHYLPHENIDATE HYDROCHLORIDE 36 MG/1
36 TABLET ORAL DAILY
Qty: 30 TABLET | Refills: 0 | Status: SHIPPED | OUTPATIENT
Start: 2023-08-30

## 2023-08-31 ENCOUNTER — PROCEDURE VISIT (OUTPATIENT)
Dept: DERMATOLOGY | Facility: CLINIC | Age: 38
End: 2023-08-31
Payer: COMMERCIAL

## 2023-08-31 ENCOUNTER — TELEPHONE (OUTPATIENT)
Dept: NEUROLOGY | Facility: CLINIC | Age: 38
End: 2023-08-31

## 2023-08-31 VITALS — BODY MASS INDEX: 27.09 KG/M2 | HEIGHT: 71 IN

## 2023-08-31 DIAGNOSIS — R51.9 WORSENING HEADACHES: Primary | ICD-10-CM

## 2023-08-31 DIAGNOSIS — H53.9 VISUAL AURA: ICD-10-CM

## 2023-08-31 DIAGNOSIS — L63.9 ALOPECIA AREATA: Primary | ICD-10-CM

## 2023-08-31 DIAGNOSIS — G43.109 MIGRAINE WITH AURA AND WITHOUT STATUS MIGRAINOSUS, NOT INTRACTABLE: ICD-10-CM

## 2023-08-31 DIAGNOSIS — G43.111 INTRACTABLE MIGRAINE WITH AURA WITH STATUS MIGRAINOSUS: ICD-10-CM

## 2023-08-31 PROCEDURE — 11900 INJECT SKIN LESIONS </W 7: CPT | Performed by: STUDENT IN AN ORGANIZED HEALTH CARE EDUCATION/TRAINING PROGRAM

## 2023-08-31 RX ORDER — DEXAMETHASONE 2 MG/1
2 TABLET ORAL
Qty: 5 TABLET | Refills: 0 | Status: SHIPPED | OUTPATIENT
Start: 2023-08-31

## 2023-08-31 RX ORDER — DIVALPROEX SODIUM 500 MG/1
500 TABLET, EXTENDED RELEASE ORAL DAILY
Qty: 5 TABLET | Refills: 0 | Status: SHIPPED | OUTPATIENT
Start: 2023-08-31

## 2023-08-31 RX ORDER — RIMEGEPANT SULFATE 75 MG/75MG
75 TABLET, ORALLY DISINTEGRATING ORAL AS NEEDED
Qty: 16 TABLET | Refills: 6 | Status: SHIPPED | OUTPATIENT
Start: 2023-08-31

## 2023-08-31 RX ORDER — NAPROXEN SODIUM 550 MG/1
550 TABLET ORAL 2 TIMES DAILY WITH MEALS
Qty: 30 TABLET | Refills: 0 | Status: SHIPPED | OUTPATIENT
Start: 2023-08-31

## 2023-08-31 NOTE — PROGRESS NOTES
[facility] Note     Patient Name: Carmen Moore  Encounter Date: 8/31/23     Have you been cared for by a [facility] in the last 3 years and, if so, which description applies to you? Yes. I have been here within the last 3 years, and my medical history has NOT changed since that time. I am FEMALE/of child-bearing potential.    REVIEW OF SYSTEMS:  Have you recently had or currently have any of the following? · No changes in my recent health. PAST MEDICAL HISTORY:  Have you personally ever had or currently have any of the following? If "YES," then please provide more detail. · No changes in my medical history. FAMILY HISTORY:  Any "first degree relatives" (parent, brother, sister, or child) with the following? • No changes in my family's known health. PATIENT EXPERIENCE:    • Do you want the Dermatologist to perform a COMPLETE skin exam today including a clinical examination under the "bra and underwear" areas? NO  • If necessary, do we have your permission to call and leave a detailed message on your Preferred Phone number that includes your specific medical information? Yes      Allergies   Allergen Reactions   • Sulfa Antibiotics      Family has allergy she has never taken   • Doxycycline Rash and Edema     Whole body rash,bruises behind both thighs      Current Outpatient Medications:   •  ascorbic acid (VITAMIN C) 500 MG tablet, Take 500 mg by mouth daily, Disp: , Rfl:   •  bimatoprost (LATISSE) 0.03 % ophthalmic solution, Administer 1 drop to both eyes daily at bedtime Place one drop on applicator and apply evenly along the skin of the upper eyelid at base of eyelashes once daily at bedtime; repeat procedure for second eye (use a clean applicator). , Disp: 3 mL, Rfl: 3  •  cholecalciferol (VITAMIN D3) 1,000 units tablet, Take 4,000 Units by mouth daily, Disp: , Rfl:   •  Cyanocobalamin (VITAMIN B 12 PO), Place 1,000 mcg under the tongue in the morning, Disp: , Rfl:   •  cyproheptadine (PERIACTIN) 4 mg tablet, Take 1 tablet (4 mg total) by mouth daily at bedtime as needed for allergies (headaches), Disp: 30 tablet, Rfl: 0  •  dexamethasone (DECADRON) 2 mg tablet, Take 1 tablet (2 mg total) by mouth daily with breakfast, Disp: 5 tablet, Rfl: 0  •  Docusate Sodium 100 MG capsule, Take 100-200 mg by mouth in the morning 100 mg Monday/Wednesday/Friday/Sunday 200 mg Tuesday/Thursday/Saturday, Disp: , Rfl:   •  fluticasone (FLONASE) 50 mcg/act nasal spray, 1 spray into each nostril daily, Disp: 48 mL, Rfl: 0  •  ketorolac (TORADOL) 10 mg tablet, Take 10 mg by mouth every 6 (six) hours as needed for moderate pain, Disp: , Rfl:   •  Loratadine 10 MG CAPS, Take 10 mg by mouth daily , Disp: , Rfl:   •  methocarbamol (ROBAXIN) 500 mg tablet, Take 1 tablet (500 mg total) by mouth daily at bedtime, Disp: 90 tablet, Rfl: 3  •  methylphenidate (Concerta) 36 MG ER tablet, Take 1 tablet (36 mg total) by mouth daily Max Daily Amount: 36 mg, Disp: 30 tablet, Rfl: 0  •  naproxen sodium (ANAPROX) 550 mg tablet, Take 1 tablet (550 mg total) by mouth 2 (two) times a day with meals For menstrual cramps, Disp: 30 tablet, Rfl: 0  •  ondansetron (ZOFRAN) 4 mg tablet, Take 1 tablet (4 mg total) by mouth every 8 (eight) hours as needed for nausea or vomiting, Disp: 20 tablet, Rfl: 0  •  rizatriptan (MAXALT-MLT) 10 MG disintegrating tablet, Take 1 tablet (10 mg total) by mouth once as needed for migraine May repeat every 2 hours if needed, max 20mg/24 hours, Disp: 12 tablet, Rfl: 0  •  Semaglutide-Weight Management (WEGOVY) 2.4 MG/0.75ML, Inject 0.75 mL (2.4 mg total) under the skin once a week, Disp: 9 mL, Rfl: 0  •  valACYclovir (VALTREX) 500 mg tablet, Take 1 tablet (500 mg total) by mouth daily, Disp: 90 tablet, Rfl: 0  •  budesonide-formoterol (Symbicort) 160-4.5 mcg/act inhaler, Inhale 2 puffs 2 (two) times a day Rinse mouth after use.  (Patient not taking: Reported on 7/20/2023), Disp: 10.2 g, Rfl: 1  •  dexamethasone (DECADRON) 1 mg tablet, Take 1 mg by mouth if needed, Disp: , Rfl:   •  divalproex sodium (DEPAKOTE ER) 500 mg 24 hr tablet, Take 1 tablet (500 mg total) by mouth daily (Patient not taking: Reported on 8/31/2023), Disp: 5 tablet, Rfl: 0  •  FIBER PO, Take 2 tablets by mouth in the morning Gummies (Patient not taking: Reported on 7/20/2023), Disp: , Rfl:   •  MAGNESIUM OXIDE 400 PO, Take 1 tablet by mouth daily, Disp: , Rfl:   •  rimegepant sulfate (Nurtec) 75 mg TBDP, Take 1 tablet (75 mg total) by mouth as needed (migraine) Limit of 1 in 24 hours (Patient not taking: Reported on 8/31/2023), Disp: 16 tablet, Rfl: 6          • Whom besides the patient is providing clinical information about today's encounter?   o NO ADDITIONAL HISTORIAN (patient alone provided history)    Physical Exam and Assessment/Plan by Diagnosis:     ALOPECIA AREATA    Physical Exam:  • Anatomic Location Affected & Morphological Description:  Left occipital scalp with patch of regrowth of hair at shorter length than rest of scalp ~1cm length. Small alopecic smooth patch adjacent - No perifollicular erythema or scale. • Pertinent Positives:  • Pertinent Negatives: some involvement of the eyelashes        Assessment and Plan:  - History and physical consistent with alopecia areata   - Educated that this is an autoimmune disease resulting in non-scarring hair loss  - Discussed the natural history of alopecia areata with most patients demonstrating limited areas of involvement of the scalp, eyebrows, eyelashes or body hair but that rarely alopecia areata can progress to alopecia totalis (complete loss of hair on the scalp) or alopecia universalis (complete loss of hair on the scalp and body). - Educated that 50% of patients with limited hair loss will recover within a year but many will experience multiple episodes. - Educated that in some cases hair loss does not respond to treatments.   - Treatment options including topical steroids, intralesional steroids, mini pulse oral steroids, NAHUM inhibitors, minoxidil, including the benefits and risks. - Based on a thorough discussion of this condition and the management approach to it (including a comprehensive discussion of the known risks, side effects and potential benefits of treatment), the patient (family) agrees to implement the following specific plan:  • Kenalog treatment done today in office as below  • Consent obtained        PROCEDURE:  INTRALESIONAL STEROID INJECTION (KENALOG INJECTION)    Purpose: Triamcinolone is a synthetic glucocorticoid corticosteroid that has marked anti-inflammatory action. It is prepared in sterile aqueous suspension suitable for injecting directly into a lesion on or immediately below the skin to treat a dermal inflammatory process. Indications: It is indicated for alopecia areata; inflammatory acne cysts; discoid lupus erythematosus; keloids and hypertrophic scars; inflammatory lesions of granuloma annulare, lichen planus, lichen simplex chronicus (neurodermatitis), psoriatic plaques, and other localized inflammatory skin conditions. Potential Side Effects: I understand that triamcinolone injection can potentially cause early and/or delayed adverse effects such as:   • Pain   • Impaired wound healing   • Increased hair growth   • Bleeding   • White or brown marks   • Steroid acne   • Infection   • Telangiectasia   • Skin thinning   • Cutaneous and subcutaneous lipoatrophy (most common) appearing as skin indentations or dimples around the injection sites a few weeks after treatment     PROCEDURE NOTE:  After verbal and written consent were obtained, the to-be-treated area was wiped and cleaned with rubbing alcohol 70%.       Then, a total of 1.8  mL of Kenalog CONCENTRATION:  10 mg/mL diluted with saline to a final concentration of 5 mg/mL    (Lot# [id number]; Expiration 09/30/2024, NDC#: 0909-3954-82) was injected intralesionally into a total of 1 lesion/s on the following anatomic areas:   Left occipital scalp  using a 3 -mL syringe and a 30 -gauge needle. There was less than 1 mL of blood loss and little to no discomfort. The area was bandaged with a Band-aid. The patient tolerated the procedure well and remained in the office for observation. With no signs of an adverse reaction, the patient was eventually discharged from clinic.         Scribe Attestation    I,:  Karthik Howe MA am acting as a scribe while in the presence of the attending physician.:       I,:  Estela Armas MD personally performed the services described in this documentation    as scribed in my presence.:

## 2023-08-31 NOTE — PATIENT INSTRUCTIONS
ALOPECIA AREATA        Assessment and Plan:  - History and physical consistent with alopecia areata   - Educated that this is an autoimmune disease resulting in non-scarring hair loss  - Discussed the natural history of alopecia areata with most patients demonstrating limited areas of involvement of the scalp, eyebrows, eyelashes or body hair but that rarely alopecia areata can progress to alopecia totalis (complete loss of hair on the scalp) or alopecia universalis (complete loss of hair on the scalp and body). - Educated that 50% of patients with limited hair loss will recover within a year but many will experience multiple episodes. - Educated that in some cases hair loss does not respond to treatments. - Treatment options including topical steroids, intralesional steroids, mini pulse oral steroids, NAHUM inhibitors, minoxidil, including the benefits and risks. - Based on a thorough discussion of this condition and the management approach to it (including a comprehensive discussion of the known risks, side effects and potential benefits of treatment), the patient (family) agrees to implement the following specific plan:  Kenalog treatment done today in office as below  Consent obtained        PROCEDURE:  INTRALESIONAL STEROID INJECTION (KENALOG INJECTION)    Purpose: Triamcinolone is a synthetic glucocorticoid corticosteroid that has marked anti-inflammatory action. It is prepared in sterile aqueous suspension suitable for injecting directly into a lesion on or immediately below the skin to treat a dermal inflammatory process. Indications: It is indicated for alopecia areata; inflammatory acne cysts; discoid lupus erythematosus; keloids and hypertrophic scars; inflammatory lesions of granuloma annulare, lichen planus, lichen simplex chronicus (neurodermatitis), psoriatic plaques, and other localized inflammatory skin conditions.      Potential Side Effects: I understand that triamcinolone injection can potentially cause early and/or delayed adverse effects such as:    Pain    Impaired wound healing    Increased hair growth    Bleeding    White or brown marks    Steroid acne    Infection    Telangiectasia    Skin thinning    Cutaneous and subcutaneous lipoatrophy (most common) appearing as skin indentations or dimples around the injection sites a few weeks after treatment     PROCEDURE NOTE:  After verbal and written consent were obtained, the to-be-treated area was wiped and cleaned with rubbing alcohol 70%. There was less than 1 mL of blood loss and little to no discomfort. The area was bandaged with a Band-aid. The patient tolerated the procedure well and remained in the office for observation. With no signs of an adverse reaction, the patient was eventually discharged from clinic.

## 2023-08-31 NOTE — TELEPHONE ENCOUNTER
Patient has followed with our office for headaches since February 19, 2021. Her last office visit was November 28, 2022 where she reported she was getting 5-7 mild headaches a week and 1 moderate to severe migraine every few months. She has never had an aura prior to her message on 8/30/2023. This was completely denied. Since the end of July 2023 patient has had significant worsening of her migraines despite preventative and rescue treatment. In addition, much more concerning, patient has had 6 visual auras, which are completely new for her, leading to migraines. Therefore, we need to proceed with MRA of the head and neck to evaluate for any arterial abnormalities which could be causing these new visual auras as well as an MRI of the brain due to worsening of her migraines despite adequate preventative and rescue treatment. This needs to be done as soon as possible as she does have new visual aura was which she has never had in her prior history. Patient has had headaches and migraines since middle school. They did worsen when she was approximately 34. This is also a new change for her at the age of 41.      ----- Message from Antonia Green RN sent at 8/30/2023  3:33 PM EDT -----  Regarding: FW: Migraine   Contact: [phone]    ----- Message -----  From: Blaze Loza "Connie"  Sent: 8/30/2023   2:52 PM EDT  To: Neurology [facility] Clinical Team 5  Subject: Migraine                                         Good afternoon. I am reaching out because my next appointment is not until the end of November. Approximately 3-4 weeks ago I started with auras. My migraines have been pretty consistent again. And the auras are brand new. Since the start of these auras, I have had six visual areas leading to migraines. The max salt does help but they continue to return hours or a day later.  Please advise

## 2023-09-12 ENCOUNTER — TELEPHONE (OUTPATIENT)
Dept: NEUROLOGY | Facility: CLINIC | Age: 38
End: 2023-09-12

## 2023-09-12 NOTE — TELEPHONE ENCOUNTER
Chart reviewed  We did not receive PA request    Urgent PA initiated on CMM.  Key: [id number]    Awaiting determination

## 2023-09-13 ENCOUNTER — OFFICE VISIT (OUTPATIENT)
Dept: BARIATRICS | Facility: CLINIC | Age: 38
End: 2023-09-13
Payer: COMMERCIAL

## 2023-09-13 VITALS
HEART RATE: 76 BPM | SYSTOLIC BLOOD PRESSURE: 115 MMHG | DIASTOLIC BLOOD PRESSURE: 75 MMHG | WEIGHT: 184 LBS | HEIGHT: 70 IN | BODY MASS INDEX: 26.34 KG/M2 | RESPIRATION RATE: 17 BRPM

## 2023-09-13 DIAGNOSIS — G47.33 OSA (OBSTRUCTIVE SLEEP APNEA): ICD-10-CM

## 2023-09-13 DIAGNOSIS — E66.3 OVERWEIGHT: Primary | ICD-10-CM

## 2023-09-13 PROCEDURE — 99214 OFFICE O/P EST MOD 30 MIN: CPT | Performed by: PHYSICIAN ASSISTANT

## 2023-09-13 NOTE — ASSESSMENT & PLAN NOTE
-Patient is pursuing Conservative Program. Previously completed [facility]. -Initial weight loss goal of 5-10% weight loss for improved healt. - Labs reviewed: CMP, CBC, and TSH 1/30/2023 were within acceptable range. A1C and lipid panel 12/5/2022. HDL low, which will likely improve with cardiovascular exercise, otherwise labs within acceptable range. Initial: 247.2 lbs  Last Visit: 194lb  Current: 184 BMI 26.4  Change: -63.2  Goal:180s    Goals:  Continue food logging-goal 1600 calories consistently. She is aware she needs to meal prep more and needs to prioritize high protein snacks when at work. She is working with health  through caring starts with you  Keep up the great work with water intake - at least 64 oz daily. Discussed trying to increase exercise      - to continue wegovy. Wegovy started at 234.6lb on 2/17/22. and lost weight to a low of 175lb. It was stopped in February but then restarted after increased appetite and weight gain. Currently on 0.5mg dose and tolerating well. To start 1mg next week- Patient denies personal history of pancreatitis. Patient also denies personal and family history of medullary thyroid cancer and multiple endocrine neoplasia type 2 (MEN 2 tumor). Uncle has history of papillary thyroid cancer.

## 2023-09-13 NOTE — PROGRESS NOTES
Assessment/Plan:    Overweight  -Patient is pursuing Conservative Program. Previously completed Healthy CORE. -Initial weight loss goal of 5-10% weight loss for improved healt. - Labs reviewed: CMP, CBC, and TSH 1/30/2023 were within acceptable range. A1C and lipid panel 12/5/2022. HDL low, which will likely improve with cardiovascular exercise, otherwise labs within acceptable range. Initial: 247.2 lbs  Last Visit: 194lb  Current: 184 BMI 26.4  Change: -63.2  Goal:180s    Goals:  Continue food logging-goal 1600 calories consistently. She is aware she needs to meal prep more and needs to prioritize high protein snacks when at work. She is working with health  through caring starts with you  Keep up the great work with water intake - at least 64 oz daily. Discussed trying to increase exercise      - to continue wegovy. Wegovy started at 234.6lb on 2/17/22. and lost weight to a low of 175lb. It was stopped in February but then restarted after increased appetite and weight gain. Currently on 0.5mg dose and tolerating well. To start 1mg next week- Patient denies personal history of pancreatitis. Patient also denies personal and family history of medullary thyroid cancer and multiple endocrine neoplasia type 2 (MEN 2 tumor). Uncle has history of papillary thyroid cancer. Follow up in approximately 4 months with Non-Surgical Physician/Advanced Practitioner. I have spent a total time of 30 minutes on 09/13/23 in caring for this patient including Diagnostic results, Prognosis, Risks and benefits of tx options, Instructions for management, Patient and family education, Importance of tx compliance, Risk factor reductions, Impressions, Counseling / Coordination of care and Documenting in the medical record. Diagnoses and all orders for this visit:    Overweight  -     Semaglutide-Weight Management (WEGOVY) 2.4 MG/0.75ML;  Inject 0.75 mL (2.4 mg total) under the skin once a week    ROMINA (obstructive sleep apnea)  -     Semaglutide-Weight Management (WEGOVY) 2.4 MG/0.75ML; Inject 0.75 mL (2.4 mg total) under the skin once a week          Subjective:   Chief Complaint   Patient presents with   • Follow-up     MWM 4m f/u; waist-31.5in        Patient ID: Yunior May  is a 41 y.o. female with excess weight/obesity here to pursue weight managment. Patient is pursuing Conservative Program.     HPI   She has noticed appetite has been bigger laterly She did just finish dexamethasone for migraine. Sleep schedule has been off. She has had irregular work schedule and wokring more. She has noticed menstrual cycle is late-denies pregnancy. In the past she heavy menses and she will be following with gynecology in 4 weeks. She is still tracking foods but not meal prepping as much . Protein is a little lower on work days  Wt Readings from Last 10 Encounters:   23 83.5 kg (184 lb)   23 86.9 kg (191 lb 8 oz)   23 90.1 kg (198 lb 9.6 oz)   23 88 kg (194 lb)   23 88 kg (194 lb)   23 84.1 kg (185 lb 6.4 oz)   23 81 kg (178 lb 8 oz)   23 82.1 kg (181 lb)   23 79.6 kg (175 lb 6.4 oz)   23 80.3 kg (177 lb)       Food loggin calories a day, protein goal 80 gm  Increased appetite/cravings:currently yes, but usually no  Fruit/Vegetable servings:  Exercise:nothing formal right now  Hydration:at least a gallon a day    Colonoscopy-Not applicable    The following portions of the patient's history were reviewed and updated as appropriate:   She  has a past medical history of Allergic, Asthma, GERD (gastroesophageal reflux disease), HPV (human papilloma virus) infection, HSV-1 infection, and HSV-2 infection.   She   Patient Active Problem List    Diagnosis Date Noted   • Abnormal result of iron profile testing 2023   • ROMINA (obstructive sleep apnea)    • Cervicalgia 2022   • Snoring 2022   • Morning headache 2022   • Viral syndrome 12/30/2021   • Overweight 11/08/2021   • GERD (gastroesophageal reflux disease) 11/08/2021   • Migraine without aura and with status migrainosus, not intractable 02/19/2021   • Galactorrhea 09/16/2020   • Palpitations 05/29/2019   • Lumbar disc herniation 11/21/2018   • Lumbar radiculopathy - Left 11/13/2018     She  has a past surgical history that includes TONSILECTOMY AND ADNOIDECTOMY (1990); [place] tooth extraction (2007); Cervical biopsy w/ loop electrode excision (2012); Posterior laminectomy thoracic and lumbar spine (N/A, 02/05/2019); CT epidural steroid injection (TAMI lumbar) (N/A, 2018); and Skin biopsy. Her family history includes Abnormal EKG in her maternal grandmother; Alcohol abuse in her maternal grandmother; Arthritis in her father and mother; Atrial fibrillation in her mother; Brain cancer in her maternal grandfather; Cancer in her father, maternal grandfather, and maternal grandmother; Cirrhosis in her maternal grandmother; Colon cancer in her paternal aunt; Diabetes in her father, mother, paternal aunt, paternal grandfather, paternal grandmother, and paternal uncle; Esophageal varices in her maternal grandmother; Heart disease in her maternal grandfather and maternal uncle; Hyperlipidemia in her father; Hypertension in her father, mother, paternal aunt, paternal grandmother, and paternal uncle; Lung cancer in her paternal grandmother; Melanoma in her maternal uncle; Stroke in her maternal grandfather; Thyroid cancer in her paternal uncle. She  reports that she quit smoking about 7 years ago. Her smoking use included cigarettes. She started smoking about 27 years ago. She has never used smokeless tobacco. She reports current alcohol use. She reports current drug use. Drug: Marijuana.   Current Outpatient Medications   Medication Sig Dispense Refill   • ascorbic acid (VITAMIN C) 500 MG tablet Take 500 mg by mouth daily     • bimatoprost (LATISSE) 0.03 % ophthalmic solution Administer 1 drop to both eyes daily at bedtime Place one drop on applicator and apply evenly along the skin of the upper eyelid at base of eyelashes once daily at bedtime; repeat procedure for second eye (use a clean applicator). 3 mL 3   • budesonide-formoterol (Symbicort) 160-4.5 mcg/act inhaler Inhale 2 puffs 2 (two) times a day Rinse mouth after use.  10.2 g 1   • cholecalciferol (VITAMIN D3) 1,000 units tablet Take 4,000 Units by mouth daily     • Cyanocobalamin (VITAMIN B 12 PO) Place 1,000 mcg under the tongue in the morning     • cyproheptadine (PERIACTIN) 4 mg tablet Take 1 tablet (4 mg total) by mouth daily at bedtime as needed for allergies (headaches) 30 tablet 0   • dexamethasone (DECADRON) 1 mg tablet Take 1 mg by mouth if needed     • dexamethasone (DECADRON) 2 mg tablet Take 1 tablet (2 mg total) by mouth daily with breakfast 5 tablet 0   • Docusate Sodium 100 MG capsule Take 100-200 mg by mouth in the morning 100 mg Monday/Wednesday/Friday/Sunday  200 mg Tuesday/Thursday/Saturday     • fluticasone (FLONASE) 50 mcg/act nasal spray 1 spray into each nostril daily 48 mL 0   • ketorolac (TORADOL) 10 mg tablet Take 10 mg by mouth every 6 (six) hours as needed for moderate pain     • Loratadine 10 MG CAPS Take 10 mg by mouth daily      • methocarbamol (ROBAXIN) 500 mg tablet Take 1 tablet (500 mg total) by mouth daily at bedtime 90 tablet 3   • methylphenidate (Concerta) 36 MG ER tablet Take 1 tablet (36 mg total) by mouth daily Max Daily Amount: 36 mg 30 tablet 0   • naproxen sodium (ANAPROX) 550 mg tablet Take 1 tablet (550 mg total) by mouth 2 (two) times a day with meals For menstrual cramps 30 tablet 0   • ondansetron (ZOFRAN) 4 mg tablet Take 1 tablet (4 mg total) by mouth every 8 (eight) hours as needed for nausea or vomiting 20 tablet 0   • rizatriptan (MAXALT-MLT) 10 MG disintegrating tablet Take 1 tablet (10 mg total) by mouth once as needed for migraine May repeat every 2 hours if needed, max 20mg/24 hours 12 tablet 0   • Semaglutide-Weight Management (WEGOVY) 2.4 MG/0.75ML Inject 0.75 mL (2.4 mg total) under the skin once a week 9 mL 1   • valACYclovir (VALTREX) 500 mg tablet Take 1 tablet (500 mg total) by mouth daily 90 tablet 0   • divalproex sodium (DEPAKOTE ER) 500 mg 24 hr tablet Take 1 tablet (500 mg total) by mouth daily (Patient not taking: Reported on 9/13/2023) 5 tablet 0   • FIBER PO Take 2 tablets by mouth in the morning Gummies (Patient not taking: Reported on 7/20/2023)     • MAGNESIUM OXIDE 400 PO Take 1 tablet by mouth daily     • rimegepant sulfate (Nurtec) 75 mg TBDP Take 1 tablet (75 mg total) by mouth as needed (migraine) Limit of 1 in 24 hours (Patient not taking: Reported on 8/31/2023) 16 tablet 6     No current facility-administered medications for this visit. Current Outpatient Medications on File Prior to Visit   Medication Sig   • ascorbic acid (VITAMIN C) 500 MG tablet Take 500 mg by mouth daily   • bimatoprost (LATISSE) 0.03 % ophthalmic solution Administer 1 drop to both eyes daily at bedtime Place one drop on applicator and apply evenly along the skin of the upper eyelid at base of eyelashes once daily at bedtime; repeat procedure for second eye (use a clean applicator). • budesonide-formoterol (Symbicort) 160-4.5 mcg/act inhaler Inhale 2 puffs 2 (two) times a day Rinse mouth after use.    • cholecalciferol (VITAMIN D3) 1,000 units tablet Take 4,000 Units by mouth daily   • Cyanocobalamin (VITAMIN B 12 PO) Place 1,000 mcg under the tongue in the morning   • cyproheptadine (PERIACTIN) 4 mg tablet Take 1 tablet (4 mg total) by mouth daily at bedtime as needed for allergies (headaches)   • dexamethasone (DECADRON) 1 mg tablet Take 1 mg by mouth if needed   • dexamethasone (DECADRON) 2 mg tablet Take 1 tablet (2 mg total) by mouth daily with breakfast   • Docusate Sodium 100 MG capsule Take 100-200 mg by mouth in the morning 100 mg Monday/Wednesday/Friday/Sunday  200 mg Tuesday/Thursday/Saturday   • fluticasone (FLONASE) 50 mcg/act nasal spray 1 spray into each nostril daily   • ketorolac (TORADOL) 10 mg tablet Take 10 mg by mouth every 6 (six) hours as needed for moderate pain   • Loratadine 10 MG CAPS Take 10 mg by mouth daily    • methocarbamol (ROBAXIN) 500 mg tablet Take 1 tablet (500 mg total) by mouth daily at bedtime   • methylphenidate (Concerta) 36 MG ER tablet Take 1 tablet (36 mg total) by mouth daily Max Daily Amount: 36 mg   • naproxen sodium (ANAPROX) 550 mg tablet Take 1 tablet (550 mg total) by mouth 2 (two) times a day with meals For menstrual cramps   • ondansetron (ZOFRAN) 4 mg tablet Take 1 tablet (4 mg total) by mouth every 8 (eight) hours as needed for nausea or vomiting   • rizatriptan (MAXALT-MLT) 10 MG disintegrating tablet Take 1 tablet (10 mg total) by mouth once as needed for migraine May repeat every 2 hours if needed, max 20mg/24 hours   • valACYclovir (VALTREX) 500 mg tablet Take 1 tablet (500 mg total) by mouth daily   • [DISCONTINUED] Semaglutide-Weight Management (WEGOVY) 2.4 MG/0.75ML Inject 0.75 mL (2.4 mg total) under the skin once a week   • divalproex sodium (DEPAKOTE ER) 500 mg 24 hr tablet Take 1 tablet (500 mg total) by mouth daily (Patient not taking: Reported on 9/13/2023)   • FIBER PO Take 2 tablets by mouth in the morning Gummies (Patient not taking: Reported on 7/20/2023)   • MAGNESIUM OXIDE 400 PO Take 1 tablet by mouth daily   • rimegepant sulfate (Nurtec) 75 mg TBDP Take 1 tablet (75 mg total) by mouth as needed (migraine) Limit of 1 in 24 hours (Patient not taking: Reported on 8/31/2023)     No current facility-administered medications on file prior to visit. She is allergic to sulfa antibiotics and doxycycline. .    Review of Systems   Constitutional: Negative for fever. Respiratory: Negative for shortness of breath. Cardiovascular: Negative for chest pain and palpitations.    Gastrointestinal: Negative for abdominal pain, constipation, diarrhea and vomiting. Genitourinary: Positive for menstrual problem (menses 2 weeks late). Negative for difficulty urinating. Musculoskeletal: Negative for arthralgias and back pain. Skin: Negative for rash. Neurological: Positive for headaches. Psychiatric/Behavioral: Negative for dysphoric mood. The patient is not nervous/anxious. Objective:    /75   Pulse 76   Resp 17   Ht 5' 10" (1.778 m)   Wt 83.5 kg (184 lb)   LMP  (LMP Unknown)   BMI 26.40 kg/m²      Physical Exam  Vitals and nursing note reviewed. Constitutional:       General: She is not in acute distress. Appearance: She is well-developed. Comments: Overweight     HENT:      Head: Normocephalic and atraumatic. Eyes:      Conjunctiva/sclera: Conjunctivae normal.   Neck:      Thyroid: No thyromegaly. Pulmonary:      Effort: Pulmonary effort is normal. No respiratory distress. Skin:     Findings: No rash (visible). Neurological:      Mental Status: She is alert and oriented to person, place, and time.    Psychiatric:         Behavior: Behavior normal.

## 2023-09-14 NOTE — TELEPHONE ENCOUNTER
nurtec approved from 9/12/23-9/12/24    Left message for pharm making them aware of approval    mychart message sent to pt

## 2023-09-15 ENCOUNTER — APPOINTMENT (OUTPATIENT)
Dept: LAB | Facility: HOSPITAL | Age: 38
End: 2023-09-15
Payer: COMMERCIAL

## 2023-09-15 DIAGNOSIS — R51.9 WORSENING HEADACHES: ICD-10-CM

## 2023-09-15 DIAGNOSIS — H53.9 VISUAL AURA: ICD-10-CM

## 2023-09-15 LAB
BUN SERPL-MCNC: 13 MG/DL (ref 5–25)
CREAT SERPL-MCNC: 0.93 MG/DL (ref 0.6–1.3)
GFR SERPL CREATININE-BSD FRML MDRD: 78 ML/MIN/1.73SQ M

## 2023-09-15 PROCEDURE — 82565 ASSAY OF CREATININE: CPT

## 2023-09-15 PROCEDURE — 36415 COLL VENOUS BLD VENIPUNCTURE: CPT

## 2023-09-15 PROCEDURE — 84520 ASSAY OF UREA NITROGEN: CPT

## 2023-09-18 NOTE — TELEPHONE ENCOUNTER
September 15, 2023  Will Jeans  to [id number] Team 5 (supporting You)          9/15/23  7:30 PM  I was just wondering which pharmacy you left a message with [facility]. I called [facility] pharmacy in [facility] and on their end it shows that a prior off is still required.

## 2023-09-19 ENCOUNTER — HOSPITAL ENCOUNTER (OUTPATIENT)
Dept: MRI IMAGING | Facility: HOSPITAL | Age: 38
Discharge: HOME | End: 2023-09-19
Payer: COMMERCIAL

## 2023-09-19 DIAGNOSIS — H53.9 VISUAL AURA: ICD-10-CM

## 2023-09-19 DIAGNOSIS — R51.9 WORSENING HEADACHES: ICD-10-CM

## 2023-09-19 PROCEDURE — 70547 MR ANGIOGRAPHY NECK W/O DYE: CPT

## 2023-09-19 PROCEDURE — G1004 CDSM NDSC: HCPCS

## 2023-09-19 PROCEDURE — 70553 MRI BRAIN STEM W/O & W/DYE: CPT

## 2023-09-19 PROCEDURE — 70544 MR ANGIOGRAPHY HEAD W/O DYE: CPT

## 2023-09-19 PROCEDURE — A9585 GADOBUTROL INJECTION: HCPCS | Performed by: PHYSICIAN ASSISTANT

## 2023-09-19 RX ORDER — GADOBUTROL 604.72 MG/ML
8 INJECTION INTRAVENOUS
Status: COMPLETED | OUTPATIENT
Start: 2023-09-19 | End: 2023-09-19

## 2023-09-19 RX ADMIN — GADOBUTROL 8 ML: 604.72 INJECTION INTRAVENOUS at 15:21

## 2023-10-03 DIAGNOSIS — F90.0 ATTENTION DEFICIT HYPERACTIVITY DISORDER (ADHD), PREDOMINANTLY INATTENTIVE TYPE: ICD-10-CM

## 2023-10-03 RX ORDER — METHYLPHENIDATE HYDROCHLORIDE 36 MG/1
36 TABLET ORAL DAILY
Qty: 30 TABLET | Refills: 0 | Status: SHIPPED | OUTPATIENT
Start: 2023-10-03

## 2023-10-09 ENCOUNTER — ANNUAL EXAM (OUTPATIENT)
Dept: GYNECOLOGY | Facility: CLINIC | Age: 38
End: 2023-10-09
Payer: COMMERCIAL

## 2023-10-09 VITALS
BODY MASS INDEX: 25.97 KG/M2 | DIASTOLIC BLOOD PRESSURE: 68 MMHG | WEIGHT: 181.4 LBS | HEIGHT: 70 IN | SYSTOLIC BLOOD PRESSURE: 102 MMHG

## 2023-10-09 DIAGNOSIS — Z11.51 SCREENING FOR HPV (HUMAN PAPILLOMAVIRUS): ICD-10-CM

## 2023-10-09 DIAGNOSIS — N85.2 ENLARGED UTERUS: ICD-10-CM

## 2023-10-09 DIAGNOSIS — N64.4 BREAST PAIN, LEFT: ICD-10-CM

## 2023-10-09 DIAGNOSIS — Z01.419 ENCOUNTER FOR ANNUAL ROUTINE GYNECOLOGICAL EXAMINATION: Primary | ICD-10-CM

## 2023-10-09 PROCEDURE — G0476 HPV COMBO ASSAY CA SCREEN: HCPCS | Performed by: OBSTETRICS & GYNECOLOGY

## 2023-10-09 PROCEDURE — S0612 ANNUAL GYNECOLOGICAL EXAMINA: HCPCS | Performed by: OBSTETRICS & GYNECOLOGY

## 2023-10-09 PROCEDURE — G0145 SCR C/V CYTO,THINLAYER,RESCR: HCPCS | Performed by: OBSTETRICS & GYNECOLOGY

## 2023-10-09 NOTE — PROGRESS NOTES
Assessment/Plan:    pap and HPV done today    Right breast burning-ultrasound ordered. If normal, we discussed evening primrose oil and vitamin E.  Her exam is normal today      Discussed self breast awareness    Top normal size uterus-she has a very mobile uterus and it may not be enlarged. We discussed an ultrasound and she would like to do this. She will keep a menstrual calendar and if she has more irregular cycles, she will let us know. discussed preventive care, regular exercise and a healthy diet      No problem-specific Assessment & Plan notes found for this encounter. Diagnoses and all orders for this visit:    Encounter for annual routine gynecological examination  -     Liquid-based pap, screening  -     HPV High Risk    Breast pain, left  -     US breast right limited (diagnostic); Future    Enlarged uterus  -     US pelvis complete w transvaginal; Future    Screening for HPV (human papillomavirus)  -     Liquid-based pap, screening  -     HPV High Risk          Subjective:      Patient ID: Neymar Gonzáles is a 41 y.o. female. Pt here for yearly. She had one cycle that was 2 weeks late and then it was very heavy. Often it is heavy but not always, typically it is very  regular    For several months, she has been having burning in the right breast.  It is in the upper outer quadrant and slightly more anterior. She does not feel any mass, it does not radiate. Normal pap, negative HPV in 9-2020          The following portions of the patient's history were reviewed and updated as appropriate: allergies, current medications, past family history, past medical history, past social history, past surgical history, and problem list.    Review of Systems   Constitutional: Negative. Gastrointestinal: Negative. Genitourinary: Negative.           Objective:      /68 (BP Location: Left arm, Patient Position: Sitting)   Ht 5' 10" (1.778 m)   Wt 82.3 kg (181 lb 6.4 oz)   LMP 09/16/2023 (Exact Date)   BMI 26.03 kg/m²          Physical Exam  Vitals reviewed. Constitutional:       Appearance: She is well-developed. Neck:      Thyroid: No thyromegaly. Trachea: No tracheal deviation. Cardiovascular:      Rate and Rhythm: Normal rate and regular rhythm. Pulmonary:      Effort: Pulmonary effort is normal.      Breath sounds: Normal breath sounds. Chest:   Breasts:     Breasts are symmetrical.      Right: No inverted nipple, mass, nipple discharge, skin change or tenderness. Left: No inverted nipple, mass, nipple discharge, skin change or tenderness. Abdominal:      General: There is no distension. Palpations: Abdomen is soft. There is no mass. Tenderness: There is no abdominal tenderness. Genitourinary:     Labia:         Right: No rash, tenderness, lesion or injury. Left: No rash, tenderness, lesion or injury. Vagina: Normal.      Cervix: No cervical motion tenderness, discharge or friability. Adnexa:         Right: No mass, tenderness or fullness. Left: No mass, tenderness or fullness.         Comments: Top normal size, very mobile

## 2023-10-14 LAB
LAB AP GYN PRIMARY INTERPRETATION: NORMAL
LAB AP LMP: NORMAL
Lab: NORMAL

## 2023-11-09 ENCOUNTER — HOSPITAL ENCOUNTER (OUTPATIENT)
Dept: ULTRASOUND IMAGING | Facility: CLINIC | Age: 38
Discharge: HOME | End: 2023-11-09
Payer: COMMERCIAL

## 2023-11-09 DIAGNOSIS — N85.2 ENLARGED UTERUS: ICD-10-CM

## 2023-11-09 PROCEDURE — 76856 US EXAM PELVIC COMPLETE: CPT

## 2023-11-09 PROCEDURE — 76830 TRANSVAGINAL US NON-OB: CPT

## 2023-11-12 DIAGNOSIS — F90.0 ATTENTION DEFICIT HYPERACTIVITY DISORDER (ADHD), PREDOMINANTLY INATTENTIVE TYPE: ICD-10-CM

## 2023-11-12 DIAGNOSIS — M54.2 CERVICALGIA: ICD-10-CM

## 2023-11-12 RX ORDER — METHOCARBAMOL 500 MG/1
500 TABLET, FILM COATED ORAL
Qty: 90 TABLET | Refills: 0 | Status: CANCELLED | OUTPATIENT
Start: 2023-11-12

## 2023-11-13 NOTE — TELEPHONE ENCOUNTER
Methocarbamol was sent to pharm in April for 90 day supply with 3 refills.       [employer] message sent to pt

## 2023-11-14 RX ORDER — METHYLPHENIDATE HYDROCHLORIDE 36 MG/1
36 TABLET ORAL DAILY
Qty: 30 TABLET | Refills: 0 | Status: SHIPPED | OUTPATIENT
Start: 2023-11-14

## 2023-11-28 ENCOUNTER — OFFICE VISIT (OUTPATIENT)
Dept: NEUROLOGY | Facility: CLINIC | Age: 38
End: 2023-11-28
Payer: COMMERCIAL

## 2023-11-28 VITALS
HEART RATE: 81 BPM | BODY MASS INDEX: 26.07 KG/M2 | DIASTOLIC BLOOD PRESSURE: 60 MMHG | TEMPERATURE: 97.9 F | HEIGHT: 70 IN | SYSTOLIC BLOOD PRESSURE: 104 MMHG | WEIGHT: 182.1 LBS

## 2023-11-28 DIAGNOSIS — R51.9 MORNING HEADACHE: ICD-10-CM

## 2023-11-28 DIAGNOSIS — G43.001 MIGRAINE WITHOUT AURA AND WITH STATUS MIGRAINOSUS, NOT INTRACTABLE: Primary | ICD-10-CM

## 2023-11-28 DIAGNOSIS — G47.33 OBSTRUCTIVE SLEEP APNEA (ADULT) (PEDIATRIC): ICD-10-CM

## 2023-11-28 DIAGNOSIS — R06.83 SNORING: ICD-10-CM

## 2023-11-28 DIAGNOSIS — G93.2 INTRACRANIAL HYPERTENSION: ICD-10-CM

## 2023-11-28 DIAGNOSIS — G47.33 OSA (OBSTRUCTIVE SLEEP APNEA): ICD-10-CM

## 2023-11-28 PROCEDURE — 99215 OFFICE O/P EST HI 40 MIN: CPT | Performed by: PHYSICIAN ASSISTANT

## 2023-11-28 RX ORDER — ACETAZOLAMIDE 125 MG/1
TABLET ORAL
Qty: 120 TABLET | Refills: 2 | Status: SHIPPED | OUTPATIENT
Start: 2023-11-28

## 2023-11-28 NOTE — PATIENT INSTRUCTIONS
Preventive therapy:   Reproductive age women: Should take folic acid daily when taking anti-seizure drugs especially Depakote.  -Over-the-counter supplements: to decrease intensity and frequency of migraines  - Magnesium Oxide 400mg twice a day. If any diarrhea or upset stomach, decrease dose  as tolerated  (oral magnesium oxide may be an effective preventive strategy for people with migraine. Some theories about how it works include the idea that magnesium can help to prevent waves of cortical spreading depression and aura. Magnesium, in theory, also reduces the release of inflammatory or activating chemicals that can cause migraine)  - Vitamin B2 200 mg twice a day. May cause the urine to turn yellow which is normal for B 2 to do and is not a sign that you are dehydrated. (may be an effective preventive medication in some people with migraine)  - Vitamin D3 0792-4687 units daily  - Robaxin 500 mg at bedtime, if this fails let me know, we can either increase the dose or try another one to see if help  Start acetazolamide 125 mg 1 tab twice a day. Second tab late afternoon/early evening for 5-7 days then increase to 2 tabs twice a day. Update me how you are doing    Abortive therapy:  - at onset of her headaches she is to take Maxalt sublingual 10 mg and Toradol 10 mg in two hours. May repeat Maxalt in 2 hours if needed, limit of 3 a week or 9 a month  - if headaches still persist then she is to take decadron 2 mg with food. - Cyproheptadine 4 mg at bedtime as needed for weather or sinus headaches    Please get your blood work done 4-6 weeks after starting acetazolamide  Please get sleep study done  I am placing a referral to the sleep medicine specialist team to further evaluate your sleep issues.   Please call [phone] to schedule and I recommend you see one of the following providers:    Neetu Perdue MD  [address] [place] [address]  MD Katia Gallardo DEB Arias PA-C      Headache management instructions  - When patient has a moderate to severe headache, they should seek rest, initiate relaxation and apply cold compresses to the head. - Maintain regular sleep schedule. Adults need at least 7-8 hours of uninterrupted a night. - Limit over the counter medications such as Tylenol, Ibuprofen, Aleve, Excedrin. (No more than 2- 3 times a week or max 10 a month). - Maintain headache diary. Free LISETTE for a smart phone, which can be used is "Migraine kylah"  - Limit caffeine to 1-2 cups 8 to 16 oz a day or less. - Avoid dietary trigger. (aged cheese, peanuts, MSG, aspartame and nitrates). - Patient is to have regular frequent meals to prevent headache onset. - Please drink at least 64 ounces of water a day to help remain hydrated. Exercising for migraineurs:  Regular exercise can reduce the frequency and intensity of headaches and migraines. When one exercises, the body releases endorphins, which are the body’s natural painkillers. Exercise reduces stress and helps individuals to sleep at night. Exercising at least 30 to 40 minutes 3 times a week is sufficient for most patients. When exercising, follow this plan to prevent headaches:  - First, stay hydrated before, during, and after exercise. - Second part of the exercise plan is to eat sufficient food about an hour and a half before you exercise. Exercise causes one’s blood sugar level to decrease, and it is important to have a source of energy.   - Final part of the exercise plan is to warm-up. Do not jump into sudden, vigorous exercise if that triggers a headache or migraine. To read more go to https://americanmigrainefoundation. org/resource-library/effects-of-exercise-on-headaches-and-migraines/         Please call with any questions or concerns.  Office number is [phone]

## 2023-11-28 NOTE — ASSESSMENT & PLAN NOTE
Referral to sleep medicine  Ordered in lab sleep study as did have significant number of events overnight and was close to mild on home study which can underestimate the level of sleep apnea

## 2023-11-28 NOTE — PROGRESS NOTES
[facility]  PATIENT:  Blaze Loza  MRN:  [id number]  :  1985  DATE OF SERVICE:  2023      Assessment/Plan:     Migraine without aura and with status migrainosus, not intractable  Preventive therapy:   Reproductive age women: Should take folic acid daily when taking anti-seizure drugs especially Depakote.  -Over-the-counter supplements: to decrease intensity and frequency of migraines  - Magnesium Oxide 400mg twice a day. If any diarrhea or upset stomach, decrease dose  as tolerated  (oral magnesium oxide may be an effective preventive strategy for people with migraine. Some theories about how it works include the idea that magnesium can help to prevent waves of cortical spreading depression and aura. Magnesium, in theory, also reduces the release of inflammatory or activating chemicals that can cause migraine)  - Vitamin B2 200 mg twice a day. May cause the urine to turn yellow which is normal for B 2 to do and is not a sign that you are dehydrated. (may be an effective preventive medication in some people with migraine)  - Vitamin D3 9428-4307 units daily  - Robaxin 500 mg at bedtime, if this fails let me know, we can either increase the dose or try another one to see if help  Start acetazolamide 125 mg 1 tab twice a day. Second tab late afternoon/early evening for 5-7 days then increase to 2 tabs twice a day. Update me how you are doing    Abortive therapy:  - at onset of her headaches she is to take Maxalt sublingual 10 mg and Toradol 10 mg in two hours. May repeat Maxalt in 2 hours if needed, limit of 3 a week or 9 a month  - if headaches still persist then she is to take decadron 2 mg with food.      - Cyproheptadine 4 mg at bedtime as needed for weather or sinus headaches    ROMINA (obstructive sleep apnea)  Referral to sleep medicine  Ordered in lab sleep study as did have significant number of events overnight and was close to mild on home study which can underestimate the level of sleep apnea    Intracranial hypertension  Start acetazolamide 125 mg 1 tab twice a day. Second tab late afternoon/early evening for 5-7 days then increase to 2 tabs twice a day. CBC and CMP in 4-6 weeks         Problem List Items Addressed This Visit        Respiratory    ROMINA (obstructive sleep apnea)     Referral to sleep medicine  Ordered in lab sleep study as did have significant number of events overnight and was close to mild on home study which can underestimate the level of sleep apnea            Cardiovascular and Mediastinum    Migraine without aura and with status migrainosus, not intractable - Primary     Preventive therapy:   Reproductive age women: Should take folic acid daily when taking anti-seizure drugs especially Depakote.  -Over-the-counter supplements: to decrease intensity and frequency of migraines  - Magnesium Oxide 400mg twice a day. If any diarrhea or upset stomach, decrease dose  as tolerated  (oral magnesium oxide may be an effective preventive strategy for people with migraine. Some theories about how it works include the idea that magnesium can help to prevent waves of cortical spreading depression and aura. Magnesium, in theory, also reduces the release of inflammatory or activating chemicals that can cause migraine)  - Vitamin B2 200 mg twice a day. May cause the urine to turn yellow which is normal for B 2 to do and is not a sign that you are dehydrated. (may be an effective preventive medication in some people with migraine)  - Vitamin D3 8576-4554 units daily  - Robaxin 500 mg at bedtime, if this fails let me know, we can either increase the dose or try another one to see if help  Start acetazolamide 125 mg 1 tab twice a day. Second tab late afternoon/early evening for 5-7 days then increase to 2 tabs twice a day.   Update me how you are doing    Abortive therapy:  - at onset of her headaches she is to take Maxalt sublingual 10 mg and Toradol 10 mg in two hours. May repeat Maxalt in 2 hours if needed, limit of 3 a week or 9 a month  - if headaches still persist then she is to take decadron 2 mg with food. - Cyproheptadine 4 mg at bedtime as needed for weather or sinus headaches            Other    Snoring    Relevant Orders    Diagnostic Sleep Study    Ambulatory Referral to Sleep Medicine    Morning headache    Relevant Orders    Diagnostic Sleep Study    Ambulatory Referral to Sleep Medicine    Intracranial hypertension     Start acetazolamide 125 mg 1 tab twice a day. Second tab late afternoon/early evening for 5-7 days then increase to 2 tabs twice a day. CBC and CMP in 4-6 weeks         Relevant Medications    acetaZOLAMIDE (DIAMOX) 125 mg tablet    Other Relevant Orders    CBC and differential    Comprehensive metabolic panel   Other Visit Diagnoses     Obstructive sleep apnea (adult) (pediatric)        Relevant Orders    Diagnostic Sleep Study    Ambulatory Referral to Sleep Medicine              History of Present Illness: We had the pleasure of evaluating Andreina Johnson in neurological follow up  today for headaches. As you know,  she is a 41 y.o.  right handed female. 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 Medical history review:  Qtc:  04/30/2019- 409 - IRBBB  Tobacco use:yes, 2014 - she was smoking 1/2 a pack for 15 years. Herpes 1-2 and takes valtrex for the last 7 years  Mils asthma  L5 S1 laminectomy in 2019      Headaches:   Any family history of migraines? brother  Any family history of aneurysms? no     Have you seen someone else for headaches or pain? none     Headaches started at what age? Middle school and they got worse when she was 36     What medications do you take or have you taken for your headaches/pain/mood?    Current preventative:  Vitamin D3, B12,  Cyproheptadine--as needed    Current abortive:  Rizatriptan  Nurtec  Naproxen  Ketorolac  Decadron  Depakote     Prior Preventive therapy:   Gabapentin 300 mg x1,  Robaxin 500 mg 4 times a day,    Prior Abortive Therapy:   Fentanyl, Dilaudid, tramadol 50 mg,  Tylenol 650 /975 mg,  Decadron injection, methylprednisolone 160 mg x1, prednisone  Titration pack,  Toradol injection, naproxen 500 mg,  Zofran  4 mg injection, Reglan 5 mg,  Sumatriptan 50 mg,          What is your current pain level? 1/10     How often do the headaches occur? Mild headaches: wakes up every day  Moderate to severe headaches: 1-3 a month; prior was 1 every few months (prior was May)     Are you ever headache free? No      Aura/Warning and how long does it last?  blurred vision in my Right eye in the center of my visual field. That same day, approx 1 hr later it became a multi colored, almost QRS/EKG like limes in my Left eye. What time of the day do the headaches start? Mild headaches: wakes up with them  Moderate to severe headaches: usually later in the day     How long do the headaches last?   Mild headaches: 1 hour to entire day  Moderate to severe headaches: 2 hours to rest of the day; prior was 4 days     Where is your headache located? Mild headaches: differs Frontal above eyes or occipital and shoulder  Moderate to severe headaches: bilateral supraorbital region, occipitalis and neck     Describe your usual headache? Mild headaches: achy, dull, ?pressure  Moderate to severe headaches: throbbing and pressure     What is the intensity of pain?    Mild headaches: 2-3/10  Moderate to severe headaches: 6-10/10; average 6-7/10, gets to a 10/10 rarely     Associated symptoms:   - Decreased appetite, Nausea       - Photophobia, Phonophobia, Osmophobia - yes sometimes  - right eye film/hazy  - Flushing of face    - Stiff or sore neck   - Dizziness, light headed- sometimes  - Problems with concentration  - Prefer to be in a cool, quiet, dark room     Number of days missed per month because of headaches:  Work (or school) days: works through  Social or Family activities: does not affect her at home     Headache are worse if the patient: cough, sneeze, bending over, exertion  Headache triggers:  Not sure  What time of the year do headaches occur more frequently? none     Have you had trigger point injection performed and how often? No  Have you had Botox injection performed and how often? No   Have you had epidural injections or transforaminal injections performed? No     Alternative therapies used in the past for headaches? none  Have you used CBD or THC for your headaches and how often? No  How many caffeine products to drink a day? One cup on the day she works  How much water to drink a day? 68 ounces to a gallon a day     Are you current pregnant or planning on getting pregnant? no       Have you ever had any Brain imaging? yes    9/19/2023 MRA head  Normal MRA examination of the brain. 9/19/2023 MRA carotids  Normal MR angiography examination of the neck. 9/19/2023 MRI brain  Normal examination. Upon further review on 11/28/2023 do see evidence of cerebellar medullary crowding, slightly tortuous optic nerves with slight descent of the cerebellar tonsil    - Reviewed old notes from physician seen in the past   Recent laboratory data was reviewed. Medications and allergies were reviewed. Sleep:  Patient had home sleep study on 12/21/2022 with the following results: The patient had a total of 38 respiratory events made up of 20 obstructive apneas, 0 central apneas, 0 mixed apneas and 18 hypopneas resulting in a respiratory event index (RINA) of 3.7. The lowest SpO2 recorded is 89%. The snore index was 0%       Reviewed old notes from physician seen in the past- see above HPI for summary of previous encounters.           Past Medical History:   Diagnosis Date   • ADHD    • Allergic    • Asthma    • Cluster headache    • GERD (gastroesophageal reflux disease)    • Headache, tension-type    • HPV (human papilloma virus) infection    • HSV-1 infection    • HSV-2 infection Patient Active Problem List   Diagnosis   • Lumbar radiculopathy - Left   • Lumbar disc herniation   • Palpitations   • Galactorrhea   • Migraine without aura and with status migrainosus, not intractable   • Overweight   • GERD (gastroesophageal reflux disease)   • Viral syndrome   • Cervicalgia   • Snoring   • Morning headache   • ROMINA (obstructive sleep apnea)   • Abnormal result of iron profile testing   • Intracranial hypertension       Medications:      Current Outpatient Medications   Medication Sig Dispense Refill   • acetaZOLAMIDE (DIAMOX) 125 mg tablet 1 tab twice a day (second dose late afternoon/evening) for 7 days then increase to 2 tabs twice a day 120 tablet 2   • ascorbic acid (VITAMIN C) 500 MG tablet Take 500 mg by mouth daily     • bimatoprost (LATISSE) 0.03 % ophthalmic solution Administer 1 drop to both eyes daily at bedtime Place one drop on applicator and apply evenly along the skin of the upper eyelid at base of eyelashes once daily at bedtime; repeat procedure for second eye (use a clean applicator). (Patient taking differently: Administer 1 drop to both eyes daily at bedtime as needed Place one drop on applicator and apply evenly along the skin of the upper eyelid at base of eyelashes once daily at bedtime; repeat procedure for second eye (use a clean applicator). ) 3 mL 3   • cholecalciferol (VITAMIN D3) 1,000 units tablet Take 4,000 Units by mouth daily     • Cyanocobalamin (VITAMIN B 12 PO) Place 1,000 mcg under the tongue in the morning     • cyproheptadine (PERIACTIN) 4 mg tablet Take 1 tablet (4 mg total) by mouth daily at bedtime as needed for allergies (headaches) 30 tablet 0   • Docusate Sodium 100 MG capsule Take 100-200 mg by mouth in the morning 100 mg Monday/Wednesday/Friday/Sunday  200 mg Tuesday/Thursday/Saturday     • fluticasone (FLONASE) 50 mcg/act nasal spray 1 spray into each nostril daily (Patient taking differently: 1 spray into each nostril if needed) 48 mL 0   • ketorolac (TORADOL) 10 mg tablet Take 10 mg by mouth every 6 (six) hours as needed for moderate pain     • Loratadine 10 MG CAPS Take 10 mg by mouth daily      • methocarbamol (ROBAXIN) 500 mg tablet Take 1 tablet (500 mg total) by mouth daily at bedtime (Patient taking differently: Take 500 mg by mouth daily at bedtime as needed) 90 tablet 3   • methylphenidate (Concerta) 36 MG ER tablet Take 1 tablet (36 mg total) by mouth daily Max Daily Amount: 36 mg 30 tablet 0   • naproxen sodium (ANAPROX) 550 mg tablet Take 1 tablet (550 mg total) by mouth 2 (two) times a day with meals For menstrual cramps (Patient taking differently: Take 550 mg by mouth 2 (two) times a day as needed For menstrual cramps) 30 tablet 0   • ondansetron (ZOFRAN) 4 mg tablet Take 1 tablet (4 mg total) by mouth every 8 (eight) hours as needed for nausea or vomiting 20 tablet 0   • rimegepant sulfate (Nurtec) 75 mg TBDP Take 1 tablet (75 mg total) by mouth as needed (migraine) Limit of 1 in 24 hours 16 tablet 6   • rizatriptan (MAXALT-MLT) 10 MG disintegrating tablet Take 1 tablet (10 mg total) by mouth once as needed for migraine May repeat every 2 hours if needed, max 20mg/24 hours 12 tablet 0   • Semaglutide-Weight Management (WEGOVY) 2.4 MG/0.75ML Inject 0.75 mL (2.4 mg total) under the skin once a week 9 mL 1   • valACYclovir (VALTREX) 500 mg tablet Take 1 tablet (500 mg total) by mouth daily 90 tablet 0   • budesonide-formoterol (Symbicort) 160-4.5 mcg/act inhaler Inhale 2 puffs 2 (two) times a day Rinse mouth after use.  (Patient not taking: Reported on 11/28/2023) 10.2 g 1   • dexamethasone (DECADRON) 1 mg tablet Take 1 mg by mouth if needed (Patient not taking: Reported on 11/28/2023)     • dexamethasone (DECADRON) 2 mg tablet Take 1 tablet (2 mg total) by mouth daily with breakfast (Patient not taking: Reported on 11/28/2023) 5 tablet 0   • divalproex sodium (DEPAKOTE ER) 500 mg 24 hr tablet Take 1 tablet (500 mg total) by mouth daily (Patient not taking: Reported on 2023) 5 tablet 0     No current facility-administered medications for this visit. Allergies:       Allergies   Allergen Reactions   • Sulfa Antibiotics      Family has allergy she has never taken   • Doxycycline Rash and Edema     Whole body rash,bruises behind both thighs       Family History:     Family History   Problem Relation Age of Onset   • Diabetes Mother    • Arthritis Mother    • Hypertension Mother    • Atrial fibrillation Mother    • Neuropathy Mother         Diabetic neuropathy   • Diabetes Father    • Arthritis Father         psoriatic   • Hyperlipidemia Father    • Cancer Father         bile ducts    • Hypertension Father    • Heart disease Maternal Uncle    • Melanoma Maternal Uncle    • Hypertension Paternal Aunt    • Diabetes Paternal Aunt    • Colon cancer Paternal Aunt    • Hypertension Paternal Uncle    • Diabetes Paternal Uncle    • Thyroid cancer Paternal Uncle         Papillary thyroid cancer   • Cancer Maternal Grandmother    • Esophageal varices Maternal Grandmother    • Abnormal EKG Maternal Grandmother    • Alcohol abuse Maternal Grandmother    • Cirrhosis Maternal Grandmother    • Cancer Maternal Grandfather    • Heart disease Maternal Grandfather    • Stroke Maternal Grandfather         x 3   • Brain cancer Maternal Grandfather    • Hypertension Paternal Grandmother    • Diabetes Paternal Grandmother    • Lung cancer Paternal Grandmother    • Diabetes Paternal Grandfather    • Thyroid disease Neg Hx        Social History:     Social History     Socioeconomic History   • Marital status: /Civil Union     Spouse name: Not on file   • Number of children: Not on file   • Years of education: Not on file   • Highest education level: Not on file   Occupational History   • Not on file   Tobacco Use   • Smoking status: Former     Types: Cigarettes     Start date: 1996     Quit date: 2016     Years since quittin.9   • Smokeless tobacco: Never   • Tobacco comments:     quit > 4 years ago    Vaping Use   • Vaping Use: Never used   Substance and Sexual Activity   • Alcohol use: Yes     Comment: Rare use   • Drug use: Yes     Types: Marijuana     Comment: Medical Marijuana occasionally use   • Sexual activity: Not Currently     Partners: Male     Birth control/protection: None     Comment: Had Mirena removed 10/2018   Other Topics Concern   • Not on file   Social History Narrative   • Not on file     Social Determinants of Health     Financial Resource Strain: Low Risk  (11/3/2020)    Overall Financial Resource Strain (CARDIA)    • Difficulty of Paying Living Expenses: Not hard at all   [address] Present (11/3/2020)    Hunger Vital Sign    • Worried About [place] in the Last Year: Sometimes true    • Ran Out of Food in the Last Year: Sometimes true   Transportation Needs: No Transportation Needs (11/3/2020)    PRAPARE - Transportation    • Lack of Transportation (Medical): No    • Lack of Transportation (Non-Medical): No   Physical Activity: Inactive (11/3/2020)    Exercise Vital Sign    • Days of Exercise per Week: 0 days    • Minutes of Exercise per Session: 0 min   Stress: No Stress Concern Present (11/3/2020)    [facility]    • Feeling of Stress : Not at all   Social Connections: Moderately Integrated (11/3/2020)    Social Connection and Isolation Panel [NHANES]    • Frequency of Communication with Friends and Family: More than three times a week    • Frequency of Social Gatherings with Friends and Family: More than three times a week    • Attends Taoist Services: Never    • Active Member of Clubs or Organizations:  Yes    • Attends Club or Organization Meetings: Never    • Marital Status:    Intimate Partner Violence: Not At Risk (2/1/2023)    Humiliation, Afraid, Rape, and Kick questionnaire    • Fear of Current or Ex-Partner: No • Emotionally Abused: No    • Physically Abused: No    • Sexually Abused: No   Housing Stability: Not on file      I have reviewed the patient's medical, social and surgical history as well as medications in detail and updated the computerized patient record. Objective:   Physical Exam:                                                                   Vitals:            /60 (BP Location: Right arm, Patient Position: Sitting, Cuff Size: Standard)   Pulse 81   Temp 97.9 °F (36.6 °C) (Temporal)   Ht 5' 10" (1.778 m)   Wt 82.6 kg (182 lb 1.6 oz)   BMI 26.13 kg/m²   BP Readings from Last 3 Encounters:   11/28/23 104/60   10/09/23 102/68   09/13/23 115/75     Pulse Readings from Last 3 Encounters:   11/28/23 81   09/13/23 76   06/20/23 67          CONSTITUTIONAL: Well developed, well nourished, well groomed. No dysmorphic features. Eyes:  EOM normal      Neck:  Normal ROM, neck supple. HEENT:  Normocephalic atraumatic. Chest:  Respirations regular and unlabored. Psychiatric:  Normal behavior and appropriate affect      MENTAL STATUS  Orientation: Alert and oriented x 3  Fund of knowledge: Intact. MOTOR (Upper and lower extremities)   Bulk/tone/abnormal movement: Normal muscle bulk and tone. COORDINATION   Station/Gait: Normal baseline gait. Review of Systems:   Review of Systems  Constitutional: Negative. HENT: Negative. Eyes: Negative. Respiratory: Negative. Cardiovascular: Negative. Gastrointestinal: Negative. Endocrine: Negative. Genitourinary: Negative. Musculoskeletal: Negative. Skin: Negative. Allergic/Immunologic: Negative. Neurological:  Positive for headaches. Hematological: Negative. Psychiatric/Behavioral: Negative.      I personally reviewed the ROS entered by the MA      I have spent a total time of 42 minutes on 11/28/23 in caring for this patient including Diagnostic results, Prognosis, Risks and benefits of tx options, Instructions for management, Patient and family education, Importance of tx compliance, Risk factor reductions, Impressions, Counseling / Coordination of care, Documenting in the medical record, Reviewing / ordering tests, medicine, procedures  , and Obtaining or reviewing history  .       Author:  Christine Mcdonough PA-C 11/28/2023 12:29 PM

## 2023-11-28 NOTE — ASSESSMENT & PLAN NOTE
Start acetazolamide 125 mg 1 tab twice a day. Second tab late afternoon/early evening for 5-7 days then increase to 2 tabs twice a day.       CBC and CMP in 4-6 weeks

## 2023-11-28 NOTE — ASSESSMENT & PLAN NOTE
Preventive therapy:   Reproductive age women: Should take folic acid daily when taking anti-seizure drugs especially Depakote.  -Over-the-counter supplements: to decrease intensity and frequency of migraines  - Magnesium Oxide 400mg twice a day. If any diarrhea or upset stomach, decrease dose  as tolerated  (oral magnesium oxide may be an effective preventive strategy for people with migraine. Some theories about how it works include the idea that magnesium can help to prevent waves of cortical spreading depression and aura. Magnesium, in theory, also reduces the release of inflammatory or activating chemicals that can cause migraine)  - Vitamin B2 200 mg twice a day. May cause the urine to turn yellow which is normal for B 2 to do and is not a sign that you are dehydrated. (may be an effective preventive medication in some people with migraine)  - Vitamin D3 5747-6651 units daily  - Robaxin 500 mg at bedtime, if this fails let me know, we can either increase the dose or try another one to see if help  Start acetazolamide 125 mg 1 tab twice a day. Second tab late afternoon/early evening for 5-7 days then increase to 2 tabs twice a day. Update me how you are doing    Abortive therapy:  - at onset of her headaches she is to take Maxalt sublingual 10 mg and Toradol 10 mg in two hours. May repeat Maxalt in 2 hours if needed, limit of 3 a week or 9 a month  - if headaches still persist then she is to take decadron 2 mg with food.      - Cyproheptadine 4 mg at bedtime as needed for weather or sinus headaches

## 2023-12-05 ENCOUNTER — PATIENT MESSAGE (OUTPATIENT)
Dept: NEUROLOGY | Facility: CLINIC | Age: 38
End: 2023-12-05

## 2023-12-05 DIAGNOSIS — Z00.6 ENCOUNTER FOR EXAMINATION FOR NORMAL COMPARISON OR CONTROL IN CLINICAL RESEARCH PROGRAM: ICD-10-CM

## 2023-12-11 ENCOUNTER — TELEPHONE (OUTPATIENT)
Dept: SLEEP CENTER | Facility: CLINIC | Age: 38
End: 2023-12-11

## 2023-12-11 NOTE — PATIENT COMMUNICATION
December 6, 2023  Angela Pablo PA-C   to Dunia Thornton MD       12/6/23  1:25 PM  He was supposed to follow up in 3-4 months as of 3/2023 but he cancelled his appointments  Chip Diaz

## 2023-12-11 NOTE — TELEPHONE ENCOUNTER
----- Message from Dannielle Osgood, MD sent at 12/10/2023  7:19 AM EST -----  approved  ----- Message -----  From: Anabell Villatoro  Sent: [id number]/5/8257  12:21 PM EST  To: Sleep Medicine [facility] Provider    This Diagnostic sleep study needs approval.     If approved please sign and return to clerical pool. If denied please include reasons why. Also provide alternative testing if warranted. Please sign and return to clerical pool.

## 2023-12-11 NOTE — PATIENT COMMUNICATION
December 8, 2023  Daryn Coreas MD   to Neurology [facility] Clinical Team 5       12/8/23  3:16 PM  I would be happy to look and did look at his MRI. Could you see if patient has consent for me to talk to his mom and if so I be happy to discuss this all personally with them. Maybe virtual visit?

## 2023-12-13 ENCOUNTER — OFFICE VISIT (OUTPATIENT)
Dept: FAMILY MEDICINE CLINIC | Facility: CLINIC | Age: 38
End: 2023-12-13
Payer: COMMERCIAL

## 2023-12-13 VITALS
RESPIRATION RATE: 16 BRPM | BODY MASS INDEX: 25.83 KG/M2 | HEIGHT: 70 IN | OXYGEN SATURATION: 98 % | DIASTOLIC BLOOD PRESSURE: 68 MMHG | TEMPERATURE: 98.1 F | SYSTOLIC BLOOD PRESSURE: 100 MMHG | WEIGHT: 180.4 LBS | HEART RATE: 79 BPM

## 2023-12-13 DIAGNOSIS — Z13.6 SCREENING FOR CARDIOVASCULAR CONDITION: ICD-10-CM

## 2023-12-13 DIAGNOSIS — Z13.29 SCREENING FOR THYROID DISORDER: ICD-10-CM

## 2023-12-13 DIAGNOSIS — Z00.00 ANNUAL PHYSICAL EXAM: Primary | ICD-10-CM

## 2023-12-13 DIAGNOSIS — Z13.1 SCREENING FOR DIABETES MELLITUS: ICD-10-CM

## 2023-12-13 PROCEDURE — 99395 PREV VISIT EST AGE 18-39: CPT | Performed by: NURSE PRACTITIONER

## 2023-12-13 NOTE — PATIENT INSTRUCTIONS
Wellness Visit for Adults   AMBULATORY CARE:   A wellness visit  is when you see your healthcare provider to get screened for health problems. Your healthcare provider will also give you advice on how to stay healthy. Write down your questions so you remember to ask them. Ask your healthcare provider how often you should have a wellness visit. What happens at a wellness visit:  Your healthcare provider will ask about your health, and your family history of health problems. This includes high blood pressure, heart disease, and cancer. He or she will ask if you have symptoms that concern you, if you smoke, and about your mood. You may also be asked about your intake of medicines, supplements, food, and alcohol. Any of the following may be done: Your weight  will be checked. Your height may also be checked so your body mass index (BMI) can be calculated. Your BMI shows if you are at a healthy weight. Your blood pressure  and heart rate will be checked. Your temperature may also be checked. Blood and urine tests  may be done. Blood tests may be done to check your cholesterol levels. Abnormal cholesterol levels increase your risk for heart disease and stroke. You may also need a blood or urine test to check for diabetes if you are at increased risk. Urine tests may be done to look for signs of an infection or kidney disease. A physical exam  includes checking your heartbeat and lungs with a stethoscope. Your healthcare provider may also check your skin to look for sun damage. Screening tests  may be recommended. A screening test is done to check for diseases that may not cause symptoms. The screening tests you may need depend on your age, gender, family history, and lifestyle habits. For example, colorectal screening may be recommended if you are 48years old or older. Screening tests you need if you are a woman:   A Pap smear  is used to screen for cervical cancer.  Pap smears are usually done every 3 to 5 years depending on your age. You may need them more often if you have had abnormal Pap smear test results in the past. Ask your healthcare provider how often you should have a Pap smear. A mammogram  is an x-ray of your breasts to screen for breast cancer. Experts recommend mammograms every 2 years starting at age 48 years. You may need a mammogram at age 52 years or younger if you have an increased risk for breast cancer. Talk to your healthcare provider about when you should start having mammograms and how often you need them. Vaccines you may need:   Get an influenza vaccine  every year. The influenza vaccine protects you from the flu. Several types of viruses cause the flu. The viruses change over time, so new vaccines are made each year. Get a tetanus-diphtheria (Td) booster vaccine  every 10 years. This vaccine protects you against tetanus and diphtheria. Tetanus is a severe infection that may cause painful muscle spasms and lockjaw. Diphtheria is a severe bacterial infection that causes a thick covering in the back of your mouth and throat. Get a human papillomavirus (HPV) vaccine  if you are female and aged 23 to 32 or male 23 to 24 and never received it. This vaccine protects you from HPV infection. HPV is the most common infection spread by sexual contact. HPV may also cause vaginal, penile, and anal cancers. Get a pneumococcal vaccine  if you are aged 72 years or older. The pneumococcal vaccine is an injection given to protect you from pneumococcal disease. Pneumococcal disease is an infection caused by pneumococcal bacteria. The infection may cause pneumonia, meningitis, or an ear infection. Get a shingles vaccine  if you are 60 or older, even if you have had shingles before. The shingles vaccine is an injection to protect you from the varicella-zoster virus. This is the same virus that causes chickenpox.  Shingles is a painful rash that develops in people who had chickenpox or have been exposed to the virus. How to eat healthy:  My Plate is a model for planning healthy meals. It shows the types and amounts of foods that should go on your plate. Fruits and vegetables make up about half of your plate, and grains and protein make up the other half. A serving of dairy is included on the side of your plate. The amount of calories and serving sizes you need depends on your age, gender, weight, and height. Examples of healthy foods are listed below:  Eat a variety of vegetables  such as dark green, red, and orange vegetables. You can also include canned vegetables low in sodium (salt) and frozen vegetables without added butter or sauces. Eat a variety of fresh fruits , canned fruit in 100% juice, frozen fruit, and dried fruit. Include whole grains. At least half of the grains you eat should be whole grains. Examples include whole-wheat bread, wheat pasta, brown rice, and whole-grain cereals such as oatmeal.    Eat a variety of protein foods such as seafood (fish and shellfish), lean meat, and poultry without skin (turkey and chicken). Examples of lean meats include pork leg, shoulder, or tenderloin, and beef round, sirloin, tenderloin, and extra lean ground beef. Other protein foods include eggs and egg substitutes, beans, peas, soy products, nuts, and seeds. Choose low-fat dairy products such as skim or 1% milk or low-fat yogurt, cheese, and cottage cheese. Limit unhealthy fats  such as butter, hard margarine, and shortening. Exercise:  Exercise at least 30 minutes per day on most days of the week. Some examples of exercise include walking, biking, dancing, and swimming. You can also fit in more physical activity by taking the stairs instead of the elevator or parking farther away from stores. Include muscle strengthening activities 2 days each week. Regular exercise provides many health benefits.  It helps you manage your weight, and decreases your risk for type 2 diabetes, heart disease, stroke, and high blood pressure. Exercise can also help improve your mood. Ask your healthcare provider about the best exercise plan for you. General health and safety guidelines:   Do not smoke. Nicotine and other chemicals in cigarettes and cigars can cause lung damage. Ask your healthcare provider for information if you currently smoke and need help to quit. E-cigarettes or smokeless tobacco still contain nicotine. Talk to your healthcare provider before you use these products. Limit alcohol. A drink of alcohol is 12 ounces of beer, 5 ounces of wine, or 1½ ounces of liquor. Lose weight, if needed. Being overweight increases your risk of certain health conditions. These include heart disease, high blood pressure, type 2 diabetes, and certain types of cancer. Protect your skin. Do not sunbathe or use tanning beds. Use sunscreen with a SPF 15 or higher. Apply sunscreen at least 15 minutes before you go outside. Reapply sunscreen every 2 hours. Wear protective clothing, hats, and sunglasses when you are outside. Drive safely. Always wear your seatbelt. Make sure everyone in your car wears a seatbelt. A seatbelt can save your life if you are in an accident. Do not use your cell phone when you are driving. This could distract you and cause an accident. Pull over if you need to make a call or send a text message. Practice safe sex. Use latex condoms if are sexually active and have more than one partner. Your healthcare provider may recommend screening tests for sexually transmitted infections (STIs). Wear helmets, lifejackets, and protective gear. Always wear a helmet when you ride a bike or motorcycle, go skiing, or play sports that could cause a head injury. Wear protective equipment when you play sports. Wear a lifejacket when you are on a boat or doing water sports.     © Copyright Matthew Castillo 2023 Information is for End User's use only and may not be sold, redistributed or otherwise used for commercial purposes. The above information is an  only. It is not intended as medical advice for individual conditions or treatments. Talk to your doctor, nurse or pharmacist before following any medical regimen to see if it is safe and effective for you.

## 2023-12-13 NOTE — PROGRESS NOTES
[address] [facility]    NAME: Cielo Reyes  AGE: 41 y.o. SEX: female  : 1985     DATE: 2023     Assessment and Plan:     Problem List Items Addressed This Visit    None  Visit Diagnoses       Annual physical exam    -  Primary              Immunizations and preventive care screenings were discussed with patient today. Appropriate education was printed on patient's after visit summary. Counseling:  Alcohol/drug use: discussed moderation in alcohol intake, the recommendations for healthy alcohol use, and avoidance of illicit drug use. Dental Health: discussed importance of regular tooth brushing, flossing, and dental visits. Injury prevention: discussed safety/seat belts, safety helmets, smoke detectors, carbon dioxide detectors, and smoking near bedding or upholstery. Sexual health: discussed sexually transmitted diseases, partner selection, use of condoms, avoidance of unintended pregnancy, and contraceptive alternatives. Exercise: the importance of regular exercise/physical activity was discussed. Recommend exercise 3-5 times per week for at least 30 minutes. Depression Screening and Follow-up Plan: Patient was screened for depression during today's encounter. They screened negative with a PHQ-2 score of 0. Return in 6 months (on 2024). Chief Complaint:     Chief Complaint   Patient presents with    Annual Exam     Med refill      History of Present Illness:     Adult Annual Physical   Patient here for a comprehensive physical exam. The patient reports no problems. Diet and Physical Activity  Diet/Nutrition: well balanced diet and consuming 3-5 servings of fruits/vegetables daily. Exercise: moderate cardiovascular exercise and 3-4 times a week on average.       Depression Screening  PHQ-2/9 Depression Screening    Little interest or pleasure in doing things: 0 - not at all  Feeling down, depressed, or hopeless: 0 - not at all  PHQ-2 Score: 0  PHQ-2 Interpretation: Negative depression screen       General Health  Sleep: sleeps poorly and gets 4-6 hours of sleep on average. Hearing: normal - bilateral.  Vision: no vision problems, goes for regular eye exams, and most recent eye exam <1 year ago. Dental: regular dental visits and brushes teeth twice daily. /GYN Health  Follows with gynecology? yes   Last menstrual period: monthly  Contraceptive method:  unknown . History of STDs?: no.     Advanced Care Planning  Do you have an advanced directive? no  Do you have a durable medical power of ? no     Review of Systems:     Review of Systems   Constitutional: Negative. HENT: Negative. Eyes: Negative. Respiratory: Negative. Cardiovascular: Negative. Gastrointestinal: Negative. Endocrine: Negative. Genitourinary: Negative. Musculoskeletal: Negative. Skin: Negative. Allergic/Immunologic: Negative. Neurological:  Positive for headaches (sees neulorlogy). Hematological: Negative. Psychiatric/Behavioral: Negative.         Past Medical History:     Past Medical History:   Diagnosis Date    ADHD     Allergic     Asthma     Cluster headache     GERD (gastroesophageal reflux disease)     Headache, tension-type     HPV (human papilloma virus) infection     HSV-1 infection     HSV-2 infection       Past Surgical History:     Past Surgical History:   Procedure Laterality Date    CERVICAL BIOPSY  W/ LOOP ELECTRODE EXCISION  2012    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    CT EPIDURAL STEROID INJECTION (TAMI LUMBAR) N/A 2018    L5-S1, x2    LAMINECTOMY  2/5/2019    L5-S1 herniated disc    POSTERIOR LAMINECTOMY THORACIC AND LUMBAR SPINE N/A 02/05/2019    Procedure: Lumbar laminectomy, decompression, discectomy left L5-S1 ;  Surgeon: Rashaad Ruff MD;  Location: [facility];  Service: Orthopedics    SKIN BIOPSY      TONSILECTOMY AND ADNOIDECTOMY      WISDOM TOOTH EXTRACTION        Social History:     Social History     Socioeconomic History    Marital status: /Civil Union     Spouse name: None    Number of children: None    Years of education: None    Highest education level: None   Occupational History    None   Tobacco Use    Smoking status: Former     Current packs/day: 0.00     Types: Cigarettes     Start date: 1996     Quit date: 2016     Years since quittin.9     Passive exposure: Past    Smokeless tobacco: Never    Tobacco comments:     quit > 4 years ago    Vaping Use    Vaping status: Never Used   Substance and Sexual Activity    Alcohol use: Yes     Comment: Rare use    Drug use: Yes     Types: Marijuana     Comment: Medical Marijuana occasionally use    Sexual activity: Not Currently     Partners: Male     Birth control/protection: None     Comment: Had Mirena removed 10/2018   Other Topics Concern    None   Social History Narrative    None     Social Determinants of Health     Financial Resource Strain: Low Risk  (11/3/2020)    Overall Financial Resource Strain (CARDIA)     Difficulty of Paying Living Expenses: Not hard at all   Food Insecurity: Food Insecurity Present (11/3/2020)    Hunger Vital Sign     Worried About Running Out of Food in the Last Year: Sometimes true     Ran Out of Food in the Last Year: Sometimes true   Transportation Needs: No Transportation Needs (11/3/2020)    PRAPARE - Transportation     Lack of Transportation (Medical): No     Lack of Transportation (Non-Medical): No   Physical Activity: Inactive (11/3/2020)    Exercise Vital Sign     Days of Exercise per Week: 0 days     Minutes of Exercise per Session: 0 min   Stress: No Stress Concern Present (11/3/2020)    [facility]     Feeling of Stress : Not at all   Social Connections:  Moderately Integrated (11/3/2020)    Social Connection and Isolation Panel [NHANES] Frequency of Communication with Friends and Family: More than three times a week     Frequency of Social Gatherings with Friends and Family: More than three times a week     Attends Confucianism Services: Never     Active Member of Clubs or Organizations: Yes     Attends Club or Organization Meetings: Never     Marital Status:    Intimate Partner Violence: Not At Risk (12/13/2023)    Humiliation, Afraid, Rape, and Kick questionnaire     Fear of Current or Ex-Partner: No     Emotionally Abused: No     Physically Abused: No     Sexually Abused: No   Housing Stability: Not on file      Family History:     Family History   Problem Relation Age of Onset    Diabetes Mother     Arthritis Mother     Hypertension Mother     Atrial fibrillation Mother     Neuropathy Mother         Diabetic neuropathy    Diabetes Father     Arthritis Father         psoriatic    Hyperlipidemia Father     Cancer Father         bile ducts     Hypertension Father     Heart disease Maternal Uncle     Melanoma Maternal Uncle     Hypertension Paternal Aunt     Diabetes Paternal Aunt     Colon cancer Paternal Aunt     Hypertension Paternal Uncle     Diabetes Paternal Uncle     Thyroid cancer Paternal Uncle         Papillary thyroid cancer    Cancer Maternal Grandmother     Esophageal varices Maternal Grandmother     Abnormal EKG Maternal Grandmother     Alcohol abuse Maternal Grandmother     Cirrhosis Maternal Grandmother     Cancer Maternal Grandfather     Heart disease Maternal Grandfather     Stroke Maternal Grandfather         x 3    Brain cancer Maternal Grandfather     Hypertension Paternal Grandmother     Diabetes Paternal Grandmother     Lung cancer Paternal Grandmother     Diabetes Paternal Grandfather     Thyroid disease Neg Hx       Current Medications:     Current Outpatient Medications   Medication Sig Dispense Refill    acetaZOLAMIDE (DIAMOX) 125 mg tablet 1 tab twice a day (second dose late afternoon/evening) for 7 days then increase to 2 tabs twice a day (Patient taking differently: 250 mg 2 (two) times a day 1 tab twice a day (second dose late afternoon/evening) for 7 days then increase to 2 tabs twice a day) 120 tablet 2    ascorbic acid (VITAMIN C) 500 MG tablet Take 500 mg by mouth daily      bimatoprost (LATISSE) 0.03 % ophthalmic solution Administer 1 drop to both eyes daily at bedtime Place one drop on applicator and apply evenly along the skin of the upper eyelid at base of eyelashes once daily at bedtime; repeat procedure for second eye (use a clean applicator). (Patient taking differently: Administer 1 drop to both eyes daily at bedtime as needed Place one drop on applicator and apply evenly along the skin of the upper eyelid at base of eyelashes once daily at bedtime; repeat procedure for second eye (use a clean applicator). ) 3 mL 3    cholecalciferol (VITAMIN D3) 1,000 units tablet Take 4,000 Units by mouth daily      Cyanocobalamin (VITAMIN B 12 PO) Place 1,000 mcg under the tongue in the morning      cyproheptadine (PERIACTIN) 4 mg tablet Take 1 tablet (4 mg total) by mouth daily at bedtime as needed for allergies (headaches) 30 tablet 0    dexamethasone (DECADRON) 1 mg tablet Take 1 mg by mouth if needed      Docusate Sodium 100 MG capsule Take 100-200 mg by mouth in the morning 100 mg Monday/Wednesday/Friday/Sunday  200 mg Tuesday/Thursday/Saturday      fluticasone (FLONASE) 50 mcg/act nasal spray 1 spray into each nostril daily (Patient taking differently: 1 spray into each nostril if needed) 48 mL 0    ketorolac (TORADOL) 10 mg tablet Take 10 mg by mouth every 6 (six) hours as needed for moderate pain      Loratadine 10 MG CAPS Take 10 mg by mouth daily       methocarbamol (ROBAXIN) 500 mg tablet Take 1 tablet (500 mg total) by mouth daily at bedtime (Patient taking differently: Take 500 mg by mouth daily at bedtime as needed) 90 tablet 3    methylphenidate (Concerta) 36 MG ER tablet Take 1 tablet (36 mg total) by mouth daily Max Daily Amount: 36 mg 30 tablet 0    naproxen sodium (ANAPROX) 550 mg tablet Take 1 tablet (550 mg total) by mouth 2 (two) times a day with meals For menstrual cramps (Patient taking differently: Take 550 mg by mouth 2 (two) times a day as needed For menstrual cramps) 30 tablet 0    ondansetron (ZOFRAN) 4 mg tablet Take 1 tablet (4 mg total) by mouth every 8 (eight) hours as needed for nausea or vomiting 20 tablet 0    rimegepant sulfate (Nurtec) 75 mg TBDP Take 1 tablet (75 mg total) by mouth as needed (migraine) Limit of 1 in 24 hours 16 tablet 6    rizatriptan (MAXALT-MLT) 10 MG disintegrating tablet Take 1 tablet (10 mg total) by mouth once as needed for migraine May repeat every 2 hours if needed, max 20mg/24 hours 12 tablet 0    Semaglutide-Weight Management (WEGOVY) 2.4 MG/0.75ML Inject 0.75 mL (2.4 mg total) under the skin once a week 9 mL 1    valACYclovir (VALTREX) 500 mg tablet Take 1 tablet (500 mg total) by mouth daily 90 tablet 0    budesonide-formoterol (Symbicort) 160-4.5 mcg/act inhaler Inhale 2 puffs 2 (two) times a day Rinse mouth after use. (Patient not taking: Reported on 11/28/2023) 10.2 g 1    dexamethasone (DECADRON) 2 mg tablet Take 1 tablet (2 mg total) by mouth daily with breakfast (Patient not taking: Reported on 12/13/2023) 5 tablet 0    divalproex sodium (DEPAKOTE ER) 500 mg 24 hr tablet Take 1 tablet (500 mg total) by mouth daily (Patient not taking: Reported on 9/13/2023) 5 tablet 0     No current facility-administered medications for this visit. Allergies:      Allergies   Allergen Reactions    Sulfa Antibiotics      Family has allergy she has never taken    Doxycycline Rash and Edema     Whole body rash,bruises behind both thighs      Physical Exam:     /68 (BP Location: Left arm, Patient Position: Sitting, Cuff Size: Standard)   Pulse 79   Temp 98.1 °F (36.7 °C) (Tympanic)   Resp 16   Ht 5' 10" (1.778 m)   Wt 81.8 kg (180 lb 6.4 oz)   SpO2 98%   BMI 25.88 kg/m²     Physical Exam  Vitals and nursing note reviewed. Constitutional:       General: She is not in acute distress. Appearance: Normal appearance. She is not ill-appearing or toxic-appearing. HENT:      Head: Normocephalic and atraumatic. Right Ear: Tympanic membrane, ear canal and external ear normal. There is no impacted cerumen. Left Ear: Tympanic membrane, ear canal and external ear normal. There is no impacted cerumen. Nose: Nose normal. No congestion. Mouth/Throat:      Mouth: Mucous membranes are moist.      Pharynx: Oropharynx is clear. No oropharyngeal exudate or posterior oropharyngeal erythema. Eyes:      General:         Right eye: No discharge. Left eye: No discharge. Extraocular Movements: Extraocular movements intact. Conjunctiva/sclera: Conjunctivae normal.   Cardiovascular:      Rate and Rhythm: Normal rate and regular rhythm. Pulses: Normal pulses. Heart sounds: Normal heart sounds. No murmur heard. Pulmonary:      Effort: Pulmonary effort is normal. No respiratory distress. Breath sounds: Normal breath sounds. Chest:      Chest wall: No tenderness. Abdominal:      General: Abdomen is flat. Bowel sounds are normal.      Palpations: Abdomen is soft. Tenderness: There is no abdominal tenderness. There is no right CVA tenderness or left CVA tenderness. Hernia: No hernia is present. Musculoskeletal:         General: Normal range of motion. Cervical back: Normal range of motion and neck supple. No tenderness. Lymphadenopathy:      Cervical: No cervical adenopathy. Skin:     General: Skin is warm and dry. Capillary Refill: Capillary refill takes less than 2 seconds. Neurological:      Mental Status: She is alert and oriented to person, place, and time. Psychiatric:         Mood and Affect: Mood normal.         Behavior: Behavior normal.         Thought Content:  Thought content normal. Judgment: Judgment normal.          Lissy Andrew, [facility]

## 2023-12-16 DIAGNOSIS — F90.0 ATTENTION DEFICIT HYPERACTIVITY DISORDER (ADHD), PREDOMINANTLY INATTENTIVE TYPE: ICD-10-CM

## 2023-12-19 RX ORDER — METHYLPHENIDATE HYDROCHLORIDE 36 MG/1
36 TABLET ORAL DAILY
Qty: 30 TABLET | Refills: 0 | Status: SHIPPED | OUTPATIENT
Start: 2023-12-19

## 2023-12-20 DIAGNOSIS — G43.001 MIGRAINE WITHOUT AURA AND WITH STATUS MIGRAINOSUS, NOT INTRACTABLE: ICD-10-CM

## 2023-12-20 DIAGNOSIS — B00.9 HERPES: ICD-10-CM

## 2023-12-20 RX ORDER — KETOROLAC TROMETHAMINE 10 MG/1
TABLET, FILM COATED ORAL
Qty: 10 TABLET | Refills: 3 | Status: SHIPPED | OUTPATIENT
Start: 2023-12-20

## 2023-12-20 RX ORDER — RIZATRIPTAN BENZOATE 10 MG/1
TABLET, ORALLY DISINTEGRATING ORAL
Qty: 12 TABLET | Refills: 3 | Status: SHIPPED | OUTPATIENT
Start: 2023-12-20

## 2023-12-20 RX ORDER — VALACYCLOVIR HYDROCHLORIDE 500 MG/1
500 TABLET, FILM COATED ORAL DAILY
Qty: 90 TABLET | Refills: 0 | Status: SHIPPED | OUTPATIENT
Start: 2023-12-20 | End: 2024-03-19

## 2023-12-27 ENCOUNTER — HOSPITAL ENCOUNTER (OUTPATIENT)
Dept: ULTRASOUND IMAGING | Facility: CLINIC | Age: 38
Discharge: HOME | End: 2023-12-27
Payer: COMMERCIAL

## 2023-12-27 ENCOUNTER — HOSPITAL ENCOUNTER (OUTPATIENT)
Dept: MAMMOGRAPHY | Facility: CLINIC | Age: 38
Discharge: HOME | End: 2023-12-27
Payer: COMMERCIAL

## 2023-12-27 VITALS — BODY MASS INDEX: 25.77 KG/M2 | WEIGHT: 180 LBS | HEIGHT: 70 IN

## 2023-12-27 DIAGNOSIS — N64.4 BREAST PAIN, LEFT: ICD-10-CM

## 2023-12-27 PROCEDURE — 77066 DX MAMMO INCL CAD BI: CPT

## 2023-12-27 PROCEDURE — G0279 TOMOSYNTHESIS, MAMMO: HCPCS

## 2023-12-27 PROCEDURE — 76642 ULTRASOUND BREAST LIMITED: CPT

## 2024-01-03 ENCOUNTER — APPOINTMENT (OUTPATIENT)
Dept: LAB | Facility: CLINIC | Age: 39
End: 2024-01-03

## 2024-01-03 DIAGNOSIS — Z13.6 SCREENING FOR CARDIOVASCULAR CONDITION: ICD-10-CM

## 2024-01-03 DIAGNOSIS — Z13.1 SCREENING FOR DIABETES MELLITUS: ICD-10-CM

## 2024-01-03 DIAGNOSIS — Z00.6 ENCOUNTER FOR EXAMINATION FOR NORMAL COMPARISON OR CONTROL IN CLINICAL RESEARCH PROGRAM: ICD-10-CM

## 2024-01-03 DIAGNOSIS — G93.2 INTRACRANIAL HYPERTENSION: ICD-10-CM

## 2024-01-03 DIAGNOSIS — Z13.29 SCREENING FOR THYROID DISORDER: ICD-10-CM

## 2024-01-03 LAB
ALBUMIN SERPL BCP-MCNC: 4.1 G/DL (ref 3.5–5)
ALP SERPL-CCNC: 38 U/L (ref 34–104)
ALT SERPL W P-5'-P-CCNC: 7 U/L (ref 7–52)
ANION GAP SERPL CALCULATED.3IONS-SCNC: 6 MMOL/L
AST SERPL W P-5'-P-CCNC: 10 U/L (ref 13–39)
BASOPHILS # BLD AUTO: 0.02 THOUSANDS/ÂΜL (ref 0–0.1)
BASOPHILS NFR BLD AUTO: 0 % (ref 0–1)
BILIRUB SERPL-MCNC: 0.37 MG/DL (ref 0.2–1)
BILIRUB UR QL STRIP: NEGATIVE
BUN SERPL-MCNC: 13 MG/DL (ref 5–25)
CALCIUM SERPL-MCNC: 9.2 MG/DL (ref 8.4–10.2)
CHLORIDE SERPL-SCNC: 109 MMOL/L (ref 96–108)
CHOLEST SERPL-MCNC: 143 MG/DL
CLARITY UR: CLEAR
CO2 SERPL-SCNC: 23 MMOL/L (ref 21–32)
COLOR UR: NORMAL
CREAT SERPL-MCNC: 0.76 MG/DL (ref 0.6–1.3)
EOSINOPHIL # BLD AUTO: 0.24 THOUSAND/ÂΜL (ref 0–0.61)
EOSINOPHIL NFR BLD AUTO: 4 % (ref 0–6)
ERYTHROCYTE [DISTWIDTH] IN BLOOD BY AUTOMATED COUNT: 12.3 % (ref 11.6–15.1)
EST. AVERAGE GLUCOSE BLD GHB EST-MCNC: 97 MG/DL
GFR SERPL CREATININE-BSD FRML MDRD: 100 ML/MIN/1.73SQ M
GLUCOSE P FAST SERPL-MCNC: 86 MG/DL (ref 65–99)
GLUCOSE UR STRIP-MCNC: NEGATIVE MG/DL
HBA1C MFR BLD: 5 %
HCT VFR BLD AUTO: 39.6 % (ref 34.8–46.1)
HDLC SERPL-MCNC: 53 MG/DL
HGB BLD-MCNC: 13.2 G/DL (ref 11.5–15.4)
HGB UR QL STRIP.AUTO: NEGATIVE
IMM GRANULOCYTES # BLD AUTO: 0.02 THOUSAND/UL (ref 0–0.2)
IMM GRANULOCYTES NFR BLD AUTO: 0 % (ref 0–2)
KETONES UR STRIP-MCNC: NEGATIVE MG/DL
LDLC SERPL CALC-MCNC: 81 MG/DL (ref 0–100)
LEUKOCYTE ESTERASE UR QL STRIP: NEGATIVE
LYMPHOCYTES # BLD AUTO: 2.33 THOUSANDS/ÂΜL (ref 0.6–4.47)
LYMPHOCYTES NFR BLD AUTO: 40 % (ref 14–44)
MCH RBC QN AUTO: 31.1 PG (ref 26.8–34.3)
MCHC RBC AUTO-ENTMCNC: 33.3 G/DL (ref 31.4–37.4)
MCV RBC AUTO: 93 FL (ref 82–98)
MONOCYTES # BLD AUTO: 0.54 THOUSAND/ÂΜL (ref 0.17–1.22)
MONOCYTES NFR BLD AUTO: 9 % (ref 4–12)
NEUTROPHILS # BLD AUTO: 2.62 THOUSANDS/ÂΜL (ref 1.85–7.62)
NEUTS SEG NFR BLD AUTO: 47 % (ref 43–75)
NITRITE UR QL STRIP: NEGATIVE
NRBC BLD AUTO-RTO: 0 /100 WBCS
PH UR STRIP.AUTO: 6 [PH]
PLATELET # BLD AUTO: 246 THOUSANDS/UL (ref 149–390)
PMV BLD AUTO: 10.6 FL (ref 8.9–12.7)
POTASSIUM SERPL-SCNC: 4.2 MMOL/L (ref 3.5–5.3)
PROT SERPL-MCNC: 6.5 G/DL (ref 6.4–8.4)
PROT UR STRIP-MCNC: NEGATIVE MG/DL
RBC # BLD AUTO: 4.25 MILLION/UL (ref 3.81–5.12)
SODIUM SERPL-SCNC: 138 MMOL/L (ref 135–147)
SP GR UR STRIP.AUTO: 1.01 (ref 1–1.03)
TRIGL SERPL-MCNC: 46 MG/DL
TSH SERPL DL<=0.05 MIU/L-ACNC: 1.61 UIU/ML (ref 0.45–4.5)
UROBILINOGEN UR STRIP-ACNC: <2 MG/DL
WBC # BLD AUTO: 5.77 THOUSAND/UL (ref 4.31–10.16)

## 2024-01-03 PROCEDURE — 84443 ASSAY THYROID STIM HORMONE: CPT

## 2024-01-03 PROCEDURE — 80061 LIPID PANEL: CPT

## 2024-01-03 PROCEDURE — 83036 HEMOGLOBIN GLYCOSYLATED A1C: CPT

## 2024-01-03 PROCEDURE — 85025 COMPLETE CBC W/AUTO DIFF WBC: CPT

## 2024-01-03 PROCEDURE — 36415 COLL VENOUS BLD VENIPUNCTURE: CPT

## 2024-01-03 PROCEDURE — 80053 COMPREHEN METABOLIC PANEL: CPT

## 2024-01-03 PROCEDURE — 81003 URINALYSIS AUTO W/O SCOPE: CPT

## 2024-01-08 ENCOUNTER — COSMETIC (OUTPATIENT)
Dept: DERMATOLOGY | Facility: CLINIC | Age: 39
End: 2024-01-08
Payer: COMMERCIAL

## 2024-01-08 DIAGNOSIS — D50.8 OTHER IRON DEFICIENCY ANEMIA: ICD-10-CM

## 2024-01-08 DIAGNOSIS — E55.9 VITAMIN D DEFICIENCY, UNSPECIFIED: ICD-10-CM

## 2024-01-08 DIAGNOSIS — L63.9 ALOPECIA AREATA: Primary | ICD-10-CM

## 2024-01-08 PROCEDURE — 99213 OFFICE O/P EST LOW 20 MIN: CPT | Performed by: STUDENT IN AN ORGANIZED HEALTH CARE EDUCATION/TRAINING PROGRAM

## 2024-01-08 NOTE — PROGRESS NOTES
"[facility] Note     Patient Name: Jo Barros  Encounter Date: 1/8/24     Have you been cared for by a [facility] Dermatologist in the last 3 years and, if so, which description applies to you?    Yes.  I have been here within the last 3 years, and my medical history has NOT changed since that time.  I am FEMALE/of child-bearing potential.    REVIEW OF SYSTEMS:  Have you recently had or currently have any of the following? No changes in my recent health.   PAST MEDICAL HISTORY:  Have you personally ever had or currently have any of the following?  If \"YES,\" then please provide more detail. No changes in my medical history.   HISTORY OF IMMUNOSUPPRESSION: Do you have a history of any of the following:  Systemic Immunosuppression such as Diabetes, Biologic or Immunotherapy, Chemotherapy, Organ Transplantation, Bone Marrow Transplantation?  No     Answering \"YES\" requires the addition of the dotphrase \"IMMUNOSUPPRESSED\" as the first diagnosis of the patient's visit.   FAMILY HISTORY:  Any \"first degree relatives\" (parent, brother, sister, or child) with the following?    No changes in my family's known health.   PATIENT EXPERIENCE:    Do you want the Dermatologist to perform a COMPLETE skin exam today including a clinical examination under the \"bra and underwear\" areas?  NO  If necessary, do we have your permission to call and leave a detailed message on your Preferred Phone number that includes your specific medical information?  Yes      Allergies   Allergen Reactions    Sulfa Antibiotics      Family has allergy she has never taken    Doxycycline Rash and Edema     Whole body rash,bruises behind both thighs      Current Outpatient Medications:     acetaZOLAMIDE (DIAMOX) 125 mg tablet, 1 tab twice a day (second dose late afternoon/evening) for 7 days then increase to 2 tabs twice a day (Patient taking differently: 250 mg 2 (two) times a day 1 tab twice a day (second dose late " afternoon/evening) for 7 days then increase to 2 tabs twice a day), Disp: 120 tablet, Rfl: 2    ascorbic acid (VITAMIN C) 500 MG tablet, Take 500 mg by mouth daily, Disp: , Rfl:     bimatoprost (LATISSE) 0.03 % ophthalmic solution, Administer 1 drop to both eyes daily at bedtime Place one drop on applicator and apply evenly along the skin of the upper eyelid at base of eyelashes once daily at bedtime; repeat procedure for second eye (use a clean applicator). (Patient taking differently: Administer 1 drop to both eyes daily at bedtime as needed Place one drop on applicator and apply evenly along the skin of the upper eyelid at base of eyelashes once daily at bedtime; repeat procedure for second eye (use a clean applicator).), Disp: 3 mL, Rfl: 3    cholecalciferol (VITAMIN D3) 1,000 units tablet, Take 4,000 Units by mouth daily, Disp: , Rfl:     Cyanocobalamin (VITAMIN B 12 PO), Place 1,000 mcg under the tongue in the morning, Disp: , Rfl:     cyproheptadine (PERIACTIN) 4 mg tablet, Take 1 tablet (4 mg total) by mouth daily at bedtime as needed for allergies (headaches), Disp: 30 tablet, Rfl: 0    dexamethasone (DECADRON) 1 mg tablet, Take 1 mg by mouth if needed, Disp: , Rfl:     dexamethasone (DECADRON) 2 mg tablet, Take 1 tablet (2 mg total) by mouth daily with breakfast (Patient not taking: Reported on 12/13/2023), Disp: 5 tablet, Rfl: 0    Docusate Sodium 100 MG capsule, Take 100-200 mg by mouth in the morning 100 mg Monday/Wednesday/Friday/Sunday 200 mg Tuesday/Thursday/Saturday, Disp: , Rfl:     fluticasone (FLONASE) 50 mcg/act nasal spray, 1 spray into each nostril daily (Patient taking differently: 1 spray into each nostril if needed), Disp: 48 mL, Rfl: 0    ketorolac (TORADOL) 10 mg tablet, TAKE ONE TABLET BY MOUTH AT ONSET OF MIGRAINE, CAN REPEAT X 1 IN 6 HOURS, CAN COMBINE WITH TRIPTAN. TAKE WITH FOOD/MILK/ANTACID, Disp: 10 tablet, Rfl: 3    Loratadine 10 MG CAPS, Take 10 mg by mouth daily , Disp: , Rfl:      methocarbamol (ROBAXIN) 500 mg tablet, Take 1 tablet (500 mg total) by mouth daily at bedtime (Patient taking differently: Take 500 mg by mouth daily at bedtime as needed), Disp: 90 tablet, Rfl: 3    methylphenidate (Concerta) 36 MG ER tablet, Take 1 tablet (36 mg total) by mouth daily Max Daily Amount: 36 mg, Disp: 30 tablet, Rfl: 0    naproxen sodium (ANAPROX) 550 mg tablet, Take 1 tablet (550 mg total) by mouth 2 (two) times a day with meals For menstrual cramps (Patient taking differently: Take 550 mg by mouth 2 (two) times a day as needed For menstrual cramps), Disp: 30 tablet, Rfl: 0    ondansetron (ZOFRAN) 4 mg tablet, Take 1 tablet (4 mg total) by mouth every 8 (eight) hours as needed for nausea or vomiting, Disp: 20 tablet, Rfl: 0    rimegepant sulfate (Nurtec) 75 mg TBDP, Take 1 tablet (75 mg total) by mouth as needed (migraine) Limit of 1 in 24 hours, Disp: 16 tablet, Rfl: 6    rizatriptan (MAXALT-MLT) 10 mg disintegrating tablet, DISSOLVE 1 TABLET BY MOUTH ONCE AS NEEDED FOR MIGRAINE. MAY REPEAT EVERY 2 HOURS IF NEEDED. MAX 2/24 HOURS, Disp: 12 tablet, Rfl: 3    valACYclovir (VALTREX) 500 mg tablet, Take 1 tablet (500 mg total) by mouth daily, Disp: 90 tablet, Rfl: 0          Whom besides the patient is providing clinical information about today's encounter?   NO ADDITIONAL HISTORIAN (patient alone provided history)    Physical Exam and Assessment/Plan by Diagnosis:    TELOGEN EFFLUVIUM  HX OF ALOPECIA AREATA    History of Present Condition:      Duration: years  Shedding/Decreased density?: yes increased shedding the past few months  Previous treatments: intralesional kenalog injections  Current treatments: none  Shampoo frequency: 2 - 3 times a week  Styling products: none  Heat styling? none  Hair color? none  Relaxers/Perms? none  Extensions/Weave? none  Premenopausal? no  Family planning/pregnancy? no              History of racing heart rate/arrhythmia? no              History of low blood  pressure? yes  Thyroid disease? no  Family history of hair loss? no  Dietary Restrictions? no  New medications ? 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 no  Physical Exam:    Well appearing, in no acute distress. Well nourishes, well developed. Alert and oriented. A focused skin exam was performed including scalp and hair. The exam was negative except as noted below:     Scalp:   Thinning diffusely, notably over the bitemporal scalp  Left occipital scalp with shorter but significant hair growth  No alopecic patches   Positive hair pull test  No perifollicular erythema or scale  Follicular orifices in tact  Hairline without recession   Eyebrows and Eyelashes In tact  Pertinent Positives:  Pertinent Negatives:      Assessment and Plan:  History and physical consistent with telogen effluvium  Educated patient that telogen effluvium is a common non-scarring form of hair loss that leads to diffuse shedding.  Educated patient that telogen effluvium classically occurs in the setting of significant physical/psychological stress, illness, medication/hormonal changes. While hair loss is often noted weeks to months after a trigger, the trigger is often never identified.  Denies recent surgeries, serious illnesses, childbirth, vaccinations, protein or caloric malnutrition, medication additions, severe emotional distress  No history of thyroid disease, vitamin D deficiency or anemia. No recent vitamin D or ferritin  Set expectations that hair loss often continues for 6-12 months with then cessation of shedding and subsequent regrowth. Further educated that a small subset of patients will have chronic telogen effluvium but that this is far less common and not expected.  Educated patient that as this condition is self-limited no interventions are indicated. However, should any abnormalities arise on lab work, appropriate therapies can be discussed. Of note, literature supports iron supplementation in  patient with telogen effluvium with ferritin levels below 40 ng/mL to at least 70 ng/mL as this can lead to clinical improvement.   Recommend attempts to minimize any stressors  Educated that patient may trial over the counter 5% minoxidil. However, educated that this only provides theoretical benefit and has not been proven in the literature to provide substantial improvement. Educated patient that PO minoxidil and PO spironolactone have also been used to reset the hair cycle and move the hair back into the growth phase with some positive responses; however evidence is limited.   Patient not a candidate for Spironolactone due to lower blood pressure.    Educated patient that the first sign of improvement is cessation of shedding followed by regrowth and that this may take many months.      After discussion of risks/benefits of treatments, patient elects to proceed with the below plan:     - Recommend OTC rogaine 5% foam at night as tolerated and wash out in the morning.   - Iron Ferritin TIBC and Vitamin D ordered - will call with results. Should any abnormalities arise on lab work, appropriate therapies can be discussed. Of note, literature supports iron supplementation in patient with telogen effluvium with ferritin levels below 40 ng/mL to at least 70 ng/mL as this can lead to clinical improvement. Pt notes difficulty with constipation at baseline, however.   - Will see patient back as needed.     Scribe Attestation      I,:  Jessica Acosta MA am acting as a scribe while in the presence of the attending physician.:       I,:  Clarissa Zhu MD personally performed the services described in this documentation    as scribed in my presence.:

## 2024-01-17 ENCOUNTER — APPOINTMENT (OUTPATIENT)
Dept: LAB | Facility: CLINIC | Age: 39
End: 2024-01-17
Payer: COMMERCIAL

## 2024-01-17 DIAGNOSIS — E55.9 VITAMIN D DEFICIENCY, UNSPECIFIED: ICD-10-CM

## 2024-01-17 DIAGNOSIS — D50.8 OTHER IRON DEFICIENCY ANEMIA: ICD-10-CM

## 2024-01-17 LAB
25(OH)D3 SERPL-MCNC: 48.4 NG/ML (ref 30–100)
FERRITIN SERPL-MCNC: 44 NG/ML (ref 11–307)
IRON SATN MFR SERPL: 39 % (ref 15–50)
IRON SERPL-MCNC: 101 UG/DL (ref 50–212)
TIBC SERPL-MCNC: 259 UG/DL (ref 250–450)
UIBC SERPL-MCNC: 158 UG/DL (ref 155–355)

## 2024-01-17 PROCEDURE — 36415 COLL VENOUS BLD VENIPUNCTURE: CPT

## 2024-01-17 PROCEDURE — 83550 IRON BINDING TEST: CPT

## 2024-01-17 PROCEDURE — 82306 VITAMIN D 25 HYDROXY: CPT

## 2024-01-17 PROCEDURE — 82728 ASSAY OF FERRITIN: CPT

## 2024-01-17 PROCEDURE — 83540 ASSAY OF IRON: CPT

## 2024-01-22 ENCOUNTER — OFFICE VISIT (OUTPATIENT)
Dept: BARIATRICS | Facility: CLINIC | Age: 39
End: 2024-01-22
Payer: COMMERCIAL

## 2024-01-22 VITALS
SYSTOLIC BLOOD PRESSURE: 108 MMHG | WEIGHT: 177.6 LBS | BODY MASS INDEX: 25.43 KG/M2 | HEART RATE: 67 BPM | DIASTOLIC BLOOD PRESSURE: 71 MMHG | HEIGHT: 70 IN

## 2024-01-22 DIAGNOSIS — G93.2 INTRACRANIAL HYPERTENSION: ICD-10-CM

## 2024-01-22 DIAGNOSIS — E66.3 OVERWEIGHT: ICD-10-CM

## 2024-01-22 DIAGNOSIS — K21.9 GERD (GASTROESOPHAGEAL REFLUX DISEASE): ICD-10-CM

## 2024-01-22 DIAGNOSIS — E66.3 OVERWEIGHT: Primary | ICD-10-CM

## 2024-01-22 PROCEDURE — 99214 OFFICE O/P EST MOD 30 MIN: CPT | Performed by: PHYSICIAN ASSISTANT

## 2024-01-22 RX ORDER — SEMAGLUTIDE 2.4 MG/.75ML
2.4 INJECTION, SOLUTION SUBCUTANEOUS WEEKLY
Qty: 3 ML | Refills: 3 | Status: SHIPPED | OUTPATIENT
Start: 2024-01-22 | End: 2024-07-08

## 2024-01-22 RX ORDER — SEMAGLUTIDE 2.4 MG/.75ML
2.4 INJECTION, SOLUTION SUBCUTANEOUS WEEKLY
Qty: 3 ML | Refills: 3 | Status: SHIPPED | OUTPATIENT
Start: 2024-01-22 | End: 2024-01-22 | Stop reason: SDUPTHER

## 2024-01-22 RX ORDER — SEMAGLUTIDE 2.4 MG/.75ML
2.4 INJECTION, SOLUTION SUBCUTANEOUS WEEKLY
Qty: 9 ML | Refills: 1 | Status: SHIPPED | OUTPATIENT
Start: 2024-01-22 | End: 2024-01-22 | Stop reason: SDUPTHER

## 2024-01-22 RX ORDER — SEMAGLUTIDE 2.4 MG/.75ML
INJECTION, SOLUTION SUBCUTANEOUS
COMMUNITY
Start: 2023-12-27 | End: 2024-01-22 | Stop reason: SDUPTHER

## 2024-01-22 NOTE — PROGRESS NOTES
Assessment/Plan:    Overweight  -Patient is pursuing Conservative Program. Previously completed [facility].  -Initial weight loss goal of 5-10% weight loss for improved health-MET  - Labs reviewed: CMP, CBC, A1C from 1/3/24 reviewed and wnl    Initial: 247.2 lbs  Last Visit: 184  Current: 177.6  Change: -69.6 (-6.4lb from last visit)  Goal:180s    Goals:  -Doing well. Continue to eat a well rounded diet.    Keep up the great work with water intake - at least 64 oz daily.   Discussed trying to increase exercise      - to continue wegovy.  Wegovy started at 234.6lb on 2/17/22.and lost weight to a low of 175lb.  It was stopped in February but then restarted after increased appetite and weight gain.  Currently on 0.5mg dose and tolerating well. To start 1mg next week- Patient denies personal history of pancreatitis. Patient also denies personal and family history of medullary thyroid cancer and multiple endocrine neoplasia type 2 (MEN 2 tumor). Uncle has history of papillary thyroid cancer.         GERD (gastroesophageal reflux disease)  Has been controlled        Follow up in approximately 6 months with Non-Surgical Physician/Advanced Practitioner.     Diagnoses and all orders for this visit:    Overweight  -     Discontinue: Wegovy 2.4 MG/0.75ML; Inject 0.75 mL (2.4 mg total) under the skin once a week    Intracranial hypertension  -     Discontinue: Wegovy 2.4 MG/0.75ML; Inject 0.75 mL (2.4 mg total) under the skin once a week    GERD (gastroesophageal reflux disease)    Other orders  -     Discontinue: Wegovy 2.4 MG/0.75ML          Subjective:   Chief Complaint   Patient presents with    Follow-up     MWM- 19 weeks, F/u, Waist 31in        Patient ID: Jo Barros  is a 38 y.o. female with excess weight/obesity here to pursue weight managment.  Patient is pursuing Conservative Program.     HPI  She had covid and was sick and lost weight. She typially is eating more of a formal meal for dinner. She is doing  lunch which is small and snacks.  Tryng to prepare breakfast more now.  She is trying to prioritize protein.    She takes diamox for migraines.    Wt Readings from Last 10 Encounters:   01/22/24 80.6 kg (177 lb 9.6 oz)   12/27/23 81.6 kg (180 lb)   12/13/23 81.8 kg (180 lb 6.4 oz)   11/28/23 82.6 kg (182 lb 1.6 oz)   10/09/23 82.3 kg (181 lb 6.4 oz)   09/13/23 83.5 kg (184 lb)   07/20/23 86.9 kg (191 lb 8 oz)   06/20/23 90.1 kg (198 lb 9.6 oz)   05/24/23 88 kg (194 lb)   05/02/23 88 kg (194 lb)       Food logging:  Increased appetite/cravings:  Exercise:  Hydration:      The following portions of the patient's history were reviewed and updated as appropriate: She  has a past medical history of ADHD, Allergic, Asthma, Cluster headache, GERD (gastroesophageal reflux disease), Headache, tension-type, HPV (human papilloma virus) infection, HSV-1 infection, and HSV-2 infection.  She   Patient Active Problem List    Diagnosis Date Noted    Intracranial hypertension 11/28/2023    Abnormal result of iron profile testing 01/16/2023    ROMINA (obstructive sleep apnea)     Cervicalgia 11/28/2022    Snoring 11/28/2022    Morning headache 11/28/2022    Viral syndrome 12/30/2021    Overweight 11/08/2021    GERD (gastroesophageal reflux disease) 11/08/2021    Migraine without aura and with status migrainosus, not intractable 02/19/2021    Galactorrhea 09/16/2020    Palpitations 05/29/2019    Lumbar disc herniation 11/21/2018    Lumbar radiculopathy - Left 11/13/2018     She  has a past surgical history that includes TONSILECTOMY AND ADNOIDECTOMY (1990); [place] tooth extraction (2007); Cervical biopsy w/ loop electrode excision (2012); Posterior laminectomy thoracic and lumbar spine (N/A, 02/05/2019); CT epidural steroid injection (TAMI lumbar) (N/A, 2018); Skin biopsy; and Laminectomy (2/5/2019).  Her family history includes Abnormal EKG in her maternal grandmother; Alcohol abuse in her maternal grandmother; Arthritis in her father  and mother; Atrial fibrillation in her mother; Brain cancer in her maternal grandfather; Cancer in her father, maternal grandfather, and maternal grandmother; Cirrhosis in her maternal grandmother; Colon cancer in her paternal aunt; Diabetes in her father, mother, paternal aunt, paternal grandfather, paternal grandmother, and paternal uncle; Esophageal varices in her maternal grandmother; Heart disease in her maternal grandfather and maternal uncle; Hyperlipidemia in her father; Hypertension in her father, mother, paternal aunt, paternal grandmother, and paternal uncle; Lung cancer in her paternal grandmother; Melanoma in her maternal uncle; Neuropathy in her mother; Stroke in her maternal grandfather; Thyroid cancer in her paternal uncle.  She  reports that she quit smoking about 8 years ago. Her smoking use included cigarettes. She started smoking about 28 years ago. She has been exposed to tobacco smoke. She has never used smokeless tobacco. She reports current alcohol use. She reports current drug use. Drug: Marijuana.  Current Outpatient Medications   Medication Sig Dispense Refill    acetaZOLAMIDE (DIAMOX) 125 mg tablet 1 tab twice a day (second dose late afternoon/evening) for 7 days then increase to 2 tabs twice a day (Patient taking differently: 250 mg 2 (two) times a day 1 tab twice a day (second dose late afternoon/evening) for 7 days then increase to 2 tabs twice a day) 120 tablet 2    ascorbic acid (VITAMIN C) 500 MG tablet Take 500 mg by mouth daily      bimatoprost (LATISSE) 0.03 % ophthalmic solution Administer 1 drop to both eyes daily at bedtime Place one drop on applicator and apply evenly along the skin of the upper eyelid at base of eyelashes once daily at bedtime; repeat procedure for second eye (use a clean applicator). (Patient taking differently: Administer 1 drop to both eyes daily at bedtime as needed Place one drop on applicator and apply evenly along the skin of the upper eyelid at base  of eyelashes once daily at bedtime; repeat procedure for second eye (use a clean applicator).) 3 mL 3    cholecalciferol (VITAMIN D3) 1,000 units tablet Take 4,000 Units by mouth daily      Cyanocobalamin (VITAMIN B 12 PO) Place 1,000 mcg under the tongue in the morning      cyproheptadine (PERIACTIN) 4 mg tablet Take 1 tablet (4 mg total) by mouth daily at bedtime as needed for allergies (headaches) (Patient taking differently: Take 4 mg by mouth as needed for allergies (headaches)) 30 tablet 0    dexamethasone (DECADRON) 1 mg tablet Take 1 mg by mouth as needed      Docusate Sodium 100 MG capsule Take 100-200 mg by mouth in the morning 100 mg Monday/Wednesday/Friday/Sunday  200 mg Tuesday/Thursday/Saturday      fluticasone (FLONASE) 50 mcg/act nasal spray 1 spray into each nostril daily (Patient taking differently: 1 spray into each nostril as needed) 48 mL 0    ketorolac (TORADOL) 10 mg tablet TAKE ONE TABLET BY MOUTH AT ONSET OF MIGRAINE, CAN REPEAT X 1 IN 6 HOURS, CAN COMBINE WITH TRIPTAN. TAKE WITH FOOD/MILK/ANTACID (Patient taking differently: as needed) 10 tablet 3    Loratadine 10 MG CAPS Take 10 mg by mouth daily       methocarbamol (ROBAXIN) 500 mg tablet Take 1 tablet (500 mg total) by mouth daily at bedtime (Patient taking differently: Take 500 mg by mouth as needed) 90 tablet 3    methylphenidate (Concerta) 36 MG ER tablet Take 1 tablet (36 mg total) by mouth daily Max Daily Amount: 36 mg 30 tablet 0    naproxen sodium (ANAPROX) 550 mg tablet Take 1 tablet (550 mg total) by mouth 2 (two) times a day with meals For menstrual cramps (Patient taking differently: Take 550 mg by mouth as needed For menstrual cramps) 30 tablet 0    ondansetron (ZOFRAN) 4 mg tablet Take 1 tablet (4 mg total) by mouth every 8 (eight) hours as needed for nausea or vomiting (Patient taking differently: Take 4 mg by mouth as needed for nausea or vomiting) 20 tablet 0    rimegepant sulfate (Nurtec) 75 mg TBDP Take 1 tablet (75 mg  total) by mouth as needed (migraine) Limit of 1 in 24 hours 16 tablet 6    rizatriptan (MAXALT-MLT) 10 mg disintegrating tablet DISSOLVE 1 TABLET BY MOUTH ONCE AS NEEDED FOR MIGRAINE. MAY REPEAT EVERY 2 HOURS IF NEEDED. MAX 2/24 HOURS (Patient taking differently: as needed) 12 tablet 3    valACYclovir (VALTREX) 500 mg tablet Take 1 tablet (500 mg total) by mouth daily 90 tablet 0    Wegovy 2.4 MG/0.75ML Inject 0.75 mL (2.4 mg total) under the skin once a week 3 mL 3    dexamethasone (DECADRON) 2 mg tablet Take 1 tablet (2 mg total) by mouth daily with breakfast (Patient not taking: Reported on 12/13/2023) 5 tablet 0     No current facility-administered medications for this visit.     Current Outpatient Medications on File Prior to Visit   Medication Sig    acetaZOLAMIDE (DIAMOX) 125 mg tablet 1 tab twice a day (second dose late afternoon/evening) for 7 days then increase to 2 tabs twice a day (Patient taking differently: 250 mg 2 (two) times a day 1 tab twice a day (second dose late afternoon/evening) for 7 days then increase to 2 tabs twice a day)    ascorbic acid (VITAMIN C) 500 MG tablet Take 500 mg by mouth daily    bimatoprost (LATISSE) 0.03 % ophthalmic solution Administer 1 drop to both eyes daily at bedtime Place one drop on applicator and apply evenly along the skin of the upper eyelid at base of eyelashes once daily at bedtime; repeat procedure for second eye (use a clean applicator). (Patient taking differently: Administer 1 drop to both eyes daily at bedtime as needed Place one drop on applicator and apply evenly along the skin of the upper eyelid at base of eyelashes once daily at bedtime; repeat procedure for second eye (use a clean applicator).)    cholecalciferol (VITAMIN D3) 1,000 units tablet Take 4,000 Units by mouth daily    Cyanocobalamin (VITAMIN B 12 PO) Place 1,000 mcg under the tongue in the morning    cyproheptadine (PERIACTIN) 4 mg tablet Take 1 tablet (4 mg total) by mouth daily at  bedtime as needed for allergies (headaches) (Patient taking differently: Take 4 mg by mouth as needed for allergies (headaches))    dexamethasone (DECADRON) 1 mg tablet Take 1 mg by mouth as needed    Docusate Sodium 100 MG capsule Take 100-200 mg by mouth in the morning 100 mg Monday/Wednesday/Friday/Sunday  200 mg Tuesday/Thursday/Saturday    fluticasone (FLONASE) 50 mcg/act nasal spray 1 spray into each nostril daily (Patient taking differently: 1 spray into each nostril as needed)    ketorolac (TORADOL) 10 mg tablet TAKE ONE TABLET BY MOUTH AT ONSET OF MIGRAINE, CAN REPEAT X 1 IN 6 HOURS, CAN COMBINE WITH TRIPTAN. TAKE WITH FOOD/MILK/ANTACID (Patient taking differently: as needed)    Loratadine 10 MG CAPS Take 10 mg by mouth daily     methocarbamol (ROBAXIN) 500 mg tablet Take 1 tablet (500 mg total) by mouth daily at bedtime (Patient taking differently: Take 500 mg by mouth as needed)    methylphenidate (Concerta) 36 MG ER tablet Take 1 tablet (36 mg total) by mouth daily Max Daily Amount: 36 mg    naproxen sodium (ANAPROX) 550 mg tablet Take 1 tablet (550 mg total) by mouth 2 (two) times a day with meals For menstrual cramps (Patient taking differently: Take 550 mg by mouth as needed For menstrual cramps)    ondansetron (ZOFRAN) 4 mg tablet Take 1 tablet (4 mg total) by mouth every 8 (eight) hours as needed for nausea or vomiting (Patient taking differently: Take 4 mg by mouth as needed for nausea or vomiting)    rimegepant sulfate (Nurtec) 75 mg TBDP Take 1 tablet (75 mg total) by mouth as needed (migraine) Limit of 1 in 24 hours    rizatriptan (MAXALT-MLT) 10 mg disintegrating tablet DISSOLVE 1 TABLET BY MOUTH ONCE AS NEEDED FOR MIGRAINE. MAY REPEAT EVERY 2 HOURS IF NEEDED. MAX 2/24 HOURS (Patient taking differently: as needed)    valACYclovir (VALTREX) 500 mg tablet Take 1 tablet (500 mg total) by mouth daily    [DISCONTINUED] Wegovy 2.4 MG/0.75ML     dexamethasone (DECADRON) 2 mg tablet Take 1 tablet (2 mg  "total) by mouth daily with breakfast (Patient not taking: Reported on 12/13/2023)     No current facility-administered medications on file prior to visit.     She is allergic to sulfa antibiotics and doxycycline..    Review of Systems   Constitutional:  Negative for fever.   Respiratory:  Negative for shortness of breath.    Cardiovascular:  Negative for chest pain and palpitations.   Gastrointestinal:  Negative for abdominal pain, constipation, diarrhea and vomiting.   Genitourinary:  Negative for difficulty urinating.   Skin:  Negative for rash.   Neurological:  Negative for headaches (improved).   Psychiatric/Behavioral:  Negative for dysphoric mood. The patient is not nervous/anxious.        Objective:    /71   Pulse 67   Ht 5' 10\" (1.778 m)   Wt 80.6 kg (177 lb 9.6 oz)   LMP 12/25/2023 (Exact Date)   BMI 25.48 kg/m²      Physical Exam  Vitals and nursing note reviewed.   Constitutional:       General: She is not in acute distress.     Appearance: She is well-developed.   HENT:      Head: Normocephalic and atraumatic.   Eyes:      Conjunctiva/sclera: Conjunctivae normal.   Neck:      Thyroid: No thyromegaly.   Pulmonary:      Effort: Pulmonary effort is normal. No respiratory distress.   Skin:     Findings: No rash (visible).   Neurological:      Mental Status: She is alert and oriented to person, place, and time.   Psychiatric:         Behavior: Behavior normal.         "

## 2024-01-22 NOTE — ASSESSMENT & PLAN NOTE
-Patient is pursuing Conservative Program. Previously completed [facility].  -Initial weight loss goal of 5-10% weight loss for improved health-MET  - Labs reviewed: CMP, CBC, A1C from 1/3/24 reviewed and wnl    Initial: 247.2 lbs  Last Visit: 184  Current: 177.6  Change: -69.6 (-6.4lb from last visit)  Goal:180s    Goals:  -Doing well. Continue to eat a well rounded diet.    Keep up the great work with water intake - at least 64 oz daily.   Discussed trying to increase exercise      - to continue wegovy.  Wegovy started at 234.6lb on 2/17/22.and lost weight to a low of 175lb.  It was stopped in February but then restarted after increased appetite and weight gain.  Currently on 0.5mg dose and tolerating well. To start 1mg next week- Patient denies personal history of pancreatitis. Patient also denies personal and family history of medullary thyroid cancer and multiple endocrine neoplasia type 2 (MEN 2 tumor). Uncle has history of papillary thyroid cancer.

## 2024-02-06 DIAGNOSIS — F90.0 ATTENTION DEFICIT HYPERACTIVITY DISORDER (ADHD), PREDOMINANTLY INATTENTIVE TYPE: ICD-10-CM

## 2024-02-06 RX ORDER — METHYLPHENIDATE HYDROCHLORIDE 36 MG/1
36 TABLET ORAL DAILY
Qty: 30 TABLET | Refills: 0 | Status: SHIPPED | OUTPATIENT
Start: 2024-02-06

## 2024-02-18 LAB
APOB+LDLR+PCSK9 GENE MUT ANL BLD/T: NOT DETECTED
BRCA1+BRCA2 DEL+DUP + FULL MUT ANL BLD/T: NOT DETECTED
MLH1+MSH2+MSH6+PMS2 GN DEL+DUP+FUL M: NOT DETECTED

## 2024-03-14 ENCOUNTER — OFFICE VISIT (OUTPATIENT)
Dept: FAMILY MEDICINE CLINIC | Facility: CLINIC | Age: 39
End: 2024-03-14
Payer: COMMERCIAL

## 2024-03-14 ENCOUNTER — TELEPHONE (OUTPATIENT)
Dept: PSYCHIATRY | Facility: CLINIC | Age: 39
End: 2024-03-14

## 2024-03-14 VITALS
OXYGEN SATURATION: 100 % | TEMPERATURE: 97.8 F | RESPIRATION RATE: 16 BRPM | DIASTOLIC BLOOD PRESSURE: 68 MMHG | SYSTOLIC BLOOD PRESSURE: 102 MMHG | WEIGHT: 173.8 LBS | HEIGHT: 70 IN | HEART RATE: 71 BPM | BODY MASS INDEX: 24.88 KG/M2

## 2024-03-14 DIAGNOSIS — F90.2 ATTENTION DEFICIT HYPERACTIVITY DISORDER (ADHD), COMBINED TYPE: Primary | ICD-10-CM

## 2024-03-14 DIAGNOSIS — G43.001 MIGRAINE WITHOUT AURA AND WITH STATUS MIGRAINOSUS, NOT INTRACTABLE: ICD-10-CM

## 2024-03-14 DIAGNOSIS — G93.2 INTRACRANIAL HYPERTENSION: ICD-10-CM

## 2024-03-14 DIAGNOSIS — E66.3 OVERWEIGHT: ICD-10-CM

## 2024-03-14 DIAGNOSIS — F90.0 ATTENTION DEFICIT HYPERACTIVITY DISORDER (ADHD), PREDOMINANTLY INATTENTIVE TYPE: ICD-10-CM

## 2024-03-14 PROCEDURE — 99214 OFFICE O/P EST MOD 30 MIN: CPT | Performed by: FAMILY MEDICINE

## 2024-03-14 RX ORDER — METHYLPHENIDATE HYDROCHLORIDE 54 MG/1
54 TABLET ORAL DAILY
Qty: 30 TABLET | Refills: 0 | Status: SHIPPED | OUTPATIENT
Start: 2024-03-14

## 2024-03-14 NOTE — ASSESSMENT & PLAN NOTE
She failed treatment with wellbutrin  She is on concert but is still having trouble with her symptoms  Will increase to 54 mg daily and monitor closely  She will call as well to get on list for psych

## 2024-03-14 NOTE — PROGRESS NOTES
Jo Barros 1985 female MRN: [id number]    Family Medicine Follow-up Visit    ASSESSMENT/PLAN  Problem List Items Addressed This Visit          Cardiovascular and Mediastinum    Migraine without aura and with status migrainosus, not intractable     She is on medication as needed per neurology             Behavioral Health    Attention deficit hyperactivity disorder (ADHD), combined type - Primary     She failed treatment with wellbutrin  She is on concert but is still having trouble with her symptoms  Will increase to 54 mg daily and monitor closely  She will call as well to get on list for psych         Relevant Medications    methylphenidate (Concerta) 54 MG ER tablet    Other Relevant Orders    Ambulatory referral to clinical pharmacy       Neurology/Sleep    Intracranial hypertension     Follows with neurology  She is on diamox through their service.   Continue as prescribed             Other    Overweight     She has lost a significant amount of weight on medication through weight management.           Other Visit Diagnoses       Attention deficit hyperactivity disorder (ADHD), predominantly inattentive type        Relevant Medications    methylphenidate (Concerta) 54 MG ER tablet            Follow up in 3 months for med check          Future Appointments   Date Time Provider Department Center   3/20/2024  8:00 PM AL SLEEP ROOM 02 AL Sleep Lab [facility]   7/1/2024  9:00 AM BE US RBC 3 BE RBC US BE RBC   7/22/2024  9:30 AM Shalom Reddy PA-C [facility]   10/15/2024  9:20 AM DO KAMLESH Padilla GYN [facility]   12/6/2024  8:15 AM Nancy Connolly PA-C NE[facility]          SUBJECTIVE  CC: ADHD (Medication check and med refill.) and Depression (Discuss low dose of anti-depressant)      HPI:  Jo Barros is a 38 y.o. female who presents for follow up  She previously was seen by Anat.  Here for follow up.   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  Here for follow  up of ADHD medication and depression      HPI    Review of Systems   Constitutional:  Negative for chills, fatigue and fever.   HENT:  Negative for congestion, postnasal drip, rhinorrhea and sinus pressure.    Eyes:  Negative for photophobia and visual disturbance.   Respiratory:  Negative for cough and shortness of breath.    Cardiovascular:  Negative for chest pain, palpitations and leg swelling.   Gastrointestinal:  Negative for abdominal pain, constipation, diarrhea, nausea and vomiting.   Genitourinary:  Negative for difficulty urinating and dysuria.   Musculoskeletal:  Negative for arthralgias and myalgias.   Skin:  Negative for color change and rash.   Neurological:  Negative for dizziness, weakness, light-headedness and headaches.   Psychiatric/Behavioral:  Positive for decreased concentration.        Historical Information   The patient history was reviewed as follows:    Past Medical History:   Diagnosis Date    ADHD     Allergic     Asthma     Cluster headache     GERD (gastroesophageal reflux disease)     Headache, tension-type     HPV (human papilloma virus) infection     HSV-1 infection     HSV-2 infection      Past Surgical History:   Procedure Laterality Date    CERVICAL BIOPSY  W/ LOOP ELECTRODE EXCISION  2012    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    CT EPIDURAL STEROID INJECTION (TAMI LUMBAR) N/A 2018    L5-S1, x2    LAMINECTOMY  2/5/2019    L5-S1 herniated disc    POSTERIOR LAMINECTOMY THORACIC AND LUMBAR SPINE N/A 02/05/2019    Procedure: Lumbar laminectomy, decompression, discectomy left L5-S1 ;  Surgeon: Misa Samaniego MD;  Location: [facility];  Service: Orthopedics    SKIN BIOPSY      TONSILECTOMY AND ADNOIDECTOMY  1990    WISDOM TOOTH EXTRACTION  2007     Family History   Problem Relation Age of Onset    Diabetes Mother     Arthritis Mother     Hypertension Mother     Atrial fibrillation Mother     Neuropathy Mother         Diabetic neuropathy    Diabetes Father      Arthritis Father         psoriatic    Hyperlipidemia Father     Cancer Father         bile ducts     Hypertension Father     Heart disease Maternal Uncle     Melanoma Maternal Uncle     Hypertension Paternal Aunt     Diabetes Paternal Aunt     Colon cancer Paternal Aunt     Hypertension Paternal Uncle     Diabetes Paternal Uncle     Thyroid cancer Paternal Uncle         Papillary thyroid cancer    Cancer Maternal Grandmother     Esophageal varices Maternal Grandmother     Abnormal EKG Maternal Grandmother     Alcohol abuse Maternal Grandmother     Cirrhosis Maternal Grandmother     Cancer Maternal Grandfather     Heart disease Maternal Grandfather     Stroke Maternal Grandfather         x 3    Brain cancer Maternal Grandfather     Hypertension Paternal Grandmother     Diabetes Paternal Grandmother     Lung cancer Paternal Grandmother     Diabetes Paternal Grandfather     Thyroid disease Neg Hx       Social History   Social History     Substance and Sexual Activity   Alcohol Use Yes    Comment: Rare use     Social History     Substance and Sexual Activity   Drug Use Yes    Types: Marijuana    Comment: Medical Marijuana occasionally use     Social History     Tobacco Use   Smoking Status Former    Current packs/day: 0.00    Types: Cigarettes    Start date: 1996    Quit date: 2016    Years since quittin.2    Passive exposure: Past   Smokeless Tobacco Never   Tobacco Comments    quit > 4 years ago        Medications:     Current Outpatient Medications:     acetaZOLAMIDE (DIAMOX) 125 mg tablet, 1 tab twice a day (second dose late afternoon/evening) for 7 days then increase to 2 tabs twice a day (Patient taking differently: 250 mg 2 (two) times a day 1 tab twice a day (second dose late afternoon/evening) for 7 days then increase to 2 tabs twice a day), Disp: 120 tablet, Rfl: 2    ascorbic acid (VITAMIN C) 500 MG tablet, Take 500 mg by mouth daily, Disp: , Rfl:     bimatoprost (LATISSE) 0.03 % ophthalmic  solution, Administer 1 drop to both eyes daily at bedtime Place one drop on applicator and apply evenly along the skin of the upper eyelid at base of eyelashes once daily at bedtime; repeat procedure for second eye (use a clean applicator). (Patient taking differently: Administer 1 drop to both eyes daily at bedtime as needed Place one drop on applicator and apply evenly along the skin of the upper eyelid at base of eyelashes once daily at bedtime; repeat procedure for second eye (use a clean applicator).), Disp: 3 mL, Rfl: 3    cholecalciferol (VITAMIN D3) 1,000 units tablet, Take 4,000 Units by mouth daily, Disp: , Rfl:     Cyanocobalamin (VITAMIN B 12 PO), Place 1,000 mcg under the tongue in the morning, Disp: , Rfl:     cyproheptadine (PERIACTIN) 4 mg tablet, Take 1 tablet (4 mg total) by mouth daily at bedtime as needed for allergies (headaches) (Patient taking differently: Take 4 mg by mouth as needed for allergies (headaches)), Disp: 30 tablet, Rfl: 0    dexamethasone (DECADRON) 1 mg tablet, Take 1 mg by mouth as needed, Disp: , Rfl:     Docusate Sodium 100 MG capsule, Take 100-200 mg by mouth in the morning 100 mg Monday/Wednesday/Friday/Sunday 200 mg Tuesday/Thursday/Saturday, Disp: , Rfl:     fluticasone (FLONASE) 50 mcg/act nasal spray, 1 spray into each nostril daily (Patient taking differently: 1 spray into each nostril as needed), Disp: 48 mL, Rfl: 0    ketorolac (TORADOL) 10 mg tablet, TAKE ONE TABLET BY MOUTH AT ONSET OF MIGRAINE, CAN REPEAT X 1 IN 6 HOURS, CAN COMBINE WITH TRIPTAN. TAKE WITH FOOD/MILK/ANTACID (Patient taking differently: as needed), Disp: 10 tablet, Rfl: 3    Loratadine 10 MG CAPS, Take 10 mg by mouth daily , Disp: , Rfl:     methocarbamol (ROBAXIN) 500 mg tablet, Take 1 tablet (500 mg total) by mouth daily at bedtime (Patient taking differently: Take 500 mg by mouth as needed), Disp: 90 tablet, Rfl: 3    methylphenidate (Concerta) 54 MG ER tablet, Take 1 tablet (54 mg total) by  "mouth daily Max Daily Amount: 54 mg, Disp: 30 tablet, Rfl: 0    naproxen sodium (ANAPROX) 550 mg tablet, Take 1 tablet (550 mg total) by mouth 2 (two) times a day with meals For menstrual cramps (Patient taking differently: Take 550 mg by mouth as needed For menstrual cramps), Disp: 30 tablet, Rfl: 0    ondansetron (ZOFRAN) 4 mg tablet, Take 1 tablet (4 mg total) by mouth every 8 (eight) hours as needed for nausea or vomiting (Patient taking differently: Take 4 mg by mouth as needed for nausea or vomiting), Disp: 20 tablet, Rfl: 0    rimegepant sulfate (Nurtec) 75 mg TBDP, Take 1 tablet (75 mg total) by mouth as needed (migraine) Limit of 1 in 24 hours, Disp: 16 tablet, Rfl: 6    rizatriptan (MAXALT-MLT) 10 mg disintegrating tablet, DISSOLVE 1 TABLET BY MOUTH ONCE AS NEEDED FOR MIGRAINE. MAY REPEAT EVERY 2 HOURS IF NEEDED. MAX 2/24 HOURS (Patient taking differently: as needed), Disp: 12 tablet, Rfl: 3    valACYclovir (VALTREX) 500 mg tablet, Take 1 tablet (500 mg total) by mouth daily, Disp: 90 tablet, Rfl: 0    Wegovy 2.4 MG/0.75ML, Inject 0.75 mL (2.4 mg total) under the skin once a week, Disp: 3 mL, Rfl: 3    dexamethasone (DECADRON) 2 mg tablet, Take 1 tablet (2 mg total) by mouth daily with breakfast (Patient not taking: Reported on 12/13/2023), Disp: 5 tablet, Rfl: 0  Allergies   Allergen Reactions    Sulfa Antibiotics      Family has allergy she has never taken    Doxycycline Rash and Edema     Whole body rash,bruises behind both thighs       OBJECTIVE    Vitals:   Vitals:    03/14/24 1347   BP: 102/68   BP Location: Left arm   Patient Position: Sitting   Cuff Size: Standard   Pulse: 71   Resp: 16   Temp: 97.8 °F (36.6 °C)   TempSrc: Tympanic   SpO2: 100%   Weight: 78.8 kg (173 lb 12.8 oz)   Height: 5' 10\" (1.778 m)           Physical Exam  Constitutional:       Appearance: She is well-developed.   HENT:      Head: Normocephalic and atraumatic.   Cardiovascular:      Rate and Rhythm: Normal rate and regular " rhythm.      Heart sounds: Normal heart sounds.   Pulmonary:      Effort: Pulmonary effort is normal. No respiratory distress.      Breath sounds: Normal breath sounds. No wheezing.   Musculoskeletal:         General: No tenderness. Normal range of motion.      Cervical back: Normal range of motion and neck supple.   Skin:     General: Skin is warm and dry.   Neurological:      Mental Status: She is alert and oriented to person, place, and time.   Psychiatric:         Mood and Affect: Mood normal.         Behavior: Behavior normal.            Labs:        Samantha Caal DO    3/14/2024

## 2024-03-14 NOTE — TELEPHONE ENCOUNTER
"Behavioral Health Outpatient Intake Questions    Referred By   : Self     Please advise interviewee that they need to answer all questions truthfully to allow for best care, and any misrepresentations of information may affect their ability to be seen at this clinic   => Was this discussed? Yes     If Minor Child (under age 18)    Who is/are the legal guardian(s) of the child?     Is there a custody agreement? No     If \"YES\"- Custody orders must be obtained prior to scheduling the first appointment  In addition, Consent to Treatment must be signed by all legal guardians prior to scheduling the first appointment    If \"NO\"- Consent to Treatment must be signed by all legal guardians prior to scheduling the first appointment    Behavioral Health Outpatient Intake History -     Presenting Problem (in patient's own words): \"I see my PCP for ADHD meds I've been experiencing poor concentration and poor motivation. Im wondering if possible depression.\"    Are there any communication barriers for this patient?     Yes                                               If yes, please describe barriers: ADHD  If there is a unique situation, please refer to Esequiel Vargas/Shawna Alberts for final determination.    Are you taking any psychiatric medications? Yes     If \"YES\" -What are they  CONCERTA      If \"YES\" -Who prescribes? PCP    Has the Patient previously received outpatient Talk Therapy or Medication Management from [facility]  Yes        If \"YES\"- When, Where and with Whom? Therapy in late 2012        If \"NO\" -Has Patient received these services elsewhere?       If \"YES\" -When, Where, and with Whom?    Has the Patient abused alcohol or other substances in the last 6 months ? No  No concerns of substance abuse are reported.     If \"YES\" -What substance, How much, How often?     If illegal substance: Refer to [facility] (for EILEEN) or [facility] Offices.   If Alcohol in excess of 10 drinks per week:  Refer to [facility] (for " "EILEEN) or [facility] Offices    Legal History-     Is this treatment court ordered? No   If \"yes \"send to :  Talk Therapy : Send to Esequiel Vargas/Shawna Alberts for final determination   Med Management: Send to Dr De La Torre for final determination     Has the Patient been convicted of a felony?  No   If \"Yes\" send to -When, What?  Talk Therapy : Send to Esequiel Alberts for final determination   Med Management: Send to Dr De La Torre for final determination     ACCEPTED as a patient Yes  If \"Yes\" Appointment Date: 4/19/24 Barbara HERNANDEZ     Referred Elsewhere? No  If “Yes” - (Where? Ex: [facility], [facility], [facility], [facility], etc.)       Name of Insurance Co:[facility]   Insurance ID#[id number]   Insurance Phone #  If ins is primary or secondary?Primary   If patient is a minor, parents information such as Name, D.O.B of guarantor.  "

## 2024-03-14 NOTE — TELEPHONE ENCOUNTER
Patient called and left a voicemail seeking a return call to schedule a new patient appt. Writer returned the patients call and explained the wait list and that a message would be sent to intake to review her chart to see if there is availability to schedule her . If they are unable the patient will remain on the wait list for an appt. Patient said reasons for an appt are ADHD and depression.          Patient has been added to the Medication Management wait list without a referral.    Insurance: [facility]  Insurance Type:    Commercial [x]   Medicaid []   [facility] (if applicable)   Medicare []  Location Preference: [place] or [place]  Provider Preference: no pref  Virtual: Yes [x] No []  Were outside resources sent: Yes [] No [x]

## 2024-03-18 NOTE — PROGRESS NOTES
[facility]  Chente Lozano, PharmMARIZOL     Jo Barros is a 38 y.o. female who was referred to the clinical pharmacist by Samantha Caal DO for  ADHD . Patient presents via video for initial clinical pharmacist consult.     Telemedicine consent  This virtual check-in was done via televideo.   Encounter provider: Khushboo Herndon    Patient agrees to participate in a virtual check in via telephone or video visit instead of presenting to the office to address urgent/immediate medical needs.     After connecting, the patient was identified by name and date of birth.  Jo Barros was informed that this was a telemedicine visit and that the exam was being conducted via the Epic Embedded platform. She agrees to proceed. Patient is located in the following state in which I hold an active license ([place]). My office door was closed. No one else was in the room.  Jo Barros acknowledged consent and understanding of privacy and security of the telemedicine visit.  I informed the patient that I have reviewed her record in Epic and presented the opportunity for her to ask any questions regarding the visit today. The patient agreed to participate.      Recommendations:     Patient having return of symptoms in the early afternoon/evening suggesting the Concerta is not maintaining therapeutic blood levels throughout the day. Recommend to add short-acting stimulant to provide an additional 4-6 hours of coverage.     The following actions were taken today by the Clinical Pharmacist:   1. Discussed short-acting methylphenidate with patient. We reviewed risks, benefits and side effects of stimulants, and that medicine works best in combination with behavioral treatments. We reviewed FDA approval, black box status and risks of medicine interactions.    Interventions: Recommend provider please consider the following, if in agreeance  Add methylphenidate IR 20 mg in the  late afternoon/early evening. Take with food  - Provide short-term supply until patient can be evaluated by Psych     Follow-up:   Follow-up with pharmacist PRN  Psych visit: 4/19/2024  Next PCP visit: 6/18/2024    Findings:      Patient initially presented to PCP in August 2022 with complaint of increasing anxiety and decreased concentration/focus that had been worsening since start of COVID pandemic. Patient with history of anxiety/panic attacks. Denied any suicidal ideations or behaviors. Trial of bupropion did not improve symptoms and patient experienced worsening of migraines. She was transitioned to Concerta. Dose has been increased for symptom control. Most recent increase from Concerta 36 mg dialy to 54 mg daily on 3/14/2024.    Patient has appointment with Psychiatry for Adult ADHD evaluation on 4/19/2024.     History of palpitations. Has been seen by Cardiology. Stress test, Echo, and 24-hr Holter monitor were unrevealing.     History of migraines. Being followed by Neurology.    She has consistently screened negative on PHQ-2.    Patient denies personal or family history of motor tics, Tourette syndrome, or structural heart disease. No history of substance abuse.   Discussion:      Patient reports that when taking Concerta she had symptom improvement,  however medication did not last throughout the day. 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 She initially started taking at Concerta at 6 AM but med started wearing off around 2:30 pm. She has been taking it between 8-10 AM in an effort to make it last until 5 PM. She also feels that each dosage increase has been effective for several weeks and then she experiences a plateau.     Symptoms include being easily distracted, overly stimulated, fidgety, loss of motivation, thinking before doing, procrastination, and difficulty getting work done in reasonable time. She is completing an online masters program and she has had to repeat a course.  She denies trouble with losing things, being forgetful, or organization.     Since taking Concerta, no increased HR, delayed onset of sleep, change or worsening of headache. She has lost 85 lbs over the past 2 years. She ensures she has at least 1200 noreen/day and is followed closely by Bariatrics.     No depressive symptoms or suicidal ideation    Pharmacist Tracking Tool:     Reason For Outreach: Embedded Pharmacist  Demographics:  Intervention Method: Video  Type of Intervention: New  Topics Addressed: Depression and Psychiatric disorders  Pharmacologic Interventions: Medication Initiation and Med Rec  Non-Pharmacologic Interventions: Care coordination and Medication/Device education  Time:  Direct Patient Care:  40  mins  Care Coordination:  35  mins  Recommendation Recipient: Patient/Caregiver and Provider  Outcome: Pending/ Follow-up Required

## 2024-03-20 ENCOUNTER — CLINICAL SUPPORT (OUTPATIENT)
Dept: FAMILY MEDICINE CLINIC | Facility: CLINIC | Age: 39
End: 2024-03-20

## 2024-03-20 ENCOUNTER — HOSPITAL ENCOUNTER (OUTPATIENT)
Dept: SLEEP CENTER | Facility: CLINIC | Age: 39
Discharge: HOME | End: 2024-03-20
Payer: COMMERCIAL

## 2024-03-20 DIAGNOSIS — G43.001 MIGRAINE WITHOUT AURA AND WITH STATUS MIGRAINOSUS, NOT INTRACTABLE: ICD-10-CM

## 2024-03-20 DIAGNOSIS — F90.2 ATTENTION DEFICIT HYPERACTIVITY DISORDER (ADHD), COMBINED TYPE: ICD-10-CM

## 2024-03-20 DIAGNOSIS — G47.33 OBSTRUCTIVE SLEEP APNEA (ADULT) (PEDIATRIC): ICD-10-CM

## 2024-03-20 DIAGNOSIS — G93.2 INTRACRANIAL HYPERTENSION: ICD-10-CM

## 2024-03-20 DIAGNOSIS — R06.83 SNORING: ICD-10-CM

## 2024-03-20 DIAGNOSIS — R51.9 MORNING HEADACHE: ICD-10-CM

## 2024-03-20 PROCEDURE — 95810 POLYSOM 6/> YRS 4/> PARAM: CPT | Performed by: INTERNAL MEDICINE

## 2024-03-20 PROCEDURE — 95810 POLYSOM 6/> YRS 4/> PARAM: CPT

## 2024-03-21 NOTE — PROGRESS NOTES
Sleep Study Documentation    Pre-Sleep StudyEDS       Sleep testing procedure explained to patient:YES    Patient napped prior to study:NO    Caffeine:Dayshift worker after 12PM.  Caffeine use:NO    Alcohol:Dayshift workers after 5PM: Alcohol use:NO    Typical day for patient:YES       Study Documentation    Sleep Study Indications: EDS    Sleep Study: Diagnostic   Snore:Mild  Supplemental O2: no      Minimum SaO2 94  Baseline SaO2 98    EKG abnormalities: yes:  EPOCH example and comments:     EEG abnormalities: yes:  EPOCH example and comments    Were abnormal behaviors in sleep observed:NO    Is Total Sleep Study Recording Time < 2 hours: N/A    Is Total Sleep Study Recording Time > 2 hours but study is incomplete: N/A    Is Total Sleep Study Recording Time 6 hours or more but sleep was not obtained: NO    Patient classification: employed       Post-Sleep Study    Medication used at bedtime or during sleep study:YES other prescription medications    Patient reports time it took to fall asleep:less than 20 minutes    Patient reports waking up during study:3 or more times.  Patient reports returning to sleep in greater than 30 minutes.    Patient reports sleeping less than 2 hours without dreaming.    Does the Patient feel this is a typical night of sleep:worse than usual    Patient rated sleepiness: Very sleepy or tired    PAP treatment:no.

## 2024-04-19 ENCOUNTER — TELEMEDICINE (OUTPATIENT)
Dept: PSYCHIATRY | Facility: CLINIC | Age: 39
End: 2024-04-19
Payer: COMMERCIAL

## 2024-04-19 DIAGNOSIS — F98.8 ATTENTION DEFICIT DISORDER (ADD) IN ADULT: ICD-10-CM

## 2024-04-19 DIAGNOSIS — F33.1 MDD (MAJOR DEPRESSIVE DISORDER), RECURRENT EPISODE, MODERATE (HCC): Primary | ICD-10-CM

## 2024-04-19 PROCEDURE — 90792 PSYCH DIAG EVAL W/MED SRVCS: CPT | Performed by: PHYSICIAN ASSISTANT

## 2024-04-19 RX ORDER — METHYLPHENIDATE HYDROCHLORIDE 36 MG/1
36 TABLET ORAL DAILY
Qty: 30 TABLET | Refills: 0 | Status: SHIPPED | OUTPATIENT
Start: 2024-04-19

## 2024-04-19 RX ORDER — DULOXETIN HYDROCHLORIDE 30 MG/1
30 CAPSULE, DELAYED RELEASE ORAL DAILY
Qty: 30 CAPSULE | Refills: 1 | Status: SHIPPED | OUTPATIENT
Start: 2024-04-19
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
## 2024-04-22 ENCOUNTER — TELEPHONE (OUTPATIENT)
Dept: BARIATRICS | Facility: CLINIC | Age: 39
End: 2024-04-22

## 2024-04-23 NOTE — TELEPHONE ENCOUNTER
PA for wegovy 2.4mg    Submitted via    []CMM-KEY    [x]Surescripts-Case ID # [id number]   []Faxed to plan   []Other website    []Phone call Case ID #      Office notes sent, clinical questions answered. Awaiting determination    Turnaround time for your insurance to make a decision on your Prior Authorization can take 7-21 business days.

## 2024-05-06 DIAGNOSIS — E66.3 OVERWEIGHT: ICD-10-CM

## 2024-05-06 DIAGNOSIS — G93.2 INTRACRANIAL HYPERTENSION: ICD-10-CM

## 2024-05-07 ENCOUNTER — TELEPHONE (OUTPATIENT)
Dept: BARIATRICS | Facility: CLINIC | Age: 39
End: 2024-05-07

## 2024-05-07 RX ORDER — SEMAGLUTIDE 2.4 MG/.75ML
INJECTION, SOLUTION SUBCUTANEOUS
Qty: 3 ML | Refills: 2 | Status: SHIPPED | OUTPATIENT
Start: 2024-05-07

## 2024-05-10 DIAGNOSIS — B00.9 HERPES: ICD-10-CM

## 2024-05-10 RX ORDER — VALACYCLOVIR HYDROCHLORIDE 500 MG/1
500 TABLET, FILM COATED ORAL DAILY
Qty: 90 TABLET | Refills: 1 | Status: SHIPPED | OUTPATIENT
Start: 2024-05-10 | End: 2024-11-06

## 2024-05-10 NOTE — TELEPHONE ENCOUNTER
From: Jo Barros  To: Office of DEB Padgett  Sent: 5/9/2024 8:23 PM EDT  Subject: Medication Renewal Request    Refills have been requested for the following medications:     valACYclovir (VALTREX) 500 mg tablet [DEB Padgett]    Preferred pharmacy: [facility] - [place], [place] - [address] [place] [address]

## 2024-05-14 DIAGNOSIS — F33.1 MDD (MAJOR DEPRESSIVE DISORDER), RECURRENT EPISODE, MODERATE (HCC): ICD-10-CM

## 2024-05-14 RX ORDER — DULOXETIN HYDROCHLORIDE 30 MG/1
30 CAPSULE, DELAYED RELEASE ORAL DAILY
Qty: 90 CAPSULE | Refills: 1 | Status: SHIPPED | OUTPATIENT
Start: 2024-05-14 | End: 2024-05-19 | Stop reason: SDUPTHER

## 2024-05-16 ENCOUNTER — TELEMEDICINE (OUTPATIENT)
Dept: PSYCHIATRY | Facility: CLINIC | Age: 39
End: 2024-05-16

## 2024-05-16 DIAGNOSIS — F98.8 ATTENTION DEFICIT DISORDER (ADD) IN ADULT: ICD-10-CM

## 2024-05-16 DIAGNOSIS — F33.1 MDD (MAJOR DEPRESSIVE DISORDER), RECURRENT EPISODE, MODERATE (HCC): Primary | ICD-10-CM

## 2024-05-16 RX ORDER — METHYLPHENIDATE HYDROCHLORIDE 36 MG/1
36 TABLET ORAL DAILY
Qty: 30 TABLET | Refills: 0 | Status: SHIPPED | OUTPATIENT
Start: 2024-05-16

## 2024-05-16 NOTE — BH TREATMENT PLAN
"TREATMENT PLAN (Medication Management Only)        [facility]    Name and Date of Birth:  Jo Barros 38 y.o. 1985  Date of Treatment Plan: May 16, 2024  Diagnosis/Diagnoses:    1. MDD (major depressive disorder), recurrent episode, moderate (HCC)    2. Attention deficit disorder (ADD) in adult      Strengths/Personal Resources for Self-Care: \", mother, sons\".  Area/Areas of need (in own words): \"scheduling time for school/ schedule time for me\"  1. Long Term Goal: maintain control of anxiety/depression/focus  Target Date: 1 month  Person/Persons responsible for completion of goal: Jo  2.  Short Term Objective (s) - How will we reach this goal?:   A. Provider new recommended medication/dosage changes and/or continue medication(s): continue current medications as prescribed.  B. N/A.  C. N/A.  Target Date:1 month  Person/Persons Responsible for Completion of Goal: Jo  Progress Towards Goals: stable  Treatment Modality: 1 to 3 months and prn  Review due 180 days from date of this plan: 6 months - 11/16/2024  Expected length of service: maintenance  My Physician/PA/NP and I have developed this plan together and I agree to work on the goals and objectives. I understand the treatment goals that were developed for my treatment.      "

## 2024-05-16 NOTE — PSYCH
This note was not shared with the patient due to reasonable likelihood of causing patient harm    Virtual Regular Visit    Verification of patient location:    Patient is located at Home in the following state in which I hold an active license [place]      Assessment/Plan:    Problem List Items Addressed This Visit    None  Visit Diagnoses       MDD (major depressive disorder), recurrent episode, moderate (HCC)    -  Primary    Relevant Medications    methylphenidate (CONCERTA) 36 MG ER tablet    Attention deficit disorder (ADD) in adult        Relevant Medications    methylphenidate (CONCERTA) 36 MG ER tablet            Goals addressed in session: Goal 1          Reason for visit is   Chief Complaint   Patient presents with    Follow-up    Medication Management    Virtual Regular Visit          Encounter provider Barbara Elizabeth PA-C      Recent Visits  No visits were found meeting these conditions.  Showing recent visits within past 7 days and meeting all other requirements  Today's Visits  Date Type Provider Dept   05/16/24 Telemedicine Barbara Elizabeth PA-C [facility]   Showing today's visits and meeting all other requirements  Future Appointments  No visits were found meeting these conditions.  Showing future appointments within next 150 days and meeting all other requirements       The patient was identified by name and date of birth. Jo Barros was informed that this is a telemedicine visit and that the visit is being conducted throughthe Epic Embedded platform. She agrees to proceed..  My office door was closed. No one else was in the room.  She acknowledged consent and understanding of privacy and security of the video platform. The patient has agreed to participate and understands they can discontinue the visit at any time.    Patient is aware this is a billable service.     Subjective  Jo Barros is a 38 y.o. female with history of depression, adult ADD.      HPI    Connie was seen for follow-up and for medication management.  At her initial appointment she was started on duloxetine 30 mg daily and Concerta was reduced to 36 mg daily.  Overall states that she is doing fairly well.  States that she has noted her focus and concentration at work on the weekend has been slightly improved.  States that she has been taking her Concerta typically about 9 AM and feels as her focus concentration has been good until about 6 PM.  States that her sleep is disrupted though.  She wakes up at times during the night.  States that this has been ongoing and is not new.  Appetite has been adequate.  She has a decrease in her appetite due to medications but is eating adequately.  Work stressors due to some staffing shortages but she is managing well.  States that she has been having some struggles with completing her schoolwork on time so we discussed ways to combat that.  Discussed scheduling a time for completing her schoolwork and also encouraged her to consider going to the library or other place to do her schoolwork to decrease distractions.  Past Medical History:   Diagnosis Date    ADHD     Allergic     Asthma     Cluster headache     GERD (gastroesophageal reflux disease)     Headache, tension-type     HPV (human papilloma virus) infection     HSV-1 infection     HSV-2 infection        Past Surgical History:   Procedure Laterality Date    CERVICAL BIOPSY  W/ LOOP ELECTRODE EXCISION  2012    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    CT EPIDURAL STEROID INJECTION (TAMI LUMBAR) N/A 2018    L5-S1, x2    LAMINECTOMY  2/5/2019    L5-S1 herniated disc    POSTERIOR LAMINECTOMY THORACIC AND LUMBAR SPINE N/A 02/05/2019    Procedure: Lumbar laminectomy, decompression, discectomy left L5-S1 ;  Surgeon: Misa Samaniego MD;  Location: [facility];  Service: Orthopedics    SKIN BIOPSY      TONSILECTOMY AND ADNOIDECTOMY  1990    WISDOM TOOTH EXTRACTION  2007       Current Outpatient  Medications   Medication Sig Dispense Refill    methylphenidate (CONCERTA) 36 MG ER tablet Take 1 tablet (36 mg total) by mouth daily Max Daily Amount: 36 mg 30 tablet 0    acetaZOLAMIDE (DIAMOX) 125 mg tablet 1 tab twice a day (second dose late afternoon/evening) for 7 days then increase to 2 tabs twice a day (Patient taking differently: 250 mg 2 (two) times a day 1 tab twice a day (second dose late afternoon/evening) for 7 days then increase to 2 tabs twice a day) 120 tablet 2    ascorbic acid (VITAMIN C) 500 MG tablet Take 500 mg by mouth daily      bimatoprost (LATISSE) 0.03 % ophthalmic solution Administer 1 drop to both eyes daily at bedtime Place one drop on applicator and apply evenly along the skin of the upper eyelid at base of eyelashes once daily at bedtime; repeat procedure for second eye (use a clean applicator). (Patient taking differently: Administer 1 drop to both eyes daily at bedtime as needed Place one drop on applicator and apply evenly along the skin of the upper eyelid at base of eyelashes once daily at bedtime; repeat procedure for second eye (use a clean applicator).) 3 mL 3    cholecalciferol (VITAMIN D3) 1,000 units tablet Take 4,000 Units by mouth daily      Cyanocobalamin (VITAMIN B 12 PO) Place 1,000 mcg under the tongue in the morning      cyproheptadine (PERIACTIN) 4 mg tablet Take 1 tablet (4 mg total) by mouth daily at bedtime as needed for allergies (headaches) (Patient taking differently: Take 4 mg by mouth as needed for allergies (headaches)) 30 tablet 0    dexamethasone (DECADRON) 1 mg tablet Take 1 mg by mouth as needed      Docusate Sodium 100 MG capsule Take 100-200 mg by mouth in the morning 100 mg Monday/Wednesday/Friday/Sunday  200 mg Tuesday/Thursday/Saturday      DULoxetine (CYMBALTA) 30 mg delayed release capsule TAKE 1 CAPSULE BY MOUTH EVERY DAY 90 capsule 1    fluticasone (FLONASE) 50 mcg/act nasal spray 1 spray into each nostril daily (Patient taking differently: 1  spray into each nostril as needed) 48 mL 0    ketorolac (TORADOL) 10 mg tablet TAKE ONE TABLET BY MOUTH AT ONSET OF MIGRAINE, CAN REPEAT X 1 IN 6 HOURS, CAN COMBINE WITH TRIPTAN. TAKE WITH FOOD/MILK/ANTACID (Patient taking differently: as needed) 10 tablet 3    Loratadine 10 MG CAPS Take 10 mg by mouth daily       methocarbamol (ROBAXIN) 500 mg tablet Take 1 tablet (500 mg total) by mouth daily at bedtime (Patient taking differently: Take 500 mg by mouth as needed) 90 tablet 3    naproxen sodium (ANAPROX) 550 mg tablet Take 1 tablet (550 mg total) by mouth 2 (two) times a day with meals For menstrual cramps (Patient taking differently: Take 550 mg by mouth as needed For menstrual cramps) 30 tablet 0    ondansetron (ZOFRAN) 4 mg tablet Take 1 tablet (4 mg total) by mouth every 8 (eight) hours as needed for nausea or vomiting (Patient taking differently: Take 4 mg by mouth as needed for nausea or vomiting) 20 tablet 0    rimegepant sulfate (Nurtec) 75 mg TBDP Take 1 tablet (75 mg total) by mouth as needed (migraine) Limit of 1 in 24 hours 16 tablet 6    rizatriptan (MAXALT-MLT) 10 mg disintegrating tablet DISSOLVE 1 TABLET BY MOUTH ONCE AS NEEDED FOR MIGRAINE. MAY REPEAT EVERY 2 HOURS IF NEEDED. MAX 2/24 HOURS (Patient taking differently: as needed) 12 tablet 3    valACYclovir (VALTREX) 500 mg tablet Take 1 tablet (500 mg total) by mouth daily 90 tablet 1    Wegovy 2.4 MG/0.75ML Inject 0.75 ml (2.4 mg total) under the skin once weekly. 3 mL 2     No current facility-administered medications for this visit.        Allergies   Allergen Reactions    Sulfa Antibiotics      Family has allergy she has never taken    Doxycycline Rash and Edema     Whole body rash,bruises behind both thighs       Review of Systems  As noted in HPI  Video Exam    There were no vitals filed for this visit.    Physical Exam   Mental status exam:  Speech is clear, coherent, normal rate and volume  Adequate hygiene, good eye contact  Affect is  appropriate, pleasant  Mood is euthymic  Linear and coherent thought process  No suicidal or homicidal ideation  No psychotic signs or symptoms noted, no hallucinations and no delusions were voiced and cognition is intact  Insight and judgment is intact    Medications and treatment plan as follows:  Cymbalta 30 mg daily  Concerta 36 mg daily    We will follow-up in about 1 month and she will call me sooner if any questions or concerns    We will follow-up on June 20, 2024 at 1030 in person    Visit Time    Visit Start Time: 0925  Visit Stop Time: 0950  Total Visit Duration:  25 minutes

## 2024-05-19 ENCOUNTER — DOCUMENTATION (OUTPATIENT)
Dept: PSYCHIATRY | Facility: CLINIC | Age: 39
End: 2024-05-19

## 2024-05-19 DIAGNOSIS — F33.1 MDD (MAJOR DEPRESSIVE DISORDER), RECURRENT EPISODE, MODERATE (HCC): Primary | ICD-10-CM

## 2024-05-19 RX ORDER — DULOXETIN HYDROCHLORIDE 30 MG/1
30 CAPSULE, DELAYED RELEASE ORAL DAILY
Qty: 90 CAPSULE | Refills: 1 | Status: SHIPPED | OUTPATIENT
Start: 2024-05-19

## 2024-06-05 DIAGNOSIS — K21.00 GASTROESOPHAGEAL REFLUX DISEASE WITH ESOPHAGITIS WITHOUT HEMORRHAGE: Primary | ICD-10-CM

## 2024-06-05 RX ORDER — OMEPRAZOLE 40 MG/1
40 CAPSULE, DELAYED RELEASE ORAL DAILY
Qty: 90 CAPSULE | Refills: 1 | Status: SHIPPED | OUTPATIENT
Start: 2024-06-05

## 2024-06-18 ENCOUNTER — OFFICE VISIT (OUTPATIENT)
Dept: FAMILY MEDICINE CLINIC | Facility: CLINIC | Age: 39
End: 2024-06-18
Payer: COMMERCIAL

## 2024-06-18 VITALS
WEIGHT: 164.2 LBS | SYSTOLIC BLOOD PRESSURE: 128 MMHG | HEIGHT: 70 IN | OXYGEN SATURATION: 98 % | DIASTOLIC BLOOD PRESSURE: 76 MMHG | TEMPERATURE: 98.7 F | BODY MASS INDEX: 23.51 KG/M2 | HEART RATE: 82 BPM | RESPIRATION RATE: 16 BRPM

## 2024-06-18 DIAGNOSIS — F90.2 ATTENTION DEFICIT HYPERACTIVITY DISORDER (ADHD), COMBINED TYPE: Primary | ICD-10-CM

## 2024-06-18 DIAGNOSIS — K21.9 GASTROESOPHAGEAL REFLUX DISEASE WITHOUT ESOPHAGITIS: ICD-10-CM

## 2024-06-18 PROCEDURE — 99214 OFFICE O/P EST MOD 30 MIN: CPT | Performed by: FAMILY MEDICINE

## 2024-06-18 NOTE — PROGRESS NOTES
Jo Barros 1985 female MRN: [id number]    Family Medicine Follow-up Visit    ASSESSMENT/PLAN  Problem List Items Addressed This Visit          Digestive    GERD (gastroesophageal reflux disease)     She is doing much better on the PPI therapy  Continue as prescribed             Behavioral Health    Attention deficit hyperactivity disorder (ADHD), combined type - Primary     Was able to get in with psychiatry since our last visit  Thankfully she is feeling so much better on medication adjustments that were made at that time  Continue follow up as scheduled with psychiatry             Follow up in 6 months for CP or sooner if needed          Future Appointments   Date Time Provider Department Center   6/18/2024  3:00 PM DO BAY Daniels [facility]   6/20/2024 10:30 AM Barbara Elizabeth PA-C [facility]   7/1/2024  9:00 AM BE US RBC 3 BE RBC US BE RBC   7/22/2024  9:30 AM Shalom Reddy PA-C [facility]   10/15/2024  9:20 AM DO KAMLESH Padilla [facility]   12/6/2024  8:15 AM Nancy Connolly PA-C [facility]          SUBJECTIVE  CC: ADHD (Has seen psych. They started her on cymbalta. ) and Epigastric Pain (Omeprazole has helped. )      HPI:  Jo Barros is a 39 y.o. female who presents for check up    HPI    Review of Systems   Constitutional:  Negative for chills, fatigue and fever.   HENT:  Negative for congestion, postnasal drip, rhinorrhea and sinus pressure.    Eyes:  Negative for photophobia and visual disturbance.   Respiratory:  Negative for cough and shortness of breath.    Cardiovascular:  Negative for chest pain, palpitations and leg swelling.   Gastrointestinal:  Negative for abdominal pain, constipation, diarrhea, nausea and vomiting.   Genitourinary:  Negative for difficulty urinating and dysuria.   Musculoskeletal:  Negative for arthralgias and myalgias.   Skin:  Negative for color change and rash.   Neurological:  Negative for  dizziness, weakness, light-headedness and headaches.       Historical Information   The patient history was reviewed as follows:    Past Medical History:   Diagnosis Date    ADHD     Allergic     Asthma     Cluster headache     GERD (gastroesophageal reflux disease)     Headache(784.0)     Headache, tension-type     HPV (human papilloma virus) infection     HSV-1 infection     HSV-2 infection      Past Surgical History:   Procedure Laterality Date    CERVICAL BIOPSY  W/ LOOP ELECTRODE EXCISION      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    CT EPIDURAL STEROID INJECTION (TAMI LUMBAR) N/A     L5-S1, x2    LAMINECTOMY  2019    L5-S1 herniated disc    POSTERIOR LAMINECTOMY THORACIC AND LUMBAR SPINE N/A 2019    Procedure: Lumbar laminectomy, decompression, discectomy left L5-S1 ;  Surgeon: Misa Samaniego MD;  Location: [facility];  Service: Orthopedics    SKIN BIOPSY      SPINE SURGERY  19    L5-S1 discectomy/laminectomy    TONSILECTOMY AND ADNOIDECTOMY      WISDOM TOOTH EXTRACTION       Family History   Problem Relation Age of Onset    Diabetes Mother     Arthritis Mother     Hypertension Mother     Atrial fibrillation Mother     Neuropathy Mother         Diabetic neuropathy    Diabetes Father             Arthritis Father         psoriatic    Hyperlipidemia Father             Cancer Father         Cholangiocarcinoma ()    Hypertension Father     Hearing loss Father         Degenerative hearing loss    Heart disease Maternal Uncle     Melanoma Maternal Uncle     Hypertension Paternal Aunt     Diabetes Paternal Aunt     Colon cancer Paternal Aunt     Hypertension Paternal Uncle     Diabetes Paternal Uncle     Thyroid cancer Paternal Uncle         Papillary thyroid cancer    Cancer Maternal Grandmother     Esophageal varices Maternal Grandmother     Abnormal EKG Maternal Grandmother     Alcohol abuse Maternal Grandmother     Cirrhosis Maternal  Grandmother     Cancer Maternal Grandfather     Heart disease Maternal Grandfather     Stroke Maternal Grandfather         x 3    Brain cancer Maternal Grandfather     Hypertension Paternal Grandmother     Diabetes Paternal Grandmother     Lung cancer Paternal Grandmother     Diabetes Paternal Grandfather     Thyroid disease Neg Hx       Social History   Social History     Substance and Sexual Activity   Alcohol Use Yes    Comment: Rare use     Social History     Substance and Sexual Activity   Drug Use Yes    Types: Marijuana    Comment: Medical Marijuana occasionally use     Social History     Tobacco Use   Smoking Status Former    Current packs/day: 0.00    Types: Cigarettes    Start date: 1996    Quit date: 2016    Years since quittin.4    Passive exposure: Past   Smokeless Tobacco Never   Tobacco Comments    quit > 4 years ago        Medications:     Current Outpatient Medications:     acetaZOLAMIDE (DIAMOX) 125 mg tablet, 1 tab twice a day (second dose late afternoon/evening) for 7 days then increase to 2 tabs twice a day (Patient taking differently: 250 mg 2 (two) times a day 1 tab twice a day (second dose late afternoon/evening) for 7 days then increase to 2 tabs twice a day), Disp: 120 tablet, Rfl: 2    ascorbic acid (VITAMIN C) 500 MG tablet, Take 500 mg by mouth daily, Disp: , Rfl:     bimatoprost (LATISSE) 0.03 % ophthalmic solution, Administer 1 drop to both eyes daily at bedtime Place one drop on applicator and apply evenly along the skin of the upper eyelid at base of eyelashes once daily at bedtime; repeat procedure for second eye (use a clean applicator). (Patient taking differently: Administer 1 drop to both eyes daily at bedtime as needed Place one drop on applicator and apply evenly along the skin of the upper eyelid at base of eyelashes once daily at bedtime; repeat procedure for second eye (use a clean applicator).), Disp: 3 mL, Rfl: 3    cholecalciferol (VITAMIN D3) 1,000 units  tablet, Take 4,000 Units by mouth daily, Disp: , Rfl:     Cyanocobalamin (VITAMIN B 12 PO), Place 1,000 mcg under the tongue in the morning, Disp: , Rfl:     cyproheptadine (PERIACTIN) 4 mg tablet, Take 1 tablet (4 mg total) by mouth daily at bedtime as needed for allergies (headaches) (Patient taking differently: Take 4 mg by mouth as needed for allergies (headaches)), Disp: 30 tablet, Rfl: 0    dexamethasone (DECADRON) 1 mg tablet, Take 1 mg by mouth as needed, Disp: , Rfl:     Docusate Sodium 100 MG capsule, Take 100-200 mg by mouth in the morning 100 mg Monday/Wednesday/Friday/Sunday 200 mg Tuesday/Thursday/Saturday, Disp: , Rfl:     DULoxetine (CYMBALTA) 30 mg delayed release capsule, Take 1 capsule (30 mg total) by mouth daily, Disp: 90 capsule, Rfl: 1    fluticasone (FLONASE) 50 mcg/act nasal spray, 1 spray into each nostril daily (Patient taking differently: 1 spray into each nostril as needed), Disp: 48 mL, Rfl: 0    ketorolac (TORADOL) 10 mg tablet, TAKE ONE TABLET BY MOUTH AT ONSET OF MIGRAINE, CAN REPEAT X 1 IN 6 HOURS, CAN COMBINE WITH TRIPTAN. TAKE WITH FOOD/MILK/ANTACID (Patient taking differently: as needed), Disp: 10 tablet, Rfl: 3    Loratadine 10 MG CAPS, Take 10 mg by mouth daily , Disp: , Rfl:     methocarbamol (ROBAXIN) 500 mg tablet, Take 1 tablet (500 mg total) by mouth daily at bedtime (Patient taking differently: Take 500 mg by mouth as needed), Disp: 90 tablet, Rfl: 3    methylphenidate (CONCERTA) 36 MG ER tablet, Take 1 tablet (36 mg total) by mouth daily Max Daily Amount: 36 mg, Disp: 30 tablet, Rfl: 0    naproxen sodium (ANAPROX) 550 mg tablet, Take 1 tablet (550 mg total) by mouth 2 (two) times a day with meals For menstrual cramps (Patient taking differently: Take 550 mg by mouth as needed For menstrual cramps), Disp: 30 tablet, Rfl: 0    omeprazole (PriLOSEC) 40 MG capsule, Take 1 capsule (40 mg total) by mouth daily, Disp: 90 capsule, Rfl: 1    ondansetron (ZOFRAN) 4 mg tablet, Take  "1 tablet (4 mg total) by mouth every 8 (eight) hours as needed for nausea or vomiting (Patient taking differently: Take 4 mg by mouth as needed for nausea or vomiting), Disp: 20 tablet, Rfl: 0    rimegepant sulfate (Nurtec) 75 mg TBDP, Take 1 tablet (75 mg total) by mouth as needed (migraine) Limit of 1 in 24 hours, Disp: 16 tablet, Rfl: 6    rizatriptan (MAXALT-MLT) 10 mg disintegrating tablet, DISSOLVE 1 TABLET BY MOUTH ONCE AS NEEDED FOR MIGRAINE. MAY REPEAT EVERY 2 HOURS IF NEEDED. MAX 2/24 HOURS (Patient taking differently: as needed), Disp: 12 tablet, Rfl: 3    valACYclovir (VALTREX) 500 mg tablet, Take 1 tablet (500 mg total) by mouth daily, Disp: 90 tablet, Rfl: 1    Wegovy 2.4 MG/0.75ML, Inject 0.75 ml (2.4 mg total) under the skin once weekly., Disp: 3 mL, Rfl: 2  Allergies   Allergen Reactions    Sulfa Antibiotics      Family has allergy she has never taken    Doxycycline Rash and Edema     Whole body rash,bruises behind both thighs       OBJECTIVE    Vitals:   Vitals:    06/18/24 1446   BP: 128/76   BP Location: Right arm   Patient Position: Sitting   Cuff Size: Standard   Pulse: 82   Resp: 16   Temp: 98.7 °F (37.1 °C)   TempSrc: Tympanic   SpO2: 98%   Weight: 74.5 kg (164 lb 3.2 oz)   Height: 5' 10\" (1.778 m)           Physical Exam  Constitutional:       Appearance: She is well-developed.   HENT:      Head: Normocephalic and atraumatic.   Cardiovascular:      Rate and Rhythm: Normal rate and regular rhythm.      Heart sounds: Normal heart sounds.   Pulmonary:      Effort: Pulmonary effort is normal. No respiratory distress.      Breath sounds: Normal breath sounds. No wheezing.   Musculoskeletal:         General: No tenderness. Normal range of motion.      Cervical back: Normal range of motion and neck supple.   Skin:     General: Skin is warm and dry.   Neurological:      Mental Status: She is alert and oriented to person, place, and time.   Psychiatric:         Mood and Affect: Mood normal.         " Behavior: Behavior normal.            Labs:        Samantha Caal,     6/18/2024

## 2024-06-18 NOTE — ASSESSMENT & PLAN NOTE
Was able to get in with psychiatry since our last visit  Thankfully she is feeling so much better on medication adjustments that were made at that time  Continue follow up as scheduled with psychiatry

## 2024-06-20 ENCOUNTER — OFFICE VISIT (OUTPATIENT)
Dept: PSYCHIATRY | Facility: CLINIC | Age: 39
End: 2024-06-20
Payer: COMMERCIAL

## 2024-06-20 DIAGNOSIS — F33.1 MDD (MAJOR DEPRESSIVE DISORDER), RECURRENT EPISODE, MODERATE (HCC): Primary | ICD-10-CM

## 2024-06-20 DIAGNOSIS — F98.8 ATTENTION DEFICIT DISORDER (ADD) IN ADULT: ICD-10-CM

## 2024-06-20 PROCEDURE — 99214 OFFICE O/P EST MOD 30 MIN: CPT | Performed by: PHYSICIAN ASSISTANT

## 2024-06-20 RX ORDER — METHYLPHENIDATE HYDROCHLORIDE 36 MG/1
36 TABLET ORAL DAILY
Qty: 30 TABLET | Refills: 0 | Status: SHIPPED | OUTPATIENT
Start: 2024-06-20

## 2024-06-20 NOTE — PSYCH
Time start 1030  [id number]  This note was not shared with the patient due to reasonable likelihood of causing patient harm      PROGRESS NOTE        [facility]      Name and Date of Birth:  Jo Barros 39 y.o. 1985    Date of Visit: 06/20/24    SUBJECTIVE:  Connie was seen for follow-up of depression and adult ADD and for medication management.  Since her last visit she has been on Cymbalta 30 mg daily and Concerta 36 mg every morning.  Overall states that she has been doing very well.  Has noted significant improvement with addition of Cymbalta and reduction in Concerta.  States that her focus concentration has improved.  She is accomplishing things at work and no longer feeling overwhelmed typically.  Does continue with work related stressors but handling things.  States that she continues to struggle though with sleep and frequently awakens.  Discussed trial of melatonin since she is hesitant to take anything prescription for sleep.  Reports that her appetite is decreased since being on Wegovy.  Has also been exercising on a regular basis.  Is trying to make sure that she is eating something on a routine basis though.  Appears bright and in good spirits today.  Good supportive relationship with her spouse.  Children are doing well.    She denies suicidal ideation, intent or plan at present, has no suicidal ideation, intent or plan at present.    She denies any auditory hallucinations and visual hallucinations, denies any other delusional thinking, denies any delusional thinking.    She denies any side effects from medications  .  HPI ROS Appetite Changes and Sleep: normal appetite, normal sleep    Review Of Systems:      Constitutional Negative   ENT Negative   Cardiovascular Negative   Respiratory Negative   Gastrointestinal Negative   Genitourinary Negative   Musculoskeletal Negative   Integumentary Negative   Neurological Negative   Endocrine Negative    Other Symptoms Negative and None       Laboratory Results: No results found for this or any previous visit.    Substance Abuse History:    Social History     Substance and Sexual Activity   Drug Use Yes    Types: Marijuana    Comment: Medical Marijuana occasionally use       Family Psychiatric History:     Family History   Problem Relation Age of Onset    Diabetes Mother     Arthritis Mother     Hypertension Mother     Atrial fibrillation Mother     Neuropathy Mother         Diabetic neuropathy    Diabetes Father             Arthritis Father         psoriatic    Hyperlipidemia Father             Cancer Father         Cholangiocarcinoma ()    Hypertension Father     Hearing loss Father         Degenerative hearing loss    Heart disease Maternal Uncle     Melanoma Maternal Uncle     Hypertension Paternal Aunt     Diabetes Paternal Aunt     Colon cancer Paternal Aunt     Hypertension Paternal Uncle     Diabetes Paternal Uncle     Thyroid cancer Paternal Uncle         Papillary thyroid cancer    Cancer Maternal Grandmother     Esophageal varices Maternal Grandmother     Abnormal EKG Maternal Grandmother     Alcohol abuse Maternal Grandmother     Cirrhosis Maternal Grandmother     Cancer Maternal Grandfather     Heart disease Maternal Grandfather     Stroke Maternal Grandfather         x 3    Brain cancer Maternal Grandfather     Hypertension Paternal Grandmother     Diabetes Paternal Grandmother     Lung cancer Paternal Grandmother     Diabetes Paternal Grandfather     Thyroid disease Neg Hx        The following portions of the patient's history were reviewed and updated as appropriate: past family history, past medical history, past social history, past surgical history and problem list.    Social History     Socioeconomic History    Marital status: /Civil Union     Spouse name: Not on file    Number of children: Not on file    Years of education: Not on file    Highest education level:  Not on file   Occupational History    Not on file   Tobacco Use    Smoking status: Former     Current packs/day: 0.00     Types: Cigarettes     Start date: 1996     Quit date: 2016     Years since quittin.4     Passive exposure: Past    Smokeless tobacco: Never    Tobacco comments:     quit > 4 years ago    Vaping Use    Vaping status: Some Days    Substances: THC   Substance and Sexual Activity    Alcohol use: Yes     Comment: Rare use    Drug use: Yes     Types: Marijuana     Comment: Medical Marijuana occasionally use    Sexual activity: Yes     Partners: Male     Birth control/protection: None     Comment: Had Mirena removed 10/2018   Other Topics Concern    Not on file   Social History Narrative    Not on file     Social Determinants of Health     Financial Resource Strain: Low Risk  (11/3/2020)    Overall Financial Resource Strain (CARDIA)     Difficulty of Paying Living Expenses: Not hard at all   Food Insecurity: Food Insecurity Present (11/3/2020)    Hunger Vital Sign     Worried About Running Out of Food in the Last Year: Sometimes true     Ran Out of Food in the Last Year: Sometimes true   Transportation Needs: No Transportation Needs (11/3/2020)    PRAPARE - Transportation     Lack of Transportation (Medical): No     Lack of Transportation (Non-Medical): No   Physical Activity: Inactive (11/3/2020)    Exercise Vital Sign     Days of Exercise per Week: 0 days     Minutes of Exercise per Session: 0 min   Stress: No Stress Concern Present (11/3/2020)    [employer] - Occupational Stress Questionnaire     Feeling of Stress : Not at all   Social Connections: Moderately Integrated (11/3/2020)    Social Connection and Isolation Panel [NHANES]     Frequency of Communication with Friends and Family: More than three times a week     Frequency of Social Gatherings with Friends and Family: More than three times a week     Attends [employer] Services: Never     Active Member of  Clubs or Organizations: Yes     Attends Club or Organization Meetings: Never     Marital Status:    Intimate Partner Violence: Not At Risk (12/13/2023)    Humiliation, Afraid, Rape, and Kick questionnaire     Fear of Current or Ex-Partner: No     Emotionally Abused: No     Physically Abused: No     Sexually Abused: No   Housing Stability: Not on file     Social History     Social History Narrative    Not on file        Social History       Tobacco History       Smoking Status  Former Smoking Start Date  1/1/1996 Quit Date  1/1/2016 Smoking Tobacco Type  Cigarettes from 1/1/1996 to 1/1/2016   Pack Year History     Packs/Day From To Years    0 1/1/2016  8.5     1/1/1996 1/1/2016 20.0      Passive Exposure  Past      Smokeless Tobacco Use  Never      Tobacco Comments  quit > 4 years ago               Alcohol History       Alcohol Use Status  Yes Drinks/Week  0 Glasses of wine, 0 Cans of beer, 0 Shots of liquor, 0 Standard drinks or equivalent per week Comment  Rare use              Drug Use       Drug Use Status  Yes Types  Marijuana Comment  Medical Marijuana occasionally use              Sexual Activity       Sexually Active  Yes Partners  Male Birth Control/Protection  None Comment  Had Mirena removed 10/2018              Activities of Daily Living    Not Asked                       OBJECTIVE:     Mental Status Evaluation:    Appearance age appropriate, casually dressed   Behavior pleasant, cooperative   Speech normal volume, normal pitch   Mood Euthymic   Affect Pleasant   Thought Processes logical   Associations intact associations   Thought Content normal   Perceptual Disturbances: none   Abnormal Thoughts  Risk Potential Suicidal ideation - None  Homicidal ideation - None  Potential for aggression - No   Orientation oriented to person, place, time/date and situation   Memory recent and remote memory grossly intact   Cosciousness alert and awake   Attention Span attention span and concentration are age  appropriate   Intellect Not formally assessed   Insight age appropriate    Judgement good    Muscle Strength and  Gait muscle strength and tone were normal   Language no difficulty naming common objects   Fund of Knowledge displays adequate knowledge of current events   Pain none   Pain Scale 0       Assessment/Plan:       Diagnoses and all orders for this visit:    MDD (major depressive disorder), recurrent episode, moderate (HCC)    Attention deficit disorder (ADD) in adult  -     methylphenidate (CONCERTA) 36 MG ER tablet; Take 1 tablet (36 mg total) by mouth daily Max Daily Amount: 36 mg          Treatment Recommendations/Precautions:  Cymbalta 30 mg daily  Concerta 36 mg daily    Continue current medications and follow-up in 3 months  She will come in sooner if any questions or concerns  Aware of after-hours on-call service and [phone] crisis line    Risks/Benefits      Risks, Benefits And Possible Side Effects Of Medications:    Risks, benefits, and possible side effects of medications explained to patient and patient verbalizes understanding and agreement for treatment.    Controlled Medication Discussion:     Not applicable    Psychotherapy Provided:     Individual psychotherapy provided: No

## 2024-07-01 ENCOUNTER — HOSPITAL ENCOUNTER (OUTPATIENT)
Dept: ULTRASOUND IMAGING | Facility: CLINIC | Age: 39
Discharge: HOME | End: 2024-07-01
Payer: COMMERCIAL

## 2024-07-01 DIAGNOSIS — R92.8 ABNORMAL FINDINGS ON DIAGNOSTIC IMAGING OF BREAST: ICD-10-CM

## 2024-07-01 PROCEDURE — 76642 ULTRASOUND BREAST LIMITED: CPT

## 2024-07-22 ENCOUNTER — OFFICE VISIT (OUTPATIENT)
Dept: BARIATRICS | Facility: CLINIC | Age: 39
End: 2024-07-22
Payer: COMMERCIAL

## 2024-07-22 VITALS
SYSTOLIC BLOOD PRESSURE: 98 MMHG | RESPIRATION RATE: 16 BRPM | BODY MASS INDEX: 23.77 KG/M2 | HEIGHT: 70 IN | TEMPERATURE: 97.6 F | WEIGHT: 166 LBS | DIASTOLIC BLOOD PRESSURE: 60 MMHG | HEART RATE: 62 BPM

## 2024-07-22 DIAGNOSIS — G93.2 INTRACRANIAL HYPERTENSION: ICD-10-CM

## 2024-07-22 DIAGNOSIS — Z86.39 HISTORY OF OBESITY: Primary | ICD-10-CM

## 2024-07-22 DIAGNOSIS — K21.9 GERD (GASTROESOPHAGEAL REFLUX DISEASE): ICD-10-CM

## 2024-07-22 PROCEDURE — 99214 OFFICE O/P EST MOD 30 MIN: CPT | Performed by: PHYSICIAN ASSISTANT

## 2024-07-22 NOTE — PROGRESS NOTES
Assessment/Plan:    History of obesity  -Patient is pursuing Conservative Program. Previously completed Healthy [facility].  -Initial weight loss goal of 5-10% weight loss for improved health-MET  - Labs reviewed: CMP, CBC, A1C from 1/3/24 reviewed and wnl    Initial: 247.2 lbs  Last Visit: 177.6  Current: 166  Change: -81.2 (-11.2lb)  Goal:180s-met, now to maintain    Goals:  -Doing well. Continue to eat a well rounded diet.    Keep up the great work with water intake - at least 64 oz daily.   Continue current exercise plan      - to continue wegovy but reduce to 1.7mg .  Wegovy started at 234.6lb on 2/17/22.29.2 % weight loss    GERD (gastroesophageal reflux disease)  On omeprazole      Intracranial hypertension  Follows neuro and on diamox        Return in about 6 months (around 1/22/2025).       Diagnoses and all orders for this visit:    History of obesity  -     Semaglutide-Weight Management (WEGOVY) 1.7 MG/0.75ML; Inject 0.75 mL (1.7 mg total) under the skin once a week    GERD (gastroesophageal reflux disease)    Intracranial hypertension          Subjective:   Chief Complaint   Patient presents with    Follow-up     MWM- 6 mo F/u; Waist 27in        Patient ID: Jo Barros  is a 39 y.o. female with excess weight/obesity here to pursue weight managment.  Patient is pursuing Conservative Program.     HPI  On wegovy 2.4mg.  She has started to exercise.  This has progressed her weight loss.  Appetite varies-sometimes hungry and sometiems not. Unsure what causes the fluctuation. She does usually feel less of an appetite at night.    Wt Readings from Last 10 Encounters:   07/22/24 75.3 kg (166 lb)   06/18/24 74.5 kg (164 lb 3.2 oz)   03/14/24 78.8 kg (173 lb 12.8 oz)   01/22/24 80.6 kg (177 lb 9.6 oz)   12/27/23 81.6 kg (180 lb)   12/13/23 81.8 kg (180 lb 6.4 oz)   11/28/23 82.6 kg (182 lb 1.6 oz)   10/09/23 82.3 kg (181 lb 6.4 oz)   09/13/23 83.5 kg (184 lb)   07/20/23 86.9 kg (191 lb 8 oz)       Food  logging:  Increased appetite/cravings:  Exercise:3-4 x week strength training.   Hydration:days off 1/2 gal       The following portions of the patient's history were reviewed and updated as appropriate: She  has a past medical history of ADHD, Allergic, Asthma, Cluster headache, GERD (gastroesophageal reflux disease), Headache(784.0), Headache, tension-type, HPV (human papilloma virus) infection, HSV-1 infection, and HSV-2 infection.  She   Patient Active Problem List    Diagnosis Date Noted    Attention deficit hyperactivity disorder (ADHD), combined type 03/14/2024    Intracranial hypertension 11/28/2023    Abnormal result of iron profile testing 01/16/2023    Obstructive sleep apnea (adult) (pediatric)     Cervicalgia 11/28/2022    Snoring 11/28/2022    Morning headache 11/28/2022    Viral syndrome 12/30/2021    History of obesity 11/08/2021    GERD (gastroesophageal reflux disease) 11/08/2021    Migraine without aura and with status migrainosus, not intractable 02/19/2021    Galactorrhea 09/16/2020    Palpitations 05/29/2019    Lumbar disc herniation 11/21/2018    Lumbar radiculopathy - Left 11/13/2018     She  has a past surgical history that includes TONSILECTOMY AND ADNOIDECTOMY (1990); Serafina tooth extraction (2007); Cervical biopsy w/ loop electrode excision (2012); Posterior laminectomy thoracic and lumbar spine (N/A, 02/05/2019); CT epidural steroid injection (TAMI lumbar) (N/A, 2018); Skin biopsy; Laminectomy (2/5/2019); and Spine surgery (2/5/19).  Her family history includes Abnormal EKG in her maternal grandmother; Alcohol abuse in her maternal grandmother; Arthritis in her father and mother; Atrial fibrillation in her mother; Brain cancer in her maternal grandfather; Cancer in her father, maternal grandfather, and maternal grandmother; Cirrhosis in her maternal grandmother; Colon cancer in her paternal aunt; Diabetes in her father, mother, paternal aunt, paternal grandfather, paternal grandmother,  and paternal uncle; Esophageal varices in her maternal grandmother; Hearing loss in her father; Heart disease in her maternal grandfather and maternal uncle; Hyperlipidemia in her father; Hypertension in her father, mother, paternal aunt, paternal grandmother, and paternal uncle; Lung cancer in her paternal grandmother; Melanoma in her maternal uncle; Neuropathy in her mother; Stroke in her maternal grandfather; Thyroid cancer in her paternal uncle.  She  reports that she quit smoking about 8 years ago. Her smoking use included cigarettes. She started smoking about 28 years ago. She has been exposed to tobacco smoke. She has never used smokeless tobacco. She reports current alcohol use. She reports current drug use. Drug: Marijuana.  Current Outpatient Medications   Medication Sig Dispense Refill    acetaZOLAMIDE (DIAMOX) 125 mg tablet 1 tab twice a day (second dose late afternoon/evening) for 7 days then increase to 2 tabs twice a day (Patient taking differently: 250 mg 2 (two) times a day 1 tab twice a day (second dose late afternoon/evening) for 7 days then increase to 2 tabs twice a day) 120 tablet 2    ascorbic acid (VITAMIN C) 500 MG tablet Take 500 mg by mouth daily      bimatoprost (LATISSE) 0.03 % ophthalmic solution Administer 1 drop to both eyes daily at bedtime Place one drop on applicator and apply evenly along the skin of the upper eyelid at base of eyelashes once daily at bedtime; repeat procedure for second eye (use a clean applicator). (Patient taking differently: Administer 1 drop to both eyes if needed Place one drop on applicator and apply evenly along the skin of the upper eyelid at base of eyelashes once daily at bedtime; repeat procedure for second eye (use a clean applicator).) 3 mL 3    cholecalciferol (VITAMIN D3) 1,000 units tablet Take 4,000 Units by mouth daily      Cyanocobalamin (VITAMIN B 12 PO) Place 1,000 mcg under the tongue in the morning      cyproheptadine (PERIACTIN) 4 mg  tablet Take 1 tablet (4 mg total) by mouth daily at bedtime as needed for allergies (headaches) (Patient taking differently: Take 4 mg by mouth if needed for allergies (headaches)) 30 tablet 0    Docusate Sodium 100 MG capsule Take 100-200 mg by mouth in the morning 100 mg Monday/Wednesday/Friday/Sunday  200 mg Tuesday/Thursday/Saturday      DULoxetine (CYMBALTA) 30 mg delayed release capsule Take 1 capsule (30 mg total) by mouth daily 90 capsule 1    fluticasone (FLONASE) 50 mcg/act nasal spray 1 spray into each nostril daily (Patient taking differently: 1 spray into each nostril if needed) 48 mL 0    ketorolac (TORADOL) 10 mg tablet TAKE ONE TABLET BY MOUTH AT ONSET OF MIGRAINE, CAN REPEAT X 1 IN 6 HOURS, CAN COMBINE WITH TRIPTAN. TAKE WITH FOOD/MILK/ANTACID (Patient taking differently: if needed) 10 tablet 3    Loratadine 10 MG CAPS Take 10 mg by mouth daily       methocarbamol (ROBAXIN) 500 mg tablet Take 1 tablet (500 mg total) by mouth daily at bedtime (Patient taking differently: Take 500 mg by mouth if needed) 90 tablet 3    methylphenidate (CONCERTA) 36 MG ER tablet Take 1 tablet (36 mg total) by mouth daily Max Daily Amount: 36 mg 30 tablet 0    naproxen sodium (ANAPROX) 550 mg tablet Take 1 tablet (550 mg total) by mouth 2 (two) times a day with meals For menstrual cramps (Patient taking differently: Take 550 mg by mouth if needed For menstrual cramps) 30 tablet 0    omeprazole (PriLOSEC) 40 MG capsule Take 1 capsule (40 mg total) by mouth daily 90 capsule 1    ondansetron (ZOFRAN) 4 mg tablet Take 1 tablet (4 mg total) by mouth every 8 (eight) hours as needed for nausea or vomiting (Patient taking differently: Take 4 mg by mouth if needed for nausea or vomiting) 20 tablet 0    rimegepant sulfate (Nurtec) 75 mg TBDP Take 1 tablet (75 mg total) by mouth as needed (migraine) Limit of 1 in 24 hours (Patient taking differently: Take 75 mg by mouth if needed (migraine) Limit of 1 in 24 hours) 16 tablet 6     rizatriptan (MAXALT-MLT) 10 mg disintegrating tablet DISSOLVE 1 TABLET BY MOUTH ONCE AS NEEDED FOR MIGRAINE. MAY REPEAT EVERY 2 HOURS IF NEEDED. MAX 2/24 HOURS (Patient taking differently: if needed) 12 tablet 3    Semaglutide-Weight Management (WEGOVY) 1.7 MG/0.75ML Inject 0.75 mL (1.7 mg total) under the skin once a week 3 mL 5    valACYclovir (VALTREX) 500 mg tablet Take 1 tablet (500 mg total) by mouth daily 90 tablet 1    dexamethasone (DECADRON) 1 mg tablet Take 1 mg by mouth as needed (Patient not taking: Reported on 7/22/2024)       No current facility-administered medications for this visit.     Current Outpatient Medications on File Prior to Visit   Medication Sig    acetaZOLAMIDE (DIAMOX) 125 mg tablet 1 tab twice a day (second dose late afternoon/evening) for 7 days then increase to 2 tabs twice a day (Patient taking differently: 250 mg 2 (two) times a day 1 tab twice a day (second dose late afternoon/evening) for 7 days then increase to 2 tabs twice a day)    ascorbic acid (VITAMIN C) 500 MG tablet Take 500 mg by mouth daily    bimatoprost (LATISSE) 0.03 % ophthalmic solution Administer 1 drop to both eyes daily at bedtime Place one drop on applicator and apply evenly along the skin of the upper eyelid at base of eyelashes once daily at bedtime; repeat procedure for second eye (use a clean applicator). (Patient taking differently: Administer 1 drop to both eyes if needed Place one drop on applicator and apply evenly along the skin of the upper eyelid at base of eyelashes once daily at bedtime; repeat procedure for second eye (use a clean applicator).)    cholecalciferol (VITAMIN D3) 1,000 units tablet Take 4,000 Units by mouth daily    Cyanocobalamin (VITAMIN B 12 PO) Place 1,000 mcg under the tongue in the morning    cyproheptadine (PERIACTIN) 4 mg tablet Take 1 tablet (4 mg total) by mouth daily at bedtime as needed for allergies (headaches) (Patient taking differently: Take 4 mg by mouth if needed  for allergies (headaches))    Docusate Sodium 100 MG capsule Take 100-200 mg by mouth in the morning 100 mg Monday/Wednesday/Friday/Sunday  200 mg Tuesday/Thursday/Saturday    DULoxetine (CYMBALTA) 30 mg delayed release capsule Take 1 capsule (30 mg total) by mouth daily    fluticasone (FLONASE) 50 mcg/act nasal spray 1 spray into each nostril daily (Patient taking differently: 1 spray into each nostril if needed)    ketorolac (TORADOL) 10 mg tablet TAKE ONE TABLET BY MOUTH AT ONSET OF MIGRAINE, CAN REPEAT X 1 IN 6 HOURS, CAN COMBINE WITH TRIPTAN. TAKE WITH FOOD/MILK/ANTACID (Patient taking differently: if needed)    Loratadine 10 MG CAPS Take 10 mg by mouth daily     methocarbamol (ROBAXIN) 500 mg tablet Take 1 tablet (500 mg total) by mouth daily at bedtime (Patient taking differently: Take 500 mg by mouth if needed)    methylphenidate (CONCERTA) 36 MG ER tablet Take 1 tablet (36 mg total) by mouth daily Max Daily Amount: 36 mg    naproxen sodium (ANAPROX) 550 mg tablet Take 1 tablet (550 mg total) by mouth 2 (two) times a day with meals For menstrual cramps (Patient taking differently: Take 550 mg by mouth if needed For menstrual cramps)    omeprazole (PriLOSEC) 40 MG capsule Take 1 capsule (40 mg total) by mouth daily    ondansetron (ZOFRAN) 4 mg tablet Take 1 tablet (4 mg total) by mouth every 8 (eight) hours as needed for nausea or vomiting (Patient taking differently: Take 4 mg by mouth if needed for nausea or vomiting)    rimegepant sulfate (Nurtec) 75 mg TBDP Take 1 tablet (75 mg total) by mouth as needed (migraine) Limit of 1 in 24 hours (Patient taking differently: Take 75 mg by mouth if needed (migraine) Limit of 1 in 24 hours)    rizatriptan (MAXALT-MLT) 10 mg disintegrating tablet DISSOLVE 1 TABLET BY MOUTH ONCE AS NEEDED FOR MIGRAINE. MAY REPEAT EVERY 2 HOURS IF NEEDED. MAX 2/24 HOURS (Patient taking differently: if needed)    valACYclovir (VALTREX) 500 mg tablet Take 1 tablet (500 mg total) by mouth  "daily    [DISCONTINUED] Wegovy 2.4 MG/0.75ML Inject 0.75 ml (2.4 mg total) under the skin once weekly.    dexamethasone (DECADRON) 1 mg tablet Take 1 mg by mouth as needed (Patient not taking: Reported on 7/22/2024)     No current facility-administered medications on file prior to visit.     She is allergic to sulfa antibiotics and doxycycline..    Review of Systems   Constitutional:  Negative for fever.   Respiratory:  Negative for shortness of breath.    Cardiovascular:  Negative for chest pain and palpitations.   Gastrointestinal:  Negative for abdominal pain, constipation, diarrhea and vomiting.   Genitourinary:  Negative for difficulty urinating.   Skin:  Negative for rash.   Psychiatric/Behavioral:  Negative for dysphoric mood. The patient is not nervous/anxious.        Objective:    BP 98/60   Pulse 62   Temp 97.6 °F (36.4 °C) (Tympanic)   Resp 16   Ht 5' 10\" (1.778 m)   Wt 75.3 kg (166 lb)   BMI 23.82 kg/m²      Physical Exam  Vitals and nursing note reviewed.   Constitutional:       General: She is not in acute distress.     Appearance: Normal appearance. She is well-developed.   HENT:      Head: Normocephalic and atraumatic.   Eyes:      Conjunctiva/sclera: Conjunctivae normal.   Neck:      Thyroid: No thyromegaly.   Pulmonary:      Effort: Pulmonary effort is normal. No respiratory distress.   Skin:     Findings: No rash (visible).   Neurological:      Mental Status: She is alert and oriented to person, place, and time.   Psychiatric:         Behavior: Behavior normal.         "

## 2024-07-22 NOTE — ASSESSMENT & PLAN NOTE
-Patient is pursuing Conservative Program. Previously completed [facility].  -Initial weight loss goal of 5-10% weight loss for improved health-MET  - Labs reviewed: CMP, CBC, A1C from 1/3/24 reviewed and wnl    Initial: 247.2 lbs  Last Visit: 177.6  Current: 166  Change: -81.2 (-11.2lb)  Goal:180s-met, now to maintain    Goals:  -Doing well. Continue to eat a well rounded diet.    Keep up the great work with water intake - at least 64 oz daily.   Continue current exercise plan      - to continue wegovy but reduce to 1.7mg .  Wegovy started at 234.6lb on 2/17/22.29.2 % weight loss

## 2024-08-19 ENCOUNTER — OFFICE VISIT (OUTPATIENT)
Dept: FAMILY MEDICINE CLINIC | Facility: CLINIC | Age: 39
End: 2024-08-19
Payer: COMMERCIAL

## 2024-08-19 VITALS
HEART RATE: 75 BPM | TEMPERATURE: 97.7 F | RESPIRATION RATE: 16 BRPM | SYSTOLIC BLOOD PRESSURE: 100 MMHG | DIASTOLIC BLOOD PRESSURE: 70 MMHG | OXYGEN SATURATION: 98 % | BODY MASS INDEX: 23.22 KG/M2 | WEIGHT: 162.2 LBS | HEIGHT: 70 IN

## 2024-08-19 DIAGNOSIS — N94.6 DYSMENORRHEA: ICD-10-CM

## 2024-08-19 DIAGNOSIS — F98.8 ATTENTION DEFICIT DISORDER (ADD) IN ADULT: ICD-10-CM

## 2024-08-19 DIAGNOSIS — M77.8 LEFT SHOULDER TENDONITIS: Primary | ICD-10-CM

## 2024-08-19 PROCEDURE — 99213 OFFICE O/P EST LOW 20 MIN: CPT | Performed by: FAMILY MEDICINE

## 2024-08-19 RX ORDER — METHYLPHENIDATE HYDROCHLORIDE 36 MG/1
36 TABLET ORAL DAILY
Qty: 30 TABLET | Refills: 0 | Status: SHIPPED | OUTPATIENT
Start: 2024-08-19

## 2024-08-19 RX ORDER — METHYLPREDNISOLONE 4 MG
TABLET, DOSE PACK ORAL
Qty: 1 EACH | Refills: 0 | Status: SHIPPED | OUTPATIENT
Start: 2024-08-19

## 2024-08-19 RX ORDER — NAPROXEN SODIUM 550 MG/1
550 TABLET ORAL 2 TIMES DAILY WITH MEALS
Qty: 60 TABLET | Refills: 1 | Status: SHIPPED | OUTPATIENT
Start: 2024-08-19

## 2024-08-19 NOTE — PROGRESS NOTES
"    Assessment/Plan:     Diagnoses and all orders for this visit:    Left shoulder tendonitis  -     Ambulatory Referral to Physical Therapy; Future  -     methylPREDNISolone 4 MG tablet therapy pack; Use as directed on package    Dysmenorrhea  -     naproxen sodium (ANAPROX) 550 mg tablet; Take 1 tablet (550 mg total) by mouth 2 (two) times a day with meals For menstrual cramps     Meds refilled  Patient referred to physical therapy as well as steroid pack given  Pain more specifically is from her biceps tendon is likely a biceps tendinitis  Will follow-up as needed      Subjective:     Chief Complaint   Patient presents with    Shoulder Pain     Left 1 month now, hurts when taking deep breaths        Patient ID: Jo Barros is a 39 y.o. female.    4 weeks of left shoulder pain  Worse with different movements   No history of injury or trauma      Shoulder Pain         The following portions of the patient's history were reviewed and updated as appropriate: allergies, current medications, past family history, past medical history, past social history, past surgical history and problem list.    Review of Systems   Constitutional: Negative.    HENT: Negative.     Eyes: Negative.    Respiratory: Negative.     Cardiovascular: Negative.    Gastrointestinal: Negative.    Endocrine: Negative.    Genitourinary: Negative.    Musculoskeletal:  Positive for arthralgias.   Skin: Negative.    Allergic/Immunologic: Negative.    Neurological: Negative.    Hematological: Negative.    Psychiatric/Behavioral: Negative.     All other systems reviewed and are negative.        Objective:    Vitals:    08/19/24 1029   BP: 100/70   BP Location: Left arm   Patient Position: Sitting   Cuff Size: Standard   Pulse: 75   Resp: 16   Temp: 97.7 °F (36.5 °C)   TempSrc: Tympanic   SpO2: 98%   Weight: 73.6 kg (162 lb 3.2 oz)   Height: 5' 10\" (1.778 m)          Physical Exam  Vitals and nursing note reviewed.   Constitutional:       " Appearance: Normal appearance. She is well-developed.   HENT:      Head: Normocephalic and atraumatic.      Right Ear: External ear normal.      Left Ear: External ear normal.   Eyes:      Conjunctiva/sclera: Conjunctivae normal.      Pupils: Pupils are equal, round, and reactive to light.   Cardiovascular:      Rate and Rhythm: Normal rate and regular rhythm.      Heart sounds: Normal heart sounds.   Pulmonary:      Effort: Pulmonary effort is normal.      Breath sounds: Normal breath sounds.   Abdominal:      General: Bowel sounds are normal.      Palpations: Abdomen is soft.   Musculoskeletal:         General: Normal range of motion.      Cervical back: Normal range of motion.   Skin:     General: Skin is warm and dry.   Neurological:      General: No focal deficit present.      Mental Status: She is alert and oriented to person, place, and time.      Deep Tendon Reflexes: Reflexes are normal and symmetric.   Psychiatric:         Behavior: Behavior normal.         Thought Content: Thought content normal.         Judgment: Judgment normal.

## 2024-08-19 NOTE — TELEPHONE ENCOUNTER
Reason for call:   [x] Refill   [] Prior Auth  [x] Other: Patient ran out of meds this past weekend     Office:   [] PCP/Provider -   [x] Specialty/Provider - [facility]     Medication: methylphenidate (CONCERTA) 36 MG ER 1 tablet daily       Pharmacy: [facility]     Does the patient have enough for 3 days?   [] Yes   [x] No - Send as HP to POD

## 2024-08-27 ENCOUNTER — EVALUATION (OUTPATIENT)
Dept: PHYSICAL THERAPY | Facility: CLINIC | Age: 39
End: 2024-08-27
Payer: COMMERCIAL

## 2024-08-27 DIAGNOSIS — M77.8 LEFT SHOULDER TENDONITIS: ICD-10-CM

## 2024-08-27 PROCEDURE — 97161 PT EVAL LOW COMPLEX 20 MIN: CPT | Performed by: PHYSICAL THERAPIST

## 2024-08-27 PROCEDURE — 97110 THERAPEUTIC EXERCISES: CPT | Performed by: PHYSICAL THERAPIST

## 2024-08-27 NOTE — PROGRESS NOTES
PT Evaluation     Today's date: 2024  Patient name: Jo Barros  : 1985  MRN: [id number]  Referring provider: Delroy Huber DO  Dx:   Encounter Diagnosis     ICD-10-CM    1. Left shoulder tendonitis  M77.8 Ambulatory Referral to Physical Therapy                     Assessment  Impairments: abnormal muscle firing, lacks appropriate home exercise program and pain with function  Symptom irritability: low    Assessment details: Connie presents to physical therapy with hx L shoulder pain. Patient had decreased strength of mid trap and external rotators. Decreased ROM of shoulder extension. Strength and ROM deficits has limited patients ability to lift items above shoulder height. Patient was TTP along medial border of scapula and infraspinatus muscle. Patients symptoms are consistent with the possible diagnosis of infraspinatus and middle trapezius strain. Connie would benefit from continued physical therapy to improve functional strength and ROM to return to prior function of ADLs.    Goals  Short term- 4 wks  1. Pt will demonstrate increased extension ROM by 10 degrees  2. Pt will demonstrate consistent performance of HEP  3. Pt will demonstrate increased L shoulder ER of 4/5    Long term-8wks  1. Pt will be able to lift 10 lb object above shoulder height without symptom reproduction  2. Pt will demonstrate ability to reach behind for items by increase extension AROM  3. Pt will be able to drive with left arm without pain reproduction    Plan  Patient would benefit from: skilled physical therapy    Planned therapy interventions: manual therapy, ADL retraining, patient/caregiver education, stretching, strengthening, therapeutic activities, therapeutic exercise and neuromuscular re-education    Frequency: 1x week  Duration in weeks: 8  Plan of Care beginning date: 2024  Plan of Care expiration date: 10/24/2024  Treatment plan discussed with: patient      Subjective Evaluation    History of Present  "Illness  Mechanism of injury: Patient with history of L shoulder pain. Pain initially felt on posterior side then moved to anterior aspect of shoulder. Pain felt across the chest when taking deep breath but has since improved with 6 day course of Prednisone. Patient reports difficulty with lifting objects to shoulder height. No n/t. 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  Quality of life: good    Patient Goals  Patient goals for therapy: increased strength and decreased pain    Pain  Current pain ratin  At best pain ratin  At worst pain ratin  Location: infraspinatus insertion  Quality: tight and pulling  Alleviating factors: massage and persussion gun.        Objective     Postural Observations  Seated posture: good  Standing posture: good      Palpation   Left   Tenderness of the infraspinatus, middle trapezius and rhomboids.     Right   Tenderness of the middle trapezius and rhomboids.     Active Range of Motion   Left Shoulder   Flexion: 145 degrees   Extension: 35 degrees   Abduction: WFL    Right Shoulder   Flexion: 149 degrees   Extension: 40 degrees   Abduction: WFL    Strength/Myotome Testing     Left Shoulder     Planes of Motion   Flexion: 4   Extension: 4-   Abduction: 5   External rotation at 0°: 3+     Isolated Muscles   Middle trapezius: 4     Right Shoulder     Planes of Motion   Flexion: 4+   Extension: 4   Abduction: 5   External rotation at 0°: 4     Isolated Muscles   Middle trapezius: 4+     Tests     Left Shoulder   Negative Hawkin's, Speed's and Yergason's.     Right Shoulder   Negative Speed's and Yergason's.              Precautions: none      Manuals                                                                 Neuro Re-Ed             Shoulder ER isometric 5\"x10            Shoulder ER TB Green 2x10                                                                             Ther Ex             UBE             Doorway pec " "stretch 20\"x5            Prone horizontal flexion 2x10  2\"            Shoulder flexion H with pillowcase             TB shoulder extension               TB chest press             TB rows                          Ther Activity                                       Gait Training                                       Modalities                                            "

## 2024-09-03 ENCOUNTER — OFFICE VISIT (OUTPATIENT)
Dept: PHYSICAL THERAPY | Facility: CLINIC | Age: 39
End: 2024-09-03
Payer: COMMERCIAL

## 2024-09-03 DIAGNOSIS — M77.8 LEFT SHOULDER TENDONITIS: Primary | ICD-10-CM

## 2024-09-03 PROCEDURE — 97110 THERAPEUTIC EXERCISES: CPT

## 2024-09-03 PROCEDURE — 97140 MANUAL THERAPY 1/> REGIONS: CPT

## 2024-09-03 NOTE — PROGRESS NOTES
"Daily Note     Today's date: 9/3/2024  Patient name: Jo Barros  : 1985  MRN: [id number]  Referring provider: Delroy Huber DO  Dx:   Encounter Diagnosis     ICD-10-CM    1. Left shoulder tendonitis  M77.8                      Subjective: pt states that she did not get to her exercsies as much as she should as she worked a lot this week.       Objective: See treatment diary below      Assessment: Tolerated treatment with TTP over LH of biceps and proximal muscle belly along with pec and infra.  Discussed using tennis ball for TPR at home as tolerated to help decrease tightness in these areas.  Reviewed exercises and added exercises as noted with no increased pain noted in shoulder.  She was a bit tender after manuals . Patient exhibited good technique with therapeutic exercises and would benefit from continued PT      Plan: Continue per plan of care.      Precautions: none      Manuals 8/27 9/3           Tpr, biceps, pec and infra  DL                                                  Neuro Re-Ed             Shoulder ER isometric 5\"x10            Shoulder ER TB Green 2x10                                                                             Ther Ex             UBE  2'x2'           Doorway pec stretch 20\"x5            Prone horizontal flexion 2x10  2\" 2\" x10           Shoulder flexion with pillowcase  x10           TB shoulder extension    Gtb  x20           TB chest press  nv           TB rows  bLue 2x10                        Ther Activity                                       Gait Training                                       Modalities                                            "

## 2024-09-10 ENCOUNTER — APPOINTMENT (OUTPATIENT)
Dept: PHYSICAL THERAPY | Facility: CLINIC | Age: 39
End: 2024-09-10
Payer: COMMERCIAL

## 2024-09-10 ENCOUNTER — OFFICE VISIT (OUTPATIENT)
Dept: PHYSICAL THERAPY | Facility: CLINIC | Age: 39
End: 2024-09-10
Payer: COMMERCIAL

## 2024-09-10 DIAGNOSIS — M77.8 LEFT SHOULDER TENDONITIS: Primary | ICD-10-CM

## 2024-09-10 PROCEDURE — 97140 MANUAL THERAPY 1/> REGIONS: CPT

## 2024-09-10 PROCEDURE — 97110 THERAPEUTIC EXERCISES: CPT

## 2024-09-10 NOTE — PROGRESS NOTES
"Daily Note     Today's date: 9/10/2024  Patient name: Jo Barros  : 1985  MRN: [id number]  Referring provider: Delroy Huber DO  Dx:   Encounter Diagnosis     ICD-10-CM    1. Left shoulder tendonitis  M77.8                      Subjective: pt reports that her shoulder anteriorly has been very sore with work and is still painful in infra region.      Objective: See treatment diary below      Assessment: Tolerated treatment with tightness in pec and bicep region pain noted with TPR at infra. . Patient was with good tolerance to exercises and with good form.       Plan: Continue per plan of care.      Precautions: none      Manuals 8/27 9/3 9/10          Tpr, biceps, pec and infra  DL DL                                                 Neuro Re-Ed             Shoulder ER isometric 5\"x10            Shoulder ER TB Green 2x10                                                                             Ther Ex             UBE  2'x2' 2'x2'          Doorway pec stretch 20\"x5            Prone horizontal flexion 2x10  2\" 2\" x10           Shoulder flexion with pillowcase  x10 2x10          TB shoulder extension    Gtb  x20 Blue 2x10          TB chest press  nv Blue 2x10          TB rows  bLue 2x10 Blue 2x10                       Ther Activity                                       Gait Training                                       Modalities                                              "

## 2024-09-17 ENCOUNTER — TELEPHONE (OUTPATIENT)
Age: 39
End: 2024-09-17

## 2024-09-17 ENCOUNTER — OFFICE VISIT (OUTPATIENT)
Dept: PHYSICAL THERAPY | Facility: CLINIC | Age: 39
End: 2024-09-17
Payer: COMMERCIAL

## 2024-09-17 DIAGNOSIS — M77.8 LEFT SHOULDER TENDONITIS: Primary | ICD-10-CM

## 2024-09-17 PROCEDURE — 97140 MANUAL THERAPY 1/> REGIONS: CPT

## 2024-09-17 PROCEDURE — 97110 THERAPEUTIC EXERCISES: CPT

## 2024-09-17 NOTE — PROGRESS NOTES
"Daily Note     Today's date: 2024  Patient name: Jo Barros  : 1985  MRN: [id number]  Referring provider: Delroy Huber DO  Dx:   Encounter Diagnosis     ICD-10-CM    1. Left shoulder tendonitis  M77.8                      Subjective: Connie states that she is ill and is achy all over presently.   Still with much difficulty pulling the heavy doors at work.        Objective: See treatment diary below      Assessment: Tolerated treatment with soreness in biceps region anterior shoulder. Patient fatigued easily today 2* to illness, will continue to advance with program to strength for functional ability       Plan: Continue per plan of care.      Precautions: none      Manuals 8/27 9/3 9/10 9/17         Tpr, biceps, pec and infraL  DL DL DL                                                Neuro Re-Ed             Shoulder ER isometric 5\"x10            Shoulder ER TB Green 2x10            Prone row    3#  x20                                                             Ther Ex             UBE for endurance/strength  2'x2' 2'x2' 2'x2'         Doorway pec stretch 20\"x5            Prone horizontal flexion 2x10  2\" 2\" x10           Shoulder flexion with pillowcase  x10 2x10          TB shoulder extension    Gtb  x20 Blue 2x10          TB chest press  nv Blue 2x10          TB rows  bLue 2x10 Blue 2x10          S/l ER with towel    2# x20         Ther Activity                                       Gait Training                                       Modalities                                                "

## 2024-09-19 ENCOUNTER — OFFICE VISIT (OUTPATIENT)
Dept: PSYCHIATRY | Facility: CLINIC | Age: 39
End: 2024-09-19
Payer: COMMERCIAL

## 2024-09-19 DIAGNOSIS — F98.8 ATTENTION DEFICIT DISORDER (ADD) IN ADULT: Primary | ICD-10-CM

## 2024-09-19 DIAGNOSIS — F33.1 MDD (MAJOR DEPRESSIVE DISORDER), RECURRENT EPISODE, MODERATE (HCC): ICD-10-CM

## 2024-09-19 PROCEDURE — 99214 OFFICE O/P EST MOD 30 MIN: CPT | Performed by: PHYSICIAN ASSISTANT

## 2024-09-19 RX ORDER — DULOXETIN HYDROCHLORIDE 60 MG/1
60 CAPSULE, DELAYED RELEASE ORAL DAILY
Qty: 90 CAPSULE | Refills: 1 | Status: SHIPPED | OUTPATIENT
Start: 2024-09-19

## 2024-09-19 RX ORDER — METHYLPHENIDATE HYDROCHLORIDE 36 MG/1
36 TABLET ORAL DAILY
Qty: 30 TABLET | Refills: 0 | Status: SHIPPED | OUTPATIENT
Start: 2024-09-19

## 2024-09-19 NOTE — PSYCH
Assessment & Plan  Attention deficit disorder (ADD) in adult  Continue Concerta 36 mg daily  Orders:    methylphenidate (CONCERTA) 36 MG ER tablet; Take 1 tablet (36 mg total) by mouth daily Max Daily Amount: 36 mg    MDD (major depressive disorder), recurrent episode, moderate (HCC)  Increase Cymbalta to 60 mg daily  Orders:    DULoxetine (CYMBALTA) 60 mg delayed release capsule; Take 1 capsule (60 mg total) by mouth daily    We will follow-up in 3 months and she will call me sooner if any questions or concerns  Aware of after-hours on-call service and [phone] crisis line    VisitTime Start: 11:00    End:11:30    PROGRESS NOTE        [facility]      Name and Date of Birth:  Jo Barros 39 y.o. 1985    Date of Visit: 09/19/24    SUBJECTIVE:  Connie was seen for follow-up of depression, adult ADD and for medication management.  She has been maintained on a combination of Cymbalta and Concerta.  States that over the past couple weeks she has been having episodes of increased depression on some days.  States that her motivation and interest is decreased on those days and feeling as though she does not want to do anything or get out of bed.  Other days she is feeling okay.  States that overall since being on Cymbalta things have been improved however noticing some of her depressive symptoms to be resurfacing.  States that her focus and concentration though has been stable with current dose of Concerta and no adverse effects noted.  She has been doing well with her shifts at work.  Continues to also take classes towards her master's degree in nursing.  Overall states that she has been tolerating Cymbalta well however has noted some increase in bruising.  Reviewed her CBC and platelets have been within normal range but we will continue to monitor.    She denies suicidal ideation, intent or plan at present, has no suicidal ideation, intent or plan at present.    She  denies any auditory hallucinations and visual hallucinations, denies any other delusional thinking, denies any delusional thinking.    She denies any side effects from medications  .  HPI ROS Appetite Changes and Sleep: normal appetite, normal sleep    Review Of Systems:      Constitutional Negative   ENT Negative   Cardiovascular Negative   Respiratory Negative   Gastrointestinal Negative   Genitourinary Negative   Musculoskeletal Negative   Integumentary Negative   Neurological Negative   Endocrine Negative   Other Symptoms Negative and None       Laboratory Results: No results found for this or any previous visit.    Substance Abuse History:    Social History     Substance and Sexual Activity   Drug Use Yes    Frequency: 2.0 times per week    Types: Marijuana    Comment: Medical Marijuana occasionally use       Family Psychiatric History:     Family History   Problem Relation Age of Onset    Diabetes Mother     Arthritis Mother     Hypertension Mother     Atrial fibrillation Mother     Neuropathy Mother         Diabetic neuropathy    Diabetes Father             Arthritis Father         psoriatic    Hyperlipidemia Father             Cancer Father         Cholangiocarcinoma ()    Hypertension Father     Hearing loss Father         Degenerative hearing loss    Heart disease Maternal Uncle     Melanoma Maternal Uncle     Hypertension Paternal Aunt     Diabetes Paternal Aunt     Colon cancer Paternal Aunt     Hypertension Paternal Uncle     Diabetes Paternal Uncle     Thyroid cancer Paternal Uncle         Papillary thyroid cancer    Cancer Maternal Grandmother     Esophageal varices Maternal Grandmother     Abnormal EKG Maternal Grandmother     Alcohol abuse Maternal Grandmother     Cirrhosis Maternal Grandmother     Cancer Maternal Grandfather     Heart disease Maternal Grandfather     Stroke Maternal Grandfather         x 3    Brain cancer Maternal Grandfather     Hypertension Paternal  Grandmother     Diabetes Paternal Grandmother     Lung cancer Paternal Grandmother     Diabetes Paternal Grandfather     Thyroid disease Neg Hx        The following portions of the patient's history were reviewed and updated as appropriate: past family history, past medical history, past social history, past surgical history and problem list.    Social History     Socioeconomic History    Marital status: /Civil Union     Spouse name: Not on file    Number of children: Not on file    Years of education: Not on file    Highest education level: Not on file   Occupational History    Not on file   Tobacco Use    Smoking status: Former     Current packs/day: 0.00     Types: Cigarettes     Start date: 1996     Quit date: 2016     Years since quittin.7     Passive exposure: Past    Smokeless tobacco: Never    Tobacco comments:     quit > 10 years ago    Vaping Use    Vaping status: Some Days    Substances: THC   Substance and Sexual Activity    Alcohol use: Yes     Comment: Rare use    Drug use: Yes     Frequency: 2.0 times per week     Types: Marijuana     Comment: Medical Marijuana occasionally use    Sexual activity: Yes     Partners: Male     Birth control/protection: None     Comment: Had Mirena removed 10/2018   Other Topics Concern    Not on file   Social History Narrative    Not on file     Social Determinants of Health     Financial Resource Strain: Low Risk  (11/3/2020)    Overall Financial Resource Strain (CARDIA)     Difficulty of Paying Living Expenses: Not hard at all   Food Insecurity: Food Insecurity Present (11/3/2020)    Hunger Vital Sign     Worried About Running Out of Food in the Last Year: Sometimes true     Ran Out of Food in the Last Year: Sometimes true   Transportation Needs: No Transportation Needs (11/3/2020)    PRAPARE - Transportation     Lack of Transportation (Medical): No     Lack of Transportation (Non-Medical): No   Physical Activity: Inactive (11/3/2020)    Exercise  Vital Sign     Days of Exercise per Week: 0 days     Minutes of Exercise per Session: 0 min   Stress: No Stress Concern Present (11/3/2020)    [employer] - Occupational Stress Questionnaire     Feeling of Stress : Not at all   Social Connections: Moderately Integrated (11/3/2020)    Social Connection and Isolation Panel [NHANES]     Frequency of Communication with Friends and Family: More than three times a week     Frequency of Social Gatherings with Friends and Family: More than three times a week     Attends Hindu Services: Never     Active Member of Clubs or Organizations: Yes     Attends Club or Organization Meetings: Never     Marital Status:    Intimate Partner Violence: Not At Risk (12/13/2023)    Humiliation, Afraid, Rape, and Kick questionnaire     Fear of Current or Ex-Partner: No     Emotionally Abused: No     Physically Abused: No     Sexually Abused: No   Housing Stability: Not on file     Social History     Social History Narrative    Not on file        Social History       Tobacco History       Smoking Status  Former Smoking Start Date  1/1/1996 Quit Date  1/1/2016 Smoking Tobacco Type  Cigarettes from 1/1/1996 to 1/1/2016   Pack Year History     Packs/Day From To Years    0 1/1/2016  8.7     1/1/1996 1/1/2016 20.0      Passive Exposure  Past      Smokeless Tobacco Use  Never      Tobacco Comments  quit > 10 years ago               Alcohol History       Alcohol Use Status  Yes Drinks/Week  0 Glasses of wine, 0 Cans of beer, 0 Shots of liquor, 0 Standard drinks or equivalent per week Comment  Rare use              Drug Use       Drug Use Status  Yes Types  Marijuana Frequency   2 times/week Comment  Medical Marijuana occasionally use              Sexual Activity       Sexually Active  Yes Partners  Male Birth Control/Protection  None Comment  Had Mirena removed 10/2018              Activities of Daily Living    Not Asked                       OBJECTIVE:      Mental Status Evaluation:    Appearance age appropriate, casually dressed   Behavior pleasant, cooperative   Speech normal volume, normal pitch   Mood Dysthymic at times   Affect Currently euthymic   Thought Processes logical   Associations intact associations   Thought Content normal   Perceptual Disturbances: none   Abnormal Thoughts  Risk Potential Suicidal ideation - None  Homicidal ideation - None  Potential for aggression - No   Orientation oriented to person, place, time/date and situation   Memory recent and remote memory grossly intact   Cosciousness alert and awake   Attention Span attention span and concentration are age appropriate   Intellect Not formally assessed   Insight age appropriate    Judgement good    Muscle Strength and  Gait Steady gait   Language no difficulty naming common objects   Fund of Knowledge displays adequate knowledge of current events   Pain none   Pain Scale 0         Risks/Benefits      Risks, Benefits And Possible Side Effects Of Medications:    Risks, benefits, and possible side effects of medications explained to patient and patient verbalizes understanding and agreement for treatment.    Controlled Medication Discussion:     Not applicable    Psychotherapy Provided:     Individual psychotherapy provided: No

## 2024-09-24 ENCOUNTER — OFFICE VISIT (OUTPATIENT)
Dept: PHYSICAL THERAPY | Facility: CLINIC | Age: 39
End: 2024-09-24
Payer: COMMERCIAL

## 2024-09-24 DIAGNOSIS — M77.8 LEFT SHOULDER TENDONITIS: Primary | ICD-10-CM

## 2024-09-24 PROCEDURE — 97140 MANUAL THERAPY 1/> REGIONS: CPT | Performed by: PHYSICAL THERAPIST

## 2024-09-24 PROCEDURE — 97110 THERAPEUTIC EXERCISES: CPT | Performed by: PHYSICAL THERAPIST

## 2024-09-24 NOTE — PROGRESS NOTES
"Daily Note     Today's date: 2024  Patient name: Jo Barros  : 1985  MRN: [id number]  Referring provider: Delroy Huber DO  Dx:   Encounter Diagnosis     ICD-10-CM    1. Left shoulder tendonitis  M77.8                      Subjective: Pt reports increased soreness in L shoulder she thinks is due to the way she slept. Pt does sleep on L side. Opening doors at work is still challenging.      Objective: See treatment diary below      Assessment: Tolerated treatment well indicated by ability to complete exercises without increased pain. Pt updated on HEP. Patient would benefit from continued PT to address decreased strength and ROM to progress towards prior daily funciton      Plan: Continue per plan of care.      Precautions: none      Manuals 8/27 9/3 9/10 9/17 9/24        Tpr, biceps, pec and infraL  DL DL DL Lf                                               Neuro Re-Ed             Shoulder ER isometric 5\"x10            Shoulder ER TB Green 2x10            Prone row    3#  x20                                                             Ther Ex             UBE for endurance/strength  2'x2' 2'x2' 2'x2' 5'  ellip        Doorway pec stretch 20\"x5            Prone horizontal abd 2x10  2\" 2\" x10  2\"x10 2x10  2#        Shoulder flexion with pillowcase  x10 2x10 2x10 2x10        TB shoulder extension    Gtb  x20 Blue 2x10 Blue  2x10 Blue  2xx10        TB chest press  nv Blue 2x10  Blue  2x10        TB rows  bLue 2x10 Blue 2x10 Blue  2x10 Blue  2x10        S/l ER with towel    2# x20 2#  2x10        Ther Activity                                       Gait Training                                       Modalities                                                  "

## 2024-10-01 ENCOUNTER — OFFICE VISIT (OUTPATIENT)
Dept: PHYSICAL THERAPY | Facility: CLINIC | Age: 39
End: 2024-10-01
Payer: COMMERCIAL

## 2024-10-01 DIAGNOSIS — M77.8 LEFT SHOULDER TENDONITIS: Primary | ICD-10-CM

## 2024-10-01 PROCEDURE — 97110 THERAPEUTIC EXERCISES: CPT

## 2024-10-01 PROCEDURE — 97140 MANUAL THERAPY 1/> REGIONS: CPT

## 2024-10-01 NOTE — PROGRESS NOTES
"Daily Note     Today's date: 10/1/2024  Patient name: Jo Barros  : 1985  MRN: [id number]  Referring provider: Delroy Huber DO  Dx:   Encounter Diagnosis     ICD-10-CM    1. Left shoulder tendonitis  M77.8           Start Time: 1415  Stop Time: 1500  Total time in clinic (min): 45 minutes    Subjective: Pt reports continued soreness from opening heavy door earlier this week, denies any medical uodates since her last sessioh      Objective: See treatment diary below      Assessment: Tolerated treatment well. Increased reps for rows and extension with good tolerance. Additionally 4'/4' fwd/rev for UBE completed today with no complaints of discomfort. Patient demonstrated fatigue post treatment, exhibited good technique with therapeutic exercises, and would benefit from continued PT      Plan: Continue per plan of care.      Precautions: none      Manuals 8/27 9/3 9/10 9/17 9/24 10/1       Tpr, biceps, pec and infraL  DL DL DL Lf FH                                              Neuro Re-Ed             Shoulder ER isometric 5\"x10            Shoulder ER TB Green 2x10            Prone row    3#  x20  3#  x20                                                           Ther Ex             UBE for endurance/strength  2'x2' 2'x2' 2'x2' 5'  ellip 4'/'4'       Doorway pec stretch 20\"x5            Prone horizontal abd 2x10  2\" 2\" x10  2\"x10 2x10  2# 2x10 2#       Shoulder flexion with pillowcase  x10 2x10 2x10 2x10 2x10       TB shoulder extension    Gtb  x20 Blue 2x10 Blue  2x10 Blue  2x10 Blue 3x10       TB chest press  nv Blue 2x10  Blue  2x10 Blue 2x10        TB rows  bLue 2x10 Blue 2x10 Blue  2x10 Blue  2x10 Blue 3 x10        S/l ER with towel    2# x20 2#  2x10 2# 2x10        Ther Activity                                       Gait Training                                       Modalities                                                    "

## 2024-10-08 ENCOUNTER — OFFICE VISIT (OUTPATIENT)
Dept: PHYSICAL THERAPY | Facility: CLINIC | Age: 39
End: 2024-10-08
Payer: COMMERCIAL

## 2024-10-08 DIAGNOSIS — M77.8 LEFT SHOULDER TENDONITIS: Primary | ICD-10-CM

## 2024-10-08 PROCEDURE — 97140 MANUAL THERAPY 1/> REGIONS: CPT | Performed by: PHYSICAL THERAPIST

## 2024-10-08 NOTE — PROGRESS NOTES
"Daily Note     Today's date: 10/8/2024  Patient name: Jo Barros  : 1985  MRN: [id number]  Referring provider: Delroy Huber DO  Dx:   Encounter Diagnosis     ICD-10-CM    1. Left shoulder tendonitis  M77.8                      Subjective: pt notes that she is still having the same issue with pulling doors towards her and moving the tv tray with laptop on it to the left.       Objective: See treatment diary below      Assessment: pt does have definite ttp of the left subscap that improved with stm. Will work on this over next two weeks but if no change in symptoms over next two visits pt would benefit from an scheduling an ortho visit.   Held on most exercises secondary to  time constraint.       Plan: Continue per plan of care.      Precautions: none      Manuals 8/27 9/3 9/10 9/17 9/24 10/1 10/8      Tpr, biceps, pec and infraL  DL DL DL Lf FH Subscap, pec, infra stm DB                                             Neuro Re-Ed             Shoulder ER isometric 5\"x10            Shoulder ER TB Green 2x10            Prone row    3#  x20  3#  x20                                                           Ther Ex             UBE for endurance/strength  2'x2' 2'x2' 2'x2' 5'  ellip 4'/'4' Ellip 3'x3'      Doorway pec stretch 20\"x5            Prone horizontal abd 2x10  2\" 2\" x10  2\"x10 2x10  2# 2x10 2#       Shoulder flexion with pillowcase  x10 2x10 2x10 2x10 2x10       TB shoulder extension    Gtb  x20 Blue 2x10 Blue  2x10 Blue  2x10 Blue 3x10       TB chest press  nv Blue 2x10  Blue  2x10 Blue 2x10        TB rows  bLue 2x10 Blue 2x10 Blue  2x10 Blue  2x10 Blue 3 x10        S/l ER with towel    2# x20 2#  2x10 2# 2x10  3# 2x10      Ther Activity                                       Gait Training                                       Modalities                                                      "

## 2024-10-14 NOTE — PROGRESS NOTES
"Ambulatory Visit  Name: Jo Barros      : 1985      MRN: [id number]  Encounter Provider: Kate Wilhelm DO  Encounter Date: 10/15/2024   Encounter department: [facility] [place]    Assessment & Plan  Encounter for annual routine gynecological examination         Fibroadenoma of breast, right    Orders:    Ambulatory Referral to Surgical Oncology; Future    General counseling and advice on female contraception         Menorrhagia with regular cycle         pap is up to date    She has a right breast ultrasound scheduled as a follow-up  She does not need her mammogram until she is 41.  Fibroadenoma-this is tender at times and she would like to have it removed.  I placed a referral to general surgery.  If they do not do breast surgery, I did give her the card for the breast surgeons although sometimes it a while to get in with a benign finding.  Discussed self breast exams    Contraception-she is very infrequently sexually active and not using contraception.  We discussed several options including another Mirena, Depo-Provera, progesterone only pills as she did not tolerate combination pills in the past.    Heavy menses-we discussed birth control as above, Lysteda and ablation.  She is using naproxen for her cramps.  She will call if she would like something else.    Right arm pain-the skin appears normal, she has no history of shingles.  I am unsure of what is causing this \"razor burn\" type of discomfort.  She will discuss with her family doctor at her upcoming visit.    discussed preventive care, regular exercise and a healthy diet    History of Present Illness     Jo Barros is a 39 y.o. female who presents for yearly.  Menses are regular, bleeds for 5 days, heavy for 2.  Cramps are painful. She uses Naproxen.   Most recent menses was heavy but that is not typical.  She is not attempting pregnancy.  She has not tolerated ocs. She has had 2 Mirena.    She has a probable " fibroadenoma in the upper outer quadrant of the right breast.  She had an ultrasound in July and has a follow-up ultrasound scheduled for 6 months.  She has pain in the area of the fibroadenoma and is interested in having it removed. Not related to menstrual cycle.    Back of arm burning pain, feels like 'razor burn'  2 months ago, not everyday, intermittent.  Normal pap, negative HPV in       Review of Systems   Constitutional: Negative.    Gastrointestinal: Negative.    Genitourinary: Negative.         Right breast pain at area of fibroadenoma   Musculoskeletal:         Pain under right arm, lateral           Objective     There were no vitals taken for this visit.    Physical Exam  Vitals and nursing note reviewed. Exam conducted with a chaperone present.   Constitutional:       Appearance: She is well-developed.   HENT:      Head: Normocephalic and atraumatic.   Neck:      Thyroid: No thyromegaly.   Cardiovascular:      Rate and Rhythm: Normal rate and regular rhythm.   Pulmonary:      Effort: Pulmonary effort is normal.      Breath sounds: Normal breath sounds.   Chest:   Breasts:     Right: Normal.      Left: Normal.      Comments: Examined seated and supine  Abdominal:      Palpations: Abdomen is soft.   Genitourinary:     General: Normal vulva.      Vagina: Normal.      Cervix: Normal.      Uterus: Normal.       Adnexa: Right adnexa normal and left adnexa normal.   Musculoskeletal:      Cervical back: Neck supple.   Skin:     General: Skin is warm and dry.   Neurological:      Mental Status: She is alert.   Psychiatric:         Mood and Affect: Mood normal.

## 2024-10-15 ENCOUNTER — APPOINTMENT (OUTPATIENT)
Dept: PHYSICAL THERAPY | Facility: CLINIC | Age: 39
End: 2024-10-15
Payer: COMMERCIAL

## 2024-10-15 ENCOUNTER — ANNUAL EXAM (OUTPATIENT)
Dept: GYNECOLOGY | Facility: CLINIC | Age: 39
End: 2024-10-15
Payer: COMMERCIAL

## 2024-10-15 VITALS
WEIGHT: 164 LBS | DIASTOLIC BLOOD PRESSURE: 60 MMHG | BODY MASS INDEX: 23.48 KG/M2 | SYSTOLIC BLOOD PRESSURE: 104 MMHG | HEIGHT: 70 IN

## 2024-10-15 DIAGNOSIS — N92.0 MENORRHAGIA WITH REGULAR CYCLE: ICD-10-CM

## 2024-10-15 DIAGNOSIS — D24.1 FIBROADENOMA OF BREAST, RIGHT: ICD-10-CM

## 2024-10-15 DIAGNOSIS — Z01.419 ENCOUNTER FOR ANNUAL ROUTINE GYNECOLOGICAL EXAMINATION: Primary | ICD-10-CM

## 2024-10-15 DIAGNOSIS — Z30.09 GENERAL COUNSELING AND ADVICE ON FEMALE CONTRACEPTION: ICD-10-CM

## 2024-10-15 PROCEDURE — S0612 ANNUAL GYNECOLOGICAL EXAMINA: HCPCS | Performed by: OBSTETRICS & GYNECOLOGY

## 2024-10-16 ENCOUNTER — TELEPHONE (OUTPATIENT)
Dept: HEMATOLOGY ONCOLOGY | Facility: CLINIC | Age: 39
End: 2024-10-16

## 2024-10-16 NOTE — TELEPHONE ENCOUNTER
Referral to Surgical Oncology received.  Chart reviewed by  for Surgical oncology at this time.       Diagnosis:     D24.1 (ICD-10-CM) - Fibroadenoma of breast, right     After review of chart, instructions for scheduling added to referral and sent to be scheduled as advised.

## 2024-10-22 ENCOUNTER — OFFICE VISIT (OUTPATIENT)
Dept: PHYSICAL THERAPY | Facility: CLINIC | Age: 39
End: 2024-10-22
Payer: COMMERCIAL

## 2024-10-22 DIAGNOSIS — M77.8 LEFT SHOULDER TENDONITIS: Primary | ICD-10-CM

## 2024-10-22 PROCEDURE — 97140 MANUAL THERAPY 1/> REGIONS: CPT

## 2024-10-22 PROCEDURE — 97110 THERAPEUTIC EXERCISES: CPT

## 2024-10-22 NOTE — PROGRESS NOTES
"Daily Note     Today's date: 10/22/2024  Patient name: Jo Barros  : 1985  MRN: [id number]  Referring provider: Delroy Huber DO  Dx:   Encounter Diagnosis     ICD-10-CM    1. Left shoulder tendonitis  M77.8                      Subjective: pt states that she is feeling the same and has been lifting tray more often and is still with same sxs.        Objective: See treatment diary below      Assessment: Tolerated treatment with TTP over subscap, infra and pec. Patient demonstrated fatigue post treatment, exhibited good technique with therapeutic exercises, and would benefit from continued PT      Plan: Continue per plan of care.   Pt is to [facility] to [facility] appt as her sxs are not changing at this time.      Precautions: none      Manuals 8/27 9/3 9/10 9/17 9/24 10/1 10/8 10/22     Tpr, biceps, pec and infraL  DL DL DL Lf FH Subscap, pec, infra stm DB Subscap,pec,infra stm-DL                                            Neuro Re-Ed             Shoulder ER isometric 5\"x10            Shoulder ER TB Green 2x10            Prone row    3#  x20  3#  x20  4#  2x10                                                         Ther Ex             UBE for endurance/strength  2'x2' 2'x2' 2'x2' 5'  ellip 4'/'4' Ellip 3'x3' Ellip 6'     Doorway pec stretch 20\"x5            Prone horizontal abd 2x10  2\" 2\" x10  2\"x10 2x10  2# 2x10 2#       B ER        Gtb 2x10     Shoulder flexion with pillowcase  x10 2x10 2x10 2x10 2x10       TB shoulder extension    Gtb  x20 Blue 2x10 Blue  2x10 Blue  2x10 Blue 3x10  Blue supinated  2x10     TB chest press  nv Blue 2x10  Blue  2x10 Blue 2x10        TB rows  bLue 2x10 Blue 2x10 Blue  2x10 Blue  2x10 Blue 3 x10        S/l ER with towel    2# x20 2#  2x10 2# 2x10  3# 2x10 3#  2x10     Ther Activity                                       Gait Training                                       Modalities                                                        "

## 2024-10-23 ENCOUNTER — TELEPHONE (OUTPATIENT)
Dept: SURGICAL ONCOLOGY | Facility: CLINIC | Age: 39
End: 2024-10-23

## 2024-10-23 NOTE — TELEPHONE ENCOUNTER
Message left that we needed to move her 12/9/24 consult to 12/11/24 at 3pm with Dr Yin as she will be in the OR all day on 12/9/24. Requested she return call to confirm change.

## 2024-10-25 ENCOUNTER — TELEPHONE (OUTPATIENT)
Dept: OBGYN CLINIC | Facility: MEDICAL CENTER | Age: 39
End: 2024-10-25

## 2024-10-25 NOTE — TELEPHONE ENCOUNTER
I called Connie to ask what part of the body she was coming in for on 10/30/2024 to see Dr. Montano as a new patient. 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 A previous P.T. appointment feels she may have Bicep tendon and rotator cuff. She was told by P.T. to see an orthopedic doctor because she is not progressing. She can Not left anything above ten pounds. If she would happen to need to carry her laptop and charging cable and away from her body she has to put it down immediately before she drops it. She would like xrays, to see what is going on with her left shoulder.

## 2024-10-29 ENCOUNTER — OFFICE VISIT (OUTPATIENT)
Dept: PHYSICAL THERAPY | Facility: CLINIC | Age: 39
End: 2024-10-29
Payer: COMMERCIAL

## 2024-10-29 DIAGNOSIS — M77.8 LEFT SHOULDER TENDONITIS: Primary | ICD-10-CM

## 2024-10-29 PROCEDURE — 97110 THERAPEUTIC EXERCISES: CPT

## 2024-10-29 PROCEDURE — 97140 MANUAL THERAPY 1/> REGIONS: CPT

## 2024-10-29 NOTE — PROGRESS NOTES
"Daily Note     Today's date: 10/29/2024  Patient name: Jo Barros  : 1985  MRN: [id number]  Referring provider: Delroy Huber DO  Dx:   Encounter Diagnosis     ICD-10-CM    1. Left shoulder tendonitis  M77.8                      Subjective: pt is with same issues with opening heavier doors.  She has ortho appt tomorrow      Objective: See treatment diary below      Assessment: Tolerated treatment with tightness in infra and subscap with releases made.  She is able to perform exercises as noted but pain is remaining the same. . Patient exhibited good technique with therapeutic exercises      Plan: pt to see ortho and will cont upon their recommendation as progress has been limited.      Precautions: none      Manuals 8/27 9/3 9/10 9/17 9/24 10/1 10/8 10/22 10/29    Tpr, biceps, pec and infraL  DL DL DL Lf FH Subscap, pec, infra stm DB Subscap,pec,infra stm-DL Subscap, pec, infra -DL                                           Neuro Re-Ed             Shoulder ER isometric 5\"x10            Shoulder ER TB Green 2x10            Prone row    3#  x20  3#  x20  4#  2x10 4#  2x10                                                        Ther Ex             UBE for endurance/strength  2'x2' 2'x2' 2'x2' 5'  ellip 4'/'4' Ellip 3'x3' Ellip 6' ellip    Doorway pec stretch 20\"x5            Prone horizontal abd 2x10  2\" 2\" x10  2\"x10 2x10  2# 2x10 2#       B ER        Gtb 2x10 Gtb 2x10    Shoulder flexion with pillowcase  x10 2x10 2x10 2x10 2x10       TB shoulder extension    Gtb  x20 Blue 2x10 Blue  2x10 Blue  2x10 Blue 3x10  Blue supinated  2x10 Blk supinated 2x10    TB chest press  nv Blue 2x10  Blue  2x10 Blue 2x10    Blk 2x10    TB rows  bLue 2x10 Blue 2x10 Blue  2x10 Blue  2x10 Blue 3 x10    Silver 2x10    S/l ER with towel    2# x20 2#  2x10 2# 2x10  3# 2x10 3#  2x10 3#  2x10    Ther Activity                                       Gait Training                                       Modalities                           "

## 2024-10-30 ENCOUNTER — APPOINTMENT (OUTPATIENT)
Dept: RADIOLOGY | Facility: MEDICAL CENTER | Age: 39
End: 2024-10-30
Payer: COMMERCIAL

## 2024-10-30 ENCOUNTER — TELEPHONE (OUTPATIENT)
Age: 39
End: 2024-10-30

## 2024-10-30 ENCOUNTER — OFFICE VISIT (OUTPATIENT)
Dept: OBGYN CLINIC | Facility: MEDICAL CENTER | Age: 39
End: 2024-10-30
Payer: COMMERCIAL

## 2024-10-30 VITALS
BODY MASS INDEX: 23.48 KG/M2 | SYSTOLIC BLOOD PRESSURE: 97 MMHG | HEART RATE: 61 BPM | WEIGHT: 164 LBS | DIASTOLIC BLOOD PRESSURE: 62 MMHG | HEIGHT: 70 IN

## 2024-10-30 DIAGNOSIS — M24.812 INTERNAL DERANGEMENT OF LEFT SHOULDER: Primary | ICD-10-CM

## 2024-10-30 DIAGNOSIS — M75.02 ADHESIVE CAPSULITIS OF LEFT SHOULDER: ICD-10-CM

## 2024-10-30 PROCEDURE — 99203 OFFICE O/P NEW LOW 30 MIN: CPT | Performed by: ORTHOPAEDIC SURGERY

## 2024-10-30 PROCEDURE — 73030 X-RAY EXAM OF SHOULDER: CPT

## 2024-10-30 NOTE — TELEPHONE ENCOUNTER
Scan under media tab, dated 9/9/24 states that [facility] requires reauthorization. This may be done through [facility], Key # [id number].

## 2024-10-30 NOTE — PROGRESS NOTES
Ortho Sports Medicine Shoulder New Patient Visit     Assesment:   39 y.o. 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 Her physical examination is consistent with scapular dyskinesia versus labral pathology and she has been recommended for an MRI arthrogram due to lack of improvement after 3 months of physical therapy. She will follow up after the MRI is performed     Conservative treatment:    Ice to shoulder 1-2 times daily, for 20 minutes at a time.  PT for ROM and strengthening to shoulder, rotator cuff, scapular stabilizers.  Home exercise program for shoulder, including ROM and strenthening.  Instructions given to patient of what exercises to perform.  Let pain guide return to activities.      Imaging:    All imaging from today was reviewed by myself and explained to the patient.       Injection:    No Injection planned at this time.      Surgery:     No surgery is recommended at this point, continue with conservative treatment plan as noted.      Follow up:    No follow-ups on file.        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      The patient denies a history of diabetes.  The patient denies a history of thyroid disorder.      Pain is located anterior.  The patient rates the pain as a 5/10.  The pain has been present for a 3-4 months.      The patient reports an atraumatic history. The pain is characterized as sharp, achy.  The pain is present daily.      Pain is improved by rest.  Pain is aggravated by overhead activity, reaching back, rotation, lifting , and exercising.      The patient has  weakness.  The patient denies numbness and tingling.     The patient has tried rest and physical therapy.          Shoulder Surgical History:  None    Past Medical, Social and Family History:  Past Medical History:   Diagnosis Date    Abnormal ECG 1/25/19    Incomplete RBBB, repeat EKG 4/30    ADHD     Allergic     Asthma     Cluster headache     GERD (gastroesophageal reflux disease)     Headache(784.0)     Headache, tension-type     HPV (human papilloma virus) infection     HSV-1 infection     HSV-2 infection     Miscarriage 09/2009    Miscarriage 8 weeks, Stillbirth 08/2012 (39 weeks)     Past Surgical History:   Procedure Laterality Date    CERVICAL BIOPSY  W/ LOOP ELECTRODE EXCISION  2012    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    CT EPIDURAL STEROID INJECTION (TAMI LUMBAR) N/A 2018    L5-S1, x2    LAMINECTOMY  2/5/2019    L5-S1 herniated disc    POSTERIOR LAMINECTOMY THORACIC AND LUMBAR SPINE N/A 02/05/2019    Procedure: Lumbar laminectomy, decompression, discectomy left L5-S1 ;  Surgeon: Misa Samaniego MD;  Location: [facility];  Service: Orthopedics    SKIN BIOPSY      SPINE SURGERY  2/5/19    L5-S1 discectomy/laminectomy    TONSILECTOMY AND ADNOIDECTOMY  1990    WISDOM TOOTH EXTRACTION  2007     Allergies   Allergen Reactions    Sulfa Antibiotics      Family has allergy she has never taken    Doxycycline Rash and Edema     Whole body rash,bruises behind both thighs     Current Outpatient Medications on File Prior to Visit   Medication Sig Dispense Refill    acetaZOLAMIDE (DIAMOX) 125 mg tablet 1 tab twice a day (second dose late afternoon/evening) for 7 days then increase to 2 tabs twice a day (Patient taking differently: Take 125 mg by mouth daily For migraines) 120 tablet 2    ascorbic acid (VITAMIN C) 500 MG tablet Take 500 mg by mouth daily      bimatoprost (LATISSE) 0.03 % ophthalmic solution Administer 1 drop to both eyes daily at bedtime Place one drop on applicator and apply  evenly along the skin of the upper eyelid at base of eyelashes once daily at bedtime; repeat procedure for second eye (use a clean applicator). (Patient taking differently: Administer 1 drop to both eyes if needed Place one drop on applicator and apply evenly along the skin of the upper eyelid at base of eyelashes once daily at bedtime; repeat procedure for second eye (use a clean applicator).) 3 mL 3    cholecalciferol (VITAMIN D3) 1,000 units tablet Take 4,000 Units by mouth daily      Cyanocobalamin (VITAMIN B 12 PO) Place 1,000 mcg under the tongue in the morning      Docusate Sodium 100 MG capsule Take 100-200 mg by mouth in the morning 100 mg Monday/Wednesday/Friday/Sunday  200 mg Tuesday/Thursday/Saturday      DULoxetine (CYMBALTA) 60 mg delayed release capsule Take 1 capsule (60 mg total) by mouth daily 90 capsule 1    fluticasone (FLONASE) 50 mcg/act nasal spray 1 spray into each nostril daily (Patient taking differently: 1 spray into each nostril if needed) 48 mL 0    ketorolac (TORADOL) 10 mg tablet TAKE ONE TABLET BY MOUTH AT ONSET OF MIGRAINE, CAN REPEAT X 1 IN 6 HOURS, CAN COMBINE WITH TRIPTAN. TAKE WITH FOOD/MILK/ANTACID (Patient taking differently: if needed) 10 tablet 3    Loratadine 10 MG CAPS Take 10 mg by mouth daily       methocarbamol (ROBAXIN) 500 mg tablet Take 1 tablet (500 mg total) by mouth daily at bedtime (Patient taking differently: Take 500 mg by mouth if needed) 90 tablet 3    methylphenidate (CONCERTA) 36 MG ER tablet Take 1 tablet (36 mg total) by mouth daily Max Daily Amount: 36 mg 30 tablet 0    methylPREDNISolone 4 MG tablet therapy pack Use as directed on package 1 each 0    naproxen sodium (ANAPROX) 550 mg tablet Take 1 tablet (550 mg total) by mouth 2 (two) times a day with meals For menstrual cramps 60 tablet 1    omeprazole (PriLOSEC) 40 MG capsule Take 1 capsule (40 mg total) by mouth daily 90 capsule 1    ondansetron (ZOFRAN) 4 mg tablet Take 1 tablet (4 mg total) by mouth  every 8 (eight) hours as needed for nausea or vomiting (Patient taking differently: Take 4 mg by mouth if needed for nausea or vomiting) 20 tablet 0    rimegepant sulfate (Nurtec) 75 mg TBDP Take 1 tablet (75 mg total) by mouth as needed (migraine) Limit of 1 in 24 hours (Patient taking differently: Take 75 mg by mouth if needed (migraine) Limit of 1 in 24 hours) 16 tablet 6    rizatriptan (MAXALT-MLT) 10 mg disintegrating tablet DISSOLVE 1 TABLET BY MOUTH ONCE AS NEEDED FOR MIGRAINE. MAY REPEAT EVERY 2 HOURS IF NEEDED. MAX 2/24 HOURS (Patient taking differently: if needed) 12 tablet 3    Semaglutide-Weight Management (WEGOVY) 1.7 MG/0.75ML Inject 0.75 mL (1.7 mg total) under the skin once a week 3 mL 5    valACYclovir (VALTREX) 500 mg tablet Take 1 tablet (500 mg total) by mouth daily 90 tablet 1    cyproheptadine (PERIACTIN) 4 mg tablet Take 1 tablet (4 mg total) by mouth daily at bedtime as needed for allergies (headaches) (Patient not taking: Reported on 2024) 30 tablet 0    dexamethasone (DECADRON) 1 mg tablet Take 1 mg by mouth as needed (Patient not taking: Reported on 2024)       No current facility-administered medications on file prior to visit.     Social History     Socioeconomic History    Marital status: /Civil Union     Spouse name: Not on file    Number of children: Not on file    Years of education: Not on file    Highest education level: Not on file   Occupational History    Not on file   Tobacco Use    Smoking status: Former     Current packs/day: 0.00     Types: Cigarettes     Start date: 1996     Quit date: 2016     Years since quittin.8     Passive exposure: Past    Smokeless tobacco: Never    Tobacco comments:     quit > 10 years ago    Vaping Use    Vaping status: Some Days    Substances: THC   Substance and Sexual Activity    Alcohol use: Yes     Comment: Rare use    Drug use: Yes     Frequency: 2.0 times per week     Types: Marijuana     Comment: Medical  Marijuana occasionally use    Sexual activity: Yes     Partners: Male     Birth control/protection: None     Comment: Had Mirena removed 10/2018   Other Topics Concern    Not on file   Social History Narrative    Not on file     Social Determinants of Health     Financial Resource Strain: Low Risk  (11/3/2020)    Overall Financial Resource Strain (CARDIA)     Difficulty of Paying Living Expenses: Not hard at all   Food Insecurity: Food Insecurity Present (11/3/2020)    Hunger Vital Sign     Worried About Running Out of Food in the Last Year: Sometimes true     Ran Out of Food in the Last Year: Sometimes true   Transportation Needs: No Transportation Needs (11/3/2020)    PRAPARE - Transportation     Lack of Transportation (Medical): No     Lack of Transportation (Non-Medical): No   Physical Activity: Inactive (11/3/2020)    Exercise Vital Sign     Days of Exercise per Week: 0 days     Minutes of Exercise per Session: 0 min   Stress: No Stress Concern Present (11/3/2020)    [place] [employer] - Occupational Stress Questionnaire     Feeling of Stress : Not at all   Social Connections: Moderately Integrated (11/3/2020)    Social Connection and Isolation Panel [NHANES]     Frequency of Communication with Friends and Family: More than three times a week     Frequency of Social Gatherings with Friends and Family: More than three times a week     Attends [employer] Services: Never     Active Member of Clubs or Organizations: Yes     Attends Club or Organization Meetings: Never     Marital Status:    Intimate Partner Violence: Not At Risk (12/13/2023)    Humiliation, Afraid, Rape, and Kick questionnaire     Fear of Current or Ex-Partner: No     Emotionally Abused: No     Physically Abused: No     Sexually Abused: No   Housing Stability: Not on file         I have reviewed the past medical, surgical, social and family history, medications and allergies as documented in the EMR.    Review of  "systems: ROS is negative other than that noted in the HPI.  Constitutional: Negative for fatigue and fever.   HENT: Negative for sore throat.    Respiratory: Negative for shortness of breath.    Cardiovascular: Negative for chest pain.   Gastrointestinal: Negative for abdominal pain.   Endocrine: Negative for cold intolerance and heat intolerance.   Genitourinary: Negative for flank pain.   Musculoskeletal: Negative for back pain.   Skin: Negative for rash.   Allergic/Immunologic: Negative for immunocompromised state.   Neurological: Negative for dizziness.   Psychiatric/Behavioral: Negative for agitation.      Physical Exam:    Blood pressure 97/62, pulse 61, height 5' 10\" (1.778 m), weight 74.4 kg (164 lb), last menstrual period 10/10/2024, not currently breastfeeding.    General/Constitutional: NAD, well developed, well nourished  HENT: Normocephalic, atraumatic  CV: Intact distal pulses, regular rate  Resp: No respiratory distress or labored breathing  GI: Soft and non-tender   Lymphatic: No lymphadenopathy palpated  Neuro: Alert and Oriented x 3, no focal deficits  Psych: Normal mood, normal affect, normal judgement, normal behavior  Skin: Warm, dry, no rashes, no erythema      Shoulder focused exam:       RIGHT LEFT    Scapula Atrophy Negative Negative     Winging Negative Negative     Protraction Negative Negative    Rotator cuff SS 5/5 5/5     IS 5/5 5/5     SubS 5/5 5/5    ROM     170     ER0 60 60     ER90 90    90     IR90 T6    T6     IRb T6    T6    TTP: AC Negative Negative     Biceps Negative Negative     Coracoid Negative Negative    Special Tests: O'Briens Negative Negative     Deng-shear Negative Positive     Cross body Adduction Negative Negative     Speeds  Negative Negative     Quinn's Negative Negative     Whipple Negative Negative       Neer Negative Negative     Gallegos Negative Negative    Instability: Apprehension & relocation not tested not tested     Load & shift not tested not " tested    Other: Crank Negative Negative     Supination strength  5/5 4/5          UE NV Exam: +2 Radial pulses bilaterally  Sensation intact to light touch C5-T1 bilaterally, Radial/median/ulnar nerve motor intact      Bilateral elbow, wrist, and and forearm ROM full, painless with passive ROM, no ttp or crepitance throughout extremities below shoulder joint    Cervical ROM is full without pain, numbness or tingling      Shoulder Imaging    X-rays of the left shoulder were reviewed, which demonstrate no fracture, lesion, or dislocation. No loose bodies or calcific tendonitis.  I have reviewed the radiology report and do not currently have a radiology reading from [facility], but will check the result once the reading is performed.      Scribe Attestation      I,:   am acting as a scribe while in the presence of the attending physician.:       I,:   personally performed the services described in this documentation    as scribed in my presence.:

## 2024-11-05 DIAGNOSIS — F98.8 ATTENTION DEFICIT DISORDER (ADD) IN ADULT: ICD-10-CM

## 2024-11-05 NOTE — TELEPHONE ENCOUNTER
Reason for call:   [x] Refill   [] Prior Auth  [] Other:     Office:   [] PCP/Provider -   [x] Specialty/Provider - [facility]IATR[facility] ASSMALOU LEMUS     Medication: methylphenidate (CONCERTA) 36 MG ER  Take 1 tablet (36 mg total) by mouth daily     Pharmacy: [facility]     Does the patient have enough for 3 days?   [] Yes   [x] No - Send as HP to POD

## 2024-11-06 RX ORDER — METHYLPHENIDATE HYDROCHLORIDE 36 MG/1
36 TABLET ORAL DAILY
Qty: 30 TABLET | Refills: 0 | Status: SHIPPED | OUTPATIENT
Start: 2024-11-06

## 2024-11-06 NOTE — TELEPHONE ENCOUNTER
09/19/2024 09/19/2024 Methylphenidate Hcl (Tablet, Extended Release) 30.0 30 36 MG NA [facility] Commercial Insurance 0 / 0 [place]      1 [id number] 08/19/2024 08/19/2024 Methylphenidate Hcl (Tablet, Extended Release) 30.0 30 36 MG NA [facility] Commercial Insurance 0 / 0 [place]    1 [id number] 06/20/2024 06/20/2024 Methylphenidate Hcl (Tablet, Extended Release) 30.0 30 36 MG NA [facility]

## 2024-11-11 ENCOUNTER — IMMUNIZATIONS (OUTPATIENT)
Dept: FAMILY MEDICINE CLINIC | Facility: CLINIC | Age: 39
End: 2024-11-11
Payer: COMMERCIAL

## 2024-11-11 DIAGNOSIS — Z23 ENCOUNTER FOR IMMUNIZATION: Primary | ICD-10-CM

## 2024-11-11 PROCEDURE — 90656 IIV3 VACC NO PRSV 0.5 ML IM: CPT

## 2024-11-11 PROCEDURE — 90471 IMMUNIZATION ADMIN: CPT

## 2024-11-12 ENCOUNTER — HOSPITAL ENCOUNTER (OUTPATIENT)
Dept: MRI IMAGING | Facility: HOSPITAL | Age: 39
Discharge: HOME | End: 2024-11-12
Attending: ORTHOPAEDIC SURGERY
Payer: COMMERCIAL

## 2024-11-12 ENCOUNTER — HOSPITAL ENCOUNTER (OUTPATIENT)
Dept: RADIOLOGY | Facility: HOSPITAL | Age: 39
Discharge: HOME | End: 2024-11-12
Attending: ORTHOPAEDIC SURGERY
Payer: COMMERCIAL

## 2024-11-12 ENCOUNTER — TELEPHONE (OUTPATIENT)
Dept: HEMATOLOGY ONCOLOGY | Facility: CLINIC | Age: 39
End: 2024-11-12

## 2024-11-12 DIAGNOSIS — M25.512 SHOULDER PAIN, LEFT: ICD-10-CM

## 2024-11-12 DIAGNOSIS — M24.812 INTERNAL DERANGEMENT OF LEFT SHOULDER: ICD-10-CM

## 2024-11-12 PROCEDURE — 23350 INJECTION FOR SHOULDER X-RAY: CPT

## 2024-11-12 PROCEDURE — 77002 NEEDLE LOCALIZATION BY XRAY: CPT

## 2024-11-12 PROCEDURE — A9585 GADOBUTROL INJECTION: HCPCS | Performed by: ORTHOPAEDIC SURGERY

## 2024-11-12 PROCEDURE — 73222 MRI JOINT UPR EXTREM W/DYE: CPT

## 2024-11-12 RX ORDER — GADOBUTROL 604.72 MG/ML
2 INJECTION INTRAVENOUS
Status: COMPLETED | OUTPATIENT
Start: 2024-11-12 | End: 2024-11-12

## 2024-11-12 RX ORDER — ROPIVACAINE HYDROCHLORIDE 2 MG/ML
5 INJECTION, SOLUTION EPIDURAL; INFILTRATION; PERINEURAL ONCE
Status: DISCONTINUED | OUTPATIENT
Start: 2024-11-12 | End: 2024-11-16 | Stop reason: HOSPADM

## 2024-11-12 RX ORDER — LIDOCAINE HYDROCHLORIDE 10 MG/ML
30 INJECTION, SOLUTION EPIDURAL; INFILTRATION; INTRACAUDAL; PERINEURAL
Status: DISCONTINUED | OUTPATIENT
Start: 2024-11-12 | End: 2024-11-16 | Stop reason: HOSPADM

## 2024-11-12 RX ADMIN — IOHEXOL 20 ML: 240 INJECTION, SOLUTION INTRATHECAL; INTRAVASCULAR; INTRAVENOUS; ORAL at 09:51

## 2024-11-12 RX ADMIN — GADOBUTROL 2 ML: 604.72 INJECTION INTRAVENOUS at 09:50

## 2024-11-12 NOTE — TELEPHONE ENCOUNTER
Appointment Change  Cancel, Reschedule, Change to Virtual      Who are you speaking with? Patient   If it is not the patient, is the caller listed on the communication consent form? N/A   Old appointment details December 18, 2024  Dr. Annamaria Perez   What is the reason for the appointment change? Sooner consult date   New appointment details November 14, 2024  Dr. Annamaria Lainez

## 2024-11-14 ENCOUNTER — OFFICE VISIT (OUTPATIENT)
Dept: SURGICAL ONCOLOGY | Facility: CLINIC | Age: 39
End: 2024-11-14
Payer: COMMERCIAL

## 2024-11-14 VITALS
HEART RATE: 81 BPM | WEIGHT: 171 LBS | RESPIRATION RATE: 18 BRPM | SYSTOLIC BLOOD PRESSURE: 102 MMHG | HEIGHT: 70 IN | DIASTOLIC BLOOD PRESSURE: 64 MMHG | BODY MASS INDEX: 24.48 KG/M2 | OXYGEN SATURATION: 98 % | TEMPERATURE: 97.1 F

## 2024-11-14 DIAGNOSIS — D24.1 FIBROADENOMA OF BREAST, RIGHT: Primary | ICD-10-CM

## 2024-11-14 PROCEDURE — 99244 OFF/OP CNSLTJ NEW/EST MOD 40: CPT | Performed by: SURGERY

## 2024-11-14 NOTE — PROGRESS NOTES
Breast Consultation-Surgical Oncology     [facility]  [facility] [place]  [address]  [place] [address]    Name:  Jo Barros  YOB: 1985  MRN:  [id number]    Assessment & Plan   Diagnoses and all orders for this visit:    Fibroadenoma of breast, right  -     Ambulatory Referral to Surgical Oncology        HPI: Jo Barros is a 39 y.o. year old female who presents with a presumed fibroadenoma of the right breast.  She reports associated pain that radiates into the axilla.  She denies any family history of breast cancer.    Surgical treatment to date consisted of not applicable.    Oncology History:    Oncology History    No history exists.       Pertinent reproductive history:  Age at menarche:    OB History          4    Para   3    Term   3            AB   1    Living   2         SAB   0    IAB        Ectopic        Multiple        Live Births   2           Obstetric Comments   Age of first period: 12  Age when first child was born: 20                 Problem List:   Patient Active Problem List   Diagnosis    Lumbar radiculopathy - Left    Lumbar disc herniation    Palpitations    Galactorrhea    Migraine without aura and with status migrainosus, not intractable    History of obesity    GERD (gastroesophageal reflux disease)    Viral syndrome    Cervicalgia    Snoring    Morning headache    Obstructive sleep apnea (adult) (pediatric)    Abnormal result of iron profile testing    Intracranial hypertension    Attention deficit hyperactivity disorder (ADHD), combined type    Fibroadenoma of breast, right     Past Medical History:   Diagnosis Date    Abnormal ECG 19    Incomplete RBBB, repeat EKG     ADHD     Allergic     Asthma     Cluster headache     GERD (gastroesophageal reflux disease)     Headache(784.0)     Headache, tension-type     HPV (human papilloma virus) infection     HSV-1 infection     HSV-2  infection     Miscarriage 2009    Miscarriage 8 weeks, Stillbirth 2012 (39 weeks)     Past Surgical History:   Procedure Laterality Date    CERVICAL BIOPSY  W/ LOOP ELECTRODE EXCISION      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    CT EPIDURAL STEROID INJECTION (TAMI LUMBAR) N/A     L5-S1, x2    FL INJECTION LEFT SHOULDER (ARTHROGRAM)  2024    LAMINECTOMY  2019    L5-S1 herniated disc    POSTERIOR LAMINECTOMY THORACIC AND LUMBAR SPINE N/A 2019    Procedure: Lumbar laminectomy, decompression, discectomy left L5-S1 ;  Surgeon: Misa Samaniego MD;  Location: [facility];  Service: Orthopedics    SKIN BIOPSY      SPINE SURGERY  19    L5-S1 discectomy/laminectomy    TONSILECTOMY AND ADNOIDECTOMY      WISDOM TOOTH EXTRACTION       Family History   Problem Relation Age of Onset    Diabetes Mother     Arthritis Mother     Hypertension Mother     Atrial fibrillation Mother     Neuropathy Mother         Diabetic neuropathy    Osteoarthritis Mother     Diabetes Father             Arthritis Father         psoriatic    Hyperlipidemia Father             Cancer Father         Cholangiocarcinoma ()    Hypertension Father     Hearing loss Father         Degenerative hearing loss    Migraines Brother     Cancer Maternal Grandmother     Esophageal varices Maternal Grandmother     Abnormal EKG Maternal Grandmother     Alcohol abuse Maternal Grandmother     Cirrhosis Maternal Grandmother     Cancer Maternal Grandfather     Heart disease Maternal Grandfather     Stroke Maternal Grandfather         x 3    Brain cancer Maternal Grandfather     Hypertension Paternal Grandmother     Diabetes Paternal Grandmother     Lung cancer Paternal Grandmother     Diabetes Paternal Grandfather     Migraines Son     Heart disease Maternal Uncle     Melanoma Maternal Uncle     Hypertension Paternal Aunt     Diabetes Paternal Aunt     Colon cancer Paternal Aunt      Hypertension Paternal Uncle     Diabetes Paternal Uncle     Thyroid cancer Paternal Uncle         Papillary thyroid cancer     Social History     Socioeconomic History    Marital status: /Civil Union     Spouse name: Not on file    Number of children: Not on file    Years of education: Not on file    Highest education level: Not on file   Occupational History    Not on file   Tobacco Use    Smoking status: Former     Current packs/day: 0.00     Types: Cigarettes     Start date: 1996     Quit date: 2016     Years since quittin.8     Passive exposure: Past    Smokeless tobacco: Never    Tobacco comments:     quit > 10 years ago    Vaping Use    Vaping status: Some Days    Substances: THC   Substance and Sexual Activity    Alcohol use: Yes     Comment: Rare use    Drug use: Yes     Frequency: 2.0 times per week     Types: Marijuana     Comment: Medical Marijuana occasionally use    Sexual activity: Yes     Partners: Male     Birth control/protection: None     Comment: Had Mirena removed 10/2018   Other Topics Concern    Not on file   Social History Narrative    Not on file     Social Drivers of Health     Financial Resource Strain: Low Risk  (11/3/2020)    Overall Financial Resource Strain (CARDIA)     Difficulty of Paying Living Expenses: Not hard at all   Food Insecurity: Food Insecurity Present (11/3/2020)    Hunger Vital Sign     Worried About Running Out of Food in the Last Year: Sometimes true     Ran Out of Food in the Last Year: Sometimes true   Transportation Needs: No Transportation Needs (11/3/2020)    PRAPARE - Transportation     Lack of Transportation (Medical): No     Lack of Transportation (Non-Medical): No   Physical Activity: Inactive (11/3/2020)    Exercise Vital Sign     Days of Exercise per Week: 0 days     Minutes of Exercise per Session: 0 min   Stress: No Stress Concern Present (11/3/2020)    [place] [employer] - Occupational Stress Questionnaire      Feeling of Stress : Not at all   Social Connections: Moderately Integrated (11/3/2020)    Social Connection and Isolation Panel [NHANES]     Frequency of Communication with Friends and Family: More than three times a week     Frequency of Social Gatherings with Friends and Family: More than three times a week     Attends Caodaism Services: Never     Active Member of Clubs or Organizations: Yes     Attends Club or Organization Meetings: Never     Marital Status:    Intimate Partner Violence: Not At Risk (12/13/2023)    Humiliation, Afraid, Rape, and Kick questionnaire     Fear of Current or Ex-Partner: No     Emotionally Abused: No     Physically Abused: No     Sexually Abused: No   Housing Stability: Not on file     Current Outpatient Medications   Medication Sig Dispense Refill    acetaZOLAMIDE (DIAMOX) 125 mg tablet 1 tab twice a day (second dose late afternoon/evening) for 7 days then increase to 2 tabs twice a day 120 tablet 2    ascorbic acid (VITAMIN C) 500 MG tablet Take 500 mg by mouth daily      bimatoprost (LATISSE) 0.03 % ophthalmic solution Administer 1 drop to both eyes daily at bedtime Place one drop on applicator and apply evenly along the skin of the upper eyelid at base of eyelashes once daily at bedtime; repeat procedure for second eye (use a clean applicator). 3 mL 3    cholecalciferol (VITAMIN D3) 1,000 units tablet Take 4,000 Units by mouth daily      Cyanocobalamin (VITAMIN B 12 PO) Place 1,000 mcg under the tongue in the morning      cyproheptadine (PERIACTIN) 4 mg tablet Take 1 tablet (4 mg total) by mouth daily at bedtime as needed for allergies (headaches) 30 tablet 0    dexamethasone (DECADRON) 1 mg tablet Take 1 mg by mouth as needed      Docusate Sodium 100 MG capsule Take 100-200 mg by mouth in the morning 100 mg Monday/Wednesday/Friday/Sunday  200 mg Tuesday/Thursday/Saturday      DULoxetine (CYMBALTA) 60 mg delayed release capsule Take 1 capsule (60 mg total) by mouth daily 90  capsule 1    fluticasone (FLONASE) 50 mcg/act nasal spray 1 spray into each nostril daily 48 mL 0    ketorolac (TORADOL) 10 mg tablet TAKE ONE TABLET BY MOUTH AT ONSET OF MIGRAINE, CAN REPEAT X 1 IN 6 HOURS, CAN COMBINE WITH TRIPTAN. TAKE WITH FOOD/MILK/ANTACID 10 tablet 3    Loratadine 10 MG CAPS Take 10 mg by mouth daily       methocarbamol (ROBAXIN) 500 mg tablet Take 1 tablet (500 mg total) by mouth daily at bedtime 90 tablet 3    methylphenidate (CONCERTA) 36 MG ER tablet Take 1 tablet (36 mg total) by mouth daily Max Daily Amount: 36 mg 30 tablet 0    naproxen sodium (ANAPROX) 550 mg tablet Take 1 tablet (550 mg total) by mouth 2 (two) times a day with meals For menstrual cramps 60 tablet 1    omeprazole (PriLOSEC) 40 MG capsule Take 1 capsule (40 mg total) by mouth daily 90 capsule 1    ondansetron (ZOFRAN) 4 mg tablet Take 1 tablet (4 mg total) by mouth every 8 (eight) hours as needed for nausea or vomiting 20 tablet 0    rimegepant sulfate (Nurtec) 75 mg TBDP Take 1 tablet (75 mg total) by mouth as needed (migraine) Limit of 1 in 24 hours 16 tablet 6    rizatriptan (MAXALT-MLT) 10 mg disintegrating tablet DISSOLVE 1 TABLET BY MOUTH ONCE AS NEEDED FOR MIGRAINE. MAY REPEAT EVERY 2 HOURS IF NEEDED. MAX 2/24 HOURS 12 tablet 3    Semaglutide-Weight Management (WEGOVY) 1.7 MG/0.75ML Inject 0.75 mL (1.7 mg total) under the skin once a week 3 mL 5    valACYclovir (VALTREX) 500 mg tablet Take 1 tablet (500 mg total) by mouth daily 90 tablet 1    methylPREDNISolone 4 MG tablet therapy pack Use as directed on package (Patient not taking: Reported on 11/14/2024) 1 each 0     No current facility-administered medications for this visit.     Facility-Administered Medications Ordered in Other Visits   Medication Dose Route Frequency Provider Last Rate Last Admin    lidocaine (PF) (XYLOCAINE-MPF) 1 % injection 30 mL  30 mL Injection Once in imaging John Avila MD        ropivacaine (NAROPIN) injection 5 mL  5 mL  Injection Once John Avila MD         Allergies   Allergen Reactions    Sulfa Antibiotics      Family has allergy she has never taken    Doxycycline Rash and Edema     Whole body rash,bruises behind both thighs         The following portions of the patient's history were reviewed and updated as appropriate: allergies, current medications, past family history, past medical history, past social history, past surgical history, and problem list.    Review of Systems:  Review of Systems   Constitutional: Negative.  Negative for appetite change, fever and unexpected weight change.   HENT: Negative.  Negative for trouble swallowing.    Eyes: Negative.    Respiratory: Negative.  Negative for cough and shortness of breath.    Cardiovascular: Negative.  Negative for chest pain.   Gastrointestinal: Negative.  Negative for abdominal pain, nausea and vomiting.   Endocrine: Negative.    Genitourinary: Negative.  Negative for dysuria.   Musculoskeletal:  Positive for neck pain. Negative for arthralgias and myalgias.   Skin: Negative.    Allergic/Immunologic: Positive for environmental allergies.   Neurological: Negative.  Negative for headaches.   Hematological:  Negative for adenopathy. Bruises/bleeds easily.   Psychiatric/Behavioral: Negative.         Physical Exam:  Physical Exam  Constitutional:       General: She is not in acute distress.     Appearance: Normal appearance. She is well-developed.   HENT:      Head: Normocephalic and atraumatic.   Cardiovascular:      Heart sounds: Normal heart sounds.   Pulmonary:      Breath sounds: Normal breath sounds.   Chest:   Breasts:     Right: No swelling, bleeding, inverted nipple, mass, nipple discharge, skin change or tenderness.      Left: No swelling, bleeding, inverted nipple, mass, nipple discharge, skin change or tenderness.      Comments: Dense nodular tissue bilateral  Abdominal:      Palpations: Abdomen is soft.   Musculoskeletal:      Right lower leg: No edema.       Left lower leg: No edema.   Lymphadenopathy:      Upper Body:      Right upper body: No supraclavicular, axillary or pectoral adenopathy.      Left upper body: No supraclavicular, axillary or pectoral adenopathy.   Neurological:      Mental Status: She is alert and oriented to person, place, and time.   Psychiatric:         Mood and Affect: Mood normal.           [id number] bilateral 3D diagnostic mammogram and right breast ultrasound shows dense breast tissue bilateral, in the upper outer right breast in the area of pain there is a 19 mm oval mass with a sonographic correlate at 10:00 8 cm from the nipple with an adjacent 7 mm oval mass, no axillary findings, left side was benign, short-term follow-up ultrasound was recommended for the right breast    2024 right breast ultrasound shows a stable 19 mm oval hypoechoic lesion at 10:00 thought to represent a fibroadenoma with the adjacent nodule measuring roughly 6 mm which could potentially be a complicated cyst or fibroadenoma, additional short-term follow-up recommended          Discussion/Summary: 39-year-old female who presents with right breast pain that radiates into the axilla.  She attributes this to being associated with the presumed fibroadenoma of the breast.  I discussed these findings with her.  The mass is not readily palpable given the extremely dense breast parenchyma.  I did visualize this on office ultrasound today.  I talked about the option of surgical excision with her.  She would like to wait for the upcoming ultrasound scheduled for January.  If the lesion increases in size, she would like to have this excised.  If it remains stable, she may continue with surveillance but will consider surgical excision.  I will reach out to her following the upcoming ultrasound.

## 2024-11-20 ENCOUNTER — OFFICE VISIT (OUTPATIENT)
Dept: OBGYN CLINIC | Facility: MEDICAL CENTER | Age: 39
End: 2024-11-20
Payer: COMMERCIAL

## 2024-11-20 VITALS
SYSTOLIC BLOOD PRESSURE: 110 MMHG | DIASTOLIC BLOOD PRESSURE: 67 MMHG | HEIGHT: 70 IN | BODY MASS INDEX: 2.43 KG/M2 | HEART RATE: 65 BPM | WEIGHT: 17 LBS

## 2024-11-20 DIAGNOSIS — S43.432A: Primary | ICD-10-CM

## 2024-11-20 PROCEDURE — 99214 OFFICE O/P EST MOD 30 MIN: CPT | Performed by: ORTHOPAEDIC SURGERY

## 2024-11-20 NOTE — PROGRESS NOTES
Ortho Sports Medicine Shoulder Follow Up Visit     Assesment:   39 y.o. female left shoulder labral tear: SLAP 2     Plan:  Patient will revisit formal physical therapy. Patient will return if physical therapy fails for either a CSI or to schedule surgery.   Conservative treatment:    Ice to shoulder 1-2 times daily, for 20 minutes at a time.  PT for ROM and strengthening to shoulder, rotator cuff, scapular stabilizers.  Home exercise program for shoulder, including ROM and strenthening.  Instructions given to patient of what exercises to perform.  Let pain guide return to activities.      Imaging:    All imaging from today was reviewed by myself and explained to the patient.       Injection:    No Injection planned at this time.  May consider future corticosteroid injection depending on clinical exam/imaging.      Surgery:     Surgical intervention was discussed with the patient but patient elected to proceed with the non-operative route.       Follow up:    No follow-ups on file.      Chief Complaint   Patient presents with    Left Shoulder - Follow-up     MRI Arth.         History of Present Illness:    The patient is returns for follow up of left shoulder.  Since the prior visit, She reports minimal improvement. Patient states that she has pain as time with certain movements.     Pain is improved by rest, ice, and NSAIDS.  Pain is aggravated by overhead activity, rotation, and lifting .Symptoms include pain with certain movements.    The patient has weakness.       The patient has tried rest, ice, NSAIDS, and physical therapy.        Shoulder Surgical History:  None    Past Medical, Social and Family History:  Past Medical History:   Diagnosis Date    Abnormal ECG 1/25/19    Incomplete RBBB, repeat EKG 4/30    ADHD     Allergic     Asthma     Cluster headache     GERD (gastroesophageal reflux disease)     Headache(784.0)     Headache, tension-type     HPV (human papilloma virus) infection     HSV-1 infection      HSV-2 infection     Miscarriage 09/2009    Miscarriage 8 weeks, Stillbirth 08/2012 (39 weeks)     Past Surgical History:   Procedure Laterality Date    CERVICAL BIOPSY  W/ LOOP ELECTRODE EXCISION  2012    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    CT EPIDURAL STEROID INJECTION (TAMI LUMBAR) N/A 2018    L5-S1, x2    FL INJECTION LEFT SHOULDER (ARTHROGRAM)  11/12/2024    LAMINECTOMY  2/5/2019    L5-S1 herniated disc    POSTERIOR LAMINECTOMY THORACIC AND LUMBAR SPINE N/A 02/05/2019    Procedure: Lumbar laminectomy, decompression, discectomy left L5-S1 ;  Surgeon: Misa Samaniego MD;  Location: [facility];  Service: Orthopedics    SKIN BIOPSY      SPINE SURGERY  2/5/19    L5-S1 discectomy/laminectomy    TONSILECTOMY AND ADNOIDECTOMY  1990    WISDOM TOOTH EXTRACTION  2007     Allergies   Allergen Reactions    Sulfa Antibiotics      Family has allergy she has never taken    Doxycycline Rash and Edema     Whole body rash,bruises behind both thighs     Current Outpatient Medications on File Prior to Visit   Medication Sig Dispense Refill    acetaZOLAMIDE (DIAMOX) 125 mg tablet 1 tab twice a day (second dose late afternoon/evening) for 7 days then increase to 2 tabs twice a day 120 tablet 2    ascorbic acid (VITAMIN C) 500 MG tablet Take 500 mg by mouth daily      bimatoprost (LATISSE) 0.03 % ophthalmic solution Administer 1 drop to both eyes daily at bedtime Place one drop on applicator and apply evenly along the skin of the upper eyelid at base of eyelashes once daily at bedtime; repeat procedure for second eye (use a clean applicator). 3 mL 3    cholecalciferol (VITAMIN D3) 1,000 units tablet Take 4,000 Units by mouth daily      Cyanocobalamin (VITAMIN B 12 PO) Place 1,000 mcg under the tongue in the morning      cyproheptadine (PERIACTIN) 4 mg tablet Take 1 tablet (4 mg total) by mouth daily at bedtime as needed for allergies (headaches) 30 tablet 0    dexamethasone (DECADRON) 1 mg tablet Take 1 mg by  mouth as needed      Docusate Sodium 100 MG capsule Take 100-200 mg by mouth in the morning 100 mg Monday/Wednesday/Friday/Sunday  200 mg Tuesday/Thursday/Saturday      DULoxetine (CYMBALTA) 60 mg delayed release capsule Take 1 capsule (60 mg total) by mouth daily 90 capsule 1    fluticasone (FLONASE) 50 mcg/act nasal spray 1 spray into each nostril daily 48 mL 0    ketorolac (TORADOL) 10 mg tablet TAKE ONE TABLET BY MOUTH AT ONSET OF MIGRAINE, CAN REPEAT X 1 IN 6 HOURS, CAN COMBINE WITH TRIPTAN. TAKE WITH FOOD/MILK/ANTACID 10 tablet 3    Loratadine 10 MG CAPS Take 10 mg by mouth daily       methocarbamol (ROBAXIN) 500 mg tablet Take 1 tablet (500 mg total) by mouth daily at bedtime 90 tablet 3    methylphenidate (CONCERTA) 36 MG ER tablet Take 1 tablet (36 mg total) by mouth daily Max Daily Amount: 36 mg 30 tablet 0    naproxen sodium (ANAPROX) 550 mg tablet Take 1 tablet (550 mg total) by mouth 2 (two) times a day with meals For menstrual cramps 60 tablet 1    omeprazole (PriLOSEC) 40 MG capsule Take 1 capsule (40 mg total) by mouth daily 90 capsule 1    ondansetron (ZOFRAN) 4 mg tablet Take 1 tablet (4 mg total) by mouth every 8 (eight) hours as needed for nausea or vomiting 20 tablet 0    rimegepant sulfate (Nurtec) 75 mg TBDP Take 1 tablet (75 mg total) by mouth as needed (migraine) Limit of 1 in 24 hours 16 tablet 6    rizatriptan (MAXALT-MLT) 10 mg disintegrating tablet DISSOLVE 1 TABLET BY MOUTH ONCE AS NEEDED FOR MIGRAINE. MAY REPEAT EVERY 2 HOURS IF NEEDED. MAX 2/24 HOURS 12 tablet 3    Semaglutide-Weight Management (WEGOVY) 1.7 MG/0.75ML Inject 0.75 mL (1.7 mg total) under the skin once a week 3 mL 5    methylPREDNISolone 4 MG tablet therapy pack Use as directed on package (Patient not taking: Reported on 11/20/2024) 1 each 0    valACYclovir (VALTREX) 500 mg tablet Take 1 tablet (500 mg total) by mouth daily 90 tablet 1     No current facility-administered medications on file prior to visit.     Social  History     Socioeconomic History    Marital status: /Civil Union     Spouse name: Not on file    Number of children: Not on file    Years of education: Not on file    Highest education level: Not on file   Occupational History    Not on file   Tobacco Use    Smoking status: Former     Current packs/day: 0.00     Types: Cigarettes     Start date: 1996     Quit date: 2016     Years since quittin.8     Passive exposure: Past    Smokeless tobacco: Never    Tobacco comments:     quit > 10 years ago    Vaping Use    Vaping status: Some Days    Substances: THC   Substance and Sexual Activity    Alcohol use: Yes     Comment: Rare use    Drug use: Yes     Frequency: 2.0 times per week     Types: Marijuana     Comment: Medical Marijuana occasionally use    Sexual activity: Yes     Partners: Male     Birth control/protection: None     Comment: Had Mirena removed 10/2018   Other Topics Concern    Not on file   Social History Narrative    Not on file     Social Drivers of Health     Financial Resource Strain: Low Risk  (11/3/2020)    Overall Financial Resource Strain (CARDIA)     Difficulty of Paying Living Expenses: Not hard at all   Food Insecurity: Food Insecurity Present (11/3/2020)    Hunger Vital Sign     Worried About Running Out of Food in the Last Year: Sometimes true     Ran Out of Food in the Last Year: Sometimes true   Transportation Needs: No Transportation Needs (11/3/2020)    PRAPARE - Transportation     Lack of Transportation (Medical): No     Lack of Transportation (Non-Medical): No   Physical Activity: Inactive (11/3/2020)    Exercise Vital Sign     Days of Exercise per Week: 0 days     Minutes of Exercise per Session: 0 min   Stress: No Stress Concern Present (11/3/2020)    [place] [employer]ational [employer] - Occupational Stress Questionnaire     Feeling of Stress : Not at all   Social Connections: Moderately Integrated (11/3/2020)    Social Connection and Isolation Panel [NHANES]     " Frequency of Communication with Friends and Family: More than three times a week     Frequency of Social Gatherings with Friends and Family: More than three times a week     Attends Protestant Services: Never     Active Member of Clubs or Organizations: Yes     Attends Club or Organization Meetings: Never     Marital Status:    Intimate Partner Violence: Not At Risk (12/13/2023)    Humiliation, Afraid, Rape, and Kick questionnaire     Fear of Current or Ex-Partner: No     Emotionally Abused: No     Physically Abused: No     Sexually Abused: No   Housing Stability: Not on file       I have reviewed the past medical, surgical, social and family history, medications and allergies as documented in the EMR.    Review of systems: ROS is negative other than that noted in the HPI.  Constitutional: Negative for fatigue and fever.      Physical Exam:    Blood pressure 110/67, pulse 65, height 5' 10\" (1.778 m), weight 7.711 kg (17 lb), last menstrual period 11/04/2024, not currently breastfeeding.    General/Constitutional: NAD, well developed, well nourished  HENT: Normocephalic, atraumatic  CV: Intact distal pulses, regular rate  Resp: No respiratory distress or labored breathing  GI: Soft and non-tender   Lymphatic: No lymphadenopathy palpated  Neuro: Alert and Oriented x 3, no focal deficits  Psych: Normal mood, normal affect, normal judgement, normal behavior  Skin: Warm, dry, no rashes, no erythema      Shoulder focused exam:       RIGHT LEFT    Scapula Atrophy Negative Negative     Winging Negative Negative     Protraction Negative Negative    Rotator cuff SS 5/5 4/5     IS 5/5 4/5     SubS 5/5 5/5    ROM     170     ER0 60 60     ER90 90    90     IR90 T6    T6     IRb T6    T6    TTP: AC Negative Negative     Biceps Negative Positive     Coracoid Negative Negative    Special Tests: O'Briens Negative Positive     Deng-shear Negative Positive     Cross body Adduction Negative Negative     Speeds  Negative " Negative     Quinn's Negative Negative     Whipple Negative Negative       Neer Negative Negative     Gallegos Negative Negative    Instability: Apprehension & relocation not tested not tested     Load & shift not tested not tested    Other: Crank Negative Negative               UE NV Exam: +2 Radial pulses bilaterally  Sensation intact to light touch C5-T1 bilaterally, Radial/median/ulnar nerve motor intact    Cervical ROM is full without pain, numbness or tingling      Shoulder Imaging    MRI of the left the left shoulder was reviewed which revealed a a type II slap tear       Scribe Attestation      I,:  Enoch Spring am acting as a scribe while in the presence of the attending physician.:       I,:  Gwyn Montano, DO personally performed the services described in this documentation    as scribed in my presence.:

## 2024-12-05 ENCOUNTER — EVALUATION (OUTPATIENT)
Dept: PHYSICAL THERAPY | Facility: CLINIC | Age: 39
End: 2024-12-05
Payer: COMMERCIAL

## 2024-12-05 DIAGNOSIS — S43.432A SUPERIOR LABRUM ANTERIOR-TO-POSTERIOR (SLAP) TEAR OF LEFT SHOULDER: Primary | ICD-10-CM

## 2024-12-05 PROCEDURE — 97164 PT RE-EVAL EST PLAN CARE: CPT | Performed by: PHYSICAL THERAPIST

## 2024-12-05 PROCEDURE — 97112 NEUROMUSCULAR REEDUCATION: CPT | Performed by: PHYSICAL THERAPIST

## 2024-12-05 NOTE — PROGRESS NOTES
PT Re-Evaluation     Today's date: 2024  Patient name: Jo Barros  : 1985  MRN: [id number]  Referring provider: Delroy Huber DO  Dx:   Encounter Diagnosis     ICD-10-CM    1. Superior labrum anterior-to-posterior (SLAP) tear of left shoulder  S43.432A                      Assessment  Impairments: abnormal muscle firing, lacks appropriate home exercise program and pain with function  Symptom irritability: low    Assessment details: Pt since last visit has improved rom but continued pain levels she has continued rtc weakness especially when moving the elbow away from the body. With new diagnosis of Slap type 2 tear we will work more on shoulder stability exercises to improve the pt's function and reduce pain levels.     Goals  Short term- 4 wks  1. Pt will demonstrate increased extension ROM by 10 degrees met  2. Pt will demonstrate consistent performance of HEP met  3. Pt will demonstrate increased L shoulder ER of 4/5 no met    Long term-8wks  1. Pt will be able to lift 10 lb object above shoulder height without symptom reproduction not met  2. PT will bet able to move desk at work with out difficulty not met  3. Pt will be able to drive with left arm without pain reproduction not met    Plan  Patient would benefit from: skilled physical therapy    Planned therapy interventions: manual therapy, ADL retraining, patient/caregiver education, stretching, strengthening, therapeutic activities, therapeutic exercise and neuromuscular re-education    Frequency: 1x week  Duration in weeks: 8  Plan of Care beginning date: 2024  Plan of Care expiration date: 2025  Treatment plan discussed with: patient        Subjective Evaluation    History of Present Illness  Mechanism of injury: Pt notes that since last visit to PT she has been continuing with some of her exercises but still has weakness in the shoulder and pian with putting pressure out away from the body. Pt notes that she has had an MRI since  last visit, and was dx'd with a type 2 slap repair. She would like to continue with therapy in order to avoid both injections  and definitely surgery.   Quality of life: good    Patient Goals  Patient goals for therapy: increased strength and decreased pain    Pain  Current pain ratin  At best pain ratin  At worst pain ratin  Location: infraspinatus insertion  Quality: tight and pulling  Alleviating factors: massage and persussion gun.          Objective     Postural Observations  Seated posture: good  Standing posture: good      Palpation   Left   No palpable tenderness to the middle trapezius and rhomboids.   Tenderness of the infraspinatus and subscapularis.     Right   No palpable tenderness to the middle trapezius and rhomboids.   Tenderness of the rhomboids.     Active Range of Motion   Left Shoulder   Flexion: 159 degrees   Extension: 35 degrees   Abduction: 158 degrees   External rotation BTH: C5   Internal rotation BTB: T5     Right Shoulder   Flexion: 149 degrees   Extension: 40 degrees   Abduction: WFL  External rotation BTH: C3   Internal rotation BTB: T7     Scapular Mobility   Left Shoulder   Scapular mobility: fair    Right Shoulder   Scapular mobility: good    Joint Play   Left Shoulder  Joints within functional limits are the anterior capsule, posterior capsule and inferior capsule.     Right Shoulder  Joints within functional limits are the anterior capsule, posterior capsule and inferior capsule.     Strength/Myotome Testing     Left Shoulder     Planes of Motion   Flexion: 4   Extension: 4-   Abduction: 5   External rotation at 0°: 4   External rotation at 90°: 3+   Internal rotation at 0°: 4+   Internal rotation at 90°: 3+     Isolated Muscles   Middle trapezius: 4     Right Shoulder     Planes of Motion   Flexion: 4+   Extension: 4   Abduction: 5   External rotation at 0°: 4     Isolated Muscles   Middle trapezius: 4+     Tests     Left Shoulder   Negative Hawkin's, Speed's and  "Yergason's.     Right Shoulder   Negative Speed's and Yergason's.              Precautions: none    Manuals 8/27 9/3 9/10 9/17 9/24 10/1 10/8 10/22 10/29 12/5   Tpr, biceps, pec and infraL  DL DL DL Lf FH Subscap, pec, infra stm DB Subscap,pec,infra stm-DL Subscap, pec, infra -DL                                           Neuro Re-Ed             Supine alphabet          5# x1    Seated serratus flexion   \         Red x10   Wall clocks towel          X5 ea   Supine bottoms up long axis IR/ER          nv   Supine to s/l arm bar          nv                             Ther Ex             UBE for endurance/strength  2'x2' 2'x2' 2'x2' 5'  ellip 4'/'4' Ellip 3'x3' Ellip 6' ellip    Doorway pec stretch 20\"x5            Prone horizontal abd 2x10  2\" 2\" x10  2\"x10 2x10  2# 2x10 2#       B ER        Gtb 2x10 Gtb 2x10    Shoulder flexion with pillowcase  x10 2x10 2x10 2x10 2x10       TB shoulder extension    Gtb  x20 Blue 2x10 Blue  2x10 Blue  2x10 Blue 3x10  Blue supinated  2x10 Blk supinated 2x10    TB chest press  nv Blue 2x10  Blue  2x10 Blue 2x10    Blk 2x10    TB rows  bLue 2x10 Blue 2x10 Blue  2x10 Blue  2x10 Blue 3 x10    Silver 2x10    S/l ER with towel    2# x20 2#  2x10 2# 2x10  3# 2x10 3#  2x10 3#  2x10    Ther Activity                                       Gait Training                                       Modalities                                                "

## 2024-12-06 ENCOUNTER — OFFICE VISIT (OUTPATIENT)
Dept: NEUROLOGY | Facility: CLINIC | Age: 39
End: 2024-12-06
Payer: COMMERCIAL

## 2024-12-06 VITALS
TEMPERATURE: 97.6 F | WEIGHT: 168.4 LBS | HEIGHT: 70 IN | HEART RATE: 76 BPM | BODY MASS INDEX: 24.11 KG/M2 | DIASTOLIC BLOOD PRESSURE: 64 MMHG | SYSTOLIC BLOOD PRESSURE: 111 MMHG

## 2024-12-06 DIAGNOSIS — G43.109 MIGRAINE WITH AURA AND WITHOUT STATUS MIGRAINOSUS, NOT INTRACTABLE: ICD-10-CM

## 2024-12-06 DIAGNOSIS — M54.2 CERVICALGIA: ICD-10-CM

## 2024-12-06 DIAGNOSIS — G43.001 MIGRAINE WITHOUT AURA AND WITH STATUS MIGRAINOSUS, NOT INTRACTABLE: Primary | ICD-10-CM

## 2024-12-06 DIAGNOSIS — R51.9 MORNING HEADACHE: ICD-10-CM

## 2024-12-06 DIAGNOSIS — G93.2 INTRACRANIAL HYPERTENSION: ICD-10-CM

## 2024-12-06 PROCEDURE — 99214 OFFICE O/P EST MOD 30 MIN: CPT | Performed by: PHYSICIAN ASSISTANT

## 2024-12-06 RX ORDER — ACETAZOLAMIDE 250 MG/1
250 TABLET ORAL 2 TIMES DAILY
Qty: 180 TABLET | Refills: 3 | Status: SHIPPED | OUTPATIENT
Start: 2024-12-06

## 2024-12-06 RX ORDER — RIMEGEPANT SULFATE 75 MG/75MG
75 TABLET, ORALLY DISINTEGRATING ORAL AS NEEDED
Qty: 16 TABLET | Refills: 6 | Status: SHIPPED | OUTPATIENT
Start: 2024-12-06

## 2024-12-06 RX ORDER — RIZATRIPTAN BENZOATE 10 MG/1
10 TABLET, ORALLY DISINTEGRATING ORAL AS NEEDED
Qty: 12 TABLET | Refills: 3 | Status: SHIPPED | OUTPATIENT
Start: 2024-12-06

## 2024-12-06 RX ORDER — DEXAMETHASONE 4 MG/1
4 TABLET ORAL AS NEEDED
Qty: 9 TABLET | Refills: 2 | Status: SHIPPED | OUTPATIENT
Start: 2024-12-06

## 2024-12-06 RX ORDER — METHOCARBAMOL 500 MG/1
500 TABLET, FILM COATED ORAL
Qty: 90 TABLET | Refills: 2 | Status: SHIPPED | OUTPATIENT
Start: 2024-12-06

## 2024-12-06 NOTE — ASSESSMENT & PLAN NOTE
Consider additional consultation with sleep medicine as currently do not meet criteria for sleep apnea but sleep is likely causing these morning headaches

## 2024-12-06 NOTE — PATIENT INSTRUCTIONS
1. Migraine without aura and with status migrainosus, not intractable  Assessment & Plan:  Preventive therapy:   Reproductive age women: Should take folic acid daily when taking anti-seizure drugs especially Depakote.  -Over-the-counter supplements: to decrease intensity and frequency of migraines  - Magnesium Oxide 400mg twice a day.  If any diarrhea or upset stomach, decrease dose  as tolerated  (oral magnesium oxide may be an effective preventive strategy for people with migraine. Some theories about how it works include the idea that magnesium can help to prevent waves of cortical spreading depression and aura. Magnesium, in theory, also reduces the release of inflammatory or activating chemicals that can cause migraine)  - Vitamin B2 200 mg twice a day. May cause the urine to turn yellow which is normal for B 2 to do and is not a sign that you are dehydrated. (may be an effective preventive medication in some people with migraine)  - Vitamin D3 9288-3227 units daily  - Robaxin 500 mg at bedtime, if this fails let me know, we can either increase the dose or try another one to see if help  Acetazolamide 125 mg 1 tab twice a day.  May use additional prn for weather headaches as tolerated  Abortive therapy:  - at onset of her headaches she is to take nurtec 75 mg.  If fails may use Maxalt sublingual 10 mg and Toradol 10 mg in two hours.  May repeat Maxalt in 2 hours if needed, limit of 3 a week or 9 a month    - Cyproheptadine 4 mg at bedtime as needed for weather or sinus headaches    For severe headaches which will not break after 1 day decadron 4 mg 2 tabs for 2 days then 1 tab for 3 days however may stop when breaks  Orders:  -     dexamethasone (DECADRON) 4 mg tablet; Take 1 tablet (4 mg total) by mouth as needed (intractable headache) Take 2 tabs for 2 day then 1 for 3 days.  May stop when h/a breaks if don't need all 5 days  -     rizatriptan (MAXALT-MLT) 10 mg disintegrating tablet; Take 1 tablet (10 mg  total) by mouth as needed for migraine Take at the onset of migraine; if symptoms continue or return, may take another dose at least 2 hours after first dose. Take no more than 2 doses in a day.  2. Intracranial hypertension  -     acetaZOLAMIDE (DIAMOX) 250 mg tablet; Take 1 tablet (250 mg total) by mouth 2 (two) times a day 1 tab twice a day (second dose late afternoon/evening) for 7 days then increase to 2 tabs twice a day  3. Cervicalgia  -     methocarbamol (ROBAXIN) 500 mg tablet; Take 1 tablet (500 mg total) by mouth daily at bedtime as needed for muscle spasms  4. Migraine with aura and without status migrainosus, not intractable  -     rimegepant sulfate (Nurtec) 75 mg TBDP; Take 1 tablet (75 mg total) by mouth as needed (migraine) Limit of 1 in 24 hours

## 2024-12-06 NOTE — ASSESSMENT & PLAN NOTE
Preventive therapy:   Reproductive age women: Should take folic acid daily when taking anti-seizure drugs especially Depakote.  -Over-the-counter supplements: to decrease intensity and frequency of migraines  - Magnesium Oxide 400mg twice a day.  If any diarrhea or upset stomach, decrease dose  as tolerated  (oral magnesium oxide may be an effective preventive strategy for people with migraine. Some theories about how it works include the idea that magnesium can help to prevent waves of cortical spreading depression and aura. Magnesium, in theory, also reduces the release of inflammatory or activating chemicals that can cause migraine)  - Vitamin B2 200 mg twice a day. May cause the urine to turn yellow which is normal for B 2 to do and is not a sign that you are dehydrated. (may be an effective preventive medication in some people with migraine)  - Vitamin D3 5897-1209 units daily  - Robaxin 500 mg at bedtime, if this fails let me know, we can either increase the dose or try another one to see if help  Acetazolamide 125 mg 1 tab twice a day.  May use additional prn for weather headaches as tolerated  Abortive therapy:  - at onset of her headaches she is to take nurtec 75 mg.  If fails may use Maxalt sublingual 10 mg and Toradol 10 mg in two hours.  May repeat Maxalt in 2 hours if needed, limit of 3 a week or 9 a month    - Cyproheptadine 4 mg at bedtime as needed for weather or sinus headaches    For severe headaches which will not break after 1 day decadron 4 mg 2 tabs for 2 days then 1 tab for 3 days however may stop when breaks

## 2024-12-06 NOTE — PROGRESS NOTES
Neurology Ambulatory Visit  Name: Jo Barros       : 1985       MRN: [id number]   Encounter Provider: Nancy Connolly PA-C   Encounter Date: 2024  Encounter department: [facility] [place] [address]. Migraine without aura and with status migrainosus, not intractable  Assessment & Plan:  Preventive therapy:   Reproductive age women: Should take folic acid daily when taking anti-seizure drugs especially Depakote.  -Over-the-counter supplements: to decrease intensity and frequency of migraines  - Magnesium Oxide 400mg twice a day.  If any diarrhea or upset stomach, decrease dose  as tolerated  (oral magnesium oxide may be an effective preventive strategy for people with migraine. Some theories about how it works include the idea that magnesium can help to prevent waves of cortical spreading depression and aura. Magnesium, in theory, also reduces the release of inflammatory or activating chemicals that can cause migraine)  - Vitamin B2 200 mg twice a day. May cause the urine to turn yellow which is normal for B 2 to do and is not a sign that you are dehydrated. (may be an effective preventive medication in some people with migraine)  - Vitamin D3 7848-0434 units daily  - Robaxin 500 mg at bedtime, if this fails let me know, we can either increase the dose or try another one to see if help  Acetazolamide 125 mg 1 tab twice a day.  May use additional prn for weather headaches as tolerated  Abortive therapy:  - at onset of her headaches she is to take nurtec 75 mg.  If fails may use Maxalt sublingual 10 mg and Toradol 10 mg in two hours.  May repeat Maxalt in 2 hours if needed, limit of 3 a week or 9 a month    - Cyproheptadine 4 mg at bedtime as needed for weather or sinus headaches    For severe headaches which will not break after 1 day decadron 4 mg 2 tabs for 2 days then 1 tab for 3 days however may stop when breaks  Orders:  -     dexamethasone (DECADRON) 4 mg tablet; Take 1 tablet (4  mg total) by mouth as needed (intractable headache) Take 2 tabs for 2 day then 1 for 3 days.  May stop when h/a breaks if don't need all 5 days  -     rizatriptan (MAXALT-MLT) 10 mg disintegrating tablet; Take 1 tablet (10 mg total) by mouth as needed for migraine Take at the onset of migraine; if symptoms continue or return, may take another dose at least 2 hours after first dose. Take no more than 2 doses in a day.  2. Intracranial hypertension  -     acetaZOLAMIDE (DIAMOX) 250 mg tablet; Take 1 tablet (250 mg total) by mouth 2 (two) times a day 1 tab twice a day (second dose late afternoon/evening) for 7 days then increase to 2 tabs twice a day  3. Cervicalgia  -     methocarbamol (ROBAXIN) 500 mg tablet; Take 1 tablet (500 mg total) by mouth daily at bedtime as needed for muscle spasms  4. Migraine with aura and without status migrainosus, not intractable  -     rimegepant sulfate (Nurtec) 75 mg TBDP; Take 1 tablet (75 mg total) by mouth as needed (migraine) Limit of 1 in 24 hours  5. Morning headache  Assessment & Plan:  Consider additional consultation with sleep medicine as currently do not meet criteria for sleep apnea but sleep is likely causing these morning headaches      History of Present Illness     HPI   Jo Barros is a 39 y.o.  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      Medical history review:  Qtc:  04/30/2019- 409 - IRBBB  Tobacco use:yes, 2014 - she was smoking 1/2 a pack for 15 years.   Herpes 1-2 and takes valtrex for the last 7 years  Mils asthma  L5 S1 laminectomy in 2019      Headaches:   Any family history of migraines? brother  Any family history of aneurysms? no     Have you seen someone else for headaches or pain? none     Headaches started at what age? Middle school and they got worse when she was 34     What medications do you take or have you taken for your headaches/pain/mood?   Current preventative:  Vitamin D3, B12, vitamin  C  Cyproheptadine--as needed  Acetazolamide  Duloxetine, Concerta  Methocarbamol     Current abortive:  Rizatriptan  Nurtec  Naproxen  Ketorolac  Decadron  Depakote     Prior Preventive therapy:   Gabapentin 300 mg x1,  Robaxin 500 mg 4 times a day,     Prior Abortive Therapy:   Fentanyl, Dilaudid, tramadol 50 mg,  Tylenol 650 /975 mg,  Decadron injection, methylprednisolone 160 mg x1, prednisone  Titration pack,  Toradol injection, naproxen 500 mg,  Zofran  4 mg injection, Reglan 5 mg,  Sumatriptan 50 mg,          What is your current pain level? 1-2/10     How often do the headaches occur?   Mild headaches: wakes up every day  Moderate to severe headaches: 0-1 as month, 1-2 severe a year; prior was 1 every few months (prior was May)     Are you ever headache free? No      Aura/Warning and how long does it last?  blurred vision in my Right eye in the center of my visual field. That same day, approx 1 hr later it became a multi colored, almost QRS/EKG like limes in my Left eye.      What time of the day do the headaches start?   Mild headaches: wakes up with them  Moderate to severe headaches: usually later in the day     How long do the headaches last?   Mild headaches: 1 hour to entire day  Moderate to severe headaches: 2 hours to rest of the day; prior was 4 days     Where is your headache located?   Mild headaches: differs Frontal above eyes or occipital and shoulder  Moderate to severe headaches: bilateral supraorbital region, occipitalis and neck     Describe your usual headache?  Mild headaches: achy, dull, ?pressure  Moderate to severe headaches: throbbing and pressure     What is the intensity of pain?   Mild headaches: 2-3/10  Moderate to severe headaches: 5-10/10; average 5/10, gets to a 10/10 rarely     Associated symptoms:   - Decreased appetite, Nausea       - Photophobia, Phonophobia, Osmophobia - yes sometimes  - right eye film/hazy  - Flushing of face    - Stiff or sore neck   - Dizziness, light  headed- sometimes  - Problems with concentration  - Prefer to be in a cool, quiet, dark room     Number of days missed per month because of headaches:  Work (or school) days: works through  Social or Family activities: does not affect her at home     Headache are worse if the patient: cough, sneeze, bending over, exertion  Headache triggers:  Not sure  What time of the year do headaches occur more frequently? none     Have you had trigger point injection performed and how often? No  Have you had Botox injection performed and how often? No   Have you had epidural injections or transforaminal injections performed? No     Alternative therapies used in the past for headaches? none  Have you used CBD or THC for your headaches and how often? No  How many caffeine products to drink a day? One cup on the day she works  How much water to drink a day? 68 ounces to a gallon a day     Are you current pregnant or planning on getting pregnant? no       Have you ever had any Brain imaging? yes     9/19/2023 MRA head  Normal MRA examination of the brain.      9/19/2023 MRA carotids  Normal MR angiography examination of the neck.      9/19/2023 MRI brain  Normal examination.  Upon further review on 11/28/2023 do see evidence of cerebellar medullary crowding, slightly tortuous optic nerves with slight descent of the cerebellar tonsil     - Reviewed old notes from physician seen in the past   Recent laboratory data was reviewed.  Medications and allergies were reviewed.     Sleep:  Patient had home sleep study on 12/21/2022 with the following results:    The patient had a total of 38 respiratory events made up of 20 obstructive apneas, 0 central apneas, 0 mixed apneas and 18 hypopneas resulting in a respiratory event index (RINA) of 3.7. The lowest SpO2 recorded is 89%. The snore index was 0%     Diagnostic sleep study 3/2024:  IMPRESSION: 1. This test does not support the diagnosis of obstructive sleep apnea as the apnea hypopnea  "index (AHI) was normal (Normal AHI is less than 5) 2. Normal baseline oxygen saturation. 3. Sleep efficiency was normal.. Normal sleep and REM latency. Stage N3 sleep (deep sleep) percentage was normal. REM sleep percentage was decreased on this test. 4. No evidence of periodic limb movements during sleep. 5. EKG showed normal sinus rhythm. RECOMMENDATION: Clinical correlation is required. While the apnea hypopnea index is normal on this test, reduced REM percentage could have caused an underestimation of apnea severity. If there is still high clinical suspicion, a repeat sleep study can be considered.     Reviewed old notes from physician seen in the past- see above HPI for summary of previous encounters.     Review of Systems      Objective     /64 (BP Location: Left arm, Patient Position: Sitting, Cuff Size: Standard)   Pulse 76   Temp 97.6 °F (36.4 °C) (Temporal)   Ht 5' 10\" (1.778 m)   Wt 76.4 kg (168 lb 6.4 oz)   BMI 24.16 kg/m²    Physical Exam  Neurologic Exam  CONSTITUTIONAL: Well developed, well nourished, well groomed. No dysmorphic features.     Eyes:  EOM normal      Neck:  Normal ROM, neck supple.      HEENT:  Normocephalic atraumatic.    Chest:  Respirations regular and unlabored.    Psychiatric:  Normal behavior and appropriate affect      MENTAL STATUS  Orientation: Alert and oriented x 3  Fund of knowledge: Intact.    MOTOR (Upper and lower extremities)   Bulk/tone/abnormal movement: Normal muscle bulk and tone.      COORDINATION   Station/Gait: Normal baseline gait.'    Administrative Statements     I have spent a total time of 33 minutes in caring for this patient on the day of the visit/encounter including Diagnostic results, Prognosis, Risk factor reductions, Impressions, Counseling / Coordination of care, Documenting in the medical record, Reviewing / ordering tests, medicine, procedures  , and Obtaining or reviewing history  .      "

## 2024-12-12 ENCOUNTER — OFFICE VISIT (OUTPATIENT)
Dept: PHYSICAL THERAPY | Facility: CLINIC | Age: 39
End: 2024-12-12
Payer: COMMERCIAL

## 2024-12-12 DIAGNOSIS — F98.8 ATTENTION DEFICIT DISORDER (ADD) IN ADULT: ICD-10-CM

## 2024-12-12 DIAGNOSIS — B00.9 HERPES: ICD-10-CM

## 2024-12-12 DIAGNOSIS — S43.432A SUPERIOR LABRUM ANTERIOR-TO-POSTERIOR (SLAP) TEAR OF LEFT SHOULDER: Primary | ICD-10-CM

## 2024-12-12 PROCEDURE — 97112 NEUROMUSCULAR REEDUCATION: CPT

## 2024-12-12 PROCEDURE — 97140 MANUAL THERAPY 1/> REGIONS: CPT

## 2024-12-12 RX ORDER — METHYLPHENIDATE HYDROCHLORIDE 36 MG/1
36 TABLET ORAL DAILY
Qty: 30 TABLET | Refills: 0 | Status: SHIPPED | OUTPATIENT
Start: 2024-12-12 | End: 2024-12-19 | Stop reason: SDUPTHER

## 2024-12-12 RX ORDER — VALACYCLOVIR HYDROCHLORIDE 500 MG/1
500 TABLET, FILM COATED ORAL DAILY
Qty: 90 TABLET | Refills: 1 | Status: SHIPPED | OUTPATIENT
Start: 2024-12-12 | End: 2025-06-10

## 2024-12-12 NOTE — PROGRESS NOTES
Daily Note     Today's date: 2024  Patient name: Jo Barros  : 1985  MRN: [id number]  Referring provider: Delroy Huber DO  Dx:   Encounter Diagnosis     ICD-10-CM    1. Superior labrum anterior-to-posterior (SLAP) tear of left shoulder  S43.432A                      Subjective: pt states that she is sore all over.        Objective: See treatment diary below      Assessment: Tolerated treatment with mild tightness in pec and infra.  . Patient was able to advance with exercises as noted below and was with good form on those performed last visit.       Plan: Continue per plan of care.      Precautions: none    Manuals    Tpr, biceps, pec and infraL DL                                                   Neuro Re-Ed             Supine alphabet 3#x1         5# x1    Seated serratus flexion Red x10         Red x10   Wall clocks towel x10         X5 ea   Supine bottoms up long axis IR/ER 9 kb x10         nv   Supine to s/l arm bar 4#  x10         nv                             Ther Ex             UBE for endurance/strength             Doorway pec stretch             Prone horizontal abd             B ER                          TB shoulder extension   Blk x10            TB chest press             TB rows Blk x10            S/l ER with towel             Ther Activity                                       Gait Training                                       Modalities

## 2024-12-12 NOTE — TELEPHONE ENCOUNTER
Reason for call:   [x] Refill   [] Prior Auth  [] Other:     Office:   [] PCP/Provider -   [x] Specialty/Provider -     Medication: methylphenidate (CONCERTA) 36 MG ER tablet     Dose/Frequency: 1 daily     Quantity: 30 tab    Pharmacy: [id number] [address]     Does the patient have enough for 3 days?   [] Yes   [] No - Send as HP to POD

## 2024-12-12 NOTE — TELEPHONE ENCOUNTER
Medication:  PDMP  11/06/2024 11/06/2024 Methylphenidate Hcl (Tablet, Extended Release) 30.0 30 36 MG NA [facility] Commercial Insurance 0 / 0 PA   1 [id number] 09/19/2024 09/19/2024 Methylphenidate Hcl (Tablet, Extended Release) 30.0 30 36 MG NA [facility] Commercial Insurance 0 / 0 PA   1 [id number] 08/19/2024 08/19/2024 Methylphenidate Hcl (Tablet, Extended Release) 30.0 30 36 MG NA [facility] Commercial Insurance 0 / 0  Active agreement on file -

## 2024-12-17 ENCOUNTER — APPOINTMENT (OUTPATIENT)
Dept: LAB | Facility: CLINIC | Age: 39
End: 2024-12-17
Payer: COMMERCIAL

## 2024-12-17 ENCOUNTER — OFFICE VISIT (OUTPATIENT)
Dept: FAMILY MEDICINE CLINIC | Facility: CLINIC | Age: 39
End: 2024-12-17
Payer: COMMERCIAL

## 2024-12-17 VITALS
SYSTOLIC BLOOD PRESSURE: 104 MMHG | RESPIRATION RATE: 17 BRPM | WEIGHT: 167.8 LBS | OXYGEN SATURATION: 97 % | DIASTOLIC BLOOD PRESSURE: 62 MMHG | HEIGHT: 71 IN | TEMPERATURE: 97.9 F | HEART RATE: 74 BPM | BODY MASS INDEX: 23.49 KG/M2

## 2024-12-17 DIAGNOSIS — F90.2 ATTENTION DEFICIT HYPERACTIVITY DISORDER (ADHD), COMBINED TYPE: ICD-10-CM

## 2024-12-17 DIAGNOSIS — G43.001 MIGRAINE WITHOUT AURA AND WITH STATUS MIGRAINOSUS, NOT INTRACTABLE: ICD-10-CM

## 2024-12-17 DIAGNOSIS — R20.2 PARESTHESIA: ICD-10-CM

## 2024-12-17 DIAGNOSIS — Z11.59 NEED FOR HEPATITIS C SCREENING TEST: ICD-10-CM

## 2024-12-17 DIAGNOSIS — Z00.00 ANNUAL PHYSICAL EXAM: Primary | ICD-10-CM

## 2024-12-17 DIAGNOSIS — Z00.00 ANNUAL PHYSICAL EXAM: ICD-10-CM

## 2024-12-17 DIAGNOSIS — Z11.4 SCREENING FOR HIV (HUMAN IMMUNODEFICIENCY VIRUS): ICD-10-CM

## 2024-12-17 LAB
25(OH)D3 SERPL-MCNC: 60.5 NG/ML (ref 30–100)
ALBUMIN SERPL BCG-MCNC: 4.1 G/DL (ref 3.5–5)
ALP SERPL-CCNC: 42 U/L (ref 34–104)
ALT SERPL W P-5'-P-CCNC: 9 U/L (ref 7–52)
ANION GAP SERPL CALCULATED.3IONS-SCNC: 6 MMOL/L (ref 4–13)
AST SERPL W P-5'-P-CCNC: 15 U/L (ref 13–39)
BASOPHILS # BLD AUTO: 0 THOUSANDS/ÂΜL (ref 0–0.1)
BASOPHILS NFR BLD AUTO: 0 % (ref 0–1)
BILIRUB SERPL-MCNC: 0.44 MG/DL (ref 0.2–1)
BUN SERPL-MCNC: 13 MG/DL (ref 5–25)
CALCIUM SERPL-MCNC: 8.9 MG/DL (ref 8.4–10.2)
CHLORIDE SERPL-SCNC: 104 MMOL/L (ref 96–108)
CHOLEST SERPL-MCNC: 141 MG/DL (ref ?–200)
CO2 SERPL-SCNC: 28 MMOL/L (ref 21–32)
CREAT SERPL-MCNC: 0.8 MG/DL (ref 0.6–1.3)
EOSINOPHIL # BLD AUTO: 0.12 THOUSAND/ÂΜL (ref 0–0.61)
EOSINOPHIL NFR BLD AUTO: 3 % (ref 0–6)
ERYTHROCYTE [DISTWIDTH] IN BLOOD BY AUTOMATED COUNT: 12.1 % (ref 11.6–15.1)
EST. AVERAGE GLUCOSE BLD GHB EST-MCNC: 91 MG/DL
FOLATE SERPL-MCNC: 10.3 NG/ML
GFR SERPL CREATININE-BSD FRML MDRD: 93 ML/MIN/1.73SQ M
GLUCOSE P FAST SERPL-MCNC: 76 MG/DL (ref 65–99)
HBA1C MFR BLD: 4.8 %
HCT VFR BLD AUTO: 38 % (ref 34.8–46.1)
HCV AB SER QL: NORMAL
HDLC SERPL-MCNC: 64 MG/DL
HGB BLD-MCNC: 12.5 G/DL (ref 11.5–15.4)
HIV 1+2 AB+HIV1 P24 AG SERPL QL IA: NORMAL
HIV 2 AB SERPL QL IA: NORMAL
HIV1 AB SERPL QL IA: NORMAL
HIV1 P24 AG SERPL QL IA: NORMAL
IMM GRANULOCYTES # BLD AUTO: 0.01 THOUSAND/UL (ref 0–0.2)
IMM GRANULOCYTES NFR BLD AUTO: 0 % (ref 0–2)
LDLC SERPL CALC-MCNC: 70 MG/DL (ref 0–100)
LYMPHOCYTES # BLD AUTO: 1.82 THOUSANDS/ÂΜL (ref 0.6–4.47)
LYMPHOCYTES NFR BLD AUTO: 40 % (ref 14–44)
MCH RBC QN AUTO: 30.6 PG (ref 26.8–34.3)
MCHC RBC AUTO-ENTMCNC: 32.9 G/DL (ref 31.4–37.4)
MCV RBC AUTO: 93 FL (ref 82–98)
MONOCYTES # BLD AUTO: 0.45 THOUSAND/ÂΜL (ref 0.17–1.22)
MONOCYTES NFR BLD AUTO: 10 % (ref 4–12)
NEUTROPHILS # BLD AUTO: 2.17 THOUSANDS/ÂΜL (ref 1.85–7.62)
NEUTS SEG NFR BLD AUTO: 47 % (ref 43–75)
NONHDLC SERPL-MCNC: 77 MG/DL
NRBC BLD AUTO-RTO: 0 /100 WBCS
PLATELET # BLD AUTO: 201 THOUSANDS/UL (ref 149–390)
PMV BLD AUTO: 10.5 FL (ref 8.9–12.7)
POTASSIUM SERPL-SCNC: 3.9 MMOL/L (ref 3.5–5.3)
PROT SERPL-MCNC: 6 G/DL (ref 6.4–8.4)
RBC # BLD AUTO: 4.08 MILLION/UL (ref 3.81–5.12)
SODIUM SERPL-SCNC: 138 MMOL/L (ref 135–147)
TRIGL SERPL-MCNC: 36 MG/DL (ref ?–150)
TSH SERPL DL<=0.05 MIU/L-ACNC: 1.21 UIU/ML (ref 0.45–4.5)
VIT B12 SERPL-MCNC: 555 PG/ML (ref 180–914)
WBC # BLD AUTO: 4.57 THOUSAND/UL (ref 4.31–10.16)

## 2024-12-17 PROCEDURE — 85025 COMPLETE CBC W/AUTO DIFF WBC: CPT

## 2024-12-17 PROCEDURE — 82607 VITAMIN B-12: CPT

## 2024-12-17 PROCEDURE — 82306 VITAMIN D 25 HYDROXY: CPT

## 2024-12-17 PROCEDURE — 36415 COLL VENOUS BLD VENIPUNCTURE: CPT

## 2024-12-17 PROCEDURE — 80053 COMPREHEN METABOLIC PANEL: CPT

## 2024-12-17 PROCEDURE — 99395 PREV VISIT EST AGE 18-39: CPT | Performed by: FAMILY MEDICINE

## 2024-12-17 PROCEDURE — 83036 HEMOGLOBIN GLYCOSYLATED A1C: CPT

## 2024-12-17 PROCEDURE — 87389 HIV-1 AG W/HIV-1&-2 AB AG IA: CPT

## 2024-12-17 PROCEDURE — 86803 HEPATITIS C AB TEST: CPT

## 2024-12-17 PROCEDURE — 84443 ASSAY THYROID STIM HORMONE: CPT

## 2024-12-17 PROCEDURE — 80061 LIPID PANEL: CPT

## 2024-12-17 PROCEDURE — 82746 ASSAY OF FOLIC ACID SERUM: CPT

## 2024-12-17 NOTE — PATIENT INSTRUCTIONS
"Patient Education     Routine physical for adults   The Basics   Written by the doctors and editors at [facility]   What is a physical? -- A physical is a routine visit, or \"check-up,\" with your doctor. You might also hear it called a \"wellness visit\" or \"preventive visit.\"  During each visit, the doctor will:   Ask about your physical and mental health   Ask about your habits, behaviors, and lifestyle   Do an exam   Give you vaccines if needed   Talk to you about any medicines you take   Give advice about your health   Answer your questions  Getting regular check-ups is an important part of taking care of your health. It can help your doctor find and treat any problems you have. But it's also important for preventing health problems.  A routine physical is different from a \"sick visit.\" A sick visit is when you see a doctor because of a health concern or problem. Since physicals are scheduled ahead of time, you can think about what you want to ask the doctor.  How often should I get a physical? -- It depends on your age and health. In general, for people age 21 years and older:   If you are younger than 50 years, you might be able to get a physical every 3 years.   If you are 50 years or older, your doctor might recommend a physical every year.  If you have an ongoing health condition, like diabetes or high blood pressure, your doctor will probably want to see you more often.  What happens during a physical? -- In general, each visit will include:   Physical exam - The doctor or nurse will check your height, weight, heart rate, and blood pressure. They will also look at your eyes and ears. They will ask about how you are feeling and whether you have any symptoms that bother you.   Medicines - It's a good idea to bring a list of all the medicines you take to each doctor visit. Your doctor will talk to you about your medicines and answer any questions. Tell them if you are having any side effects that bother you. You " "should also tell them if you are having trouble paying for any of your medicines.   Habits and behaviors - This includes:   Your diet   Your exercise habits   Whether you smoke, drink alcohol, or use drugs   Whether you are sexually active   Whether you feel safe at home  Your doctor will talk to you about things you can do to improve your health and lower your risk of health problems. They will also offer help and support. For example, if you want to quit smoking, they can give you advice and might prescribe medicines. If you want to improve your diet or get more physical activity, they can help you with this, too.   Lab tests, if needed - The tests you get will depend on your age and situation. For example, your doctor might want to check your:   Cholesterol   Blood sugar   Iron level   Vaccines - The recommended vaccines will depend on your age, health, and what vaccines you already had. Vaccines are very important because they can prevent certain serious or deadly infections.   Discussion of screening - \"Screening\" means checking for diseases or other health problems before they cause symptoms. Your doctor can recommend screening based on your age, risk, and preferences. This might include tests to check for:   Cancer, such as breast, prostate, cervical, ovarian, colorectal, prostate, lung, or skin cancer   Sexually transmitted infections, such as chlamydia and gonorrhea   Mental health conditions like depression and anxiety  Your doctor will talk to you about the different types of screening tests. They can help you decide which screenings to have. They can also explain what the results might mean.   Answering questions - The physical is a good time to ask the doctor or nurse questions about your health. If needed, they can refer you to other doctors or specialists, too.  Adults older than 65 years often need other care, too. As you get older, your doctor will talk to you about:   How to prevent falling at " home   Hearing or vision tests   Memory testing   How to take your medicines safely   Making sure that you have the help and support you need at home  All topics are updated as new evidence becomes available and our peer review process is complete.  This topic retrieved from [employer] on: May 02, 2024.  Topic 548202 Version 1.0  Release: 32.4.3 - C32.122  © 2024 UpToDate, Inc. and/or its affiliates. All rights reserved.  Consumer Information Use and Disclaimer   Disclaimer: This generalized information is a limited summary of diagnosis, treatment, and/or medication information. It is not meant to be comprehensive and should be used as a tool to help the user understand and/or assess potential diagnostic and treatment options. It does NOT include all information about conditions, treatments, medications, side effects, or risks that may apply to a specific patient. It is not intended to be medical advice or a substitute for the medical advice, diagnosis, or treatment of a health care provider based on the health care provider's examination and assessment of a patient's specific and unique circumstances. Patients must speak with a health care provider for complete information about their health, medical questions, and treatment options, including any risks or benefits regarding use of medications. This information does not endorse any treatments or medications as safe, effective, or approved for treating a specific patient. UpToDate, Inc. and its affiliates disclaim any warranty or liability relating to this information or the use thereof.The use of this information is governed by the Terms of Use, available at https://www.[employer].com/en/know/clinical-effectiveness-terms. 2024© UpToDate, Inc. and its affiliates and/or licensors. All rights reserved.  Copyright   © 2024 UpToDate, Inc. and/or its affiliates. All rights reserved.

## 2024-12-17 NOTE — PROGRESS NOTES
Adult Annual Physical  Name: Jo Barros      : 1985      MRN: [id number]  Encounter Provider: Samantha Caal DO  Encounter Date: 2024   Encounter department: [facility]    Assessment & Plan  Annual physical exam    Orders:    Lipid panel; Future    Comprehensive metabolic panel; Future    CBC and differential; Future    TSH, 3rd generation with Free T4 reflex; Future    Vitamin D 25 hydroxy; Future    Hemoglobin A1C; Future    Vitamin B12; Future    Folate; Future    Migraine without aura and with status migrainosus, not intractable  Management per neurology     Orders:    Lipid panel; Future    Comprehensive metabolic panel; Future    CBC and differential; Future    TSH, 3rd generation with Free T4 reflex; Future    Vitamin D 25 hydroxy; Future    Hemoglobin A1C; Future    Vitamin B12; Future    Folate; Future    Attention deficit hyperactivity disorder (ADHD), combined type  Management per psychiatry     Orders:    Lipid panel; Future    Comprehensive metabolic panel; Future    CBC and differential; Future    TSH, 3rd generation with Free T4 reflex; Future    Vitamin D 25 hydroxy; Future    Hemoglobin A1C; Future    Vitamin B12; Future    Folate; Future    Need for hepatitis C screening test    Orders:    Hepatitis C Antibody; Future    Screening for HIV (human immunodeficiency virus)    Orders:    HIV 1/2 AG/AB w Reflex [facility] for 2 yr old and above; Future    Paresthesia    Orders:    CBC and differential; Future    TSH, 3rd generation with Free T4 reflex; Future    Vitamin B12; Future    Folate; Future    Immunizations and preventive care screenings were discussed with patient today. Appropriate education was printed on patient's after visit summary.    Counseling:  Alcohol/drug use: discussed moderation in alcohol intake, the recommendations for healthy alcohol use, and avoidance of illicit drug use.  Dental Health: discussed importance of regular tooth  brushing, flossing, and dental visits.  Injury prevention: discussed safety/seat belts, safety helmets, smoke detectors, carbon monoxide detectors, and smoking near bedding or upholstery.  Sexual health: discussed sexually transmitted diseases, partner selection, use of condoms, avoidance of unintended pregnancy, and contraceptive alternatives.  Exercise: the importance of regular exercise/physical activity was discussed. Recommend exercise 3-5 times per week for at least 30 minutes.          History of Present Illness     Adult Annual Physical:  Patient presents for annual physical.     Diet and Physical Activity:  - Diet/Nutrition: well balanced diet.  - Exercise: moderate cardiovascular exercise.    General Health:    - Dental: regular dental visits.    /GYN Health:  - Follows with GYN: yes.     Review of Systems   Constitutional:  Negative for chills, fatigue and fever.   HENT:  Negative for congestion, postnasal drip, rhinorrhea and sinus pressure.    Eyes:  Negative for photophobia and visual disturbance.   Respiratory:  Negative for cough and shortness of breath.    Cardiovascular:  Negative for chest pain, palpitations and leg swelling.   Gastrointestinal:  Negative for abdominal pain, constipation, diarrhea, nausea and vomiting.   Genitourinary:  Negative for difficulty urinating and dysuria.   Musculoskeletal:  Negative for arthralgias and myalgias.   Skin:  Negative for color change and rash.   Neurological:  Negative for dizziness, weakness, light-headedness and headaches.     Pertinent Medical History     Medical History Reviewed by provider this encounter:     .  Past Medical History   Past Medical History:   Diagnosis Date    Abnormal ECG 1/25/19    Incomplete RBBB, repeat EKG 4/30    ADHD     Allergic     Asthma     Cluster headache     GERD (gastroesophageal reflux disease)     Headache(784.0)     Headache, tension-type     HPV (human papilloma virus) infection     HSV-1 infection     HSV-2  infection     Miscarriage 2009    Miscarriage 8 weeks, Stillbirth 2012 (39 weeks)     Past Surgical History:   Procedure Laterality Date    CERVICAL BIOPSY  W/ LOOP ELECTRODE EXCISION      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    CT EPIDURAL STEROID INJECTION (TAMI LUMBAR) N/A     L5-S1, x2    FL INJECTION LEFT SHOULDER (ARTHROGRAM)  2024    LAMINECTOMY  2019    L5-S1 herniated disc    POSTERIOR LAMINECTOMY THORACIC AND LUMBAR SPINE N/A 2019    Procedure: Lumbar laminectomy, decompression, discectomy left L5-S1 ;  Surgeon: Misa Samaniego MD;  Location: [facility];  Service: Orthopedics    SKIN BIOPSY      SPINE SURGERY  19    L5-S1 discectomy/laminectomy    TONSILECTOMY AND ADNOIDECTOMY      WISDOM TOOTH EXTRACTION       Family History   Problem Relation Age of Onset    Diabetes Mother     Arthritis Mother     Hypertension Mother     Atrial fibrillation Mother     Neuropathy Mother         Diabetic neuropathy    Osteoarthritis Mother     Diabetes Father             Arthritis Father         psoriatic    Hyperlipidemia Father             Cancer Father         Cholangiocarcinoma ()    Hypertension Father     Hearing loss Father         Degenerative hearing loss    Migraines Brother     Cancer Maternal Grandmother     Esophageal varices Maternal Grandmother     Abnormal EKG Maternal Grandmother     Alcohol abuse Maternal Grandmother     Cirrhosis Maternal Grandmother     Cancer Maternal Grandfather     Heart disease Maternal Grandfather     Stroke Maternal Grandfather         x 3    Brain cancer Maternal Grandfather     Hypertension Paternal Grandmother     Diabetes Paternal Grandmother     Lung cancer Paternal Grandmother     Diabetes Paternal Grandfather     Migraines Son     Heart disease Maternal Uncle     Melanoma Maternal Uncle     Hypertension Paternal Aunt     Diabetes Paternal Aunt     Colon cancer Paternal Aunt      Hypertension Paternal Uncle     Diabetes Paternal Uncle     Thyroid cancer Paternal Uncle         Papillary thyroid cancer      reports that she quit smoking about 8 years ago. Her smoking use included cigarettes. She started smoking about 28 years ago. She has been exposed to tobacco smoke. She has never used smokeless tobacco. She reports current alcohol use. She reports current drug use. Frequency: 2.00 times per week. Drug: Marijuana.  Current Outpatient Medications on File Prior to Visit   Medication Sig Dispense Refill    acetaZOLAMIDE (DIAMOX) 250 mg tablet Take 1 tablet (250 mg total) by mouth 2 (two) times a day 1 tab twice a day (second dose late afternoon/evening) for 7 days then increase to 2 tabs twice a day 180 tablet 3    ascorbic acid (VITAMIN C) 500 MG tablet Take 500 mg by mouth daily      cholecalciferol (VITAMIN D3) 1,000 units tablet Take 4,000 Units by mouth daily      Cyanocobalamin (VITAMIN B 12 PO) Place 1,000 mcg under the tongue in the morning      dexamethasone (DECADRON) 4 mg tablet Take 1 tablet (4 mg total) by mouth as needed (intractable headache) Take 2 tabs for 2 day then 1 for 3 days.  May stop when h/a breaks if don't need all 5 days 9 tablet 2    Docusate Sodium 100 MG capsule Take 100-200 mg by mouth in the morning 100 mg Monday/Wednesday/Friday/Sunday  200 mg Tuesday/Thursday/Saturday      DULoxetine (CYMBALTA) 60 mg delayed release capsule Take 1 capsule (60 mg total) by mouth daily 90 capsule 1    ketorolac (TORADOL) 10 mg tablet TAKE ONE TABLET BY MOUTH AT ONSET OF MIGRAINE, CAN REPEAT X 1 IN 6 HOURS, CAN COMBINE WITH TRIPTAN. TAKE WITH FOOD/MILK/ANTACID 10 tablet 3    Loratadine 10 MG CAPS Take 10 mg by mouth daily       methocarbamol (ROBAXIN) 500 mg tablet Take 1 tablet (500 mg total) by mouth daily at bedtime as needed for muscle spasms 90 tablet 2    methylphenidate (CONCERTA) 36 MG ER tablet Take 1 tablet (36 mg total) by mouth daily Max Daily Amount: 36 mg 30 tablet 0     naproxen sodium (ANAPROX) 550 mg tablet Take 1 tablet (550 mg total) by mouth 2 (two) times a day with meals For menstrual cramps 60 tablet 1    omeprazole (PriLOSEC) 40 MG capsule Take 1 capsule (40 mg total) by mouth daily 90 capsule 1    ondansetron (ZOFRAN) 4 mg tablet Take 1 tablet (4 mg total) by mouth every 8 (eight) hours as needed for nausea or vomiting 20 tablet 0    rimegepant sulfate (Nurtec) 75 mg TBDP Take 1 tablet (75 mg total) by mouth as needed (migraine) Limit of 1 in 24 hours 16 tablet 6    rizatriptan (MAXALT-MLT) 10 mg disintegrating tablet Take 1 tablet (10 mg total) by mouth as needed for migraine Take at the onset of migraine; if symptoms continue or return, may take another dose at least 2 hours after first dose. Take no more than 2 doses in a day. 12 tablet 3    Semaglutide-Weight Management (WEGOVY) 1.7 MG/0.75ML Inject 0.75 mL (1.7 mg total) under the skin once a week 3 mL 5    valACYclovir (VALTREX) 500 mg tablet Take 1 tablet (500 mg total) by mouth daily 90 tablet 1    bimatoprost (LATISSE) 0.03 % ophthalmic solution Administer 1 drop to both eyes daily at bedtime Place one drop on applicator and apply evenly along the skin of the upper eyelid at base of eyelashes once daily at bedtime; repeat procedure for second eye (use a clean applicator). (Patient not taking: Reported on 12/6/2024) 3 mL 3    cyproheptadine (PERIACTIN) 4 mg tablet Take 1 tablet (4 mg total) by mouth daily at bedtime as needed for allergies (headaches) (Patient not taking: Reported on 12/6/2024) 30 tablet 0    fluticasone (FLONASE) 50 mcg/act nasal spray 1 spray into each nostril daily (Patient not taking: Reported on 12/17/2024) 48 mL 0    methylPREDNISolone 4 MG tablet therapy pack Use as directed on package (Patient not taking: Reported on 11/14/2024) 1 each 0     No current facility-administered medications on file prior to visit.     Allergies   Allergen Reactions    Sulfa Antibiotics      Family has allergy  she has never taken    Doxycycline Rash and Edema     Whole body rash,bruises behind both thighs      Current Outpatient Medications on File Prior to Visit   Medication Sig Dispense Refill    acetaZOLAMIDE (DIAMOX) 250 mg tablet Take 1 tablet (250 mg total) by mouth 2 (two) times a day 1 tab twice a day (second dose late afternoon/evening) for 7 days then increase to 2 tabs twice a day 180 tablet 3    ascorbic acid (VITAMIN C) 500 MG tablet Take 500 mg by mouth daily      cholecalciferol (VITAMIN D3) 1,000 units tablet Take 4,000 Units by mouth daily      Cyanocobalamin (VITAMIN B 12 PO) Place 1,000 mcg under the tongue in the morning      dexamethasone (DECADRON) 4 mg tablet Take 1 tablet (4 mg total) by mouth as needed (intractable headache) Take 2 tabs for 2 day then 1 for 3 days.  May stop when h/a breaks if don't need all 5 days 9 tablet 2    Docusate Sodium 100 MG capsule Take 100-200 mg by mouth in the morning 100 mg Monday/Wednesday/Friday/Sunday  200 mg Tuesday/Thursday/Saturday      DULoxetine (CYMBALTA) 60 mg delayed release capsule Take 1 capsule (60 mg total) by mouth daily 90 capsule 1    ketorolac (TORADOL) 10 mg tablet TAKE ONE TABLET BY MOUTH AT ONSET OF MIGRAINE, CAN REPEAT X 1 IN 6 HOURS, CAN COMBINE WITH TRIPTAN. TAKE WITH FOOD/MILK/ANTACID 10 tablet 3    Loratadine 10 MG CAPS Take 10 mg by mouth daily       methocarbamol (ROBAXIN) 500 mg tablet Take 1 tablet (500 mg total) by mouth daily at bedtime as needed for muscle spasms 90 tablet 2    methylphenidate (CONCERTA) 36 MG ER tablet Take 1 tablet (36 mg total) by mouth daily Max Daily Amount: 36 mg 30 tablet 0    naproxen sodium (ANAPROX) 550 mg tablet Take 1 tablet (550 mg total) by mouth 2 (two) times a day with meals For menstrual cramps 60 tablet 1    omeprazole (PriLOSEC) 40 MG capsule Take 1 capsule (40 mg total) by mouth daily 90 capsule 1    ondansetron (ZOFRAN) 4 mg tablet Take 1 tablet (4 mg total) by mouth every 8 (eight) hours as  needed for nausea or vomiting 20 tablet 0    rimegepant sulfate (Nurtec) 75 mg TBDP Take 1 tablet (75 mg total) by mouth as needed (migraine) Limit of 1 in 24 hours 16 tablet 6    rizatriptan (MAXALT-MLT) 10 mg disintegrating tablet Take 1 tablet (10 mg total) by mouth as needed for migraine Take at the onset of migraine; if symptoms continue or return, may take another dose at least 2 hours after first dose. Take no more than 2 doses in a day. 12 tablet 3    Semaglutide-Weight Management (WEGOVY) 1.7 MG/0.75ML Inject 0.75 mL (1.7 mg total) under the skin once a week 3 mL 5    valACYclovir (VALTREX) 500 mg tablet Take 1 tablet (500 mg total) by mouth daily 90 tablet 1    bimatoprost (LATISSE) 0.03 % ophthalmic solution Administer 1 drop to both eyes daily at bedtime Place one drop on applicator and apply evenly along the skin of the upper eyelid at base of eyelashes once daily at bedtime; repeat procedure for second eye (use a clean applicator). (Patient not taking: Reported on 2024) 3 mL 3    cyproheptadine (PERIACTIN) 4 mg tablet Take 1 tablet (4 mg total) by mouth daily at bedtime as needed for allergies (headaches) (Patient not taking: Reported on 2024) 30 tablet 0    fluticasone (FLONASE) 50 mcg/act nasal spray 1 spray into each nostril daily (Patient not taking: Reported on 2024) 48 mL 0    methylPREDNISolone 4 MG tablet therapy pack Use as directed on package (Patient not taking: Reported on 2024) 1 each 0     No current facility-administered medications on file prior to visit.      Social History     Tobacco Use    Smoking status: Former     Current packs/day: 0.00     Types: Cigarettes     Start date: 1996     Quit date: 2016     Years since quittin.9     Passive exposure: Past    Smokeless tobacco: Never    Tobacco comments:     quit > 10 years ago    Vaping Use    Vaping status: Some Days    Substances: THC   Substance and Sexual Activity    Alcohol use: Yes     Comment:  "Rare use    Drug use: Yes     Frequency: 2.0 times per week     Types: Marijuana     Comment: Medical Marijuana occasionally use    Sexual activity: Not Currently     Partners: Male     Birth control/protection: None     Comment: Had Mirena removed 10/2018       Objective   /62 (BP Location: Left arm, Patient Position: Sitting, Cuff Size: Standard)   Pulse 74   Temp 97.9 °F (36.6 °C) (Tympanic)   Resp 17   Ht 5' 10.7\" (1.796 m)   Wt 76.1 kg (167 lb 12.8 oz)   SpO2 97%   BMI 23.60 kg/m²     Physical Exam  Constitutional:       General: She is not in acute distress.     Appearance: Normal appearance. She is not ill-appearing, toxic-appearing or diaphoretic.   HENT:      Head: Normocephalic and atraumatic.      Right Ear: Tympanic membrane and ear canal normal.      Left Ear: Tympanic membrane and ear canal normal.      Nose: Nose normal. No congestion.      Mouth/Throat:      Mouth: Mucous membranes are moist.      Pharynx: Oropharynx is clear. No oropharyngeal exudate.   Eyes:      Extraocular Movements: Extraocular movements intact.      Conjunctiva/sclera: Conjunctivae normal.      Pupils: Pupils are equal, round, and reactive to light.   Cardiovascular:      Rate and Rhythm: Normal rate and regular rhythm.      Pulses: Normal pulses.      Heart sounds: No murmur heard.  Pulmonary:      Effort: Pulmonary effort is normal.      Breath sounds: Normal breath sounds. No wheezing, rhonchi or rales.   Abdominal:      General: Bowel sounds are normal. There is no distension.      Palpations: Abdomen is soft.      Tenderness: There is no abdominal tenderness.   Musculoskeletal:         General: No swelling or tenderness. Normal range of motion.      Cervical back: Normal range of motion and neck supple.   Skin:     General: Skin is warm and dry.      Capillary Refill: Capillary refill takes less than 2 seconds.   Neurological:      General: No focal deficit present.      Mental Status: She is alert and " oriented to person, place, and time.      Cranial Nerves: No cranial nerve deficit.   Psychiatric:         Mood and Affect: Mood normal.         Behavior: Behavior normal.         Thought Content: Thought content normal.

## 2024-12-17 NOTE — ASSESSMENT & PLAN NOTE
Management per psychiatry     Orders:    Lipid panel; Future    Comprehensive metabolic panel; Future    CBC and differential; Future    TSH, 3rd generation with Free T4 reflex; Future    Vitamin D 25 hydroxy; Future    Hemoglobin A1C; Future    Vitamin B12; Future    Folate; Future

## 2024-12-17 NOTE — ASSESSMENT & PLAN NOTE
Management per neurology     Orders:    Lipid panel; Future    Comprehensive metabolic panel; Future    CBC and differential; Future    TSH, 3rd generation with Free T4 reflex; Future    Vitamin D 25 hydroxy; Future    Hemoglobin A1C; Future    Vitamin B12; Future    Folate; Future

## 2024-12-18 ENCOUNTER — OFFICE VISIT (OUTPATIENT)
Dept: PHYSICAL THERAPY | Facility: CLINIC | Age: 39
End: 2024-12-18
Payer: COMMERCIAL

## 2024-12-18 DIAGNOSIS — S43.432A SUPERIOR LABRUM ANTERIOR-TO-POSTERIOR (SLAP) TEAR OF LEFT SHOULDER: Primary | ICD-10-CM

## 2024-12-18 PROCEDURE — 97140 MANUAL THERAPY 1/> REGIONS: CPT

## 2024-12-18 PROCEDURE — 97112 NEUROMUSCULAR REEDUCATION: CPT

## 2024-12-18 NOTE — PROGRESS NOTES
Daily Note     Today's date: 2024  Patient name: Jo Barros  : 1985  MRN: [id number]  Referring provider: Delroy Huber DO  Dx:   Encounter Diagnosis     ICD-10-CM    1. Superior labrum anterior-to-posterior (SLAP) tear of left shoulder  S43.432A                      Subjective: pt states that her shoulder is feeling about the same       Objective: See treatment diary below      Assessment: Tolerated treatment with tightness in pec muscle that did loosen with TPR. Patient demonstrated fatigue post treatment and exhibited good technique with therapeutic exercises      Plan: Continue per plan of care.      Precautions: none    Manuals         12   Tpr, biceps, pec and infraL DL DL                                                  Neuro Re-Ed             Supine alphabet 3#x1 4#  x1        5# x1    Seated serratus flexion Red x10         Red x10   Wall clocks towel x10 x10        X5 ea   Supine bottoms up long axis IR/ER 9 kb x10 9kb x15        nv   Supine to s/l arm bar 4#  x10 4# x10        nv                             Ther Ex             UBE for endurance/strength  3'x3'           Doorway pec stretch             Prone horizontal abd             B ER  Red 2x10                        TB shoulder extension   Blk x10            TB chest press             TB rows Blk x10            S/l ER with towel             Ther Activity                                       Gait Training                                       Modalities

## 2024-12-19 ENCOUNTER — RESULTS FOLLOW-UP (OUTPATIENT)
Dept: FAMILY MEDICINE CLINIC | Facility: CLINIC | Age: 39
End: 2024-12-19

## 2024-12-19 ENCOUNTER — OFFICE VISIT (OUTPATIENT)
Dept: PSYCHIATRY | Facility: CLINIC | Age: 39
End: 2024-12-19
Payer: COMMERCIAL

## 2024-12-19 DIAGNOSIS — F98.8 ATTENTION DEFICIT DISORDER (ADD) IN ADULT: ICD-10-CM

## 2024-12-19 DIAGNOSIS — F33.1 MDD (MAJOR DEPRESSIVE DISORDER), RECURRENT EPISODE, MODERATE (HCC): Primary | ICD-10-CM

## 2024-12-19 PROCEDURE — 99214 OFFICE O/P EST MOD 30 MIN: CPT | Performed by: PHYSICIAN ASSISTANT

## 2024-12-19 RX ORDER — METHYLPHENIDATE HYDROCHLORIDE 36 MG/1
36 TABLET ORAL DAILY
Qty: 30 TABLET | Refills: 0 | Status: SHIPPED | OUTPATIENT
Start: 2024-12-19
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
## 2024-12-26 ENCOUNTER — OFFICE VISIT (OUTPATIENT)
Dept: PHYSICAL THERAPY | Facility: CLINIC | Age: 39
End: 2024-12-26
Payer: COMMERCIAL

## 2024-12-26 DIAGNOSIS — S43.432A SUPERIOR LABRUM ANTERIOR-TO-POSTERIOR (SLAP) TEAR OF LEFT SHOULDER: Primary | ICD-10-CM

## 2024-12-26 PROCEDURE — 97112 NEUROMUSCULAR REEDUCATION: CPT | Performed by: PHYSICAL THERAPIST

## 2024-12-26 PROCEDURE — 97140 MANUAL THERAPY 1/> REGIONS: CPT | Performed by: PHYSICAL THERAPIST

## 2024-12-26 NOTE — PROGRESS NOTES
Daily Note     Today's date: 2024  Patient name: Jo Barros  : 1985  MRN: [id number]  Referring provider: Delroy Huber DO  Dx:   Encounter Diagnosis     ICD-10-CM    1. Superior labrum anterior-to-posterior (SLAP) tear of left shoulder  S43.432A                      Subjective: pt notes that her pec is consistently tight every morning from the way she sleeps.       Objective: See treatment diary below      Assessment: did advise pt to work on tennis ball stm at home daily in order to reduce tightness in the pecs. And that she may also want to increase weights at home over next week to two in order to keep progressing with strength.       Plan: Continue per plan of care.      Precautions: none    Manuals    Tpr, biceps, pec and infraL DL DL DB                                                 Neuro Re-Ed             Supine alphabet 3#x1 4#  x1 5# x1       5# x1    Seated serratus flexion Red x10         Red x10   Wall clocks towel x10 x10 x10       X5 ea   Supine bottoms up long axis IR/ER 9 kb x10 9kb x15 9kb 2x10       nv   Supine to s/l arm bar 4#  x10 4# x10 5# x10       nv   Ball blessing 90 deg abd    2x10 ea                       Ther Ex             UBE for endurance/strength  3'x3' 3'x3'          Doorway pec stretch             Prone horizontal abd             B ER  Red 2x10 Red 3x10                       TB shoulder extension   Blk x10            TB chest press             TB rows Blk x10            S/l ER with towel             Ther Activity                                       Gait Training                                       Modalities

## 2025-01-02 ENCOUNTER — OFFICE VISIT (OUTPATIENT)
Dept: PHYSICAL THERAPY | Facility: CLINIC | Age: 40
End: 2025-01-02
Payer: COMMERCIAL

## 2025-01-02 DIAGNOSIS — S43.432A SUPERIOR LABRUM ANTERIOR-TO-POSTERIOR (SLAP) TEAR OF LEFT SHOULDER: Primary | ICD-10-CM

## 2025-01-02 PROCEDURE — 97140 MANUAL THERAPY 1/> REGIONS: CPT | Performed by: PHYSICAL THERAPIST

## 2025-01-02 PROCEDURE — 97110 THERAPEUTIC EXERCISES: CPT | Performed by: PHYSICAL THERAPIST

## 2025-01-02 PROCEDURE — 97112 NEUROMUSCULAR REEDUCATION: CPT | Performed by: PHYSICAL THERAPIST

## 2025-01-02 NOTE — PROGRESS NOTES
Daily Note     Today's date: 2025  Patient name: Jo Barros  : 1985  MRN: [id number]  Referring provider: Delroy Huber DO  Dx:   Encounter Diagnosis     ICD-10-CM    1. Superior labrum anterior-to-posterior (SLAP) tear of left shoulder  S43.432A                      Subjective: Pt reports left shoulder has been feeling better this week and notes less tension in pec, but notes tension in trap muscles due to having a migraine on Friday.      Objective: See treatment diary below      Assessment: Pt tolerated exercises with appropriate fatigue and challenge. Demonstrates mild tension in left pec, bicep tendon and triceps tendon and tolerated TPR without complaints. Patient would benefit from continued PT      Plan: Continue per plan of care.      Precautions: none    Manuals 2         Tpr, biceps, pec and infraL DL DL DB SAVANNAH                                                Neuro Re-Ed             Supine alphabet 3#x1 4#  x1 5# x1 5# 1x         Seated serratus flexion Red x10            Wall clocks towel x10 x10 x10 x10         Supine bottoms up long axis IR/ER 9 kb x10 9kb x15 9kb 2x10 9kb 2x10         Supine to s/l arm bar 4#  x10 4# x10 5# x10 5#  x10         Ball blessing 90 deg abd    2x10 ea 2x10 ea                      Ther Ex             UBE for endurance/strength  3'x3' 3'x3' 3'x3'         Doorway pec stretch             Prone horizontal abd             B ER  Red 2x10 Red 3x10 Red  3x10                      TB shoulder extension   Blk x10            TB chest press             TB rows Blk x10            S/l ER with towel             Ther Activity                                       Gait Training                                       Modalities

## 2025-01-06 ENCOUNTER — HOSPITAL ENCOUNTER (OUTPATIENT)
Dept: ULTRASOUND IMAGING | Facility: CLINIC | Age: 40
Discharge: HOME | End: 2025-01-06
Payer: COMMERCIAL

## 2025-01-06 ENCOUNTER — TELEPHONE (OUTPATIENT)
Dept: SURGICAL ONCOLOGY | Facility: CLINIC | Age: 40
End: 2025-01-06

## 2025-01-06 ENCOUNTER — RESULTS FOLLOW-UP (OUTPATIENT)
Dept: GYNECOLOGY | Facility: CLINIC | Age: 40
End: 2025-01-06

## 2025-01-06 DIAGNOSIS — R92.8 ABNORMAL ULTRASOUND OF BREAST: Primary | ICD-10-CM

## 2025-01-06 DIAGNOSIS — R92.8 ABNORMAL FINDINGS ON DIAGNOSTIC IMAGING OF BREAST: ICD-10-CM

## 2025-01-06 PROCEDURE — 76642 ULTRASOUND BREAST LIMITED: CPT

## 2025-01-06 NOTE — TELEPHONE ENCOUNTER
Called pt at this time and LM to make aware that US results are unchanged and it is up to her whether or not she wants to continue with surveillance or if she wants to proceed with surgery. Phone number given for pt to call back.

## 2025-01-09 ENCOUNTER — OFFICE VISIT (OUTPATIENT)
Dept: PHYSICAL THERAPY | Facility: CLINIC | Age: 40
End: 2025-01-09
Payer: COMMERCIAL

## 2025-01-09 DIAGNOSIS — S43.432A SUPERIOR LABRUM ANTERIOR-TO-POSTERIOR (SLAP) TEAR OF LEFT SHOULDER: Primary | ICD-10-CM

## 2025-01-09 PROCEDURE — 97112 NEUROMUSCULAR REEDUCATION: CPT

## 2025-01-09 PROCEDURE — 97110 THERAPEUTIC EXERCISES: CPT

## 2025-01-09 PROCEDURE — 97140 MANUAL THERAPY 1/> REGIONS: CPT

## 2025-01-09 NOTE — PROGRESS NOTES
Daily Note     Today's date: 2025  Patient name: Jo Barros  : 1985  MRN: [id number]  Referring provider: Delroy Huber DO  Dx:   Encounter Diagnosis     ICD-10-CM    1. Superior labrum anterior-to-posterior (SLAP) tear of left shoulder  S43.432A                      Subjective: pt is without new c/o offered      Objective: See treatment diary below      Assessment: Tolerated treatment with increased fatigue noted with increased resistance of program.  No increased pain in shoulder with the increase in resistance. . Patient demonstrated fatigue post treatment and would benefit from continued PT      Plan: Continue per plan of care.      Precautions: none    Manuals         Tpr, biceps, pec and infraL DL DL DB SAVANNAH DL                                               Neuro Re-Ed             Supine alphabet 3#x1 4#  x1 5# x1 5# 1x 5#  x1        Seated serratus flexion Red x10            Wall clocks towel x10 x10 x10 x10 Red band x5        Supine bottoms up long axis IR/ER 9 kb x10 9kb x15 9kb 2x10 9kb 2x10 9kb 2x10        Supine to s/l arm bar 4#  x10 4# x10 5# x10 5#  x10 5#  x10        Ball blessing 90 deg abd    2x10 ea 2x10 ea Red 2x10 ea                     Ther Ex             UBE for endurance/strength  3'x3' 3'x3' 3'x3' Lv2 3'x3'        Doorway pec stretch             Prone horizontal abd             B ER  Red 2x10 Red 3x10 Red  3x10 Gtb 2x10        B stand H abd     Gtb 2x10        TB shoulder extension   Blk x10            TB chest press             TB rows Blk x10            S/l ER with towel             Ther Activity                                       Gait Training                                       Modalities

## 2025-01-16 ENCOUNTER — OFFICE VISIT (OUTPATIENT)
Dept: PHYSICAL THERAPY | Facility: CLINIC | Age: 40
End: 2025-01-16
Payer: COMMERCIAL

## 2025-01-16 DIAGNOSIS — S43.432A SUPERIOR LABRUM ANTERIOR-TO-POSTERIOR (SLAP) TEAR OF LEFT SHOULDER: Primary | ICD-10-CM

## 2025-01-16 PROCEDURE — 97112 NEUROMUSCULAR REEDUCATION: CPT | Performed by: PHYSICAL THERAPIST

## 2025-01-16 PROCEDURE — 97110 THERAPEUTIC EXERCISES: CPT | Performed by: PHYSICAL THERAPIST

## 2025-01-16 PROCEDURE — 97140 MANUAL THERAPY 1/> REGIONS: CPT | Performed by: PHYSICAL THERAPIST

## 2025-01-16 NOTE — PROGRESS NOTES
Daily Note     Today's date: 2025  Patient name: Jo Barros  : 1985  MRN: [id number]  Referring provider: Delroy Huber DO  Dx:   Encounter Diagnosis     ICD-10-CM    1. Superior labrum anterior-to-posterior (SLAP) tear of left shoulder  S43.432A                      Subjective: pt ntoes that she has been feeling great with her shoulder despite the cold weather.       Objective: See treatment diary below      Assessment: pt doing better with strength, and pain levels overall. If pt continues to progress at this rate we may d/c PT in 2 weeks       Plan: possible d/c in 2-3 visits.      Precautions: none    Manuals        Tpr, biceps, pec and infraL DL DL DB SAVANNAH DL DB                                              Neuro Re-Ed             Supine alphabet 3#x1 4#  x1 5# x1 5# 1x 5#  x1 9kb x1       Seated serratus flexion Red x10            Wall clocks towel x10 x10 x10 x10 Red band x5 Red x6       Supine bottoms up long axis IR/ER 9 kb x10 9kb x15 9kb 2x10 9kb 2x10 9kb 2x10 9kb 2x10       Supine to s/l arm bar 4#  x10 4# x10 5# x10 5#  x10 5#  x10 6# 2x10       Ball blessing 90 deg abd    2x10 ea 2x10 ea Red 2x10 ea Red 2x10                    Ther Ex             UBE for endurance/strength  3'x3' 3'x3' 3'x3' Lv2 3'x3' Lv 2 3'x3'       Doorway pec stretch             Prone horizontal abd             B ER  Red 2x10 Red 3x10 Red  3x10 Gtb 2x10 Gtb 2x15       B stand H abd     Gtb 2x10 Gtb 2x10       TB shoulder extension   Blk x10            TB chest press             TB rows Blk x10            S/l ER with towel             Ther Activity                                       Gait Training                                       Modalities

## 2025-01-23 ENCOUNTER — OFFICE VISIT (OUTPATIENT)
Dept: PHYSICAL THERAPY | Facility: CLINIC | Age: 40
End: 2025-01-23
Payer: COMMERCIAL

## 2025-01-23 DIAGNOSIS — S43.432A SUPERIOR LABRUM ANTERIOR-TO-POSTERIOR (SLAP) TEAR OF LEFT SHOULDER: Primary | ICD-10-CM

## 2025-01-23 PROCEDURE — 97110 THERAPEUTIC EXERCISES: CPT

## 2025-01-23 PROCEDURE — 97140 MANUAL THERAPY 1/> REGIONS: CPT

## 2025-01-23 PROCEDURE — 97112 NEUROMUSCULAR REEDUCATION: CPT

## 2025-01-23 NOTE — PROGRESS NOTES
Daily Note     Today's date: 2025  Patient name: Jo Barros  : 1985  MRN: [id number]  Referring provider: Delroy Huber DO  Dx:   Encounter Diagnosis     ICD-10-CM    1. Superior labrum anterior-to-posterior (SLAP) tear of left shoulder  S43.432A                      Subjective: pt notes that her shoulder has been pretty good and able to perform things at work without it causing an issue.  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  . Patient demonstrated fatigue post treatment, exhibited good technique with therapeutic exercises, and would benefit from continued PT      Plan: Continue per plan of care.      Precautions: none    Manuals       Tpr, biceps, pec and infraL DL DL DB SAVANNAH DL DB DL                                             Neuro Re-Ed             Supine alphabet 3#x1 4#  x1 5# x1 5# 1x 5#  x1 9kb x1 9 kb x1      Seated serratus flexion Red x10            Wall clocks towel x10 x10 x10 x10 Red band x5 Red x6       Supine bottoms up long axis IR/ER 9 kb x10 9kb x15 9kb 2x10 9kb 2x10 9kb 2x10 9kb 2x10 9kb 2x10      Supine to s/l arm bar 4#  x10 4# x10 5# x10 5#  x10 5#  x10 6# 2x10 6#  2x10      Ball blessing 90 deg abd    2x10 ea 2x10 ea Red 2x10 ea Red 2x10                    Ther Ex             UBE for endurance/strength  3'x3' 3'x3' 3'x3' Lv2 3'x3' Lv 2 3'x3' Lv2 3'x3'      Doorway pec stretch             Prone horizontal abd             B ER  Red 2x10 Red 3x10 Red  3x10 Gtb 2x10 Gtb 2x15       B stand H abd     Gtb 2x10 Gtb 2x10       TB shoulder extension   Blk x10            TB chest press             TB rows Blk x10            S/l ER with towel             Ther Activity                                       Gait Training                                       Modalities

## 2025-01-30 ENCOUNTER — OFFICE VISIT (OUTPATIENT)
Dept: PHYSICAL THERAPY | Facility: CLINIC | Age: 40
End: 2025-01-30
Payer: COMMERCIAL

## 2025-01-30 DIAGNOSIS — S43.432A SUPERIOR LABRUM ANTERIOR-TO-POSTERIOR (SLAP) TEAR OF LEFT SHOULDER: Primary | ICD-10-CM

## 2025-01-30 PROCEDURE — 97112 NEUROMUSCULAR REEDUCATION: CPT | Performed by: PHYSICAL THERAPIST

## 2025-01-30 PROCEDURE — 97140 MANUAL THERAPY 1/> REGIONS: CPT | Performed by: PHYSICAL THERAPIST

## 2025-01-30 NOTE — PROGRESS NOTES
Daily Note     Today's date: 2025  Patient name: Jo Barros  : 1985  MRN: [id number]  Referring provider: Delroy Huber DO  Dx:   Encounter Diagnosis     ICD-10-CM    1. Superior labrum anterior-to-posterior (SLAP) tear of left shoulder  S43.432A                      Subjective: pt notes that she continues to do well. Only pain in the shoulder when she pushes on a locked door accidentally.       Objective: See treatment diary below      Assessment: overall pt with good strength, rom and tolerance to exercises. Pt doing well with maintenance of HEP at this point pt will be d/c'd to HEP       Plan: d/c to HEP secondary to all goals met.      Precautions: none    Manuals      Tpr, biceps, pec and infraL DL DL DB SAVANNAH DL DB DL DB                                            Neuro Re-Ed             Supine alphabet 3#x1 4#  x1 5# x1 5# 1x 5#  x1 9kb x1 9 kb x1 9kb x1     Seated serratus flexion Red x10            Wall clocks towel x10 x10 x10 x10 Red band x5 Red x6  Red x9     Supine bottoms up long axis IR/ER 9 kb x10 9kb x15 9kb 2x10 9kb 2x10 9kb 2x10 9kb 2x10 9kb 2x10 9kb 2x10      Supine to s/l arm bar 4#  x10 4# x10 5# x10 5#  x10 5#  x10 6# 2x10 6#  2x10 9 Kb 2x10      Ball blessing 90 deg abd    2x10 ea 2x10 ea Red 2x10 ea Red 2x10  [id number]                  Ther Ex             UBE for endurance/strength  3'x3' 3'x3' 3'x3' Lv2 3'x3' Lv 2 3'x3' Lv2 3'x3' Lv 3 3'x3'     Doorway pec stretch             Prone horizontal abd             B ER  Red 2x10 Red 3x10 Red  3x10 Gtb 2x10 Gtb 2x15  Blue 2x10     B stand H abd     Gtb 2x10 Gtb 2x10  Blue 2x10     TB shoulder extension   Blk x10            TB chest press             TB rows Blk x10            S/l ER with towel             Ther Activity                                       Gait Training                                       Modalities

## 2025-02-10 ENCOUNTER — OFFICE VISIT (OUTPATIENT)
Dept: BARIATRICS | Facility: CLINIC | Age: 40
End: 2025-02-10
Payer: COMMERCIAL

## 2025-02-10 VITALS
BODY MASS INDEX: 23.27 KG/M2 | WEIGHT: 166.2 LBS | SYSTOLIC BLOOD PRESSURE: 110 MMHG | TEMPERATURE: 97.5 F | HEART RATE: 88 BPM | HEIGHT: 71 IN | RESPIRATION RATE: 16 BRPM | DIASTOLIC BLOOD PRESSURE: 62 MMHG

## 2025-02-10 DIAGNOSIS — R79.89 LOW SERUM TOTAL PROTEIN LEVEL: ICD-10-CM

## 2025-02-10 DIAGNOSIS — Z86.39 HISTORY OF OBESITY: Primary | ICD-10-CM

## 2025-02-10 PROCEDURE — 99214 OFFICE O/P EST MOD 30 MIN: CPT | Performed by: PHYSICIAN ASSISTANT

## 2025-02-10 NOTE — ASSESSMENT & PLAN NOTE
-Patient is pursuing Conservative Program. Previously completed Healthy CORE.  -Initial weight loss goal of 5-10% weight loss for improved health-MET  - Labs reviewed: CMP, CBC, A1C from 12/14/24 reviewed and other than protein.      Initial: 247.2 lbs  Last Visit: 177.6  Current: 166  Change: -81.2  -stable  Goal:180s-met, now to maintain and at goal    Goals:  -start to exercise again.  Recommend strength training  -recommend to start to plan out more meal/snacks.increase protein  -to schedule RD visit.       - to continue wegovy 1.7mg. Wegovy started at 234.6lb on 2/17/22.29.2 % weight loss

## 2025-02-10 NOTE — PROGRESS NOTES
Assessment/Plan:    History of obesity  -Patient is pursuing Conservative Program. Previously completed Healthy CORE.  -Initial weight loss goal of 5-10% weight loss for improved health-MET  - Labs reviewed: CMP, CBC, A1C from 12/14/24 reviewed and other than protein.      Initial: 247.2 lbs  Last Visit: 177.6  Current: 166  Change: -81.2  -stable  Goal:180s-met, now to maintain and at goal    Goals:  -start to exercise again.  Recommend strength training  -recommend to start to plan out more meal/snacks.increase protein  -to schedule RD visit.       - to continue wegovy 1.7mg. Wegovy started at 234.6lb on 2/17/22.29.2 % weight loss        Return in about 8 months (around 10/14/2025) for RD Visit.   Diagnoses and all orders for this visit:    History of obesity  -     Semaglutide-Weight Management (WEGOVY) 1.7 MG/0.75ML; Inject 0.75 mL (1.7 mg total) under the skin once a week    Low serum total protein level          Subjective:   Chief Complaint   Patient presents with    Follow-up     Mwm f/u6m: waist:  31.5in        Patient ID: Jo Barros  is a 39 y.o. female with excess weight/obesity here to pursue weight managment.  Patient is pursuing Conservative Program.     HPI on wegovy 1.7mg and doing well. Tolerating the medication.    Currently doing more snacking or grazing and not choosing the best food choices. Protein was /a little low on CMP 12/14 and she is trying to increase protein  Wt Readings from Last 10 Encounters:   02/10/25 75.4 kg (166 lb 3.2 oz)   12/17/24 76.1 kg (167 lb 12.8 oz)   12/06/24 76.4 kg (168 lb 6.4 oz)   11/20/24 7.711 kg (17 lb)   11/14/24 77.6 kg (171 lb)   10/30/24 74.4 kg (164 lb)   10/15/24 74.4 kg (164 lb)   08/19/24 73.6 kg (162 lb 3.2 oz)   07/22/24 75.3 kg (166 lb)   06/18/24 74.5 kg (164 lb 3.2 oz)       Food logging:  Increased appetite/cravings:controlled  Exercise:PT for shoulder currently but would like to return back to weight lifting.    Hydration:at least 64 oz  water daily      The following portions of the patient's history were reviewed and updated as appropriate: She  has a past medical history of Abnormal ECG (1/25/19), ADHD, Allergic, Asthma, Cluster headache, GERD (gastroesophageal reflux disease), Headache(784.0), Headache, tension-type, HPV (human papilloma virus) infection, HSV-1 infection, HSV-2 infection, and Miscarriage (09/2009).  She   Patient Active Problem List    Diagnosis Date Noted    Fibroadenoma of breast, right 11/14/2024    Attention deficit hyperactivity disorder (ADHD), combined type 03/14/2024    Intracranial hypertension 11/28/2023    Abnormal result of iron profile testing 01/16/2023    Obstructive sleep apnea (adult) (pediatric)     Cervicalgia 11/28/2022    Snoring 11/28/2022    Morning headache 11/28/2022    Viral syndrome 12/30/2021    History of obesity 11/08/2021    GERD (gastroesophageal reflux disease) 11/08/2021    Migraine without aura and with status migrainosus, not intractable 02/19/2021    Galactorrhea 09/16/2020    Palpitations 05/29/2019    Lumbar disc herniation 11/21/2018    Lumbar radiculopathy - Left 11/13/2018     She  has a past surgical history that includes TONSILECTOMY AND ADNOIDECTOMY (1990); [place] tooth extraction (2007); Cervical biopsy w/ loop electrode excision (2012); Posterior laminectomy thoracic and lumbar spine (N/A, 02/05/2019); CT epidural steroid injection (TAMI lumbar) (N/A, 2018); Skin biopsy; Laminectomy (2/5/2019); Spine surgery (2/5/19); and FL injection left shoulder (arthrogram) (11/12/2024).  Her family history includes Abnormal EKG in her maternal grandmother; Alcohol abuse in her maternal grandmother; Arthritis in her father and mother; Atrial fibrillation in her mother; Brain cancer in her maternal grandfather; Cancer in her father, maternal grandfather, and maternal grandmother; Cirrhosis in her maternal grandmother; Colon cancer in her paternal aunt; Diabetes in her father, mother, paternal  aunt, paternal grandfather, paternal grandmother, and paternal uncle; Esophageal varices in her maternal grandmother; Hearing loss in her father; Heart disease in her maternal grandfather and maternal uncle; Hyperlipidemia in her father; Hypertension in her father, mother, paternal aunt, paternal grandmother, and paternal uncle; Lung cancer in her paternal grandmother; Melanoma in her maternal uncle; Migraines in her brother and son; Neuropathy in her mother; Osteoarthritis in her mother; Stroke in her maternal grandfather; Thyroid cancer in her paternal uncle.  She  reports that she quit smoking about 9 years ago. Her smoking use included cigarettes. She started smoking about 29 years ago. She has been exposed to tobacco smoke. She has never used smokeless tobacco. She reports current alcohol use. She reports current drug use. Frequency: 2.00 times per week. Drug: Marijuana.  Current Outpatient Medications   Medication Sig Dispense Refill    acetaZOLAMIDE (DIAMOX) 250 mg tablet Take 1 tablet (250 mg total) by mouth 2 (two) times a day 1 tab twice a day (second dose late afternoon/evening) for 7 days then increase to 2 tabs twice a day 180 tablet 3    ascorbic acid (VITAMIN C) 500 MG tablet Take 500 mg by mouth daily      cholecalciferol (VITAMIN D3) 1,000 units tablet Take 4,000 Units by mouth daily      Cyanocobalamin (VITAMIN B 12 PO) Place 1,000 mcg under the tongue in the morning      dexamethasone (DECADRON) 4 mg tablet Take 1 tablet (4 mg total) by mouth as needed (intractable headache) Take 2 tabs for 2 day then 1 for 3 days.  May stop when h/a breaks if don't need all 5 days 9 tablet 2    Docusate Sodium 100 MG capsule Take 100-200 mg by mouth in the morning 100 mg Monday/Wednesday/Friday/Sunday  200 mg Tuesday/Thursday/Saturday      DULoxetine (CYMBALTA) 60 mg delayed release capsule Take 1 capsule (60 mg total) by mouth daily 90 capsule 1    ketorolac (TORADOL) 10 mg tablet TAKE ONE TABLET BY MOUTH AT  ONSET OF MIGRAINE, CAN REPEAT X 1 IN 6 HOURS, CAN COMBINE WITH TRIPTAN. TAKE WITH FOOD/MILK/ANTACID 10 tablet 3    Loratadine 10 MG CAPS Take 10 mg by mouth daily       methocarbamol (ROBAXIN) 500 mg tablet Take 1 tablet (500 mg total) by mouth daily at bedtime as needed for muscle spasms 90 tablet 2    methylphenidate (CONCERTA) 36 MG ER tablet Take 1 tablet (36 mg total) by mouth daily Max Daily Amount: 36 mg 30 tablet 0    naproxen sodium (ANAPROX) 550 mg tablet Take 1 tablet (550 mg total) by mouth 2 (two) times a day with meals For menstrual cramps 60 tablet 1    omeprazole (PriLOSEC) 40 MG capsule Take 1 capsule (40 mg total) by mouth daily 90 capsule 1    ondansetron (ZOFRAN) 4 mg tablet Take 1 tablet (4 mg total) by mouth every 8 (eight) hours as needed for nausea or vomiting 20 tablet 0    rimegepant sulfate (Nurtec) 75 mg TBDP Take 1 tablet (75 mg total) by mouth as needed (migraine) Limit of 1 in 24 hours 16 tablet 6    rizatriptan (MAXALT-MLT) 10 mg disintegrating tablet Take 1 tablet (10 mg total) by mouth as needed for migraine Take at the onset of migraine; if symptoms continue or return, may take another dose at least 2 hours after first dose. Take no more than 2 doses in a day. 12 tablet 3    Semaglutide-Weight Management (WEGOVY) 1.7 MG/0.75ML Inject 0.75 mL (1.7 mg total) under the skin once a week 3 mL 7    valACYclovir (VALTREX) 500 mg tablet Take 1 tablet (500 mg total) by mouth daily 90 tablet 1    bimatoprost (LATISSE) 0.03 % ophthalmic solution Administer 1 drop to both eyes daily at bedtime Place one drop on applicator and apply evenly along the skin of the upper eyelid at base of eyelashes once daily at bedtime; repeat procedure for second eye (use a clean applicator). (Patient not taking: Reported on 12/6/2024) 3 mL 3     No current facility-administered medications for this visit.     Current Outpatient Medications on File Prior to Visit   Medication Sig    acetaZOLAMIDE (DIAMOX) 250 mg  tablet Take 1 tablet (250 mg total) by mouth 2 (two) times a day 1 tab twice a day (second dose late afternoon/evening) for 7 days then increase to 2 tabs twice a day    ascorbic acid (VITAMIN C) 500 MG tablet Take 500 mg by mouth daily    cholecalciferol (VITAMIN D3) 1,000 units tablet Take 4,000 Units by mouth daily    Cyanocobalamin (VITAMIN B 12 PO) Place 1,000 mcg under the tongue in the morning    dexamethasone (DECADRON) 4 mg tablet Take 1 tablet (4 mg total) by mouth as needed (intractable headache) Take 2 tabs for 2 day then 1 for 3 days.  May stop when h/a breaks if don't need all 5 days    Docusate Sodium 100 MG capsule Take 100-200 mg by mouth in the morning 100 mg Monday/Wednesday/Friday/Sunday  200 mg Tuesday/Thursday/Saturday    DULoxetine (CYMBALTA) 60 mg delayed release capsule Take 1 capsule (60 mg total) by mouth daily    ketorolac (TORADOL) 10 mg tablet TAKE ONE TABLET BY MOUTH AT ONSET OF MIGRAINE, CAN REPEAT X 1 IN 6 HOURS, CAN COMBINE WITH TRIPTAN. TAKE WITH FOOD/MILK/ANTACID    Loratadine 10 MG CAPS Take 10 mg by mouth daily     methocarbamol (ROBAXIN) 500 mg tablet Take 1 tablet (500 mg total) by mouth daily at bedtime as needed for muscle spasms    methylphenidate (CONCERTA) 36 MG ER tablet Take 1 tablet (36 mg total) by mouth daily Max Daily Amount: 36 mg    naproxen sodium (ANAPROX) 550 mg tablet Take 1 tablet (550 mg total) by mouth 2 (two) times a day with meals For menstrual cramps    omeprazole (PriLOSEC) 40 MG capsule Take 1 capsule (40 mg total) by mouth daily    ondansetron (ZOFRAN) 4 mg tablet Take 1 tablet (4 mg total) by mouth every 8 (eight) hours as needed for nausea or vomiting    rimegepant sulfate (Nurtec) 75 mg TBDP Take 1 tablet (75 mg total) by mouth as needed (migraine) Limit of 1 in 24 hours    rizatriptan (MAXALT-MLT) 10 mg disintegrating tablet Take 1 tablet (10 mg total) by mouth as needed for migraine Take at the onset of migraine; if symptoms continue or return,  "may take another dose at least 2 hours after first dose. Take no more than 2 doses in a day.    valACYclovir (VALTREX) 500 mg tablet Take 1 tablet (500 mg total) by mouth daily    bimatoprost (LATISSE) 0.03 % ophthalmic solution Administer 1 drop to both eyes daily at bedtime Place one drop on applicator and apply evenly along the skin of the upper eyelid at base of eyelashes once daily at bedtime; repeat procedure for second eye (use a clean applicator). (Patient not taking: Reported on 12/6/2024)     No current facility-administered medications on file prior to visit.     She is allergic to sulfa antibiotics and doxycycline..    Review of Systems   Constitutional:  Negative for fever.   Respiratory:  Negative for shortness of breath.    Cardiovascular:  Negative for chest pain and palpitations.   Gastrointestinal:  Negative for abdominal pain, constipation, diarrhea and vomiting.   Genitourinary:  Negative for difficulty urinating.   Skin:  Negative for rash.   Neurological:  Negative for headaches.   Psychiatric/Behavioral:  Negative for dysphoric mood. The patient is not nervous/anxious.        Objective:    /62   Pulse 88   Temp 97.5 °F (36.4 °C)   Resp 16   Ht 5' 10.7\" (1.796 m)   Wt 75.4 kg (166 lb 3.2 oz)   LMP 02/10/2025   BMI 23.38 kg/m²      Physical Exam  Vitals and nursing note reviewed.   Constitutional:       General: She is not in acute distress.     Appearance: Normal appearance. She is well-developed.   HENT:      Head: Normocephalic and atraumatic.   Eyes:      Conjunctiva/sclera: Conjunctivae normal.   Neck:      Thyroid: No thyromegaly.   Pulmonary:      Effort: Pulmonary effort is normal. No respiratory distress.   Skin:     Findings: No rash (visible).   Neurological:      Mental Status: She is alert and oriented to person, place, and time.   Psychiatric:         Mood and Affect: Mood normal.         Behavior: Behavior normal.         "

## 2025-02-24 NOTE — PROGRESS NOTES
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 Last seen by RD in May 2023 but has been following with PA for medication management. Currently on 1.7 mg/day wegovy. Current wt 169 lbs. She has kept off 85 lbs since 2021. Struggles to consume enough calories due to lack of hunger. Sleep schedule has also been an issue which affects her days off. Suggest revisiting food logging & weighing to help her determine if she is consuming enough protein. We discussed higher protein alternatives to some of the meals she is currently consuming. Per calculations, maintenance calories ~1800 however may not be able to get to this amount with Wegovy. Meal plan provided 8903-4672 calories and we will see if she is able to maintain here. She will f/u in ~6 wks for body comp.      Patient seen by Medical Provider in past 6 months:  yes  Requested to schedule appointment with Medical Provider: No    Anthropometric Measurements  Start Weight (#): 254 lbs 11/16/21  Current Weight (#): 169 lbs   TBW % Change from start weight: 33.4%  Ideal Body Weight (#): BMI 25 174.6 lbs  (150 lbs [place])  Goal Weight (#): 160-165 lbs  Highest: 255 lbs  Lowest: 153 lbs     Weight Loss History  Previous weight loss attempts: Commercial Programs (Weight Watchers, [employer], etc.)  Exercise  OTC meds/supplements  Self Created Diets (Portion Control, Healthy Food Choices, etc.)    Food and Nutrition Related History  Wake up:   5:30 weekends -8 a.m. (days off back to bed until 11 due to headache)  Bed Time:   11-11:30    Food Recall   6:00 hot jordan  Breakfast 9:30-10:00: belvita    Snack: skip  Lunch 11-12:00: 2 eggs, turkey valentin, sometimes 1-2 pieces toast, sometimes avocado OR if at work baked ham, potatoes, green beans OR protein pasta    Snack:  skip OR protein bar  Dinner:  6-8:00:  take out OR ? Amt of protein, carb, veg  Snack: sometimes chips and belvita    Beverages: water,   Volume of beverage  intake: 32-48 oz when working; >90 oz when at home    Weekends: Same  Cravings: chips, ice cream  Trouble area of day: midafternoon     Frequency of Eating out: 3x/wk  Food restrictions: n/a  Cooking: self   Food Shopping: self    Physical Activity Intake  Activity: none currently  Frequency: n/a  Physical limitations/barriers to exercise: shoulder injury    Estimated Needs  Energy  Harmeet Gaytan Energy Needs: BMR : 1527     Maintenance calories for sedentary activity level: 1832  Protein:  gm     (1.2-1.5g/kg IBW)  Fluid: 80 oz     (35mL/kg IBW)    Nutrition Diagnosis  Yes;    History of Overweight/obesity  related to Excess energy intake as evidenced by  BMI more than normative standard for age and sex (overweight 25-29.9)       Nutrition Intervention    Nutrition Prescription  Calories: 8370-2236  Protein:  gm    Meal Plan (Bobby/Pro)  Breakfast: 300, 15-30  Snack: skip  Lunch: 400-500, 30  Snack: 200-250, 5-15  Dinner: 400-500, 30  Snack: 0-200, 0-5     Nutrition Education:    Healthy Core Manual  Calorie controlled menu  Lean protein food choices  Healthy snack options  Food journaling tips      Nutrition Counseling:  Strategies: meal planning, portion sizes, healthy snack choices, hydration, fiber intake, protein intake, exercise, food journal      Monitoring and Evaluation:  Evaluation criteria:  Energy Intake  Meet protein needs  Maintain adequate hydration  Monitor weekly weight  Meal planning/preparation  Food journal   Decreased portions at mealtimes and snacks  Physical activity     Barriers to learning:none  Readiness to change: Maintenance: (Maintaining the behavior change)    Comprehension: very good  Expected Compliance: very good

## 2025-03-03 ENCOUNTER — CLINICAL SUPPORT (OUTPATIENT)
Dept: BARIATRICS | Facility: CLINIC | Age: 40
End: 2025-03-03
Payer: COMMERCIAL

## 2025-03-03 VITALS — HEIGHT: 70 IN | BODY MASS INDEX: 24.2 KG/M2 | WEIGHT: 169 LBS

## 2025-03-03 DIAGNOSIS — E66.9 ADULT-ONSET OBESITY: ICD-10-CM

## 2025-03-03 PROCEDURE — S9470 NUTRITIONAL COUNSELING, DIET: HCPCS

## 2025-03-03 PROCEDURE — RECHECK

## 2025-03-12 ENCOUNTER — OFFICE VISIT (OUTPATIENT)
Dept: PSYCHIATRY | Facility: CLINIC | Age: 40
End: 2025-03-12
Payer: COMMERCIAL

## 2025-03-12 DIAGNOSIS — F51.04 PSYCHOPHYSIOLOGICAL INSOMNIA: ICD-10-CM

## 2025-03-12 DIAGNOSIS — F33.0 MDD (MAJOR DEPRESSIVE DISORDER), RECURRENT EPISODE, MILD (HCC): Primary | ICD-10-CM

## 2025-03-12 DIAGNOSIS — F98.8 ATTENTION DEFICIT DISORDER (ADD) IN ADULT: ICD-10-CM

## 2025-03-12 PROCEDURE — 99214 OFFICE O/P EST MOD 30 MIN: CPT | Performed by: PHYSICIAN ASSISTANT

## 2025-03-12 RX ORDER — DULOXETIN HYDROCHLORIDE 60 MG/1
60 CAPSULE, DELAYED RELEASE ORAL DAILY
Qty: 90 CAPSULE | Refills: 1 | Status: SHIPPED | OUTPATIENT
Start: 2025-03-12

## 2025-03-12 RX ORDER — METHYLPHENIDATE HYDROCHLORIDE 36 MG/1
36 TABLET ORAL DAILY
Qty: 30 TABLET | Refills: 0 | Status: SHIPPED | OUTPATIENT
Start: 2025-03-12

## 2025-03-12 RX ORDER — TRAZODONE HYDROCHLORIDE 50 MG/1
TABLET ORAL
Qty: 30 TABLET | Refills: 1 | Status: SHIPPED | OUTPATIENT
Start: 2025-03-12

## 2025-03-12 NOTE — PSYCH
MEDICATION MANAGEMENT NOTE    Name: Jo Barros      : 1985      MRN: [id number]  Encounter Provider: Barbara Elizabeth PA-C  Encounter Date: 3/12/2025   Encounter department: [facility]    Insurance: Payor: CAPITAL / Plan: [facility] NETWORK / Product Type: [facility] /      Reason for Visit:   Chief Complaint   Patient presents with    Follow-up    Medication Management   :  Assessment & Plan  Attention deficit disorder (ADD) in adult  Stable and at baseline  Continue methylphenidate 36 mg daily  Orders:    methylphenidate (CONCERTA) 36 MG ER tablet; Take 1 tablet (36 mg total) by mouth daily Max Daily Amount: 36 mg    MDD (major depressive disorder), recurrent episode, mild (HCC)  Improving and approaching baseline  Continue Cymbalta 60 mg daily  Orders:    DULoxetine (CYMBALTA) 60 mg delayed release capsule; Take 1 capsule (60 mg total) by mouth daily    Psychophysiological insomnia  Not at baseline  Add trazodone 50 mg a half to 1 at night as needed,  Sleep hygiene  Orders:    traZODone (DESYREL) 50 mg tablet; 1/2 -1 po qhs prn        Treatment Recommendations:    Educated about diagnosis and treatment modalities. Verbalizes understanding and agreement with the treatment plan.  Discussed self monitoring of symptoms, and symptom monitoring tools.  Discussed medications and if treatment adjustment was needed or desired.  Aware of 24 hour and weekend coverage for urgent situations accessed by calling [facility] main practice number  I am scheduling this patient out for greater than 3 months: No    Medications Risks/Benefits:      Risks, Benefits And Possible Side Effects Of Medications:    Risks, benefits, and possible side effects of medications explained to Connie and she (or legal representative) verbalizes understanding and agreement for treatment.    Controlled Medication Discussion:     Connie has been filling controlled  prescriptions on time as prescribed according to [place] Prescription Drug Monitoring Program      History of Present Illness   Connie was seen for follow-up and for medication management.  At her last visit she was maintained on Cymbalta 60 mg daily and methylphenidate 36 mg in the morning.  Overall states she feels though she has been doing okay.  Did state she had some episodes of increased anxiety that she states were not provoked by any external factors.  States that she was at work this weekend when she had an increase in anxiety.  States that it did pass.  States that she has not been taking methylphenidate on some nonwork days and discussed this could contribute.  Encouraged her to be as consistent as possible with medications.  Also states that she is having difficulty with sleep at night.  States that she falls asleep okay but then awakens after 1 to 2 hours and states this continues on through the night.  As a result she is feeling tired during the day.  States that during her days off then she will go back to sleep for another 2 to 3 hours which is why she has not been taking the methylphenidate..  Feels as though focus and concentration has been stable.  She has been doing well at work.  Continue to take classes towards her master's degree.  Good relationship with her family.  Her son is looking at colleges and they will be visiting [facility].  Good relationship with her significant other.  States that she has not been exercising consistently as she had been and encouraged to do so.  Med list reviewed and updated.      Review Of Systems: A review of systems is obtained and is negative except for the pertinent positives listed in HPI/Subjective above.      Current Rating Scores:     None completed today.    Areas of Improvement: reviewed in HPI/Subjective Section and reviewed in Assessment and Plan Section      Past Medical History:   Diagnosis Date    Abnormal ECG 1/25/19    Incomplete RBBB, repeat  EKG 4/30    ADHD     Allergic     Asthma     Cluster headache     GERD (gastroesophageal reflux disease)     Headache(784.0)     Headache, tension-type     HPV (human papilloma virus) infection     HSV-1 infection     HSV-2 infection     Miscarriage 09/2009    Miscarriage 8 weeks, Stillbirth 08/2012 (39 weeks)        Past Surgical History:   Procedure Laterality Date    CERVICAL BIOPSY  W/ LOOP ELECTRODE EXCISION  2012    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    CT EPIDURAL STEROID INJECTION (TAMI LUMBAR) N/A 2018    L5-S1, x2    FL INJECTION LEFT SHOULDER (ARTHROGRAM)  11/12/2024    LAMINECTOMY  2/5/2019    L5-S1 herniated disc    POSTERIOR LAMINECTOMY THORACIC AND LUMBAR SPINE N/A 02/05/2019    Procedure: Lumbar laminectomy, decompression, discectomy left L5-S1 ;  Surgeon: Misa Samaniego MD;  Location: [facility];  Service: Orthopedics    SKIN BIOPSY      SPINE SURGERY  2/5/19    L5-S1 discectomy/laminectomy    TONSILECTOMY AND ADNOIDECTOMY  1990    WISDOM TOOTH EXTRACTION  2007     Allergies:   Allergies   Allergen Reactions    Sulfa Antibiotics      Family has allergy she has never taken    Doxycycline Rash and Edema     Whole body rash,bruises behind both thighs       Current Outpatient Medications   Medication Sig Dispense Refill    DULoxetine (CYMBALTA) 60 mg delayed release capsule Take 1 capsule (60 mg total) by mouth daily 90 capsule 1    methylphenidate (CONCERTA) 36 MG ER tablet Take 1 tablet (36 mg total) by mouth daily Max Daily Amount: 36 mg 30 tablet 0    traZODone (DESYREL) 50 mg tablet 1/2 -1 po qhs prn 30 tablet 1    acetaZOLAMIDE (DIAMOX) 250 mg tablet Take 1 tablet (250 mg total) by mouth 2 (two) times a day 1 tab twice a day (second dose late afternoon/evening) for 7 days then increase to 2 tabs twice a day 180 tablet 3    ascorbic acid (VITAMIN C) 500 MG tablet Take 500 mg by mouth daily      bimatoprost (LATISSE) 0.03 % ophthalmic solution Administer 1 drop to both eyes  daily at bedtime Place one drop on applicator and apply evenly along the skin of the upper eyelid at base of eyelashes once daily at bedtime; repeat procedure for second eye (use a clean applicator). (Patient not taking: Reported on 12/6/2024) 3 mL 3    cholecalciferol (VITAMIN D3) 1,000 units tablet Take 4,000 Units by mouth daily      Cyanocobalamin (VITAMIN B 12 PO) Place 1,000 mcg under the tongue in the morning      dexamethasone (DECADRON) 4 mg tablet Take 1 tablet (4 mg total) by mouth as needed (intractable headache) Take 2 tabs for 2 day then 1 for 3 days.  May stop when h/a breaks if don't need all 5 days 9 tablet 2    Docusate Sodium 100 MG capsule Take 100-200 mg by mouth in the morning 100 mg Monday/Wednesday/Friday/Sunday  200 mg Tuesday/Thursday/Saturday      ketorolac (TORADOL) 10 mg tablet TAKE ONE TABLET BY MOUTH AT ONSET OF MIGRAINE, CAN REPEAT X 1 IN 6 HOURS, CAN COMBINE WITH TRIPTAN. TAKE WITH FOOD/MILK/ANTACID 10 tablet 3    Loratadine 10 MG CAPS Take 10 mg by mouth daily       methocarbamol (ROBAXIN) 500 mg tablet Take 1 tablet (500 mg total) by mouth daily at bedtime as needed for muscle spasms 90 tablet 2    naproxen sodium (ANAPROX) 550 mg tablet Take 1 tablet (550 mg total) by mouth 2 (two) times a day with meals For menstrual cramps 60 tablet 1    omeprazole (PriLOSEC) 40 MG capsule Take 1 capsule (40 mg total) by mouth daily 90 capsule 1    ondansetron (ZOFRAN) 4 mg tablet Take 1 tablet (4 mg total) by mouth every 8 (eight) hours as needed for nausea or vomiting 20 tablet 0    rimegepant sulfate (Nurtec) 75 mg TBDP Take 1 tablet (75 mg total) by mouth as needed (migraine) Limit of 1 in 24 hours 16 tablet 6    rizatriptan (MAXALT-MLT) 10 mg disintegrating tablet Take 1 tablet (10 mg total) by mouth as needed for migraine Take at the onset of migraine; if symptoms continue or return, may take another dose at least 2 hours after first dose. Take no more than 2 doses in a day. 12 tablet 3     Semaglutide-Weight Management (WEGOVY) 1.7 MG/0.75ML Inject 0.75 mL (1.7 mg total) under the skin once a week 3 mL 7    valACYclovir (VALTREX) 500 mg tablet Take 1 tablet (500 mg total) by mouth daily 90 tablet 1     No current facility-administered medications for this visit.       Substance Abuse History:    Social History     Substance and Sexual Activity   Alcohol Use Yes    Comment: Rare use     Social History     Substance and Sexual Activity   Drug Use Yes    Frequency: 2.0 times per week    Types: Marijuana    Comment: Medical Marijuana occasionally use       Social History:    Social History     Socioeconomic History    Marital status: /Civil Union     Spouse name: Not on file    Number of children: Not on file    Years of education: Not on file    Highest education level: Not on file   Occupational History    Not on file   Tobacco Use    Smoking status: Former     Current packs/day: 0.00     Types: Cigarettes     Start date: 1996     Quit date: 2016     Years since quittin.2     Passive exposure: Past    Smokeless tobacco: Never    Tobacco comments:     quit > 10 years ago    Vaping Use    Vaping status: Some Days    Substances: THC   Substance and Sexual Activity    Alcohol use: Yes     Comment: Rare use    Drug use: Yes     Frequency: 2.0 times per week     Types: Marijuana     Comment: Medical Marijuana occasionally use    Sexual activity: Not Currently     Partners: Male     Birth control/protection: None     Comment: Had Mirena removed 10/2018   Other Topics Concern    Not on file   Social History Narrative    Not on file     Social Drivers of Health     Financial Resource Strain: Low Risk  (11/3/2020)    Overall Financial Resource Strain (CARDIA)     Difficulty of Paying Living Expenses: Not hard at all   Food Insecurity: Food Insecurity Present (11/3/2020)    Hunger Vital Sign     Worried About Running Out of Food in the Last Year: Sometimes true     Ran Out of Food in the Last  Year: Sometimes true   Transportation Needs: No Transportation Needs (11/3/2020)    PRAPARE - Transportation     Lack of Transportation (Medical): No     Lack of Transportation (Non-Medical): No   Physical Activity: Inactive (11/3/2020)    Exercise Vital Sign     Days of Exercise per Week: 0 days     Minutes of Exercise per Session: 0 min   Stress: No Stress Concern Present (11/3/2020)    Citizen of [place] of Occupational Health - Occupational Stress Questionnaire     Feeling of Stress : Not at all   Social Connections: Moderately Integrated (11/3/2020)    Social Connection and Isolation Panel [NHANES]     Frequency of Communication with Friends and Family: More than three times a week     Frequency of Social Gatherings with Friends and Family: More than three times a week     Attends Orthodox Services: Never     Active Member of Clubs or Organizations: Yes     Attends Club or Organization Meetings: Never     Marital Status:    Intimate Partner Violence: Not At Risk (2023)    Humiliation, Afraid, Rape, and Kick questionnaire     Fear of Current or Ex-Partner: No     Emotionally Abused: No     Physically Abused: No     Sexually Abused: No   Housing Stability: Not on file       Family Psychiatric History:     Family History   Problem Relation Age of Onset    Diabetes Mother     Arthritis Mother     Hypertension Mother     Atrial fibrillation Mother     Neuropathy Mother         Diabetic neuropathy    Osteoarthritis Mother     Diabetes Father             Arthritis Father         psoriatic    Hyperlipidemia Father             Cancer Father         Cholangiocarcinoma ()    Hypertension Father     Hearing loss Father         Degenerative hearing loss    Migraines Brother     Cancer Maternal Grandmother     Esophageal varices Maternal Grandmother     Abnormal EKG Maternal Grandmother     Alcohol abuse Maternal Grandmother     Cirrhosis Maternal Grandmother     Cancer Maternal  Grandfather     Heart disease Maternal Grandfather     Stroke Maternal Grandfather         x 3    Brain cancer Maternal Grandfather     Hypertension Paternal Grandmother     Diabetes Paternal Grandmother     Lung cancer Paternal Grandmother     Diabetes Paternal Grandfather     Migraines Son     Heart disease Maternal Uncle     Melanoma Maternal Uncle     Hypertension Paternal Aunt     Diabetes Paternal Aunt     Colon cancer Paternal Aunt     Hypertension Paternal Uncle     Diabetes Paternal Uncle     Thyroid cancer Paternal Uncle         Papillary thyroid cancer       Medical History Reviewed by provider this encounter:  Meds          Objective   LMP 02/10/2025      Mental Status Evaluation:    Appearance age appropriate, casually dressed   Behavior cooperative, calm   Speech normal rate, normal volume, normal pitch, spontaneous   Mood euthymic   Affect normal range and intensity, appropriate   Thought Processes organized, goal directed   Thought Content no overt delusions   Perceptual Disturbances: no auditory hallucinations, no visual hallucinations   Abnormal Thoughts  Risk Potential Suicidal ideation - None  Homicidal ideation - None  Potential for aggression - No   Orientation oriented to person, place, time/date, and situation   Memory recent and remote memory grossly intact   Consciousness alert and awake   Attention Span Concentration Span attention span and concentration are age appropriate   Intellect appears to be of average intelligence   Insight intact   Judgement intact   Muscle Strength and  Gait normal muscle strength and normal muscle tone, normal gait and normal balance   Motor activity no abnormal movements   Language no difficulty naming common objects, no difficulty repeating a phrase, no difficulty writing a sentence   Fund of Knowledge adequate knowledge of current events  adequate fund of knowledge regarding past history  adequate fund of knowledge regarding vocabulary    Pain none   Pain  Scale 0       Laboratory Results: I have personally reviewed all pertinent laboratory/tests results    CMP:   Lab Results   Component Value Date    SODIUM 138 12/17/2024    K 3.9 12/17/2024     12/17/2024    CO2 28 12/17/2024    AGAP 6 12/17/2024    BUN 13 12/17/2024    CREATININE 0.80 12/17/2024    GLUC 76 01/30/2023    GLUF 76 12/17/2024    CALCIUM 8.9 12/17/2024    AST 15 12/17/2024    ALT 9 12/17/2024    ALKPHOS 42 12/17/2024    TP 6.0 (L) 12/17/2024    ALB 4.1 12/17/2024    TBILI 0.44 12/17/2024    EGFR 93 12/17/2024       Suicide/Homicide Risk Assessment:    Risk of Harm to Self:  The following ratings are based on assessment at the time of the interview  Demographic Risk Factors include:   Historical Risk Factors include: history of depression  Current Specific Risk Factors include: none  Protective Factors: no current suicidal ideation, ability to adapt to change, able to make plans for the future, able to manage anger well, access to mental health treatment, being a parent, being , compliant with medications, compliant with mental health treatment, connection to community, connection to own children  Weapons/Firearms: none. The following steps have been taken to ensure weapons are properly secured: not applicable  Based on today's assessment, [facility] presents the following risk of harm to self: none    Risk of Harm to Others:  The following ratings are based on assessment at the time of the interview and observation over the last 12 months  none  The following interventions are recommended: Continue medication management. No other intervention changes indicated at this time.    Psychotherapy Provided:     Individual psychotherapy provided: No    Treatment Plan:    Completed and signed during the session: Yes - with Connie    Goals: Progress towards Treatment Plan goals - Yes, progressing, as evidenced by subjective findings in HPI/Subjective Section and in Assessment and Plan  Section    Depression Follow-up Plan Completed: Not applicable    Note Share:    This note was not shared with the patient due to reasonable likelihood of causing patient harm    Visit Time  Visit Start Time: 1100 am  Visit Stop Time: 1130 am  Total Visit Duration:  30 minutes    Barbara Elizabeth PA-C 03/12/25

## 2025-03-12 NOTE — BH TREATMENT PLAN
"TREATMENT PLAN (Medication Management Only)        [facility]    Name and Date of Birth:  Jo Barros 39 y.o. 1985  MRN: [id number]  Date of Treatment Plan: March 12, 2025  Diagnosis/Diagnoses:    1. MDD (major depressive disorder), recurrent episode, moderate (HCC)    2. Attention deficit disorder (ADD) in adult      Strengths/Personal Resources for Self-Care: \", mother, and sons, co-worker\".  Area/Areas of need (in own words): \"working on schedule and exercise more consistently\"  1. Long Term Goal:   maintain acceptable anxiety level.  Target Date:6 months - 9/12/2025  Person/Persons responsible for completion of goal: Jo  2.  Short Term Objective (s) - How will we reach this goal?:   A.  Provider new recommended medication/dosage changes and/or continue medication(s):  trial of trazodone, cont methylphenidate, cymbalta 60 mg po qd .  B.  N/A.  C.  N/A.  Target Date:6 months - 9/12/2025  Person/Persons Responsible for Completion of Goal: Jo  Progress Towards Goals: starting treatment  Treatment Modality: medication management every 6 months  Review due 180 days from date of this plan: 6 months - 9/12/2025  Expected length of service: maintenance unless revised  My Physician/PA/NP and I have developed this plan together and I agree to work on the goals and objectives. I understand the treatment goals that were developed for my treatment.   Electronic Signatures: on file (unless signed below)    Barbara Elizabeth PA-C 03/12/25  "

## 2025-04-03 DIAGNOSIS — F51.04 PSYCHOPHYSIOLOGICAL INSOMNIA: ICD-10-CM

## 2025-04-03 RX ORDER — TRAZODONE HYDROCHLORIDE 50 MG/1
TABLET ORAL
Qty: 90 TABLET | Refills: 1 | Status: SHIPPED | OUTPATIENT
Start: 2025-04-03

## 2025-04-07 ENCOUNTER — TELEPHONE (OUTPATIENT)
Dept: BARIATRICS | Facility: CLINIC | Age: 40
End: 2025-04-07

## 2025-04-16 DIAGNOSIS — F98.8 ATTENTION DEFICIT DISORDER (ADD) IN ADULT: ICD-10-CM

## 2025-04-16 RX ORDER — METHYLPHENIDATE HYDROCHLORIDE 36 MG/1
36 TABLET ORAL DAILY
Qty: 30 TABLET | Refills: 0 | Status: SHIPPED | OUTPATIENT
Start: 2025-04-16

## 2025-04-16 NOTE — TELEPHONE ENCOUNTER
Refill cannot be delegated    1 [id number] 03/12/2025 03/12/2025 Methylphenidate Hcl (Tablet, Extended Release) 30.0 30 36 MG [facility] Commercial Insurance 0 / 0 [place]   1 [id number] 01/13/2025 12/19/2024 Methylphenidate Hcl (Tablet, Extended Release) 30.0 30 36 MG [facility] Commercial Insurance 0 / 0 PA   1 [id number] 12/12/2024 12/12/2024 Methylphenidate Hcl (Tablet, Extended Release) 30.0 30 36 MG [facility] Commercial Insurance 0 / 0 PA   1 [id number] 11/06/2024 11/06/2024 Methylphenidate Hcl (Tablet, Extended Release) 30.0 30 36 MG [facility] Commercial Insurance 0 / 0 PA   1 [id number] 09/19/2024 09/19/2024 Methylphenidate Hcl (Tablet, Extended Release) 30.0 30 36 MG [facility] Commercial Insurance 0 / 0 PA   1 [id number] 08/19/2024 08/19/2024 Methylphenidate Hcl (Tablet, Extended Release) 30.0 30 36 MG [facility] Commercial Insurance 0 / 0 PA   1 [id number] 06/20/2024 06/20/2024 Methylphenidate Hcl (Tablet, Extended Release) 30.0 30 36 MG [facility] Commercial Insurance 0 / 0 PA   1 [id number] 05/19/2024 05/16/2024 Methylphenidate Hcl (Tablet, Extended Release) 30.0 30 36 MG [facility] Commercial Insurance 0 / 0 [place]   1 [id number] 04/22/2024 04/19/2024 Methylphenidate Hcl (Tablet, Extended Release) 30.0 30 36 MG [facility] Commercial Insurance 0 / 0 [place]

## 2025-04-16 NOTE — TELEPHONE ENCOUNTER
Reason for call:   [x] Refill   [] Prior Auth  [] Other:     Office:   [] PCP/Provider -   [x] Specialty/Provider - [facility]     Medication:  methylphenidate (CONCERTA) 36 MG ER tablet    Dose/Frequency: Take 1 tablet (36 mg total) by mouth daily     Quantity: 30    Pharmacy: [facility]/pharmacy #[id number] [address]     Local Pharmacy   Does the patient have enough for 3 days?   [x] Yes   [] No - Send as HP to POD    Mail Away Pharmacy   Does the patient have enough for 10 days?   [] Yes   [] No - Send as HP to POD

## 2025-05-02 DIAGNOSIS — K21.00 GASTROESOPHAGEAL REFLUX DISEASE WITH ESOPHAGITIS WITHOUT HEMORRHAGE: ICD-10-CM

## 2025-05-02 RX ORDER — OMEPRAZOLE 40 MG/1
40 CAPSULE, DELAYED RELEASE ORAL DAILY
Qty: 90 CAPSULE | Refills: 1 | Status: SHIPPED | OUTPATIENT
Start: 2025-05-02

## 2025-05-06 ENCOUNTER — TELEPHONE (OUTPATIENT)
Dept: BARIATRICS | Facility: CLINIC | Age: 40
End: 2025-05-06

## 2025-05-06 NOTE — TELEPHONE ENCOUNTER
PA for Wegovy 1.7mg SUBMITTED to [employer]     via    [x]CMM-KEY: [id number]  []Surescripts-Case ID #   []Availity-Auth ID # NDC #   []Faxed to plan   []Other website   []Phone call Case ID #     []PA sent as URGENT    All office notes, labs and other pertaining documents and studies sent. Clinical questions answered. Awaiting determination from insurance company.     Turnaround time for your insurance to make a decision on your Prior Authorization can take 7-21 business days.

## 2025-05-07 NOTE — TELEPHONE ENCOUNTER
PA for Wegovy 1.7mg APPROVED     Date(s) approved 5/6/2025 - 5/6/2026    Case #    Patient advised by          [x]MyChart Message  []Phone call   []LMOM  []L/M to call office as no active Communication consent on file  []Unable to leave detailed message as VM not approved on Communication consent       Pharmacy advised by    [x]Fax  []Phone call  []Secure Chat    Specialty Pharmacy    []     Approval letter scanned into Media Yes

## 2025-05-08 ENCOUNTER — OFFICE VISIT (OUTPATIENT)
Dept: FAMILY MEDICINE CLINIC | Facility: CLINIC | Age: 40
End: 2025-05-08
Payer: COMMERCIAL

## 2025-05-08 VITALS
OXYGEN SATURATION: 99 % | HEART RATE: 76 BPM | HEIGHT: 70 IN | BODY MASS INDEX: 24.28 KG/M2 | TEMPERATURE: 98.2 F | WEIGHT: 169.6 LBS | RESPIRATION RATE: 16 BRPM | SYSTOLIC BLOOD PRESSURE: 102 MMHG | DIASTOLIC BLOOD PRESSURE: 60 MMHG

## 2025-05-08 DIAGNOSIS — R42 DIZZINESS: Primary | ICD-10-CM

## 2025-05-08 DIAGNOSIS — R11.0 NAUSEA: ICD-10-CM

## 2025-05-08 DIAGNOSIS — R23.3 BRUISES EASILY: ICD-10-CM

## 2025-05-08 PROCEDURE — 99214 OFFICE O/P EST MOD 30 MIN: CPT

## 2025-05-08 NOTE — PROGRESS NOTES
Name: Jo Barros      : 1985      MRN: [id number]  Encounter Provider: DEB Dennison  Encounter Date: 2025   Encounter department: [facility]  :  Assessment & Plan  Dizziness  Possible BPPV, hypotension, migraine, anemia. Will check labs for underlying etiology as ordered. The patient will obtain the lab work ordered today. The patient will be notified of results when available and also any further recommendations. Referral to ENT placed. Pt given vestibular exercises in AVS. Consider PT if no improvement. Advised pt obtain automatic BP cuff to monitor BP when symptoms occurring. Pt to contact her Neurologist if symptoms persist.  Orders:  •  Ambulatory Referral to Otolaryngology; Future  •  CBC and differential; Future  •  Comprehensive metabolic panel; Future  •  Hemoglobin A1C; Future  •  Iron Panel (Includes Ferritin, Iron Sat%, Iron, and TIBC); Future  •  Lipid Panel with Direct LDL reflex; Future  •  TSH, 3rd generation with Free T4 reflex; Future    Nausea  Possible BPPV, hypotension, migraine, anemia. Will check labs for underlying etiology as ordered. The patient will obtain the lab work ordered today. The patient will be notified of results when available and also any further recommendations. Referral to ENT placed. Pt given vestibular exercises in AVS. Consider PT if no improvement.   Orders:  •  Ambulatory Referral to Otolaryngology; Future  •  CBC and differential; Future  •  Comprehensive metabolic panel; Future  •  Hemoglobin A1C; Future  •  Iron Panel (Includes Ferritin, Iron Sat%, Iron, and TIBC); Future  •  Lipid Panel with Direct LDL reflex; Future  •  TSH, 3rd generation with Free T4 reflex; Future    Bruises easily  Pt reported long history of bruising easily. The patient will obtain the lab work ordered today. The patient will be notified of results when available and also any further recommendations.  Orders:  •  CBC and  differential; Future  •  Protime-INR; Future      Follow up as needed or if symptoms worsen or fail to improve.      History of Present Illness   Jo Barros is a 39 y.o. year old female who presents today with a concern of dizziness and nausea. Nausea has been intermittent for years but Monday it became increased. Occurs when she rolls over in bed in the morning. She has had to lay on the kitchen  floor to feel better. Checked BS - 94. Has vomited once after sitting up. Zofran is not helping. Reports no chance of pregnancy. Previously BP was low 100's/60. Had menses week prior to increased symptoms.      Dizziness  Associated symptoms include nausea and vomiting. Pertinent negatives include no abdominal pain, chest pain, chills, congestion, coughing, fatigue, fever, headaches, myalgias, rash, sore throat or weakness.   Nausea  This is a new problem. Associated symptoms include nausea and vomiting. Pertinent negatives include no abdominal pain, chest pain, chills, congestion, coughing, fatigue, fever, headaches, myalgias, rash, sore throat or weakness.     Review of Systems   Constitutional: Negative.  Negative for chills, fatigue and fever.   HENT: Negative.  Negative for congestion, ear pain, rhinorrhea and sore throat.    Eyes: Negative.  Negative for pain and visual disturbance.   Respiratory: Negative.  Negative for cough and shortness of breath.    Cardiovascular: Negative.  Negative for chest pain, palpitations and leg swelling.   Gastrointestinal:  Positive for nausea and vomiting. Negative for abdominal distention, abdominal pain, anal bleeding, blood in stool, constipation, diarrhea and rectal pain.   Endocrine: Negative.    Genitourinary: Negative.  Negative for dysuria, frequency and urgency.   Musculoskeletal: Negative.  Negative for back pain and myalgias.   Skin: Negative.  Negative for rash.   Allergic/Immunologic: Negative.    Neurological:  Positive for dizziness. Negative for weakness,  "light-headedness and headaches.   Hematological: Negative.    Psychiatric/Behavioral: Negative.         Objective   /60 (BP Location: Left arm, Patient Position: Sitting, Cuff Size: Standard)   Pulse 76   Temp 98.2 °F (36.8 °C) (Tympanic)   Resp 16   Ht 5' 10\" (1.778 m)   Wt 76.9 kg (169 lb 9.6 oz)   SpO2 99%   BMI 24.34 kg/m²      Physical Exam  Vitals and nursing note reviewed.   Constitutional:       General: She is not in acute distress.     Appearance: Normal appearance. She is not ill-appearing.   HENT:      Head: Normocephalic and atraumatic.      Right Ear: Ear canal and external ear normal. A middle ear effusion is present. Tympanic membrane is scarred. Tympanic membrane is not bulging.      Left Ear: Ear canal and external ear normal. A middle ear effusion is present. Tympanic membrane is scarred. Tympanic membrane is not bulging.      Nose: Nose normal. No congestion.      Mouth/Throat:      Mouth: Mucous membranes are moist.      Pharynx: Oropharynx is clear. No posterior oropharyngeal erythema.   Eyes:      Extraocular Movements: Extraocular movements intact.      Conjunctiva/sclera: Conjunctivae normal.      Pupils: Pupils are equal, round, and reactive to light.   Cardiovascular:      Rate and Rhythm: Normal rate and regular rhythm.      Heart sounds: Normal heart sounds. No murmur heard.  Pulmonary:      Effort: Pulmonary effort is normal. No respiratory distress.      Breath sounds: Normal breath sounds. No wheezing.   Abdominal:      General: Abdomen is flat. Bowel sounds are normal.      Palpations: Abdomen is soft.      Tenderness: There is no abdominal tenderness.   Musculoskeletal:         General: No tenderness. Normal range of motion.      Cervical back: Normal range of motion and neck supple. No tenderness.   Lymphadenopathy:      Cervical: No cervical adenopathy.   Skin:     General: Skin is warm and dry.      Capillary Refill: Capillary refill takes less than 2 seconds.      " Findings: No bruising or rash.   Neurological:      General: No focal deficit present.      Mental Status: She is alert and oriented to person, place, and time.   Psychiatric:         Mood and Affect: Mood normal.         Behavior: Behavior normal.

## 2025-05-28 DIAGNOSIS — F98.8 ATTENTION DEFICIT DISORDER (ADD) IN ADULT: ICD-10-CM

## 2025-05-28 RX ORDER — METHYLPHENIDATE HYDROCHLORIDE 36 MG/1
36 TABLET ORAL DAILY
Qty: 30 TABLET | Refills: 0 | Status: SHIPPED | OUTPATIENT
Start: 2025-05-28

## 2025-05-30 ENCOUNTER — APPOINTMENT (OUTPATIENT)
Dept: LAB | Facility: HOSPITAL | Age: 40
End: 2025-05-30
Payer: COMMERCIAL

## 2025-05-30 DIAGNOSIS — R42 DIZZINESS: ICD-10-CM

## 2025-05-30 DIAGNOSIS — R23.3 BRUISES EASILY: ICD-10-CM

## 2025-05-30 DIAGNOSIS — R11.0 NAUSEA: ICD-10-CM

## 2025-05-30 LAB
ALBUMIN SERPL BCG-MCNC: 4.7 G/DL (ref 3.5–5)
ALP SERPL-CCNC: 51 U/L (ref 34–104)
ALT SERPL W P-5'-P-CCNC: 8 U/L (ref 7–52)
ANION GAP SERPL CALCULATED.3IONS-SCNC: 12 MMOL/L (ref 4–13)
AST SERPL W P-5'-P-CCNC: 14 U/L (ref 13–39)
BASOPHILS # BLD AUTO: 0.01 THOUSANDS/ÂΜL (ref 0–0.1)
BASOPHILS NFR BLD AUTO: 0 % (ref 0–1)
BILIRUB SERPL-MCNC: 0.47 MG/DL (ref 0.2–1)
BUN SERPL-MCNC: 9 MG/DL (ref 5–25)
CALCIUM SERPL-MCNC: 9.6 MG/DL (ref 8.4–10.2)
CHLORIDE SERPL-SCNC: 103 MMOL/L (ref 96–108)
CHOLEST SERPL-MCNC: 158 MG/DL (ref ?–200)
CO2 SERPL-SCNC: 25 MMOL/L (ref 21–32)
CREAT SERPL-MCNC: 0.82 MG/DL (ref 0.6–1.3)
EOSINOPHIL # BLD AUTO: 0.06 THOUSAND/ÂΜL (ref 0–0.61)
EOSINOPHIL NFR BLD AUTO: 1 % (ref 0–6)
ERYTHROCYTE [DISTWIDTH] IN BLOOD BY AUTOMATED COUNT: 12 % (ref 11.6–15.1)
EST. AVERAGE GLUCOSE BLD GHB EST-MCNC: 94 MG/DL
FERRITIN SERPL-MCNC: 10 NG/ML (ref 30–307)
GFR SERPL CREATININE-BSD FRML MDRD: 91 ML/MIN/1.73SQ M
GLUCOSE P FAST SERPL-MCNC: 90 MG/DL (ref 65–99)
HBA1C MFR BLD: 4.9 %
HCT VFR BLD AUTO: 41.2 % (ref 34.8–46.1)
HDLC SERPL-MCNC: 64 MG/DL
HGB BLD-MCNC: 14.1 G/DL (ref 11.5–15.4)
IMM GRANULOCYTES # BLD AUTO: 0.02 THOUSAND/UL (ref 0–0.2)
IMM GRANULOCYTES NFR BLD AUTO: 0 % (ref 0–2)
INR PPP: 0.98 (ref 0.85–1.19)
IRON SATN MFR SERPL: 18 % (ref 15–50)
IRON SERPL-MCNC: 60 UG/DL (ref 50–212)
LDLC SERPL CALC-MCNC: 85 MG/DL (ref 0–100)
LYMPHOCYTES # BLD AUTO: 2.37 THOUSANDS/ÂΜL (ref 0.6–4.47)
LYMPHOCYTES NFR BLD AUTO: 35 % (ref 14–44)
MCH RBC QN AUTO: 31.1 PG (ref 26.8–34.3)
MCHC RBC AUTO-ENTMCNC: 34.2 G/DL (ref 31.4–37.4)
MCV RBC AUTO: 91 FL (ref 82–98)
MONOCYTES # BLD AUTO: 0.44 THOUSAND/ÂΜL (ref 0.17–1.22)
MONOCYTES NFR BLD AUTO: 7 % (ref 4–12)
NEUTROPHILS # BLD AUTO: 3.92 THOUSANDS/ÂΜL (ref 1.85–7.62)
NEUTS SEG NFR BLD AUTO: 57 % (ref 43–75)
NRBC BLD AUTO-RTO: 0 /100 WBCS
PLATELET # BLD AUTO: 225 THOUSANDS/UL (ref 149–390)
PMV BLD AUTO: 10.7 FL (ref 8.9–12.7)
POTASSIUM SERPL-SCNC: 3.6 MMOL/L (ref 3.5–5.3)
PROT SERPL-MCNC: 7.3 G/DL (ref 6.4–8.4)
PROTHROMBIN TIME: 13.3 SECONDS (ref 12.3–15)
RBC # BLD AUTO: 4.54 MILLION/UL (ref 3.81–5.12)
SODIUM SERPL-SCNC: 140 MMOL/L (ref 135–147)
TIBC SERPL-MCNC: 341.6 UG/DL (ref 250–450)
TRANSFERRIN SERPL-MCNC: 244 MG/DL (ref 203–362)
TRIGL SERPL-MCNC: 45 MG/DL (ref ?–150)
TSH SERPL DL<=0.05 MIU/L-ACNC: 1.67 UIU/ML (ref 0.45–4.5)
UIBC SERPL-MCNC: 282 UG/DL (ref 155–355)
WBC # BLD AUTO: 6.82 THOUSAND/UL (ref 4.31–10.16)

## 2025-05-30 PROCEDURE — 83036 HEMOGLOBIN GLYCOSYLATED A1C: CPT

## 2025-05-30 PROCEDURE — 36415 COLL VENOUS BLD VENIPUNCTURE: CPT

## 2025-05-30 PROCEDURE — 80053 COMPREHEN METABOLIC PANEL: CPT

## 2025-05-30 PROCEDURE — 84443 ASSAY THYROID STIM HORMONE: CPT

## 2025-05-30 PROCEDURE — 82728 ASSAY OF FERRITIN: CPT

## 2025-05-30 PROCEDURE — 83540 ASSAY OF IRON: CPT

## 2025-05-30 PROCEDURE — 83550 IRON BINDING TEST: CPT

## 2025-05-30 PROCEDURE — 85025 COMPLETE CBC W/AUTO DIFF WBC: CPT

## 2025-05-30 PROCEDURE — 85610 PROTHROMBIN TIME: CPT

## 2025-05-30 PROCEDURE — 80061 LIPID PANEL: CPT

## 2025-06-03 ENCOUNTER — RESULTS FOLLOW-UP (OUTPATIENT)
Dept: FAMILY MEDICINE CLINIC | Facility: CLINIC | Age: 40
End: 2025-06-03

## 2025-06-12 ENCOUNTER — OFFICE VISIT (OUTPATIENT)
Dept: PSYCHIATRY | Facility: CLINIC | Age: 40
End: 2025-06-12
Payer: COMMERCIAL

## 2025-06-12 ENCOUNTER — TELEPHONE (OUTPATIENT)
Age: 40
End: 2025-06-12

## 2025-06-12 DIAGNOSIS — F98.8 ATTENTION DEFICIT DISORDER (ADD) IN ADULT: ICD-10-CM

## 2025-06-12 DIAGNOSIS — F51.04 PSYCHOPHYSIOLOGICAL INSOMNIA: Primary | ICD-10-CM

## 2025-06-12 DIAGNOSIS — F33.0 MDD (MAJOR DEPRESSIVE DISORDER), RECURRENT EPISODE, MILD (HCC): ICD-10-CM

## 2025-06-12 PROCEDURE — 99214 OFFICE O/P EST MOD 30 MIN: CPT | Performed by: PHYSICIAN ASSISTANT

## 2025-06-12 RX ORDER — METHYLPHENIDATE HYDROCHLORIDE 36 MG/1
36 TABLET ORAL DAILY
Qty: 30 TABLET | Refills: 0 | Status: SHIPPED | OUTPATIENT
Start: 2025-06-12

## 2025-06-12 RX ORDER — DULOXETIN HYDROCHLORIDE 60 MG/1
60 CAPSULE, DELAYED RELEASE ORAL DAILY
Qty: 90 CAPSULE | Refills: 1 | Status: SHIPPED | OUTPATIENT
Start: 2025-06-12

## 2025-06-12 NOTE — PSYCH
MEDICATION MANAGEMENT NOTE    Name: Jo Barros      : 1985      MRN: [id number]  Encounter Provider: Barbara Elizabeth PA-C  Encounter Date: 2025   [facility]    Insurance: Payor: CAPITAL / Plan: [facility] NETWORK / Product Type: [facility] /      Reason for Visit: No chief complaint on file.  :  Assessment & Plan  Psychophysiological insomnia  Has as needed trazodone but has barely been taking  Will take 25 mg consistently and monitor         Attention deficit disorder (ADD) in adult  Stable with methylphenidate 36 mg daily  Orders:    methylphenidate (CONCERTA) 36 MG ER tablet; Take 1 tablet (36 mg total) by mouth daily Max Daily Amount: 36 mg    MDD (major depressive disorder), recurrent episode, mild (HCC)  Continue Cymbalta 60 mg daily  Orders:    DULoxetine (CYMBALTA) 60 mg delayed release capsule; Take 1 capsule (60 mg total) by mouth daily    Follow-up in 3 months and she will call me sooner if any questions or concerns    Treatment Recommendations:    Educated about diagnosis and treatment modalities. Verbalizes understanding and agreement with the treatment plan.  Discussed self monitoring of symptoms, and symptom monitoring tools.  Discussed medications and if treatment adjustment was needed or desired.  Aware of 24 hour and weekend coverage for urgent situations accessed by calling [facility] main practice number  I am scheduling this patient out for greater than 3 months: No    Medications Risks/Benefits:      Risks, Benefits And Possible Side Effects Of Medications:    Risks, benefits, and possible side effects of medications explained to Connie and she (or legal representative) verbalizes understanding and agreement for treatment.    Controlled Medication Discussion:     Connie has been filling controlled prescriptions on time as prescribed according to [place] Prescription Drug  Monitoring Program.      History of Present Illness     Bethstates that she has been having some increased depressive symptoms.  Has been having episodic tearfulness.  Discussed stressors with her spouse.  He had been working as a  and had his own gym.  Since COVID though he had to close the business.  She states that he has only been working sporadically.  Due to that she has increased dressers due to thinking about intermediate and college further children.  States that she is going to meet with her mother's  and helpful to consolidate some debt.  Understandably this has been stressful.  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  Discussed her sleep difficulties and she has been very inconsistent with her sleep schedule.  On work days 3 days a week she gets up about 5:30 AM.  States on nonwork days though she gets up to the dogs out and then she goes back to bed until about 11 AM.  Discussed consistency with sleep and taking medication on a regular basis and she is in agreement with this.      Review Of Systems: A review of systems is obtained and is negative except for the pertinent positives listed in HPI/Subjective above.      Current Rating Scores:     None completed today.    Areas of Improvement: reviewed in HPI/Subjective Section and reviewed in Assessment and Plan Section      Past Medical History[1]  Past Surgical History[2]  Allergies: Allergies[3]    Current Outpatient Medications   Medication Instructions    acetaZOLAMIDE (DIAMOX) 250 mg, Oral, 2 times daily, 1 tab twice a day (second dose late afternoon/evening) for 7 days then increase to 2 tabs twice a day    ascorbic acid (VITAMIN C) 500 mg, Daily    cholecalciferol (VITAMIN D3) 4,000 Units, Daily    Cyanocobalamin (VITAMIN B 12 PO) 1,000 mcg, Daily    dexamethasone (DECADRON) 4 mg, Oral, As needed, Take 2 tabs for 2 day then 1 for 3 days.  May stop when h/a breaks if don't need  all 5 days    Docusate Sodium 100-200 mg, Daily    DULoxetine (CYMBALTA) 60 mg, Oral, Daily    ketorolac (TORADOL) 10 mg tablet TAKE ONE TABLET BY MOUTH AT ONSET OF MIGRAINE, CAN REPEAT X 1 IN 6 HOURS, CAN COMBINE WITH TRIPTAN. TAKE WITH FOOD/MILK/ANTACID    Loratadine 10 mg, Daily    methocarbamol (ROBAXIN) 500 mg, Oral, Daily at bedtime PRN    methylphenidate (CONCERTA) 36 mg, Oral, Daily    naproxen sodium (ANAPROX) 550 mg, Oral, 2 times daily with meals, For menstrual cramps    Nurtec 75 mg, Oral, As needed, Limit of 1 in 24 hours    omeprazole (PRILOSEC) 40 mg, Oral, Daily    ondansetron (ZOFRAN) 4 mg, Oral, Every 8 hours PRN    rizatriptan (MAXALT-MLT) 10 mg, Oral, As needed, Take at the onset of migraine; if symptoms continue or return, may take another dose at least 2 hours after first dose. Take no more than 2 doses in a day.    Semaglutide-Weight Management (WEGOVY) 1.7 mg, Subcutaneous, Weekly    traZODone (DESYREL) 50 mg tablet TAKE 1/2-1 TABLET BY MOUTH AT BEDTIME AS NEEDED    valACYclovir (VALTREX) 500 mg, Oral, Daily        Substance Abuse History:    Tobacco, Alcohol and Drug Use History     Tobacco Use    Smoking status: Former     Current packs/day: 0.00     Types: Cigarettes     Start date: 1996     Quit date: 2016     Years since quittin.4     Passive exposure: Past    Smokeless tobacco: Never    Tobacco comments:     quit > 10 years ago    Vaping Use    Vaping status: Some Days    Substances: THC   Substance Use Topics    Alcohol use: Yes     Comment: Rare use    Drug use: Yes     Frequency: 2.0 times per week     Types: Marijuana     Comment: Medical Marijuana occasionally use     Alcohol Use: Unknown (2021)    AUDIT-C     Frequency of Alcohol Consumption: Not asked     Frequency of Binge Drinking: Less than monthly       Social History:    Social History     Socioeconomic History    Marital status: /Civil Union     Spouse name: Not on file    Number of children: Not on  file    Years of education: Not on file    Highest education level: Not on file   Occupational History    Not on file   Other Topics Concern    Not on file   Social History Narrative    Not on file        Family Psychiatric History:     Family History[4]    Medical History Reviewed by provider this encounter:  Meds          Objective   There were no vitals taken for this visit.     Mental Status Evaluation:    Appearance age appropriate, casually dressed, dressed appropriately   Behavior pleasant, cooperative, calm   Speech normal rate, normal volume, normal pitch, spontaneous   Mood dysphoric   Affect normal range and intensity, appropriate   Thought Processes organized, goal directed   Thought Content no overt delusions   Perceptual Disturbances: no auditory hallucinations, no visual hallucinations   Abnormal Thoughts  Risk Potential Suicidal ideation - None  Homicidal ideation - None  Potential for aggression - No   Orientation oriented to person, place, time/date, and situation   Memory recent and remote memory grossly intact   Consciousness alert and awake   Attention Span Concentration Span attention span and concentration are age appropriate   Intellect appears to be of average intelligence   Insight intact   Judgement intact   Muscle Strength and  Gait normal muscle strength and normal muscle tone, normal gait and normal balance   Motor activity no abnormal movements   Language no difficulty naming common objects, no difficulty repeating a phrase, no difficulty writing a sentence   Fund of Knowledge adequate knowledge of current events  adequate fund of knowledge regarding past history  adequate fund of knowledge regarding vocabulary        Laboratory Results: I have personally reviewed all pertinent laboratory/tests results    Last Visit Labs:   Appointment on 05/30/2025   Component Date Value    WBC 05/30/2025 6.82     RBC 05/30/2025 4.54     Hemoglobin 05/30/2025 14.1     Hematocrit 05/30/2025 41.2      MCV 05/30/2025 91     MCH 05/30/2025 31.1     MCHC 05/30/2025 34.2     RDW 05/30/2025 12.0     MPV 05/30/2025 10.7     Platelets 05/30/2025 225     nRBC 05/30/2025 0     Segmented % 05/30/2025 57     Immature Grans % 05/30/2025 0     Lymphocytes % 05/30/2025 35     Monocytes % 05/30/2025 7     Eosinophils Relative 05/30/2025 1     Basophils Relative 05/30/2025 0     Absolute Neutrophils 05/30/2025 3.92     Absolute Immature Grans 05/30/2025 0.02     Absolute Lymphocytes 05/30/2025 2.37     Absolute Monocytes 05/30/2025 0.44     Eosinophils Absolute 05/30/2025 0.06     Basophils Absolute 05/30/2025 0.01     Sodium 05/30/2025 140     Potassium 05/30/2025 3.6     Chloride 05/30/2025 103     CO2 05/30/2025 25     ANION GAP 05/30/2025 12     BUN 05/30/2025 9     Creatinine 05/30/2025 0.82     Glucose, Fasting 05/30/2025 90     Calcium 05/30/2025 9.6     AST 05/30/2025 14     ALT 05/30/2025 8     Alkaline Phosphatase 05/30/2025 51     Total Protein 05/30/2025 7.3     Albumin 05/30/2025 4.7     Total Bilirubin 05/30/2025 0.47     eGFR 05/30/2025 91     Hemoglobin A1C 05/30/2025 4.9     EAG 05/30/2025 94     Cholesterol 05/30/2025 158     Triglycerides 05/30/2025 45     HDL, Direct 05/30/2025 64     LDL Calculated 05/30/2025 85     Protime 05/30/2025 13.3     INR 05/30/2025 0.98     TSH 3RD GENERATION 05/30/2025 1.673     Iron Saturation 05/30/2025 18     TIBC 05/30/2025 341.6     Iron 05/30/2025 60     Transferrin 05/30/2025 244     UIBC 05/30/2025 282     Ferritin 05/30/2025 10 (L)        Suicide/Homicide Risk Assessment:    Risk of Harm to Self:  The following ratings are based on assessment at the time of the interview  Based on today's assessment, Connie presents the following risk of harm to self: minimal    Risk of Harm to Others:  The following ratings are based on assessment at the time of the interview: none    The following interventions are recommended: Continue medication management. No other intervention changes  "indicated at this time.    Psychotherapy Provided:     Individual psychotherapy provided: No    Treatment Plan:    Completed and signed during the session: Not applicable - Treatment Plan not due at this session.    Goals: Progress towards Treatment Plan goals - Yes, progressing, as evidenced by subjective findings in HPI/Subjective Section and in Assessment and Plan Section    Depression Follow-up Plan Completed: Not applicable    Note Share:    This note was not shared with the patient due to privacy exception: note includes other individuals    Administrative Statements   Administrative Statements   I have spent a total time of 35 minutes in caring for this patient on the day of the visit/encounter including Prognosis, Risks and benefits of tx options, Instructions for management, Patient and family education, Importance of tx compliance, Risk factor reductions, Impressions, Counseling / Coordination of care, Documenting in the medical record, Reviewing/placing orders in the medical record (including tests, medications, and/or procedures), and Obtaining or reviewing history  .    Visit Time  Visit Start Time: 1035  Visit Stop Time: 1110  Total Visit Duration: 35 minutes    Portions of the record may have been created with voice recognition software. Occasional wrong word or \"sound a like\" substitutions may have occurred due to the inherent limitations of voice recognition software. Read the chart carefully and recognize, using context, where substitutions have occurred.    Barbara Elizabeth PA-C 06/12/25       [1]   Past Medical History:  Diagnosis Date    Abnormal ECG 1/25/19    Incomplete RBBB, repeat EKG 4/30    ADHD     Allergic     Allergic rhinitis     Anxiety     Treat with Cymbalta    Asthma     Breast mass     Painful lump Right breast    Cluster headache     GERD (gastroesophageal reflux disease)     Headache(784.0)     Headache, tension-type     HPV (human papilloma virus) infection     HSV-1 " infection     HSV-2 infection     Miscarriage 2009    Miscarriage 8 weeks, Stillbirth 2012 (39 weeks)   [2]   Past Surgical History:  Procedure Laterality Date    BACK SURGERY  2019    Laminectomy L5-S1    CERVICAL BIOPSY  W/ LOOP ELECTRODE EXCISION      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    CT EPIDURAL STEROID INJECTION (TAMI LUMBAR) N/A     L5-S1, x2    FL INJECTION LEFT SHOULDER (ARTHROGRAM)  2024    LAMINECTOMY  2019    L5-S1 herniated disc    POSTERIOR LAMINECTOMY THORACIC AND LUMBAR SPINE N/A 2019    Procedure: Lumbar laminectomy, decompression, discectomy left L5-S1 ;  Surgeon: Misa Samaniego MD;  Location: [facility];  Service: Orthopedics    SKIN BIOPSY      SPINE SURGERY  19    L5-S1 discectomy/laminectomy    TONSILECTOMY AND ADNOIDECTOMY      TONSILLECTOMY      Age 4yo    WISDOM TOOTH EXTRACTION     [3]   Allergies  Allergen Reactions    Sulfa Antibiotics      Family has allergy she has never taken    Doxycycline Rash and Edema     Whole body rash,bruises behind both thighs   [4]   Family History  Problem Relation Name Age of Onset    Diabetes Mother Kim Barros     Arthritis Mother Kim Barros     Hypertension Mother Kim Barros     Atrial fibrillation Mother Kim Barros     Neuropathy Mother Kim Barros         Diabetic neuropathy    Osteoarthritis Mother Kim Barros     Anemia Mother Kim Barros     Diabetes Father Ricardo Papo             Arthritis Father Ricardo Papo         psoriatic    Hyperlipidemia Father Ricardo Papo             Cancer Father Ricardo Papo         Cholangiocarcinoma ()    Hypertension Father Ricardo Papo     Hearing loss Father Ricardo Papo         Degenerative hearing loss    Anemia Father Ricardo Papo     Migraines Brother Sony     Alcohol abuse Brother Sony         PTSD and etoh abuse    Asthma Brother Sony     Mental illness  Brother Sony         PTSD combat vet    Cancer Maternal Grandmother Matilde Corrigan     Esophageal varices Maternal Grandmother Matilde Corrigan     Abnormal EKG Maternal Grandmother Matilde Corrigan     Alcohol abuse Maternal Grandmother Matilde Corrigan     Cirrhosis Maternal Grandmother Matilde Corrigan     Cancer Maternal Grandfather Mykel Corrigan     Heart disease Maternal Grandfather Mykel Corrigan     Stroke Maternal Grandfather Mykel Corrigan         x 3    Brain cancer Maternal Grandfather Mykel Corrigan     Alcohol abuse Maternal Grandfather Mykel Corrigan     Hypertension Paternal Grandmother Ronda Paop     Diabetes Paternal Grandmother Ronda Papo     Lung cancer Paternal Grandmother Ronda Papo     Colon cancer Paternal Grandmother Ronda Papo     Breast cancer Paternal Grandmother Ronda Papo     Diabetes Paternal Grandfather Cheng Lainezehart     Migraines Son Antony     Heart disease Maternal Uncle Dave     Coronary artery disease Maternal Uncle Dave         Also A-fib    Melanoma Maternal Uncle Mykel     Hypertension Paternal Aunt LaDawna     Diabetes Paternal Aunt LaDawna     Colon cancer Paternal Aunt LaDawna     Colon cancer Paternal Aunt Ladawna     Hypertension Paternal Uncle Denilson     Diabetes Paternal Uncle Denilson     Thyroid cancer Paternal Uncle Denilson         Papillary thyroid cancer    Colon cancer Paternal Uncle Denilson     Diabetes Paternal Uncle Ang Papo     Diabetes Paternal Aunt Jaiden Mcghee     Prostate cancer Maternal Uncle Mykel

## 2025-06-12 NOTE — TELEPHONE ENCOUNTER
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 Please reach out to the patient when she can get the ppd done. Thank you

## 2025-06-13 ENCOUNTER — PATIENT MESSAGE (OUTPATIENT)
Dept: FAMILY MEDICINE CLINIC | Facility: CLINIC | Age: 40
End: 2025-06-13

## 2025-06-16 ENCOUNTER — CLINICAL SUPPORT (OUTPATIENT)
Dept: FAMILY MEDICINE CLINIC | Facility: CLINIC | Age: 40
End: 2025-06-16
Payer: COMMERCIAL

## 2025-06-16 DIAGNOSIS — Z11.1 SCREENING FOR TUBERCULOSIS: Primary | ICD-10-CM

## 2025-06-16 PROCEDURE — 86580 TB INTRADERMAL TEST: CPT

## 2025-06-18 ENCOUNTER — CLINICAL SUPPORT (OUTPATIENT)
Dept: FAMILY MEDICINE CLINIC | Facility: CLINIC | Age: 40
End: 2025-06-18

## 2025-06-18 DIAGNOSIS — Z11.1 ENCOUNTER FOR PPD SKIN TEST READING: Primary | ICD-10-CM

## 2025-06-18 LAB
INDURATION: 0 MM
TB SKIN TEST: NEGATIVE

## 2025-06-18 PROCEDURE — NURSE

## 2025-07-07 ENCOUNTER — HOSPITAL ENCOUNTER (EMERGENCY)
Facility: HOSPITAL | Age: 40
Discharge: HOME | End: 2025-07-07
Attending: EMERGENCY MEDICINE | Admitting: EMERGENCY MEDICINE
Payer: OTHER MISCELLANEOUS

## 2025-07-07 ENCOUNTER — PATIENT MESSAGE (OUTPATIENT)
Dept: GYNECOLOGY | Facility: CLINIC | Age: 40
End: 2025-07-07

## 2025-07-07 ENCOUNTER — APPOINTMENT (EMERGENCY)
Dept: RADIOLOGY | Facility: HOSPITAL | Age: 40
End: 2025-07-07
Payer: OTHER MISCELLANEOUS

## 2025-07-07 VITALS
DIASTOLIC BLOOD PRESSURE: 72 MMHG | SYSTOLIC BLOOD PRESSURE: 141 MMHG | RESPIRATION RATE: 16 BRPM | HEART RATE: 84 BPM | TEMPERATURE: 97.8 F | OXYGEN SATURATION: 98 %

## 2025-07-07 DIAGNOSIS — S83.92XA LEFT KNEE SPRAIN: Primary | ICD-10-CM
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
## 2025-07-07 DIAGNOSIS — R92.8 ABNORMAL ULTRASOUND OF BREAST: Primary | ICD-10-CM

## 2025-07-07 PROCEDURE — 73564 X-RAY EXAM KNEE 4 OR MORE: CPT

## 2025-07-07 PROCEDURE — 99283 EMERGENCY DEPT VISIT LOW MDM: CPT

## 2025-07-07 PROCEDURE — 99284 EMERGENCY DEPT VISIT MOD MDM: CPT | Performed by: EMERGENCY MEDICINE
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
## 2025-07-07 NOTE — Clinical Note
Jo Barros was seen and treated in our emergency department on 7/7/2025.                Diagnosis:     Jo  .    She may return on this date: 07/09/2025         If you have any questions or concerns, please don't hesitate to call.      Love Bradley MD    ______________________________           _______________          _______________  Hospital Representative                              Date                                Time

## 2025-07-08 ENCOUNTER — OFFICE VISIT (OUTPATIENT)
Dept: OBGYN CLINIC | Facility: MEDICAL CENTER | Age: 40
End: 2025-07-08
Payer: COMMERCIAL

## 2025-07-08 VITALS — BODY MASS INDEX: 23.65 KG/M2 | WEIGHT: 165.2 LBS | HEIGHT: 70 IN

## 2025-07-08 DIAGNOSIS — M23.92 ACUTE INTERNAL DERANGEMENT OF LEFT KNEE: Primary | ICD-10-CM

## 2025-07-08 PROCEDURE — 99214 OFFICE O/P EST MOD 30 MIN: CPT | Performed by: ORTHOPAEDIC SURGERY
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
## 2025-07-12 DIAGNOSIS — N94.6 DYSMENORRHEA: ICD-10-CM

## 2025-07-14 RX ORDER — NAPROXEN SODIUM 550 MG/1
550 TABLET ORAL 2 TIMES DAILY WITH MEALS
Qty: 60 TABLET | Refills: 1 | Status: SHIPPED | OUTPATIENT
Start: 2025-07-14

## 2025-07-15 ENCOUNTER — HOSPITAL ENCOUNTER (OUTPATIENT)
Dept: MRI IMAGING | Facility: HOSPITAL | Age: 40
Discharge: HOME | End: 2025-07-15
Attending: ORTHOPAEDIC SURGERY
Payer: OTHER MISCELLANEOUS

## 2025-07-15 DIAGNOSIS — M23.92 ACUTE INTERNAL DERANGEMENT OF LEFT KNEE: ICD-10-CM

## 2025-07-15 PROCEDURE — 73721 MRI JNT OF LWR EXTRE W/O DYE: CPT

## 2025-07-23 ENCOUNTER — OFFICE VISIT (OUTPATIENT)
Dept: OBGYN CLINIC | Facility: MEDICAL CENTER | Age: 40
End: 2025-07-23
Payer: OTHER MISCELLANEOUS

## 2025-07-23 VITALS — BODY MASS INDEX: 23.91 KG/M2 | WEIGHT: 167 LBS | HEIGHT: 70 IN

## 2025-07-23 DIAGNOSIS — M17.12 OSTEOARTHRITIS OF LEFT PATELLOFEMORAL JOINT: Primary | ICD-10-CM

## 2025-07-23 PROCEDURE — 99213 OFFICE O/P EST LOW 20 MIN: CPT | Performed by: ORTHOPAEDIC SURGERY

## 2025-07-23 NOTE — PROGRESS NOTES
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     She is here to review her MRI today.  She notes no new injuries.  No instability.  No numbness or tingling.  No other complaints at this time.    Knee Surgical History:  None    Past Medical, Social and Family History:  Past Medical History[1]  Past Surgical History[2]  Allergies[3]  Medications Ordered Prior to Encounter[4]  Social History     Socioeconomic History    Marital status: /Civil Union     Spouse name: Not on file    Number of children: Not on file    Years of education: Not on file    Highest education level: Not on file   Occupational History    Not on file   Tobacco Use    Smoking status: Former     Current packs/day: 0.00     Types: Cigarettes     Start date: 1996     Quit date: 2016     Years since quittin.5     Passive exposure: Past    Smokeless tobacco: Never    Tobacco comments:     quit > 10 years ago    Vaping Use    Vaping status: Some Days    Substances: THC   Substance and Sexual Activity    Alcohol use: Yes     Comment: Rare use    Drug use: Yes     Frequency: 2.0 times per week     Types: Marijuana     Comment: Medical Marijuana occasionally use    Sexual activity: Yes     Partners: Male     Birth  control/protection: None     Comment: Had Mirena removed 10/2018   Other Topics Concern    Not on file   Social History Narrative    Not on file     Social Drivers of Health     Financial Resource Strain: Low Risk  (11/3/2020)    Overall Financial Resource Strain (CARDIA)     Difficulty of Paying Living Expenses: Not hard at all   Food Insecurity: Food Insecurity Present (11/3/2020)    Hunger Vital Sign     Worried About Running Out of Food in the Last Year: Sometimes true     Ran Out of Food in the Last Year: Sometimes true   Transportation Needs: No Transportation Needs (11/3/2020)    PRAPARE - Transportation     Lack of Transportation (Medical): No     Lack of Transportation (Non-Medical): No   Physical Activity: Inactive (11/3/2020)    Exercise Vital Sign     Days of Exercise per Week: 0 days     Minutes of Exercise per Session: 0 min   Stress: No Stress Concern Present (11/3/2020)    [employer] [place] [employer] - Occupational Stress Questionnaire     Feeling of Stress : Not at all   Social Connections: Moderately Integrated (11/3/2020)    Social Connection and Isolation Panel     Frequency of Communication with Friends and Family: More than three times a week     Frequency of Social Gatherings with Friends and Family: More than three times a week     Attends [employer] Services: Never     Active Member of Clubs or Organizations: Yes     Attends Club or Organization Meetings: Never     Marital Status:    Intimate Partner Violence: Not At Risk (12/13/2023)    Humiliation, Afraid, Rape, and Kick questionnaire     Fear of Current or Ex-Partner: No     Emotionally Abused: No     Physically Abused: No     Sexually Abused: No   Housing Stability: Not on file         I have reviewed the past medical, surgical, social and family history, medications and allergies as documented in the EMR.    Review of systems: ROS is negative other than that noted in the HPI.  Constitutional: Negative for fatigue and  "fever.   HENT: Negative for sore throat.    Respiratory: Negative for shortness of breath.    Cardiovascular: Negative for chest pain.   Gastrointestinal: Negative for abdominal pain.   Endocrine: Negative for cold intolerance and heat intolerance.   Genitourinary: Negative for flank pain.   Musculoskeletal: Negative for back pain.   Skin: Negative for rash.   Allergic/Immunologic: Negative for immunocompromised state.   Neurological: Negative for dizziness.   Psychiatric/Behavioral: Negative for agitation.          Objective   Ht 5' 10\" (1.778 m)   Wt 75.8 kg (167 lb)   BMI 23.96 kg/m²     Physical Exam:    Height 5' 10\" (1.778 m), weight 75.8 kg (167 lb), not currently breastfeeding.    General/Constitutional: NAD, well developed, well nourished  HENT: Normocephalic, atraumatic  CV: Intact distal pulses, regular rate  Resp: No respiratory distress or labored breathing  GI: Soft and non-tender   Lymphatic: No lymphadenopathy palpated  Neuro: Alert and Oriented x 3, no focal deficits  Psych: Normal mood, normal affect, normal judgement, normal behavior  Skin: Warm, dry, no rashes, no erythema      Knee Exam (focused):                RIGHT LEFT   ROM:   0-130 2-120   Palpation: Effusion Negative  Diffuse ecchymosis Negative  Mild medial knee swelling  Diffuse ecchymosis     MJL tenderness Negative Positive  TTP semitendinosis and semimembranosus     LJL tenderness Negative Negative   Meniscus: Mak Negative Positive    Apley's Compression Negative Negative   Instability: Varus stable stable     Valgus stable Stable wo pain  NTTP medial femoral condyle   Special Tests: Lachman Negative Negative     Posterior drawer Negative Negative     Anterior drawer Negative Negative     Pivot shift not tested not tested     Dial not tested not tested   Patella: Palpation no tenderness no tenderness     Mobility 1/4 1/4 with firm endpoint     Apprehension Negative Negative   Other: Single leg 1/4 squat not tested not tested "      LE NV Exam: +2 DP/PT pulses bilaterally  Sensation intact to light touch L2-S1 bilaterally     Bilateral hip ROM demonstrates no pain actively or passively    No calf tenderness to palpation bilaterally    Knee Imaging    X-rays of the left knee from 07/07/2025 were reviewed, which demonstrate no acute fracture or dislocation. Minimal degenerative changes.  I have reviewed the radiology report and agree with their impression.    MRI of the left knee demonstrates moderate patellar cartilage loss with no meniscus tear seen.   I have reviewed the radiology report and agree with their impression.       [1]   Past Medical History:  Diagnosis Date    Abnormal ECG 1/25/19    Incomplete RBBB, repeat EKG 4/30    ADHD     Allergic     Allergic rhinitis     Anxiety     Treat with Cymbalta    Asthma     Breast mass     Painful lump Right breast    Cluster headache     GERD (gastroesophageal reflux disease)     Headache(784.0)     Headache, tension-type     HPV (human papilloma virus) infection     HSV-1 infection     HSV-2 infection     Miscarriage 09/2009    Miscarriage 8 weeks, Stillbirth 08/2012 (39 weeks)   [2]   Past Surgical History:  Procedure Laterality Date    BACK SURGERY  02/05/2019    Laminectomy L5-S1    CERVICAL BIOPSY  W/ LOOP ELECTRODE EXCISION  2012    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    CT EPIDURAL STEROID INJECTION (TAMI LUMBAR) N/A 2018    L5-S1, x2    FL INJECTION LEFT SHOULDER (ARTHROGRAM)  11/12/2024    LAMINECTOMY  2/5/2019    L5-S1 herniated disc    POSTERIOR LAMINECTOMY THORACIC AND LUMBAR SPINE N/A 02/05/2019    Procedure: Lumbar laminectomy, decompression, discectomy left L5-S1 ;  Surgeon: Misa Samaniego MD;  Location: [facility];  Service: Orthopedics    SKIN BIOPSY      SPINE SURGERY  2/5/19    L5-S1 discectomy/laminectomy    TONSILECTOMY AND ADNOIDECTOMY  1990    TONSILLECTOMY      Age 4yo    WISDOM TOOTH EXTRACTION  2007   [3]   Allergies  Allergen Reactions     Sulfa Antibiotics      Family has allergy she has never taken    Doxycycline Rash and Edema     Whole body rash,bruises behind both thighs   [4]   Current Outpatient Medications on File Prior to Visit   Medication Sig Dispense Refill    acetaZOLAMIDE (DIAMOX) 250 mg tablet Take 1 tablet (250 mg total) by mouth 2 (two) times a day 1 tab twice a day (second dose late afternoon/evening) for 7 days then increase to 2 tabs twice a day 180 tablet 3    ascorbic acid (VITAMIN C) 500 MG tablet Take 500 mg by mouth in the morning.      cholecalciferol (VITAMIN D3) 1,000 units tablet Take 4,000 Units by mouth in the morning.      Cyanocobalamin (VITAMIN B 12 PO) Place 1,000 mcg under the tongue in the morning      dexamethasone (DECADRON) 4 mg tablet Take 1 tablet (4 mg total) by mouth as needed (intractable headache) Take 2 tabs for 2 day then 1 for 3 days.  May stop when h/a breaks if don't need all 5 days 9 tablet 2    Docusate Sodium 100 MG capsule Take 100-200 mg by mouth in the morning 100 mg Monday/Wednesday/Friday/Sunday  200 mg Tuesday/Thursday/Saturday      DULoxetine (CYMBALTA) 60 mg delayed release capsule Take 1 capsule (60 mg total) by mouth daily 90 capsule 1    ketorolac (TORADOL) 10 mg tablet TAKE ONE TABLET BY MOUTH AT ONSET OF MIGRAINE, CAN REPEAT X 1 IN 6 HOURS, CAN COMBINE WITH TRIPTAN. TAKE WITH FOOD/MILK/ANTACID 10 tablet 3    Loratadine 10 MG CAPS Take 10 mg by mouth in the morning.      methocarbamol (ROBAXIN) 500 mg tablet Take 1 tablet (500 mg total) by mouth daily at bedtime as needed for muscle spasms 90 tablet 2    methylphenidate (CONCERTA) 36 MG ER tablet Take 1 tablet (36 mg total) by mouth daily Max Daily Amount: 36 mg 30 tablet 0    naproxen sodium (ANAPROX) 550 mg tablet TAKE 1 TABLET (550 MG TOTAL) BY MOUTH 2 (TWO) TIMES A DAY WITH MEALS FOR MENSTRUAL CRAMPS 60 tablet 1    omeprazole (PriLOSEC) 40 MG capsule TAKE 1 CAPSULE (40 MG TOTAL) BY MOUTH DAILY. 90 capsule 1    ondansetron (ZOFRAN) 4  mg tablet Take 1 tablet (4 mg total) by mouth every 8 (eight) hours as needed for nausea or vomiting 20 tablet 0    rimegepant sulfate (Nurtec) 75 mg TBDP Take 1 tablet (75 mg total) by mouth as needed (migraine) Limit of 1 in 24 hours 16 tablet 6    rizatriptan (MAXALT-MLT) 10 mg disintegrating tablet Take 1 tablet (10 mg total) by mouth as needed for migraine Take at the onset of migraine; if symptoms continue or return, may take another dose at least 2 hours after first dose. Take no more than 2 doses in a day. 12 tablet 3    Semaglutide-Weight Management (WEGOVY) 1.7 MG/0.75ML Inject 0.75 mL (1.7 mg total) under the skin once a week 3 mL 7    traZODone (DESYREL) 50 mg tablet TAKE 1/2-1 TABLET BY MOUTH AT BEDTIME AS NEEDED 90 tablet 1    valACYclovir (VALTREX) 500 mg tablet Take 1 tablet (500 mg total) by mouth daily 90 tablet 1     No current facility-administered medications on file prior to visit.

## 2025-07-29 DIAGNOSIS — B00.9 HERPES: ICD-10-CM

## 2025-07-30 DIAGNOSIS — B00.9 HERPES: ICD-10-CM

## 2025-07-31 RX ORDER — VALACYCLOVIR HYDROCHLORIDE 500 MG/1
500 TABLET, FILM COATED ORAL DAILY
Qty: 90 TABLET | Refills: 0 | Status: SHIPPED | OUTPATIENT
Start: 2025-07-31 | End: 2025-08-30

## 2025-07-31 RX ORDER — VALACYCLOVIR HYDROCHLORIDE 500 MG/1
500 TABLET, FILM COATED ORAL DAILY
Qty: 90 TABLET | Refills: 1 | OUTPATIENT
Start: 2025-07-31 | End: 2026-01-27

## 2025-08-06 DIAGNOSIS — F98.8 ATTENTION DEFICIT DISORDER (ADD) IN ADULT: ICD-10-CM

## 2025-08-06 RX ORDER — METHYLPHENIDATE HYDROCHLORIDE 36 MG/1
36 TABLET ORAL DAILY
Qty: 30 TABLET | Refills: 0 | Status: SHIPPED | OUTPATIENT
Start: 2025-08-06

## 2025-08-19 ENCOUNTER — HOSPITAL ENCOUNTER (EMERGENCY)
Facility: HOSPITAL | Age: 40
Discharge: HOME | End: 2025-08-19
Attending: EMERGENCY MEDICINE | Admitting: EMERGENCY MEDICINE
Payer: COMMERCIAL

## 2025-08-19 ENCOUNTER — APPOINTMENT (EMERGENCY)
Dept: RADIOLOGY | Facility: HOSPITAL | Age: 40
End: 2025-08-19
Payer: COMMERCIAL

## 2025-08-19 VITALS
HEART RATE: 70 BPM | TEMPERATURE: 97.3 F | SYSTOLIC BLOOD PRESSURE: 107 MMHG | OXYGEN SATURATION: 100 % | RESPIRATION RATE: 16 BRPM | DIASTOLIC BLOOD PRESSURE: 72 MMHG

## 2025-08-19 DIAGNOSIS — S93.401A RIGHT ANKLE SPRAIN: Primary | ICD-10-CM

## 2025-08-19 PROCEDURE — 99284 EMERGENCY DEPT VISIT MOD MDM: CPT | Performed by: EMERGENCY MEDICINE

## 2025-08-19 PROCEDURE — 99283 EMERGENCY DEPT VISIT LOW MDM: CPT

## 2025-08-19 PROCEDURE — 73610 X-RAY EXAM OF ANKLE: CPT

## 2025-08-19 PROCEDURE — 73630 X-RAY EXAM OF FOOT: CPT

## (undated) DEVICE — DRAPE LAPAROTOMY W/POUCHES

## (undated) DEVICE — DRAPE SHEET X-LG

## (undated) DEVICE — INTENDED FOR TISSUE SEPARATION, AND OTHER PROCEDURES THAT REQUIRE A SHARP SURGICAL BLADE TO PUNCTURE OR CUT.: Brand: BARD-PARKER ® CARBON RIB-BACK BLADES

## (undated) DEVICE — FLOSEAL HEMOSTATIC MATRIX, 5 ML: Brand: FLOSEAL

## (undated) DEVICE — LIGHT HANDLE COVER SLEEVE DISP BLUE STELLAR

## (undated) DEVICE — INTENDED FOR TISSUE SEPARATION, AND OTHER PROCEDURES THAT REQUIRE A SHARP SURGICAL BLADE TO PUNCTURE OR CUT.: Brand: BARD-PARKER SAFETY BLADES SIZE 10, STERILE

## (undated) DEVICE — DRAPE C-ARM X-RAY

## (undated) DEVICE — SUT PDS II 0 CT-1 27 IN Z340H

## (undated) DEVICE — TOOL 14MH30 LEGEND 14CM 3MM: Brand: MIDAS REX ™

## (undated) DEVICE — DRAPE EQUIPMENT RF WAND

## (undated) DEVICE — DRAPE SHEET THREE QUARTER

## (undated) DEVICE — SUT VICRYL 0 CT-1 27 IN J260H

## (undated) DEVICE — GLOVE SRG BIOGEL 8.5

## (undated) DEVICE — 3M™ TEGADERM™ TRANSPARENT FILM DRESSING FRAME STYLE, 1626W, 4 IN X 4-3/4 IN (10 CM X 12 CM), 50/CT 4CT/CASE: Brand: 3M™ TEGADERM™

## (undated) DEVICE — DISPOSABLE EQUIPMENT COVER: Brand: SMALL TOWEL DRAPE

## (undated) DEVICE — NEEDLE 25G X 1 1/2

## (undated) DEVICE — SUT VICRYL 2-0 CT-1 27 IN J259H

## (undated) DEVICE — PROXIMATE PLUS MD MULTI-DIRECTIONAL RELEASE SKIN STAPLERS CONTAINS 35 STAINLESS STEEL STAPLES APPROXIMATE CLOSED DIMENSIONS: 6.9MM X 3.9MM WIDE: Brand: PROXIMATE

## (undated) DEVICE — SUT ETHILON 3-0 FS-1 18 IN 663G

## (undated) DEVICE — DRAPE C-ARMOUR

## (undated) DEVICE — ELECTRODE EZ CLEAN BLADE -0012

## (undated) DEVICE — SPECIMEN CONTAINER STERILE PEEL PACK

## (undated) DEVICE — SUT ETHILON 2-0 FSLX 30 IN 1674H

## (undated) DEVICE — BETHLEHEM UNIVERSAL SPINE, KIT: Brand: CARDINAL HEALTH

## (undated) DEVICE — CHLORAPREP HI-LITE 26ML ORANGE

## (undated) DEVICE — SWABSTCK, BENZOIN TINCTURE, 1/PK, STRL: Brand: APLICARE

## (undated) DEVICE — SPONGE PVP SCRUB WING STERILE

## (undated) DEVICE — GLOVE INDICATOR PI UNDERGLOVE SZ 8.5 BLUE

## (undated) DEVICE — PENCIL ELECTROSURG E-Z CLEAN -0035H

## (undated) DEVICE — DRESSING MEPILEX AG BORDER 4 X 4 IN